# Patient Record
Sex: MALE | Race: WHITE | Employment: OTHER | URBAN - METROPOLITAN AREA
[De-identification: names, ages, dates, MRNs, and addresses within clinical notes are randomized per-mention and may not be internally consistent; named-entity substitution may affect disease eponyms.]

---

## 2017-10-17 ENCOUNTER — HOSPITAL ENCOUNTER (OUTPATIENT)
Dept: CT IMAGING | Age: 77
Discharge: HOME | End: 2017-10-17
Attending: UROLOGY | Admitting: UROLOGY

## 2017-10-17 DIAGNOSIS — Z87.442 PERSONAL HISTORY OF URINARY CALCULI: ICD-10-CM

## 2017-10-17 DIAGNOSIS — Z87.440 HISTORY OF URINARY TRACT INFECTION: ICD-10-CM

## 2017-12-13 ENCOUNTER — HOSPITAL ENCOUNTER (OUTPATIENT)
Dept: ENDOSCOPY | Age: 77
Discharge: HOME | End: 2017-12-13
Attending: INTERNAL MEDICINE | Admitting: INTERNAL MEDICINE

## 2017-12-13 VITALS
DIASTOLIC BLOOD PRESSURE: 87 MMHG | OXYGEN SATURATION: 98 % | HEIGHT: 67 IN | TEMPERATURE: 97.1 F | RESPIRATION RATE: 16 BRPM | WEIGHT: 175 LBS | BODY MASS INDEX: 27.47 KG/M2 | HEART RATE: 72 BPM | SYSTOLIC BLOOD PRESSURE: 134 MMHG

## 2017-12-13 ASSESSMENT — PAIN - FUNCTIONAL ASSESSMENT: PAIN_FUNCTIONAL_ASSESSMENT: 0-10

## 2017-12-13 NOTE — PLAN OF CARE
as ordered  [x] Tolerating clear liquids  [x] D/C IV fluids   ACTIVITY [x] Assess level of function  [x] Specified by physician  [x]Activity as tolerated [x] Position on left side [x] Position on left side and reposition patient as physician ordered [x] Gradually elevate HOB to larsons position  [x] Position changes as patient tolerated  [x] Ambulate as pre-procedure   PATIENT / SO EDUCATION [x] Pre,Intra, Post-procedure  teaching appropriate to procedure  [x]Conscious Sedation Teaching  [x] Pain Management - instructed [x] Encourage questions  [x] Clarify any concerns [x]Assist and support patient  [x]Safety devices maintain to  prevent patient injury  [x] Observe standard precautions [x] Physician confers with the family/S.O. [x] Short visit from family in [place] area  [x] Review discharge instructions, take home medicine to family /S.O.; questions clarified  [x] Physician specific post-procedure orders  [x] S/S complications with proper F/U  [x] F/U office visits  [] Med info sheet/ copy of discharge  instruction given to pt./S.O.   OUTCOME PLANNING  DISCHARGE PLAN [x] Patient/S. O. will verbalize understanding of admission  procedure & expectations of outcome in realistic terms  [x] Patient verbalize the role of family/S. O. in plan of care  [x] Patient will have designated responsible person available for discharge.  [x] Patient will demonstrate an  understanding of the planned  procedure and its related procedures and conscious sedation [x] Patient will:  - receive services according to the standards of care  - receive standards for conscious sedation  -  remain free of injury [x] Patient will:   - have stable vital signs based on Aly Score  -  be pain free or tolerable, have no signs of difficulty in breathing  - no abdominal distention, severe sore throat, chest pain, bleeding  -  return to pre-procedure level of conciousness   - tolerate fluid with no N/V  - ambulate as pre-procedure ADL  - verbalize

## 2018-01-30 ENCOUNTER — HOSPITAL ENCOUNTER (OUTPATIENT)
Dept: ENDOSCOPY | Age: 78
Discharge: HOME | End: 2018-01-30
Attending: INTERNAL MEDICINE | Admitting: INTERNAL MEDICINE

## 2018-01-30 VITALS
DIASTOLIC BLOOD PRESSURE: 69 MMHG | SYSTOLIC BLOOD PRESSURE: 129 MMHG | HEART RATE: 58 BPM | OXYGEN SATURATION: 100 % | BODY MASS INDEX: 26.67 KG/M2 | RESPIRATION RATE: 16 BRPM | HEIGHT: 68 IN | TEMPERATURE: 96.9 F | WEIGHT: 176 LBS

## 2018-01-30 NOTE — PLAN OF CARE
understanding of the discharge instructions      NURSE SIGNATURE:   __Electronically signed by Guevara Fleming RN on 1/30/2018 at 10:18 AM  __________________________   ________________________      __________________________    ____________________________________John Jacinto Postal  ___                                                            [id number],,S8,1/36,J

## 2018-01-31 NOTE — OP NOTE
[address], [address]                                 OPERATIVE REPORT    PATIENT NAME: Tigre Munoz                      :        1940  MED REC NO:   [id number]                          ROOM:  ACCOUNT NO:   [de-identified]                          ADMIT DATE: 2018  PROVIDER:     Pancho Doran MD    DATE OF PROCEDURE:  2018    INDICATION FOR PROCEDURE:  Recurrent dysphagia. PROCEDURE:  With the patient in the left lateral position, after 50 mg of  Demerol and 3 mg of Versed IV, the Olympus video endoscope was introduced  into the esophagus and advanced towards the GE junction, where a tight  lower esophageal ring was noted. Balloon dilation was performed using size  15 and 16 mm. Slight bleeding occurred, but there was no complication. What previously appear to be polyp at GE junction, it looks as an enlarged  fold and biopsies were obtained. Previous biopsy showed inflammatory  changes. Stomach was carefully inspected and it was unremarkable. The  duodenum was normal.  Scope was then removed without complication. IMPRESSION:  Lower esophageal ring. Balloon dilation was performed.         Karlene Martin MD    D: 2018 12:54:50       T: 2018 22:31:45     [email or web address]  Job#: [id number]     Doc#: [id number]    CC:  MD Devonte Hall MD

## 2018-02-07 NOTE — H&P
History and Physical / Pre-Sedation Assessment    Patient:  Rick Albert   :   1940     Intended Procedure:  egd    HPI: dysphagia    Nurses notes reviewed and agreed. Medications reviewed  Allergies: No Known Allergies    Physical Exam:  Vital Signs: /69   Pulse 58   Temp 96.9 °F (36.1 °C)   Resp 16   Ht 5' 7.5\" (1.715 m)   Wt 176 lb (79.8 kg)   SpO2 100%   BMI 27.16 kg/m²    Pulmonary:Normal  Cardiac:Normal  Abdomen:Normal    Pre-Procedure Assessment / Plan:  ASA 2 - Patient with mild systemic disease with no functional limitations  Level of Sedation Plan: Moderate sedation  Post Procedure plan: Return to same level of care    I assessed the patient and find that the patient is in satisfactory condition to proceed with the planned procedure and sedation plan. I have explained the risk, benefits, and alternatives to the procedure. The patient understands and agrees to proceed.   Taiwo Martinez  8:51 PM 2018

## 2019-03-19 ENCOUNTER — ANESTHESIA (OUTPATIENT)
Dept: OPERATING ROOM | Age: 79
End: 2019-03-19
Payer: MEDICARE

## 2019-03-19 ENCOUNTER — ANESTHESIA EVENT (OUTPATIENT)
Dept: OPERATING ROOM | Age: 79
End: 2019-03-19
Payer: MEDICARE

## 2019-03-19 ENCOUNTER — HOSPITAL ENCOUNTER (OUTPATIENT)
Age: 79
Setting detail: OUTPATIENT SURGERY
Discharge: HOME | End: 2019-03-19
Attending: OPHTHALMOLOGY | Admitting: OPHTHALMOLOGY
Payer: MEDICARE

## 2019-03-19 VITALS
DIASTOLIC BLOOD PRESSURE: 75 MMHG | HEIGHT: 67 IN | SYSTOLIC BLOOD PRESSURE: 136 MMHG | OXYGEN SATURATION: 99 % | BODY MASS INDEX: 28.25 KG/M2 | RESPIRATION RATE: 16 BRPM | TEMPERATURE: 97.8 F | WEIGHT: 180 LBS | HEART RATE: 51 BPM

## 2019-03-19 VITALS — DIASTOLIC BLOOD PRESSURE: 75 MMHG | SYSTOLIC BLOOD PRESSURE: 120 MMHG | OXYGEN SATURATION: 99 %

## 2019-03-19 PROCEDURE — 3600000013 HC SURGERY LEVEL 3 ADDTL 15MIN: Performed by: OPHTHALMOLOGY

## 2019-03-19 PROCEDURE — 2500000003 HC RX 250 WO HCPCS: Performed by: NURSE ANESTHETIST, CERTIFIED REGISTERED

## 2019-03-19 PROCEDURE — 6370000000 HC RX 637 (ALT 250 FOR IP)

## 2019-03-19 PROCEDURE — 6360000002 HC RX W HCPCS: Performed by: NURSE ANESTHETIST, CERTIFIED REGISTERED

## 2019-03-19 PROCEDURE — 3700000001 HC ADD 15 MINUTES (ANESTHESIA): Performed by: OPHTHALMOLOGY

## 2019-03-19 PROCEDURE — 7100000011 HC PHASE II RECOVERY - ADDTL 15 MIN: Performed by: OPHTHALMOLOGY

## 2019-03-19 PROCEDURE — 3700000000 HC ANESTHESIA ATTENDED CARE: Performed by: OPHTHALMOLOGY

## 2019-03-19 PROCEDURE — 6370000000 HC RX 637 (ALT 250 FOR IP): Performed by: OPHTHALMOLOGY

## 2019-03-19 PROCEDURE — 2709999900 HC NON-CHARGEABLE SUPPLY: Performed by: OPHTHALMOLOGY

## 2019-03-19 PROCEDURE — V2632 POST CHMBR INTRAOCULAR LENS: HCPCS | Performed by: OPHTHALMOLOGY

## 2019-03-19 PROCEDURE — 3600000003 HC SURGERY LEVEL 3 BASE: Performed by: OPHTHALMOLOGY

## 2019-03-19 PROCEDURE — 2580000003 HC RX 258: Performed by: OPHTHALMOLOGY

## 2019-03-19 PROCEDURE — 2580000003 HC RX 258: Performed by: ANESTHESIOLOGY

## 2019-03-19 PROCEDURE — 7100000010 HC PHASE II RECOVERY - FIRST 15 MIN: Performed by: OPHTHALMOLOGY

## 2019-03-19 DEVICE — ACRYSOF(R) IQ ASPHERIC IOL SP ACRYLIC FOLDABLELENS WULTRASERT(TM) DELIVERY SYSTEM UV WBLUE LIGHT FILTER. 13.0MM LENGTH 6.0MM ANTERIORASYMMETRIC BICONVEX OPTIC PLANAR HAPTICS.
Type: IMPLANTABLE DEVICE | Site: LENS | Status: FUNCTIONAL
Brand: ACRYSOF ULTRASERT

## 2019-03-19 RX ORDER — SODIUM CHLORIDE 0.9 % (FLUSH) 0.9 %
10 SYRINGE (ML) INJECTION PRN
Status: DISCONTINUED | OUTPATIENT
Start: 2019-03-19 | End: 2019-03-19 | Stop reason: HOSPADM

## 2019-03-19 RX ORDER — LIDOCAINE HYDROCHLORIDE 10 MG/ML
0.3 INJECTION, SOLUTION EPIDURAL; INFILTRATION; INTRACAUDAL; PERINEURAL
Status: DISCONTINUED | OUTPATIENT
Start: 2019-03-19 | End: 2019-03-19 | Stop reason: HOSPADM

## 2019-03-19 RX ORDER — LIDOCAINE HYDROCHLORIDE 20 MG/ML
INJECTION, SOLUTION INFILTRATION; PERINEURAL PRN
Status: DISCONTINUED | OUTPATIENT
Start: 2019-03-19 | End: 2019-03-19 | Stop reason: SDUPTHER

## 2019-03-19 RX ORDER — PROPOFOL 10 MG/ML
INJECTION, EMULSION INTRAVENOUS PRN
Status: DISCONTINUED | OUTPATIENT
Start: 2019-03-19 | End: 2019-03-19 | Stop reason: SDUPTHER

## 2019-03-19 RX ORDER — SODIUM CHLORIDE 0.9 % (FLUSH) 0.9 %
10 SYRINGE (ML) INJECTION EVERY 12 HOURS SCHEDULED
Status: DISCONTINUED | OUTPATIENT
Start: 2019-03-19 | End: 2019-03-19 | Stop reason: HOSPADM

## 2019-03-19 RX ORDER — MAGNESIUM HYDROXIDE 1200 MG/15ML
LIQUID ORAL PRN
Status: DISCONTINUED | OUTPATIENT
Start: 2019-03-19 | End: 2019-03-19 | Stop reason: ALTCHOICE

## 2019-03-19 RX ORDER — SODIUM CHLORIDE, SODIUM LACTATE, POTASSIUM CHLORIDE, CALCIUM CHLORIDE 600; 310; 30; 20 MG/100ML; MG/100ML; MG/100ML; MG/100ML
INJECTION, SOLUTION INTRAVENOUS CONTINUOUS
Status: DISCONTINUED | OUTPATIENT
Start: 2019-03-19 | End: 2019-03-19 | Stop reason: HOSPADM

## 2019-03-19 RX ORDER — TOBRAMYCIN AND DEXAMETHASONE 3; 1 MG/ML; MG/ML
SUSPENSION/ DROPS OPHTHALMIC PRN
Status: DISCONTINUED | OUTPATIENT
Start: 2019-03-19 | End: 2019-03-19 | Stop reason: ALTCHOICE

## 2019-03-19 RX ADMIN — PROPOFOL 90 MG: 10 INJECTION, EMULSION INTRAVENOUS at 09:04

## 2019-03-19 RX ADMIN — Medication 0.3 ML: at 07:53

## 2019-03-19 RX ADMIN — Medication 0.3 ML: at 07:43

## 2019-03-19 RX ADMIN — BROMFENAC SODIUM 1 DROP: 0.7 SOLUTION/ DROPS OPHTHALMIC at 07:44

## 2019-03-19 RX ADMIN — SODIUM CHLORIDE, POTASSIUM CHLORIDE, SODIUM LACTATE AND CALCIUM CHLORIDE: 600; 310; 30; 20 INJECTION, SOLUTION INTRAVENOUS at 08:01

## 2019-03-19 RX ADMIN — LIDOCAINE HYDROCHLORIDE 40 MG: 20 INJECTION, SOLUTION INFILTRATION; PERINEURAL at 09:23

## 2019-03-19 RX ADMIN — Medication 0.3 ML: at 08:03

## 2019-03-19 ASSESSMENT — PULMONARY FUNCTION TESTS
PIF_VALUE: 1

## 2019-03-19 ASSESSMENT — PAIN - FUNCTIONAL ASSESSMENT: PAIN_FUNCTIONAL_ASSESSMENT: 0-10

## 2019-03-19 ASSESSMENT — PAIN SCALES - GENERAL: PAINLEVEL_OUTOF10: 0

## 2019-09-13 ENCOUNTER — HOSPITAL ENCOUNTER (EMERGENCY)
Age: 79
Discharge: HOME | End: 2019-09-13
Attending: EMERGENCY MEDICINE
Payer: MEDICARE

## 2019-09-13 VITALS
RESPIRATION RATE: 16 BRPM | DIASTOLIC BLOOD PRESSURE: 71 MMHG | HEART RATE: 68 BPM | TEMPERATURE: 99.1 F | HEIGHT: 67 IN | OXYGEN SATURATION: 97 % | BODY MASS INDEX: 28.25 KG/M2 | SYSTOLIC BLOOD PRESSURE: 134 MMHG | WEIGHT: 180 LBS

## 2019-09-13 DIAGNOSIS — L02.91 ABSCESS: Primary | ICD-10-CM

## 2019-09-13 PROCEDURE — 6370000000 HC RX 637 (ALT 250 FOR IP): Performed by: EMERGENCY MEDICINE

## 2019-09-13 PROCEDURE — 99283 EMERGENCY DEPT VISIT LOW MDM: CPT

## 2019-09-13 PROCEDURE — 4500000023 HC ED LEVEL 3 PROCEDURE

## 2019-09-13 RX ORDER — OXYCODONE HYDROCHLORIDE AND ACETAMINOPHEN 5; 325 MG/1; MG/1
2 TABLET ORAL ONCE
Status: COMPLETED | OUTPATIENT
Start: 2019-09-13 | End: 2019-09-13

## 2019-09-13 RX ORDER — CLINDAMYCIN HYDROCHLORIDE 150 MG/1
300 CAPSULE ORAL ONCE
Status: COMPLETED | OUTPATIENT
Start: 2019-09-13 | End: 2019-09-13

## 2019-09-13 RX ORDER — OXYCODONE HYDROCHLORIDE AND ACETAMINOPHEN 5; 325 MG/1; MG/1
1 TABLET ORAL EVERY 6 HOURS PRN
Qty: 20 TABLET | Refills: 0 | Status: SHIPPED | OUTPATIENT
Start: 2019-09-13 | End: 2019-09-18

## 2019-09-13 RX ORDER — OXYCODONE HYDROCHLORIDE AND ACETAMINOPHEN 5; 325 MG/1; MG/1
1 TABLET ORAL ONCE
Status: COMPLETED | OUTPATIENT
Start: 2019-09-13 | End: 2019-09-13

## 2019-09-13 RX ORDER — CLINDAMYCIN HYDROCHLORIDE 300 MG/1
300 CAPSULE ORAL 2 TIMES DAILY
Qty: 14 CAPSULE | Refills: 0 | Status: SHIPPED | OUTPATIENT
Start: 2019-09-13 | End: 2019-09-20

## 2019-09-13 RX ADMIN — CLINDAMYCIN HYDROCHLORIDE 300 MG: 150 CAPSULE ORAL at 21:48

## 2019-09-13 RX ADMIN — OXYCODONE HYDROCHLORIDE AND ACETAMINOPHEN 2 TABLET: 5; 325 TABLET ORAL at 20:53

## 2019-09-13 RX ADMIN — OXYCODONE HYDROCHLORIDE AND ACETAMINOPHEN 1 TABLET: 5; 325 TABLET ORAL at 21:48

## 2019-09-13 ASSESSMENT — PAIN DESCRIPTION - PAIN TYPE: TYPE: ACUTE PAIN

## 2019-09-13 ASSESSMENT — PAIN SCALES - GENERAL
PAINLEVEL_OUTOF10: 5
PAINLEVEL_OUTOF10: 5

## 2019-09-13 ASSESSMENT — PAIN - FUNCTIONAL ASSESSMENT: PAIN_FUNCTIONAL_ASSESSMENT: ACTIVITIES ARE NOT PREVENTED

## 2019-09-13 ASSESSMENT — PAIN DESCRIPTION - PROGRESSION: CLINICAL_PROGRESSION: GRADUALLY WORSENING

## 2019-09-13 ASSESSMENT — PAIN DESCRIPTION - LOCATION: LOCATION: BUTTOCKS

## 2019-09-13 ASSESSMENT — PAIN DESCRIPTION - FREQUENCY: FREQUENCY: CONTINUOUS

## 2019-09-13 ASSESSMENT — PAIN DESCRIPTION - DESCRIPTORS: DESCRIPTORS: SHARP;STABBING

## 2019-09-13 ASSESSMENT — PAIN DESCRIPTION - ORIENTATION: ORIENTATION: LEFT

## 2020-02-16 ENCOUNTER — APPOINTMENT (OUTPATIENT)
Dept: GENERAL RADIOLOGY | Age: 80
End: 2020-02-16
Payer: MEDICARE

## 2020-02-16 ENCOUNTER — HOSPITAL ENCOUNTER (EMERGENCY)
Age: 80
Discharge: HOME | End: 2020-02-16
Payer: MEDICARE

## 2020-02-16 VITALS
SYSTOLIC BLOOD PRESSURE: 115 MMHG | HEART RATE: 77 BPM | HEIGHT: 67 IN | BODY MASS INDEX: 28.25 KG/M2 | DIASTOLIC BLOOD PRESSURE: 71 MMHG | WEIGHT: 180 LBS | TEMPERATURE: 97.9 F | RESPIRATION RATE: 17 BRPM | OXYGEN SATURATION: 99 %

## 2020-02-16 PROCEDURE — 99283 EMERGENCY DEPT VISIT LOW MDM: CPT

## 2020-02-16 PROCEDURE — 71046 X-RAY EXAM CHEST 2 VIEWS: CPT

## 2020-02-16 NOTE — ED PROVIDER NOTES
[address] [facility]  EMERGENCY DEPARTMENT ENCOUNTER        Pt Name: Cyril Lopez  MRN: [id number]  [place] 1940  Date of evaluation: 2/16/2020  Provider: GODFREY Gomez - CNP  PCP: Bailey Garza MD    This patient was not seen and evaluated by the attending physician. CHIEF COMPLAINT       Chief Complaint   Patient presents with    URI     x 1 week    Cough       HISTORY OF PRESENT ILLNESS   (Location, Timing/Onset, Context/Setting, Quality, Duration, Modifying Factors, Severity, Associated Signs and Symptoms)  Note limiting factors. Cyril Lopez is a [de-identified] y.o. male who presents for evaluation of cough and congestion x1 week. Patient states he has had a productive cough with light green sputum and nasal congestion x1 week. Patient states that he has been taking Mucinex since yesterday with relief. Denies smoking, sore throat, ear pain, chest pain, shortness of breath, fever, chills, nausea, vomiting, and diarrhea. Patient states he did not receive a flu shot this year. States \"I want to make sure I do not have pneumonia. \"    Nursing Notes were all reviewed and agreed with or any disagreements were addressed in the HPI. REVIEW OF SYSTEMS    (2-9 systems for level 4, 10 or more for level 5)     Review of Systems    Positives and Pertinent negatives as per HPI. Except as noted above in the ROS, all other systems were reviewed and negative.        PAST MEDICAL HISTORY     Past Medical History:   Diagnosis Date    Chronic kidney disease     kidney stones    [facility]     Guillain Barré syndrome ([facility]     Hyperlipidemia     Hypertension     Left ureteral stone          SURGICAL HISTORY     Past Surgical History:   Procedure Laterality Date    ADRENALECTOMY      groin    BACK SURGERY      CARPAL TUNNEL RELEASE      bilateral    INTRACAPSULAR CATARACT EXTRACTION Right 3/19/2019    PHACOEMULSIFICATION OF CATARACT RIGHT EYE WITH INTRAOCULARLENS IMPLANT performed by Lalito Sampson MD at [address]   [address] OTHER SURGICAL HISTORY  10/25/2017     CYSTOSCOPY; TRANSURETHRAL RESECTION PROSTATE    TOTAL ELBOW ARTHROPLASTY Left          CURRENTMEDICATIONS       Previous Medications    ALLOPURINOL (ZYLOPRIM) 300 MG TABLET    Take 300 mg by mouth daily    ATORVASTATIN (LIPITOR) 80 MG TABLET    Take 80 mg by mouth daily    DIPHENHYDRAMINE-APAP, SLEEP, (TYLENOL PM EXTRA STRENGTH PO)    Take 1 tablet by mouth nightly    DONEPEZIL (ARICEPT) 5 MG TABLET    Take 5 mg by mouth nightly    LOPERAMIDE HCL (IMODIUM A-D PO)    Take by mouth Indications: as needed    TAMSULOSIN (FLOMAX) 0.4 MG CAPSULE    Take 0.4 mg by mouth daily         ALLERGIES     Patient has no known allergies. FAMILYHISTORY       Family History   Problem Relation Age of Onset    Colon Cancer Father     Diabetes Mother     Hypertension Mother     Colon Cancer Sister         x 2          SOCIAL HISTORY       Social History     Tobacco Use    Smoking status: Never Smoker    Smokeless tobacco: Never Used   Substance Use Topics    Alcohol use: No    Drug use: No       SCREENINGS             PHYSICAL EXAM    (up to 7 for level 4, 8 or more for level 5)     ED Triage Vitals [02/16/20 1134]   BP Temp Temp Source Pulse Resp SpO2 Height Weight   114/70 97.9 °F (36.6 °C) Tympanic 75 17 97 % 5' 7\" (1.702 m) 180 lb (81.6 kg)       Physical Exam  Vitals signs and nursing note reviewed. Constitutional:       Appearance: He is well-developed. He is not diaphoretic. HENT:      Head: Normocephalic and atraumatic. Right Ear: Tympanic membrane normal.      Left Ear: Tympanic membrane normal.      Nose: Congestion present. Mouth/Throat:      Mouth: Mucous membranes are moist.      Pharynx: Oropharynx is clear. Eyes:      General:         Right eye: No discharge. Left eye: No discharge.    Neck:      Musculoskeletal: Normal range of

## 2024-02-05 LAB
A/G RATIO: NORMAL
ALBUMIN SERPL-MCNC: NORMAL G/DL
ALP BLD-CCNC: 142 U/L
ALT SERPL-CCNC: 13 U/L
AST SERPL-CCNC: 23 U/L
BASOPHILS ABSOLUTE: 0.1 /ΜL
BASOPHILS RELATIVE PERCENT: 1 %
BILIRUB SERPL-MCNC: 1.3 MG/DL (ref 0.1–1.4)
BILIRUBIN DIRECT: 0.58 MG/DL
BILIRUBIN, INDIRECT: NORMAL
EOSINOPHILS ABSOLUTE: 0.2 /ΜL
EOSINOPHILS RELATIVE PERCENT: 4 %
GLOBULIN: NORMAL
HCT VFR BLD CALC: 49.3 % (ref 41–53)
HEMOGLOBIN: 15.6 G/DL (ref 13.5–17.5)
LYMPHOCYTES ABSOLUTE: 1.6 /ΜL
LYMPHOCYTES RELATIVE PERCENT: 25 %
MCH RBC QN AUTO: 27.9 PG
MCHC RBC AUTO-ENTMCNC: 31.6 G/DL
MCV RBC AUTO: 88 FL
MONOCYTES ABSOLUTE: 0.6 /ΜL
MONOCYTES RELATIVE PERCENT: 9 %
NEUTROPHILS ABSOLUTE: 3.9 /ΜL
NEUTROPHILS RELATIVE PERCENT: 61 %
PLATELET # BLD: 209 K/ΜL
PMV BLD AUTO: NORMAL FL
PROTEIN TOTAL: NORMAL
RBC # BLD: 5.59 10^6/ΜL
TSH SERPL DL<=0.05 MIU/L-ACNC: 1.7 UIU/ML
WBC # BLD: 6.4 10^3/ML

## 2024-02-13 ENCOUNTER — TELEPHONE (OUTPATIENT)
Dept: CARDIOLOGY CLINIC | Age: 84
End: 2024-02-13

## 2024-02-13 NOTE — TELEPHONE ENCOUNTER
Pt wife called to make a new pt appt, stated pt had a stress test last week that was abnormal, SOB,  edema, and claims he has a \"Heart Virus\". Verified pcp, called and requested labs and cardiac testing be faxed. Once received will abstract and scan into media.

## 2024-02-16 ENCOUNTER — TELEPHONE (OUTPATIENT)
Dept: CARDIOLOGY CLINIC | Age: 84
End: 2024-02-16

## 2024-02-16 ENCOUNTER — OFFICE VISIT (OUTPATIENT)
Dept: CARDIOLOGY CLINIC | Age: 84
End: 2024-02-16

## 2024-02-16 VITALS
HEIGHT: 67 IN | BODY MASS INDEX: 29.74 KG/M2 | HEART RATE: 99 BPM | DIASTOLIC BLOOD PRESSURE: 62 MMHG | SYSTOLIC BLOOD PRESSURE: 116 MMHG | OXYGEN SATURATION: 100 % | WEIGHT: 189.5 LBS

## 2024-02-16 DIAGNOSIS — R60.0 BILATERAL LOWER EXTREMITY EDEMA: ICD-10-CM

## 2024-02-16 DIAGNOSIS — Z79.899 MEDICATION MANAGEMENT: ICD-10-CM

## 2024-02-16 DIAGNOSIS — R00.2 PALPITATIONS: ICD-10-CM

## 2024-02-16 DIAGNOSIS — I49.9 IRREGULAR HEART RATE: ICD-10-CM

## 2024-02-16 DIAGNOSIS — R06.02 SHORTNESS OF BREATH: ICD-10-CM

## 2024-02-16 DIAGNOSIS — I50.40 COMBINED SYSTOLIC AND DIASTOLIC CONGESTIVE HEART FAILURE, UNSPECIFIED HF CHRONICITY (HCC): Primary | ICD-10-CM

## 2024-02-16 DIAGNOSIS — R06.09 DYSPNEA ON EXERTION: ICD-10-CM

## 2024-02-16 DIAGNOSIS — Z76.89 ESTABLISHING CARE WITH NEW DOCTOR, ENCOUNTER FOR: ICD-10-CM

## 2024-02-16 DIAGNOSIS — E78.2 MIXED HYPERLIPIDEMIA: ICD-10-CM

## 2024-02-16 RX ORDER — OMEPRAZOLE 40 MG/1
40 CAPSULE, DELAYED RELEASE ORAL DAILY
COMMUNITY

## 2024-02-16 RX ORDER — CEFDINIR 300 MG/1
300 CAPSULE ORAL 2 TIMES DAILY
COMMUNITY

## 2024-02-16 RX ORDER — FUROSEMIDE 40 MG/1
TABLET ORAL
Qty: 40 TABLET | Refills: 0 | Status: SHIPPED | OUTPATIENT
Start: 2024-02-16 | End: 2024-03-22

## 2024-02-16 RX ORDER — ROSUVASTATIN CALCIUM 10 MG/1
10 TABLET, COATED ORAL EVERY EVENING
COMMUNITY
Start: 2023-12-27

## 2024-02-16 RX ORDER — SACUBITRIL AND VALSARTAN 24; 26 MG/1; MG/1
1 TABLET, FILM COATED ORAL 2 TIMES DAILY
COMMUNITY
Start: 2024-02-09

## 2024-02-16 RX ORDER — OXYBUTYNIN CHLORIDE 5 MG/1
5 TABLET, EXTENDED RELEASE ORAL DAILY
COMMUNITY

## 2024-02-16 NOTE — PROGRESS NOTES
CARDIOLOGY CONSULTATION        Patient Name: Jhony Hdez  Primary Care physician: Jhony Valdovinos MD    Reason for Referral/Chief Complaint: Jhony Hdez is a 83 y.o. patient who is referred to cardiology clinic today for evaluation and treatment of shortness of breath.     History of Present Illness:   Jhony Hdez is a 83 y.o. 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 He follows with Dr. Jhony Valdovinos and reported worsening shortness of breath. He completed a stress test with his primary care provider that was interpreted as abnormal with severely reduced LV function which prompted the cardiology referral.      Today, 2/16/2024, he reports increasing shortness of breath and lower extremity edema.  He reports stress testing findings of severely reduced LV function is a new diagnosis and he has not seen a cardiologist previously. He reports increasing short of breath with minimal exertion starting ~5 weeks ago, which lead to stress testing by his primary doctor.  He also feels short of breath and dizzy with bending over.  He reports worsening lower extremity edema bilaterally along with scrotal edema. He thinks the only cardiac testing he has had in the past was stress testing.  He denies tobacco use, alcohol use or illicit drug use.  He reports he was started on cefdinir by his PCP for swelling in his groin.  He denies any issues with urination.  He was sttarted on Entresto by his primary and he reports the medication is expensive for him.  He states his PCP told him his cardiomyopathy was due to a virus, however, we are unable to find documentation to corroborate this.   He reports that his primary told him the stress test did not show any blockages in his heart.        Past Medical History:   has a past medical history of Chronic kidney 
with no rashes or ulceration.    Pysch: Euthymic mood, appropriate affect   Neurologic: Oriented to person, place and time. No slurred speech or facial asymmetry. No motor or sensory deficits on gross examination.         Labs:   CBC:   Lab Results   Component Value Date/Time    WBC 6.4 2024 12:00 AM    RBC 5.59 2024 12:00 AM    RBC 4.89 2017 11:22 AM    HGB 15.6 2024 12:00 AM    HCT 49.3 2024 12:00 AM    MCV 88 2024 12:00 AM    RDW 14.4 10/26/2017 08:30 AM     2024 12:00 AM     CMP:  Lab Results   Component Value Date/Time     10/26/2017 08:30 AM    K 3.6 10/26/2017 08:30 AM     10/26/2017 08:30 AM    CO2 28 10/26/2017 08:30 AM    BUN 6 10/26/2017 08:30 AM    CREATININE 0.6 10/26/2017 08:30 AM    GFRAA >60 10/26/2017 08:30 AM    AGRATIO 1.5 10/18/2016 11:16 AM    LABGLOM >60 10/26/2017 08:30 AM    GLUCOSE 107 10/26/2017 08:30 AM    GLUCOSE 95 2017 11:22 AM    PROT 6.3 2017 11:22 AM    CALCIUM 7.9 10/26/2017 08:30 AM    BILITOT 1.3 2024 12:00 AM    ALKPHOS 142 2024 12:00 AM    AST 23 2024 12:00 AM    ALT 13 2024 12:00 AM     PT/INR:  No results found for: \"PTINR\"  HgBA1c:No results found for: \"LABA1C\"  No results found for: \"CKTOTAL\", \"CKMB\", \"CKMBINDEX\", \"TROPONINI\"    FLP:  No results found for: \"TRIG\", \"HDL\", \"LDLCALC\", \"LDLDIRECT\"    Cardiac Data:     EK24     Echo: 7/20/21  LVEF 50%,, mild thickening of mitral valves.     Stress test: 24  Scanned into media, LVEF 24%      Impression and Plan:      1. Shortness of breath   2. Hyperlipidemia       Patient Active Problem List   Diagnosis    Malignant neoplasm of splenic flexure (HCC)    Guillain Barré syndrome (HCC)       PLAN:  ***     ***     ***    I will address the patient's cardiac risk factors and adjusted pharmacologic treatment as needed. In addition, I have reinforced the need for patient directed risk factor modification.  All questions and

## 2024-02-16 NOTE — PATIENT INSTRUCTIONS
PLAN:  Recommend Echo   Recommend 2 week Cardiac Event Monitor   Start Lasix 40mg twice a day for 5 days, then 40mg daily   -take first dose before 10AM, take second dose before 2PM  Labs: CMP one week before next visit (in 3 weeks)   Please limit Sodium intake in daily diet  Please limit fluid intake to 1.5 L daily  Obtain results of Stress Test Results and Progress Note from Dr. Valdovinos  Provided patient with patient assistance paperwork for Entresto  Follow up with me in 4 weeks      Your provider has ordered testing for further evaluation.  An order/prescription has been included in your paper work.   To schedule outpatient testing, contact Central Scheduling by calling [employer] ([phone]).

## 2024-02-16 NOTE — TELEPHONE ENCOUNTER
Monitor placed by Jaclyn MERRITT  Monitor company [employer]  Length of monitor 2 weeks  Monitor ordered by ALEJANDRO  Phone ID [email or web address]  First Patch ID [id number]  Activation successful prior to pt leaving office? Yes        
 used

## 2024-02-19 ENCOUNTER — TELEPHONE (OUTPATIENT)
Dept: CARDIOLOGY CLINIC | Age: 84
End: 2024-02-19

## 2024-02-19 DIAGNOSIS — I48.91 ATRIAL FIBRILLATION, UNSPECIFIED TYPE (HCC): Primary | ICD-10-CM

## 2024-02-19 NOTE — TELEPHONE ENCOUNTER
Jono Sandhu MD  You4 minutes ago (12:45 PM)     Ok thanks.  If cost for eliquis is prohibitive, begin Coumadin 5mg daily and refer to Coumadin clinic.

## 2024-02-19 NOTE — TELEPHONE ENCOUNTER
Jono Sandhu MD  You10 minutes ago (10:23 AM)     Please call patient Lemuel and inform him new onset AFIB was detected on his monitor.  He needs to begin Eliquis 5mg bid and to ensure close follow-up for next appointment.  Thanks         Spoke with patient and his wife. They do not have health insurance. They want to send in script for Eliquis, but will call us back if it is cost prohibitive. Has F/U with ALEJANDRO scheduled for 3/20/24.     ALEJANDRO LLANES

## 2024-02-19 NOTE — TELEPHONE ENCOUNTER
We received an \"event\" strip from [employer] -scanned into this encounter.    New pt for ALEJANDRO 2/16/23

## 2024-03-11 ENCOUNTER — TELEPHONE (OUTPATIENT)
Dept: CARDIOLOGY CLINIC | Age: 84
End: 2024-03-11

## 2024-03-11 NOTE — TELEPHONE ENCOUNTER
Pt states he has a refill of entresto at the pharmacy, he says it is expensive. He wants to verify if ALEJANDRO wants him to stay on it? If so he will pick the refill up today. He is completely out of medication.

## 2024-03-11 NOTE — TELEPHONE ENCOUNTER
Pt states for last 5 days he has blood in his urine. Pt states it is a light red color. Pt stopped taking Eliquis last night because of this. Pt would also like to know if he should continue taking Entresto. Please advise.

## 2024-03-11 NOTE — TELEPHONE ENCOUNTER
Pt called back. Message given. Pt schedule 3.14.2024 ALEJANDRO. Pt asked if he should refill the Entresto or wait to see ALEJANDRO on 3.14.2024

## 2024-03-12 NOTE — PROGRESS NOTES
CARDIOLOGY CONSULTATION        Patient Name: Jhony Hdez  Primary Care physician: Jhony Valdovinos MD    Reason for Referral/Chief Complaint: Jhony Hdez is a 83 y.o. patient who is referred to cardiology clinic today for evaluation and treatment of shortness of breath.     History of Present Illness:   Jhony Hdez is a 83 y.o. 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     He follows with Dr. Jhony Valdovinos and reported worsening shortness of breath. He completed a stress test with his primary care provider that was interpreted as abnormal with severely reduced LV function which prompted the cardiology referral 2/16/2024. He reported increasing short of breath with minimal exertion starting ~5 weeks ago, which lead to stress testing by his primary doctor. He also felt short of breath and dizzy with bending over.  He reported worsening lower extremity edema bilaterally along with scrotal edema adm started on cefdinir.  He denied tobacco use, alcohol use or illicit drug use.  He was started on Entresto by his primary care provider but cost prohibitive.     An echocardiogram is scheduled 3/15/24 and started on Lasix. He wore an event monitor 2/17/24 that showed new onset atrial fibrillation/flutter and started Eliquis but reported hematuria and it was cost prohibitive. He presents today as a follow up.             Past Medical History:   has a past medical history of Chronic kidney disease, Colon cancer (HCC), Guillain Barré syndrome (HCC), Hyperlipidemia, Hypertension, and Left ureteral stone.    Surgical History:   has a past surgical history that includes back surgery; Adrenalectomy; Carpal tunnel release; Total elbow arthroplasty (Left); Mandible surgery; other surgical history (10/25/2017); Kidney stone surgery; and

## 2024-03-14 ENCOUNTER — OFFICE VISIT (OUTPATIENT)
Dept: CARDIOLOGY CLINIC | Age: 84
End: 2024-03-14
Payer: MEDICARE

## 2024-03-14 ENCOUNTER — TELEPHONE (OUTPATIENT)
Dept: CARDIOLOGY CLINIC | Age: 84
End: 2024-03-14

## 2024-03-14 VITALS
HEIGHT: 67 IN | WEIGHT: 163 LBS | DIASTOLIC BLOOD PRESSURE: 66 MMHG | OXYGEN SATURATION: 97 % | SYSTOLIC BLOOD PRESSURE: 108 MMHG | HEART RATE: 55 BPM | BODY MASS INDEX: 25.58 KG/M2

## 2024-03-14 DIAGNOSIS — R94.31 ABNORMAL EKG: ICD-10-CM

## 2024-03-14 DIAGNOSIS — I10 ESSENTIAL HYPERTENSION: ICD-10-CM

## 2024-03-14 DIAGNOSIS — I50.40 COMBINED SYSTOLIC AND DIASTOLIC CONGESTIVE HEART FAILURE, UNSPECIFIED HF CHRONICITY (HCC): ICD-10-CM

## 2024-03-14 DIAGNOSIS — R06.02 SHORTNESS OF BREATH: Primary | ICD-10-CM

## 2024-03-14 DIAGNOSIS — E78.2 MIXED HYPERLIPIDEMIA: ICD-10-CM

## 2024-03-14 DIAGNOSIS — I48.0 PAF (PAROXYSMAL ATRIAL FIBRILLATION) (HCC): ICD-10-CM

## 2024-03-14 PROCEDURE — 3074F SYST BP LT 130 MM HG: CPT | Performed by: INTERNAL MEDICINE

## 2024-03-14 PROCEDURE — 99214 OFFICE O/P EST MOD 30 MIN: CPT | Performed by: INTERNAL MEDICINE

## 2024-03-14 PROCEDURE — 1123F ACP DISCUSS/DSCN MKR DOCD: CPT | Performed by: INTERNAL MEDICINE

## 2024-03-14 PROCEDURE — 3078F DIAST BP <80 MM HG: CPT | Performed by: INTERNAL MEDICINE

## 2024-03-14 RX ORDER — WARFARIN SODIUM 5 MG/1
5 TABLET ORAL DAILY
Qty: 30 TABLET | Refills: 0 | Status: SHIPPED | OUTPATIENT
Start: 2024-03-14

## 2024-03-14 RX ORDER — SACUBITRIL AND VALSARTAN 24; 26 MG/1; MG/1
1 TABLET, FILM COATED ORAL 2 TIMES DAILY
Qty: 60 TABLET | Refills: 0 | Status: SHIPPED | OUTPATIENT
Start: 2024-03-14

## 2024-03-14 NOTE — PATIENT INSTRUCTIONS
PLAN:  Complete echocardiogram.   Start Coumadin 5 mg daily and referral to the coumadin clinic. Notify our office of bleeding.   Discussed referral to [facility] for hematuria.   Please limit Sodium intake in daily diet.  Please limit fluid intake to 1.5 L daily.  Follow up with me in 4 weeks.

## 2024-03-14 NOTE — TELEPHONE ENCOUNTER
Pt wants to know if he could go back to work. A verbal confirmation is okay per wife. Please advise    Callback; [phone]

## 2024-03-14 NOTE — PROGRESS NOTES
CARDIOLOGY CONSULTATION        Patient Name: Jhony Hdez  Primary Care physician: Jhony Valdovinos MD    Reason for Referral/Chief Complaint: Jhony Hdez is a 83 y.o. patient who is referred to cardiology clinic today for evaluation and treatment of shortness of breath.     History of Present Illness:   Jhony Hdez is a 83 y.o. 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     He follows with Dr. Jhony Valdovinos and reported worsening shortness of breath. He completed a stress test with his primary care provider that was interpreted as abnormal with severely reduced LV function which prompted the cardiology referral 2/16/2024. He reported increasing short of breath with minimal exertion starting ~5 weeks ago, which lead to stress testing by his primary doctor. He also felt short of breath and dizzy with bending over.  He reported worsening lower extremity edema bilaterally along with scrotal edema adm started on cefdinir.  He denied tobacco use, alcohol use or illicit drug use.  He was started on Entresto at last visit, but cost prohibitive.     An echocardiogram is scheduled 3/15/24 and started on Lasix. He wore an event monitor 2/17/24 that showed new onset atrial fibrillation/flutter and started Eliquis but reported hematuria and it was cost prohibitive.     Today, 03/14/2024, he reports that he feels well overall. He has lost 20 pounds and believes the majority of it was water weight. Patient denies current edema, chest pain, shortness of breath, palpitations, dizziness or syncope. Patient is taking all cardiac medications as prescribed and tolerates them well. He is paying $698 a month for Entresto. He stopped taking Eliquis because of hematuria. He has a history of prostate cancer and kidney stones.     Past Medical

## 2024-03-14 NOTE — TELEPHONE ENCOUNTER
I spoke with the Urology group regarding episode of hematuria with Eliquis and a history of prostate cancer. I explained that we would prefer the patient to stay on blood thinner with AF if possible. I also informed them that the patient was not currently bleeding, however, we did start the patient on coumadin, because Eliquis was too expensive.  They advised the patient to call to make a follow up appointment. I spoke with the patient and relayed this information. He V/U. MARIO ALBERTO ALLEN.

## 2024-03-15 ENCOUNTER — TELEPHONE (OUTPATIENT)
Dept: CARDIOLOGY CLINIC | Age: 84
End: 2024-03-15

## 2024-03-15 ENCOUNTER — HOSPITAL ENCOUNTER (OUTPATIENT)
Dept: NON INVASIVE DIAGNOSTICS | Age: 84
Discharge: HOME | End: 2024-03-15
Payer: MEDICARE

## 2024-03-15 DIAGNOSIS — R06.02 SHORTNESS OF BREATH: ICD-10-CM

## 2024-03-15 DIAGNOSIS — R06.09 DYSPNEA ON EXERTION: ICD-10-CM

## 2024-03-15 DIAGNOSIS — Z79.899 MEDICATION MANAGEMENT: ICD-10-CM

## 2024-03-15 DIAGNOSIS — R60.0 BILATERAL LOWER EXTREMITY EDEMA: ICD-10-CM

## 2024-03-15 DIAGNOSIS — E78.2 MIXED HYPERLIPIDEMIA: ICD-10-CM

## 2024-03-15 LAB
ALBUMIN SERPL-MCNC: 4.5 G/DL (ref 3.4–5)
ALBUMIN/GLOB SERPL: 1.6 {RATIO} (ref 1.1–2.2)
ALP SERPL-CCNC: 130 U/L (ref 40–129)
ALT SERPL-CCNC: 19 U/L (ref 10–40)
ANION GAP SERPL CALCULATED.3IONS-SCNC: 11 MMOL/L (ref 3–16)
AST SERPL-CCNC: 26 U/L (ref 15–37)
BILIRUB SERPL-MCNC: 0.9 MG/DL (ref 0–1)
BUN SERPL-MCNC: 29 MG/DL (ref 7–20)
CALCIUM SERPL-MCNC: 9.9 MG/DL (ref 8.3–10.6)
CHLORIDE SERPL-SCNC: 99 MMOL/L (ref 99–110)
CHOLEST SERPL-MCNC: 168 MG/DL (ref 0–199)
CO2 SERPL-SCNC: 29 MMOL/L (ref 21–32)
CREAT SERPL-MCNC: 1.4 MG/DL (ref 0.8–1.3)
GFR SERPLBLD CREATININE-BSD FMLA CKD-EPI: 49 ML/MIN/{1.73_M2}
GLUCOSE SERPL-MCNC: 85 MG/DL (ref 70–99)
HDLC SERPL-MCNC: 37 MG/DL (ref 40–60)
LDL CHOLESTEROL CALCULATED: 106 MG/DL
MAGNESIUM SERPL-MCNC: 2.1 MG/DL (ref 1.8–2.4)
POTASSIUM SERPL-SCNC: 4.7 MMOL/L (ref 3.5–5.1)
PROT SERPL-MCNC: 7.3 G/DL (ref 6.4–8.2)
SODIUM SERPL-SCNC: 139 MMOL/L (ref 136–145)
TRIGL SERPL-MCNC: 125 MG/DL (ref 0–150)
VLDLC SERPL CALC-MCNC: 25 MG/DL

## 2024-03-15 PROCEDURE — 93356 MYOCRD STRAIN IMG SPCKL TRCK: CPT

## 2024-03-15 PROCEDURE — C8929 TTE W OR WO FOL WCON,DOPPLER: HCPCS

## 2024-03-15 PROCEDURE — 6360000004 HC RX CONTRAST MEDICATION: Performed by: INTERNAL MEDICINE

## 2024-03-15 RX ADMIN — PERFLUTREN 1.5 ML: 6.52 INJECTION, SUSPENSION INTRAVENOUS at 08:30

## 2024-03-15 NOTE — TELEPHONE ENCOUNTER
----- Message from Jono Sandhu MD sent at 3/15/2024 11:58 AM EDT -----  Regarding: echo results  Please inform echo results confirmed a severely reduced LVEF at <20%.  Please schedule clinic visit next week to discuss further and talk about next steps.  ----- Message -----  From: Sandra Galvez Incoming Cardiovascular Orders From [facility]  Sent: 3/15/2024   8:59 AM EDT  To: Jono Sandhu MD

## 2024-03-15 NOTE — TELEPHONE ENCOUNTER
Called and spoke with patient. Relayed ALEJANDRO results he VU. Appt made for next week to discuss echo results he VU to time,date, and location of appt.

## 2024-03-17 PROCEDURE — 93228 REMOTE 30 DAY ECG REV/REPORT: CPT | Performed by: INTERNAL MEDICINE

## 2024-03-19 NOTE — PROGRESS NOTES
CARDIOLOGY CONSULTATION        Patient Name: Jhony Hdez  Primary Care physician: Jhony Valdovinos MD    Reason for Referral/Chief Complaint: Jhony Hdez is a 83 y.o. patient who is referred to cardiology clinic today for a management of systolic heart failure and atrial fibrillation.     History of Present Illness:   Jhony Hdez is a 83 y.o. 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 He follows with Dr. Jhony Valdovinos and reported worsening shortness of breath. He completed a stress test with his primary care provider that was interpreted as abnormal with severely reduced LV function which prompted the cardiology referral 2/16/2024. He reported increasing short of breath with minimal exertion starting ~5 weeks ago, which lead to stress testing by his primary doctor. He also felt short of breath and dizzy with bending over.  He reported worsening lower extremity edema bilaterally along with scrotal edema adm started on cefdinir.  He denied tobacco use, alcohol use or illicit drug use. He was started on Entresto at last visit, but cost prohibitive.     He wore an event monitor 2/17/24 that showed new onset atrial fibrillation/flutter and started Eliquis but discontinued with reports of hematuria and it was cost prohibitive. He has a history of prostate cancer and kidney stones, follows with urology Dr. Tate. He was seen in follow up 3/14/24 reported he had lost 20 pounds and believed the majority of it was water weight since starting the Lasix. He was started on Coumadin with referral to the coumadin clinic discussed referral to the [facility] for hematuria.     Echocardiogram 3/15/24 showed a severely reduced EF at <20% and presents today for a follow up.       Past Medical History:   has a 
  furosemide (LASIX) 40 MG tablet Take 1 tablet by mouth 2 times daily for 5 days, THEN 1 tablet daily. 2/16/24 3/22/24 Yes Jono Sandhu MD   Diphenhydramine-APAP, sleep, (TYLENOL PM EXTRA STRENGTH PO) Take 1 tablet by mouth nightly   Yes Alhaji Alonzo MD   Loperamide HCl (IMODIUM A-D PO) Take by mouth Indications: as needed   Yes Alhaji Alonzo MD   allopurinol (ZYLOPRIM) 300 MG tablet Take 0.5 tablets by mouth daily 1/2 tablet   Yes Alhaji Alonzo MD   donepezil (ARICEPT) 5 MG tablet Take 1 tablet by mouth nightly   Yes Alhaji Alonzo MD   warfarin (COUMADIN) 5 MG tablet Take 1 tablet by mouth daily  Patient not taking: Reported on 3/20/2024 3/14/24   Jono Sandhu MD   oxyBUTYnin (DITROPAN-XL) 5 MG extended release tablet Take 1 tablet by mouth daily  Patient not taking: Reported on 3/20/2024    Alhaji Aolnzo MD     CURRENT Medications:  No current facility-administered medications for this visit.    Allergies:  Patient has no known allergies.     Review of Systems:   A 14 point review of symptoms completed. Pertinent positives identified in the HPI, all other review of symptoms negative as below.    Objective:     Vitals:    03/20/24 1314   BP: 96/60   Pulse: 50   SpO2: 100%        Weight - Scale: 76 kg (167 lb 8 oz)       PHYSICAL EXAM:    BP 96/60   Pulse 50   Ht 1.702 m (5' 7\")   Wt 76 kg (167 lb 8 oz)   SpO2 100%   BMI 26.23 kg/m²     General:  Alert, cooperative, no distress, appears stated age   Head:  Normocephalic, atraumatic   Eyes:  Conjunctiva/corneas clear, anicteric sclerae    Nose: Nares normal, no drainage or sinus tenderness   Throat: No abnormalities of the lips, oral mucosa or tongue.    Neck: Trachea midline. Neck supple with no lymphadenopathy, thyroid not enlarged, symmetric, no tenderness/mass/nodules, no Jugular venous pressure elevation    Lungs:   Decreased breath sounds in bases   Chest Wall:  No deformity or tenderness to palpation

## 2024-03-20 ENCOUNTER — TELEPHONE (OUTPATIENT)
Dept: CARDIOLOGY CLINIC | Age: 84
End: 2024-03-20

## 2024-03-20 ENCOUNTER — ANTI-COAG VISIT (OUTPATIENT)
Dept: PHARMACY | Age: 84
End: 2024-03-20
Payer: MEDICARE

## 2024-03-20 ENCOUNTER — OFFICE VISIT (OUTPATIENT)
Dept: CARDIOLOGY CLINIC | Age: 84
End: 2024-03-20
Payer: MEDICARE

## 2024-03-20 VITALS
HEART RATE: 50 BPM | HEIGHT: 67 IN | DIASTOLIC BLOOD PRESSURE: 60 MMHG | BODY MASS INDEX: 26.29 KG/M2 | OXYGEN SATURATION: 100 % | SYSTOLIC BLOOD PRESSURE: 96 MMHG | WEIGHT: 167.5 LBS

## 2024-03-20 DIAGNOSIS — R06.02 SHORTNESS OF BREATH: Primary | ICD-10-CM

## 2024-03-20 DIAGNOSIS — R60.0 BILATERAL LOWER EXTREMITY EDEMA: ICD-10-CM

## 2024-03-20 DIAGNOSIS — I42.9 CARDIOMYOPATHY, UNSPECIFIED TYPE (HCC): ICD-10-CM

## 2024-03-20 DIAGNOSIS — I10 ESSENTIAL HYPERTENSION: ICD-10-CM

## 2024-03-20 DIAGNOSIS — I48.0 PAF (PAROXYSMAL ATRIAL FIBRILLATION) (HCC): Primary | ICD-10-CM

## 2024-03-20 DIAGNOSIS — I50.40 COMBINED SYSTOLIC AND DIASTOLIC CONGESTIVE HEART FAILURE, UNSPECIFIED HF CHRONICITY (HCC): ICD-10-CM

## 2024-03-20 DIAGNOSIS — R93.1 ABNORMAL ECHOCARDIOGRAM: ICD-10-CM

## 2024-03-20 DIAGNOSIS — I48.0 PAF (PAROXYSMAL ATRIAL FIBRILLATION) (HCC): ICD-10-CM

## 2024-03-20 DIAGNOSIS — E78.2 MIXED HYPERLIPIDEMIA: ICD-10-CM

## 2024-03-20 DIAGNOSIS — I25.5 ISCHEMIC CARDIOMYOPATHY: ICD-10-CM

## 2024-03-20 LAB — INTERNATIONAL NORMALIZATION RATIO, POC: 1.7

## 2024-03-20 PROCEDURE — 99214 OFFICE O/P EST MOD 30 MIN: CPT | Performed by: INTERNAL MEDICINE

## 2024-03-20 PROCEDURE — 85610 PROTHROMBIN TIME: CPT | Performed by: PHARMACIST

## 2024-03-20 PROCEDURE — 3078F DIAST BP <80 MM HG: CPT | Performed by: INTERNAL MEDICINE

## 2024-03-20 PROCEDURE — 1123F ACP DISCUSS/DSCN MKR DOCD: CPT | Performed by: INTERNAL MEDICINE

## 2024-03-20 PROCEDURE — 3074F SYST BP LT 130 MM HG: CPT | Performed by: INTERNAL MEDICINE

## 2024-03-20 PROCEDURE — 99211 OFF/OP EST MAY X REQ PHY/QHP: CPT | Performed by: PHARMACIST

## 2024-03-20 RX ORDER — WARFARIN SODIUM 5 MG/1
5 TABLET ORAL DAILY
Qty: 90 TABLET | Refills: 1 | Status: SHIPPED | OUTPATIENT
Start: 2024-03-20

## 2024-03-20 RX ORDER — FUROSEMIDE 20 MG/1
20 TABLET ORAL DAILY
Qty: 90 TABLET | Refills: 1 | Status: SHIPPED | OUTPATIENT
Start: 2024-03-20

## 2024-03-20 RX ORDER — NITROGLYCERIN 0.4 MG/1
TABLET SUBLINGUAL
COMMUNITY
Start: 2024-02-05

## 2024-03-20 RX ORDER — OMEGA-3S/DHA/EPA/FISH OIL/D3 300MG-1000
400 CAPSULE ORAL DAILY
COMMUNITY

## 2024-03-20 NOTE — PATIENT INSTRUCTIONS
PLAN:  Order left heart catheterization (angiogram) ~ abnormal echocardiogram, severely reduced ejection fraction  ~ NPO (nothing to eat or drink) after midnight prior to procedure  ~ Hold Coumadin 48 hrs prior to procedure  ~ Hold furosemide (Lasix) and Vitamin D3 morning of procedure  ~ No lotion or cream to skin day of procedure  ~ Pack over night bag  ~ Martha will call to schedule procedure    2. Change furosemide (Lasix) to 20 mg daily  3.  Instructed to call office if unable to afford medication prior to stopping therapies  4.  Plan on echocardiogram in next 6 months ~ re-evaluate ejection fraction (heart strength)  5.  Would recommend proceeding with heart catheterization prior to returning to work.   6.  If you plan to do yard work ~ encourage you to rest when needed and avoid over exertion  7. Follow up in 1 month

## 2024-03-20 NOTE — PROGRESS NOTES
MrEmily Hdez is here for management of anticoagulation for AFib.   PMH also significant for HLD, HTN, Hx prostate, colon CA.   He presents today w/out complaint.  Pt verifies dosing regimen as listed above.   Pt denies s/s bleeding/bruising/swelling/SOB.  No BRBPR. No melena.  Address missed doses  Reviewed pt medication list  No changes in RX/OTCs/Herbal medications.  Reviewed dietary concerns  Address EToH and tobacco use.  As initial clinic visit, provided pt with counseling for medication use/compliance, adverse events, and nutrition; clinic overview & orientation; and educational materials.    Newly diagnosed AFib about one month ago.  He had been on Eliquis for ~3-4 weeks, switched due to cost and blood in urine.    Started on Warfarin 5 mg daily last Thur 3/14.  No bruising/bleeding so far.      INR 1.7 is slightly below therapeutic range of 2-2.5  Recommend to increase dose to 5 mg daily except 7.5 mg Q Wed  Patient has 5 mg tablets.  Will continue to monitor and check INR in 5 days.  Dosing reminder card given with phone number, appointment date and time.   Return to clinic: 3/25 @ 0900    Clifford Mcguire, PharmD 9:04 AM EDT 3/20/24    For Pharmacy Admin Tracking Only    Intervention Detail: Adherence Monitorin and Dose Adjustment: 1, reason: Therapy Optimization  Total # of Interventions Recommended: 2  Total # of Interventions Accepted: 2  Time Spent (min): 30

## 2024-03-20 NOTE — TELEPHONE ENCOUNTER
Order left heart catheterization (angiogram) ~ abnormal echocardiogram, severely reduced ejection fraction  ~ NPO (nothing to eat or drink) after midnight prior to procedure  ~ Hold Coumadin 48 hrs prior to procedure  ~ Hold furosemide (Lasix) and Vitamin D3 morning of procedure  ~ Take all other prescribed medications with sip of water   ~ No lotion or cream to skin day of procedure  ~ Pack over night bag  ~ Martha will call to schedule procedure

## 2024-03-21 NOTE — TELEPHONE ENCOUNTER
Procedure:  [facility]  Doctor:  Dr. Hancock (Ekwenibe)  Date:  3/25/24  Time:  9am  Arrival:  7:30am  Reps:  n/a  Anesthesia:  n/a      Spoke with patient. Please have patient arrive to the main entrance of [facility] ([address] [place] [id number]) and check in with the registration desk.  They will be directed to the Cath Lab.  Remind patient to be NPO after midnight (8 hours prior). Do not apply lotions/creams on skin the day of procedure.    JONASW, ALEJANDRO - graciela only.

## 2024-03-25 ENCOUNTER — HOSPITAL ENCOUNTER (INPATIENT)
Dept: CARDIAC CATH/INVASIVE PROCEDURES | Age: 84
LOS: 1 days | Discharge: HOME | DRG: 287 | End: 2024-03-25
Attending: INTERNAL MEDICINE | Admitting: INTERNAL MEDICINE
Payer: MEDICARE

## 2024-03-25 VITALS
WEIGHT: 166.3 LBS | HEIGHT: 67 IN | HEART RATE: 91 BPM | TEMPERATURE: 88 F | BODY MASS INDEX: 26.1 KG/M2 | OXYGEN SATURATION: 98 % | RESPIRATION RATE: 13 BRPM | SYSTOLIC BLOOD PRESSURE: 87 MMHG | DIASTOLIC BLOOD PRESSURE: 60 MMHG

## 2024-03-25 PROBLEM — I95.9 HYPOTENSION: Status: ACTIVE | Noted: 2024-03-25

## 2024-03-25 LAB
ANION GAP SERPL CALCULATED.3IONS-SCNC: 12 MMOL/L (ref 3–16)
BUN SERPL-MCNC: 42 MG/DL (ref 7–20)
CALCIUM SERPL-MCNC: 9.5 MG/DL (ref 8.3–10.6)
CHLORIDE SERPL-SCNC: 99 MMOL/L (ref 99–110)
CHOLEST SERPL-MCNC: 176 MG/DL (ref 0–199)
CO2 SERPL-SCNC: 27 MMOL/L (ref 21–32)
CREAT SERPL-MCNC: 1.7 MG/DL (ref 0.8–1.3)
DEPRECATED RDW RBC AUTO: 16.5 % (ref 12.4–15.4)
EKG ATRIAL RATE: 75 BPM
EKG DIAGNOSIS: NORMAL
EKG Q-T INTERVAL: 444 MS
EKG QRS DURATION: 152 MS
EKG QTC CALCULATION (BAZETT): 515 MS
EKG R AXIS: -80 DEGREES
EKG T AXIS: 51 DEGREES
EKG VENTRICULAR RATE: 81 BPM
GFR SERPLBLD CREATININE-BSD FMLA CKD-EPI: 39 ML/MIN/{1.73_M2}
GLUCOSE SERPL-MCNC: 84 MG/DL (ref 70–99)
HCT VFR BLD AUTO: 51.2 % (ref 40.5–52.5)
HDLC SERPL-MCNC: 39 MG/DL (ref 40–60)
HGB BLD-MCNC: 16.6 G/DL (ref 13.5–17.5)
INR PPP: 1.61 (ref 0.84–1.16)
LDLC SERPL CALC-MCNC: 105 MG/DL
MCH RBC QN AUTO: 28.4 PG (ref 26–34)
MCHC RBC AUTO-ENTMCNC: 32.4 G/DL (ref 31–36)
MCV RBC AUTO: 87.6 FL (ref 80–100)
PLATELET # BLD AUTO: 195 K/UL (ref 135–450)
PMV BLD AUTO: 8.2 FL (ref 5–10.5)
POTASSIUM SERPL-SCNC: 4.1 MMOL/L (ref 3.5–5.1)
PROTHROMBIN TIME: 19.1 SEC (ref 11.5–14.8)
RBC # BLD AUTO: 5.84 M/UL (ref 4.2–5.9)
SODIUM SERPL-SCNC: 138 MMOL/L (ref 136–145)
TRIGL SERPL-MCNC: 160 MG/DL (ref 0–150)
VLDLC SERPL CALC-MCNC: 32 MG/DL
WBC # BLD AUTO: 5 K/UL (ref 4–11)

## 2024-03-25 PROCEDURE — C1894 INTRO/SHEATH, NON-LASER: HCPCS

## 2024-03-25 PROCEDURE — 85347 COAGULATION TIME ACTIVATED: CPT

## 2024-03-25 PROCEDURE — 93460 R&L HRT ART/VENTRICLE ANGIO: CPT

## 2024-03-25 PROCEDURE — 93010 ELECTROCARDIOGRAM REPORT: CPT | Performed by: INTERNAL MEDICINE

## 2024-03-25 PROCEDURE — 85027 COMPLETE CBC AUTOMATED: CPT

## 2024-03-25 PROCEDURE — 99152 MOD SED SAME PHYS/QHP 5/>YRS: CPT | Performed by: INTERNAL MEDICINE

## 2024-03-25 PROCEDURE — 4A023N8 MEASUREMENT OF CARDIAC SAMPLING AND PRESSURE, BILATERAL, PERCUTANEOUS APPROACH: ICD-10-PCS | Performed by: INTERNAL MEDICINE

## 2024-03-25 PROCEDURE — 93005 ELECTROCARDIOGRAM TRACING: CPT | Performed by: INTERNAL MEDICINE

## 2024-03-25 PROCEDURE — 85610 PROTHROMBIN TIME: CPT

## 2024-03-25 PROCEDURE — 2500000003 HC RX 250 WO HCPCS

## 2024-03-25 PROCEDURE — B2151ZZ FLUOROSCOPY OF LEFT HEART USING LOW OSMOLAR CONTRAST: ICD-10-PCS | Performed by: INTERNAL MEDICINE

## 2024-03-25 PROCEDURE — C1769 GUIDE WIRE: HCPCS

## 2024-03-25 PROCEDURE — 6360000002 HC RX W HCPCS

## 2024-03-25 PROCEDURE — B2111ZZ FLUOROSCOPY OF MULTIPLE CORONARY ARTERIES USING LOW OSMOLAR CONTRAST: ICD-10-PCS | Performed by: INTERNAL MEDICINE

## 2024-03-25 PROCEDURE — 1200000000 HC SEMI PRIVATE

## 2024-03-25 PROCEDURE — 6360000004 HC RX CONTRAST MEDICATION

## 2024-03-25 PROCEDURE — 2709999900 HC NON-CHARGEABLE SUPPLY

## 2024-03-25 PROCEDURE — 6370000000 HC RX 637 (ALT 250 FOR IP)

## 2024-03-25 PROCEDURE — 80048 BASIC METABOLIC PNL TOTAL CA: CPT

## 2024-03-25 PROCEDURE — 93460 R&L HRT ART/VENTRICLE ANGIO: CPT | Performed by: INTERNAL MEDICINE

## 2024-03-25 PROCEDURE — 80061 LIPID PANEL: CPT

## 2024-03-25 PROCEDURE — 2580000003 HC RX 258: Performed by: INTERNAL MEDICINE

## 2024-03-25 PROCEDURE — C1887 CATHETER, GUIDING: HCPCS

## 2024-03-25 RX ORDER — MIDAZOLAM HYDROCHLORIDE 1 MG/ML
INJECTION INTRAMUSCULAR; INTRAVENOUS
Status: COMPLETED | OUTPATIENT
Start: 2024-03-25 | End: 2024-03-25

## 2024-03-25 RX ORDER — ASPIRIN 325 MG
325 TABLET ORAL ONCE
Status: COMPLETED | OUTPATIENT
Start: 2024-03-25 | End: 2024-03-25

## 2024-03-25 RX ORDER — SODIUM CHLORIDE 0.9 % (FLUSH) 0.9 %
5-40 SYRINGE (ML) INJECTION EVERY 12 HOURS SCHEDULED
Status: DISCONTINUED | OUTPATIENT
Start: 2024-03-25 | End: 2024-03-25 | Stop reason: HOSPADM

## 2024-03-25 RX ORDER — SODIUM CHLORIDE 9 MG/ML
INJECTION, SOLUTION INTRAVENOUS PRN
Status: DISCONTINUED | OUTPATIENT
Start: 2024-03-25 | End: 2024-03-25 | Stop reason: HOSPADM

## 2024-03-25 RX ORDER — SODIUM CHLORIDE 0.9 % (FLUSH) 0.9 %
5-40 SYRINGE (ML) INJECTION PRN
Status: DISCONTINUED | OUTPATIENT
Start: 2024-03-25 | End: 2024-03-25 | Stop reason: HOSPADM

## 2024-03-25 RX ORDER — 0.9 % SODIUM CHLORIDE 0.9 %
250 INTRAVENOUS SOLUTION INTRAVENOUS ONCE
Status: COMPLETED | OUTPATIENT
Start: 2024-03-25 | End: 2024-03-25

## 2024-03-25 RX ORDER — HEPARIN SODIUM 1000 [USP'U]/ML
INJECTION, SOLUTION INTRAVENOUS; SUBCUTANEOUS
Status: COMPLETED | OUTPATIENT
Start: 2024-03-25 | End: 2024-03-25

## 2024-03-25 RX ORDER — ONDANSETRON 2 MG/ML
4 INJECTION INTRAMUSCULAR; INTRAVENOUS EVERY 6 HOURS PRN
Status: DISCONTINUED | OUTPATIENT
Start: 2024-03-25 | End: 2024-03-25 | Stop reason: HOSPADM

## 2024-03-25 RX ORDER — ACETAMINOPHEN 325 MG/1
650 TABLET ORAL EVERY 4 HOURS PRN
Status: DISCONTINUED | OUTPATIENT
Start: 2024-03-25 | End: 2024-03-25 | Stop reason: HOSPADM

## 2024-03-25 RX ORDER — LORAZEPAM 0.5 MG/1
0.5 TABLET ORAL
Status: DISCONTINUED | OUTPATIENT
Start: 2024-03-25 | End: 2024-03-25 | Stop reason: HOSPADM

## 2024-03-25 RX ORDER — PHENYLEPHRINE HCL IN 0.9% NACL 1 MG/10 ML
SYRINGE (ML) INTRAVENOUS
Status: COMPLETED | OUTPATIENT
Start: 2024-03-25 | End: 2024-03-25

## 2024-03-25 RX ADMIN — SODIUM CHLORIDE: 9 INJECTION, SOLUTION INTRAVENOUS at 12:11

## 2024-03-25 RX ADMIN — SODIUM CHLORIDE 250 ML: 9 INJECTION, SOLUTION INTRAVENOUS at 14:32

## 2024-03-25 RX ADMIN — Medication 325 MG: at 07:53

## 2024-03-25 RX ADMIN — Medication 0.1 MG: at 11:31

## 2024-03-25 RX ADMIN — Medication 10 ML: at 08:58

## 2024-03-25 RX ADMIN — HEPARIN SODIUM 4000 UNITS: 1000 INJECTION, SOLUTION INTRAVENOUS; SUBCUTANEOUS at 11:31

## 2024-03-25 RX ADMIN — MIDAZOLAM HYDROCHLORIDE 0.5 MG: 1 INJECTION INTRAMUSCULAR; INTRAVENOUS at 11:05

## 2024-03-25 RX ADMIN — MIDAZOLAM HYDROCHLORIDE 0.5 MG: 1 INJECTION INTRAMUSCULAR; INTRAVENOUS at 11:16

## 2024-03-25 NOTE — PROCEDURES
CARDIAC CATHETERIZATION REPORT     Procedure Date:  3/25/2024  Patient Name: Jhony Hdez  MRN: [id number] : 1940      : Kieran Hancock MD      PROCEDURES PERFORMED  Left heart cath via right radial approach  Coronary angiography  Right heart cath via right brachial vein approach  Moderate sedation 15 min CPT 36808 (Midazolam: 1 mg, Fentanyl: 0 mcg)  Sedation start time: 1102  Sedation end time: 1135  US guidance for vascular access CPT 83476      INDICATION  Dilated cardiomyopathy    PROCEDURE DESCRIPTION  Risks/benefits/alternatives/outcomes were discussed with with patient/family and informed consent was obtained. Patient was prepped and draped in the usual sterile fashion.  Patient was sedated with midazolam only without fentanyl. Local anaesthetic was applied over right brachial vein access site.  Right brachial vein was selectively cannulated with a micropuncture sheath under ultrasound guidance a 5Fr sheath was inserted.  A 5 Macedonian Bellaire-Tree catheter was advanced through the sheath.  Intracardiac pressures were recorded at various levels including RA, RV, pulmonary artery, and wedge position as well as blood samples taken from RA and PA for measurement of oxygen saturation.  Subsequently, cardiac output was calculated using Danna method. At the conclusion of the procedure, Bellaire-Tree catheter catheter was removed from the sheath while the sheath was kept in place. There were no immediate complications.      Following this, local anaesthetic was applied over right radial puncture site. Using a modified Seldinger technique, the right radial artery was selectively cannulated and a 6Fr Terumo sheath was inserted into right radial artery.  Verapamil and nitroglycerin were NOT administered through the sheath due to severe cardiomyopathy and low blood pressure at the beginning of the case.  Heparin was administered.  Diagnostic 5Fr JL3.5 and JR4 were used to engage left and right coronary

## 2024-03-25 NOTE — PROGRESS NOTES
Patient having low BP's. Lowest was 69/52. BP came up after pt placed in Trendelenburg position (90/78) . Pt remains asymptomatic; denies dizziness/feeling faint. Fluids continue to run at 50 ml/hr. [facility] notified. No other orders at this time, but will cont to monitor.   We will obtain records from Dr Justus Delgado office   We will check blood work including celiac antibodies and thyroid  Continue bowel regimen   We will have you follow-up with Dr Carlos Hopkins or Dr Kirt Quinn

## 2024-03-25 NOTE — H&P
History and physical/sedation Pre-Procedure Note    Patient Name: Jhony Hdez   YOB: 1940  Room/Bed: [facility] Pool /NONE  Medical Record Number: [id number]  Date: 3/25/2024   Time: 8:39 AM       Indication: Cardiomyopathy    Consent: I have discussed with the patient and/or the patient representative the indication, alternatives, and the possible risks and/or complications of the planned procedure and the anesthesia methods. The patient and/or patient representative appear to understand and agree to proceed.    Vital Signs:   Vitals:    03/25/24 0802   BP: 102/79   SpO2: 99%       Past Medical History:   has a past medical history of Chronic kidney disease, Colon cancer (HCC), Guillain Barré syndrome (HCC), Hyperlipidemia, Hypertension, and Left ureteral stone.    Past Surgical History:   has a past surgical history that includes back surgery; Adrenalectomy; Carpal tunnel release; Total elbow arthroplasty (Left); Mandible surgery; other surgical history (10/25/2017); Kidney stone surgery; and Intracapsular cataract extraction (Right, 3/19/2019).    Medications:   Scheduled Meds:    sodium chloride flush  5-40 mL IntraVENous 2 times per day     Continuous Infusions:    sodium chloride       PRN Meds: sodium chloride flush, sodium chloride, ondansetron, LORazepam  Home Meds:   Prior to Admission medications    Medication Sig Start Date End Date Taking? Authorizing Provider   cholecalciferol (VITAMIN D3) 400 UNIT TABS tablet Take 1 tablet by mouth daily    Alhaji Alonzo MD   nitroGLYCERIN (NITROSTAT) 0.4 MG SL tablet PLACE 1 TABLET UNDER TONGUE FOR CHEST PAIN. CALL 911 IF NO IMPROVEMENT AFTER 5 MIN. REPEAT DOSE TWICE IF NEEDED 2/5/24   Alhaji Alonzo MD   furosemide (LASIX) 20 MG tablet Take 1 tablet by mouth daily 3/20/24   Jono Sandhu MD   warfarin (COUMADIN) 5 MG tablet Take 1 tablet by mouth daily 3/20/24   Jono Sandhu MD   ENTRESTO 24-26 MG per tablet Take 1 tablet by mouth

## 2024-03-25 NOTE — H&P
History and physical/sedation Pre-Procedure Note    Patient Name: Jhony Hdez   YOB: 1940  Room/Bed: Cath Pool /NONE  Medical Record Number: [id number]  Date: 3/25/2024   Time: 10:34 AM       Indication: Dilated cardiomyopathy    Consent: I have discussed with the patient and/or the patient representative the indication, alternatives, and the possible risks and/or complications of the planned procedure and the anesthesia methods. The patient and/or patient representative appear to understand and agree to proceed.    Vital Signs:   Vitals:    03/25/24 0802   BP: 102/79   SpO2: 99%       Past Medical History:   has a past medical history of Chronic kidney disease, Colon cancer (HCC), Guillain Barré syndrome (HCC), Hyperlipidemia, Hypertension, and Left ureteral stone.    Past Surgical History:   has a past surgical history that includes back surgery; Adrenalectomy; Carpal tunnel release; Total elbow arthroplasty (Left); Mandible surgery; other surgical history (10/25/2017); Kidney stone surgery; and Intracapsular cataract extraction (Right, 3/19/2019).    Medications:   Scheduled Meds:    sodium chloride flush  5-40 mL IntraVENous 2 times per day     Continuous Infusions:    sodium chloride       PRN Meds: sodium chloride flush, sodium chloride, ondansetron, LORazepam  Home Meds:   Prior to Admission medications    Medication Sig Start Date End Date Taking? Authorizing Provider   cholecalciferol (VITAMIN D3) 400 UNIT TABS tablet Take 1 tablet by mouth daily    Alhaji Alonzo MD   nitroGLYCERIN (NITROSTAT) 0.4 MG SL tablet PLACE 1 TABLET UNDER TONGUE FOR CHEST PAIN. CALL 911 IF NO IMPROVEMENT AFTER 5 MIN. REPEAT DOSE TWICE IF NEEDED 2/5/24   Alhaji Alonzo MD   furosemide (LASIX) 20 MG tablet Take 1 tablet by mouth daily 3/20/24   Jono Sandhu MD   warfarin (COUMADIN) 5 MG tablet Take 1 tablet by mouth daily 3/20/24   Jono Sandhu MD   ENTRESTO 24-26 MG per tablet Take 1 tablet

## 2024-03-25 NOTE — PROGRESS NOTES
Asked to admit overnight for observation but later manual blood pressure was okay and wife wanted to take her back home after the cath completed

## 2024-03-25 NOTE — PROGRESS NOTES
Radial band off @ 1308.  Brachial sheath out @ 1325. Pressure held x7 mins and hemostasis achieved.  Both sites are soft/ dry with gauze/tegaderm in place (and splint on right wrist). Will cont to monitor.

## 2024-03-25 NOTE — PROGRESS NOTES
Remained asymptomatic with good coloring even with soft BP's. Manual BP 88/60 and then 89/55. Patient wanted to go home/ did not wish to stay. Wife agreeable.  Patient did well walking to restroom and getting dressed. Had steady gait, no dizziness. Dr. Hancock notified and ok with discharge. DC instructions discussed and patient instructed to hold Lasix x2 days and resume Warfarin this evening. Both wife and patient verbalize understanding. IV removed. Patient's wife went to get car and patient wheeled to front entrance by RN.

## 2024-03-25 NOTE — DISCHARGE INSTRUCTIONS
Cath Labs at  [facility]   Discharge Instructions        3/25/2024  Jhony Hdez   Date of Birth 1940       Activity:  No driving for 24 hours.  In 24 hours you may remove dressing and shower, wash site gently with soap and water and leave open to air  Avoid submerging your arm in sitting water for 5 days.  Do not use your right hand for 24 hours, then  No lifting more than 5 pounds for 5 days.   No lotions, powders, or ointments near site for 5 days.   No work/school for 5 days unless instructed otherwise by your cardiologist.    Diet:   Resume previous diet, if a cardiac diet is specified you will receive a handout with  general guidelines.   Drink extra non-alcoholic/decaffienated fluids for first 24 hours after your procedure.    Arm Management:  If bleeding occurs from the site or a hematoma (lump) begins to increase in size, apply pressure directly over the site, call 911 to return to the hospital.    Special Instructions:  Report any coolness or numbness in the arm  Report any chills, fever, itching, red bumps or rash   Report any of the following to the MD: drainage from the site, redness and/or swelling at the site, increased tenderness at the site   If you are currently taking Metformin or Metformin combination medications for Diabetes, hold your dose for 48 hours after your procedure.  Consult your Cardiologist before taking any NSAIDS, vitamin supplements, estrogen, or estrogen plus progestin.  Do not stop taking Plavix, Brilinta or Effient, without first consulting your cardiologist.    Sedation Discharge Instructions:  For the next 24 hours do not drive a car, operate machinery, power tools or kitchen appliances.    Do not drink alcohol; including beer or wine.    Do not make any important decisions or sign any important papers.  For the next 24 hours you can expect drowsiness, light-headed or dizziness, nausea/ vomiting, inability to concentrate, fatigue and desire to sleep.  We strongly

## 2024-03-25 NOTE — PROGRESS NOTES
BP's continue to be low in 70s/50s. Patient asymptomatic and has good color. Dr. Hancock to bedside to eval. 250ml bolus ordered. Will monitor.

## 2024-03-26 LAB — POC ACT LR: >400 SEC

## 2024-04-01 ENCOUNTER — ANTI-COAG VISIT (OUTPATIENT)
Dept: PHARMACY | Age: 84
End: 2024-04-01
Payer: MEDICARE

## 2024-04-01 DIAGNOSIS — I48.0 PAF (PAROXYSMAL ATRIAL FIBRILLATION) (HCC): Primary | ICD-10-CM

## 2024-04-01 LAB — INR BLD: 3.5

## 2024-04-01 PROCEDURE — 99211 OFF/OP EST MAY X REQ PHY/QHP: CPT | Performed by: FAMILY MEDICINE

## 2024-04-01 PROCEDURE — 85610 PROTHROMBIN TIME: CPT | Performed by: FAMILY MEDICINE

## 2024-04-01 NOTE — PROGRESS NOTES
MrEmily Hdez is here for management of anticoagulation for AFib.   PMH also significant for HLD, HTN, Hx prostate, colon CA.   He presents today w/out complaint.  Pt verifies dosing regimen as listed above.   Pt denies s/s bleeding/bruising/swelling/SOB.  No BRBPR. No melena.  Address missed doses  Reviewed pt medication list  No changes in RX/OTCs/Herbal medications.  Reviewed dietary concerns  Address EToH and tobacco use.  As initial clinic visit, provided pt with counseling for medication use/compliance, adverse events, and nutrition; clinic overview & orientation; and educational materials.    Newly diagnosed AFib about one month ago.  He had been on Eliquis for ~3-4 weeks, switched due to cost and blood in urine.  Started on Warfarin 5 mg daily last ur 3/14.  No bruising/bleeding so far.    Had procedure in cath on 3/25, missed doses on 3/23, 3/24 and 3/25     INR 3.5 is above therapeutic range of 2-2.5  Recommend to take 2.5 mg today, then decrease dose to 5 mg daily  Patient has 5 mg tablets.  Will continue to monitor and check INR in 7 days.  Dosing reminder card given with phone number, appointment date and time.   Return to clinii:  @ 945 am     Joe Sibley PharmD 2024 9:18 AM  For Pharmacy Admin Tracking Only    Intervention Detail: Adherence Monitorin and Dose Adjustment: 1, reason: Therapy Optimization  Total # of Interventions Recommended: 1  Total # of Interventions Accepted: 1  Time Spent (min): 15

## 2024-04-02 DIAGNOSIS — R00.2 PALPITATIONS: Primary | ICD-10-CM

## 2024-04-05 ENCOUNTER — TELEPHONE (OUTPATIENT)
Dept: CARDIOLOGY CLINIC | Age: 84
End: 2024-04-05

## 2024-04-05 NOTE — TELEPHONE ENCOUNTER
----- Message from Jono Sandhu MD sent at 4/5/2024  4:11 PM EDT -----  Please inform patient event monitor showed AFIB and one run of a ventricular rhythm called nonsustained VT, no pauses were seen.  Refer to EP for assistance with management. Continue current regimen and FU as scheduled.  ----- Message -----  From: Luly Sorto  Sent: 4/2/2024   1:04 PM EDT  To: Jono Sandhu MD

## 2024-04-05 NOTE — TELEPHONE ENCOUNTER
I spoke with patient and his wife. They wanted to wait to speak with you at upcoming office visit prior to scheduling with EP.

## 2024-04-08 ENCOUNTER — ANTI-COAG VISIT (OUTPATIENT)
Dept: PHARMACY | Age: 84
End: 2024-04-08
Payer: MEDICARE

## 2024-04-08 DIAGNOSIS — I48.0 PAF (PAROXYSMAL ATRIAL FIBRILLATION) (HCC): Primary | ICD-10-CM

## 2024-04-08 LAB — INTERNATIONAL NORMALIZATION RATIO, POC: 2.7

## 2024-04-08 PROCEDURE — 99211 OFF/OP EST MAY X REQ PHY/QHP: CPT | Performed by: PHARMACIST

## 2024-04-08 PROCEDURE — 85610 PROTHROMBIN TIME: CPT | Performed by: PHARMACIST

## 2024-04-08 NOTE — PROGRESS NOTES
MrEmily Hdez is here for management of anticoagulation for AFib.   PMH also significant for HLD, HTN, Hx prostate, colon CA.   He presents today w/out complaint.  Pt verifies dosing regimen as listed above.   Pt denies s/s bleeding/bruising/swelling/SOB.  No BRBPR. No melena.  Address missed doses  Reviewed pt medication list  No changes in RX/OTCs/Herbal medications.  Reviewed dietary concerns  Address EToH and tobacco use.    Had procedure in cath on 3/25, missed doses on 3/23, 3/24 and 3/25     INR improved today after doing 1/2 tablet one day last week. Will plan to continue similar dose.    INR 2.7 is slightly above therapeutic range of 2-2.5  Recommend to decrease dose to 5 mg daily except 2.5 mg Q Tues.  Patient has 5 mg tablets.  Will continue to monitor and check INR in 2 weeks.  Dosing reminder card given with phone number, appointment date and time.   Return to clinii:  @ 900 am     Clifford Mcguire PharmD 9:53 AM EDT 24    For Pharmacy Admin Tracking Only    Intervention Detail: Adherence Monitorin and Dose Adjustment: 1, reason: Therapy Optimization  Total # of Interventions Recommended: 1  Total # of Interventions Accepted: 1  Time Spent (min): 15

## 2024-04-09 DIAGNOSIS — I48.0 PAF (PAROXYSMAL ATRIAL FIBRILLATION) (HCC): ICD-10-CM

## 2024-04-09 RX ORDER — SACUBITRIL AND VALSARTAN 24; 26 MG/1; MG/1
1 TABLET, FILM COATED ORAL 2 TIMES DAILY
Qty: 60 TABLET | Refills: 1 | Status: SHIPPED | OUTPATIENT
Start: 2024-04-09

## 2024-04-09 RX ORDER — WARFARIN SODIUM 5 MG/1
5 TABLET ORAL DAILY
Qty: 90 TABLET | Refills: 1 | Status: SHIPPED | OUTPATIENT
Start: 2024-04-09

## 2024-04-09 NOTE — TELEPHONE ENCOUNTER
Pts wife is requesting refill of Entresto 24-26mg. Preferred pharmacy is    [id number] - [place] [id number] - P [phone]     Last ov 03/20/2024 andrew  Next of 04/18/2024 andrew

## 2024-04-09 NOTE — TELEPHONE ENCOUNTER
Wife returned call. Wife states pt actually needs a refill for warfarin. Wife states pt does not need Entresto. Please advise.

## 2024-04-16 NOTE — PROGRESS NOTES
results found for: \"PTINR\"  HgBA1c:No results found for: \"LABA1C\"  No results found for: \"CKTOTAL\", \"CKMB\", \"CKMBINDEX\", \"TROPONINI\"    FLP:    Lab Results   Component Value Date/Time    TRIG 160 03/25/2024 07:55 AM    HDL 39 03/25/2024 07:55 AM    LDLCALC 105 03/25/2024 07:55 AM       Cardiac Data:   EKG 3/25/24  Atrial fibrillation   Low voltage in the limb leads   Left axis deviation   Right bundle branch block  Inferior infarct,age undetermined  Anteroseptal infarct,age undetermined     EKG 2/16/24  indeterminate due to quality     Echo: 7/20/11  LVEF 50%, mild thickening of mitral and aortic valves.     Echo 3/15/24  Normal left ventricle size, severely increased concentric wall thickness and severely reduced systolic function with an estimated ejection fraction of 15-20%. Global hypokinesis noted.  Indeterminate diastolic function due to afib. IVC size is dilated (>2.1 cm) and collapses < 50% with respiration  consistent with elevated RA pressure (15 mmHg).  RVSP of 53mmHg assuming RAP of 15, consistent with at least moderate pulmonary hypertension.  The right ventricle is severely dilated with moderately reduced systolic  function.  The left atrium is mild to moderately dilated.  Moderate mitral regurgitation.  Moderate tricuspid regurgitation.  The right atrium is moderately dilated.  Definity contrast agent was used to help visualize endocardial borders.    Stress test: 2/7/24  Scanned into media, LVEF 24%4    Cardiac Catheterization 3/25/24  FINDINGS  Left main: Normal  LAD: 30% stenosis in the mid segment, 50% stenosis in the distal segment  Ramus intermedius: Medium size vessel, normal  Left circumflex: Normal  RCA: Dominant vessel, mild diffuse disease  LVEDP: 7  Left ventriculogram: Not obtained  RA - 5 mmHg  RV - 30/5 mmHg  PA - 35/10/18 mmHg  PCWP - 6 mmHg  Cardiac output/index (Danna): 3.76/2.0  Cardiac output/index (Thermodilution): Not obtained  SVR - 1260  PVR - 186    FINAL DIAGNOSIS  Moderate

## 2024-04-18 ENCOUNTER — TELEPHONE (OUTPATIENT)
Dept: CARDIOLOGY CLINIC | Age: 84
End: 2024-04-18

## 2024-04-18 ENCOUNTER — OFFICE VISIT (OUTPATIENT)
Dept: CARDIOLOGY CLINIC | Age: 84
End: 2024-04-18
Payer: MEDICARE

## 2024-04-18 VITALS
HEIGHT: 67 IN | BODY MASS INDEX: 26.84 KG/M2 | SYSTOLIC BLOOD PRESSURE: 98 MMHG | WEIGHT: 171 LBS | OXYGEN SATURATION: 99 % | HEART RATE: 42 BPM | DIASTOLIC BLOOD PRESSURE: 66 MMHG

## 2024-04-18 DIAGNOSIS — I50.40 COMBINED SYSTOLIC AND DIASTOLIC CONGESTIVE HEART FAILURE, UNSPECIFIED HF CHRONICITY (HCC): Primary | ICD-10-CM

## 2024-04-18 DIAGNOSIS — E78.2 MIXED HYPERLIPIDEMIA: ICD-10-CM

## 2024-04-18 DIAGNOSIS — R94.31 ABNORMAL ECG: ICD-10-CM

## 2024-04-18 DIAGNOSIS — I50.20 HFREF (HEART FAILURE WITH REDUCED EJECTION FRACTION) (HCC): Primary | ICD-10-CM

## 2024-04-18 DIAGNOSIS — I48.0 PAF (PAROXYSMAL ATRIAL FIBRILLATION) (HCC): ICD-10-CM

## 2024-04-18 PROCEDURE — 3074F SYST BP LT 130 MM HG: CPT | Performed by: INTERNAL MEDICINE

## 2024-04-18 PROCEDURE — 99214 OFFICE O/P EST MOD 30 MIN: CPT | Performed by: INTERNAL MEDICINE

## 2024-04-18 PROCEDURE — 1123F ACP DISCUSS/DSCN MKR DOCD: CPT | Performed by: INTERNAL MEDICINE

## 2024-04-18 PROCEDURE — 3078F DIAST BP <80 MM HG: CPT | Performed by: INTERNAL MEDICINE

## 2024-04-18 RX ORDER — SACUBITRIL AND VALSARTAN 24; 26 MG/1; MG/1
1 TABLET, FILM COATED ORAL 2 TIMES DAILY
Qty: 180 TABLET | Refills: 3 | Status: SHIPPED | OUTPATIENT
Start: 2024-04-18 | End: 2024-04-18 | Stop reason: SDUPTHER

## 2024-04-18 RX ORDER — SACUBITRIL AND VALSARTAN 24; 26 MG/1; MG/1
1 TABLET, FILM COATED ORAL 2 TIMES DAILY
Qty: 180 TABLET | Refills: 3 | Status: SHIPPED | OUTPATIENT
Start: 2024-04-18

## 2024-04-18 NOTE — PATIENT INSTRUCTIONS
PLAN:  Take lasix 20 mg every other day  Continue to monitor blood pressure   Discussed ordering echo at next office visit  Follow up with me in 4 months

## 2024-04-22 ENCOUNTER — ANTI-COAG VISIT (OUTPATIENT)
Dept: PHARMACY | Age: 84
End: 2024-04-22
Payer: MEDICARE

## 2024-04-22 DIAGNOSIS — I48.0 PAF (PAROXYSMAL ATRIAL FIBRILLATION) (HCC): Primary | ICD-10-CM

## 2024-04-22 LAB — INTERNATIONAL NORMALIZATION RATIO, POC: 3

## 2024-04-22 PROCEDURE — 85610 PROTHROMBIN TIME: CPT | Performed by: PHARMACIST

## 2024-04-22 PROCEDURE — 99211 OFF/OP EST MAY X REQ PHY/QHP: CPT | Performed by: PHARMACIST

## 2024-04-22 NOTE — PROGRESS NOTES
MrEmily Hdez is here for management of anticoagulation for AFib.   PMH also significant for HLD, HTN, Hx prostate, colon CA.   He presents today w/out complaint.  Pt verifies dosing regimen as listed above.   Pt denies s/s bleeding/bruising/swelling/SOB.  No BRBPR. No melena.  Address missed doses  Reviewed pt medication list  No changes in RX/OTCs/Herbal medications.  Reviewed dietary concerns  Address EToH and tobacco use.    Decreased dose of Lasix, otherwise no recent changes.    INR remains slightly above goal, will lower dose slightly again.    INR 3.0 is slightly above therapeutic range of 2-2.5  Recommend to decrease dose to 5 mg daily except 2.5 mg Q Tues/Fri  Patient has 5 mg tablets.  Will continue to monitor and check INR in 2 weeks.  Dosing reminder card given with phone number, appointment date and time.   Return to cliniic:  @ 0815 am     Clifford Mcguire PharmD 9:08 AM EDT 24    For Pharmacy Admin Tracking Only    Intervention Detail: Adherence Monitorin and Dose Adjustment: 1, reason: Therapy Optimization  Total # of Interventions Recommended: 1  Total # of Interventions Accepted: 1  Time Spent (min): 15

## 2024-05-08 ENCOUNTER — ANTI-COAG VISIT (OUTPATIENT)
Dept: PHARMACY | Age: 84
End: 2024-05-08
Payer: MEDICARE

## 2024-05-08 DIAGNOSIS — I48.0 PAF (PAROXYSMAL ATRIAL FIBRILLATION) (HCC): Primary | ICD-10-CM

## 2024-05-08 LAB — INTERNATIONAL NORMALIZATION RATIO, POC: 2.3

## 2024-05-08 PROCEDURE — 85610 PROTHROMBIN TIME: CPT | Performed by: PHARMACIST

## 2024-05-08 PROCEDURE — 99211 OFF/OP EST MAY X REQ PHY/QHP: CPT | Performed by: PHARMACIST

## 2024-05-08 NOTE — PROGRESS NOTES
MrEmily Hdez is here for management of anticoagulation for AFib.   PMH also significant for HLD, HTN, Hx prostate, colon CA.   He presents today w/out complaint.  Pt verifies dosing regimen as listed above.   Pt denies s/s bleeding/bruising/swelling/SOB.  No BRBPR. No melena.  Address missed doses  Reviewed pt medication list  No changes in RX/OTCs/Herbal medications.  Reviewed dietary concerns  Address EToH and tobacco use.    No changes per pt    INR 2.3 is within therapeutic range of 2-2.5  Recommend to continue dose of 5 mg daily except 2.5 mg Q Tu/Fri  Patient has 5 mg tablets.  Will continue to monitor and check INR in 3 weeks.  Dosing reminder card given with phone number, appointment date and time.   Return to clinic:  @ 0815 am     Raquel ParikhD 8:20 AM EDT 24    For Pharmacy Admin Tracking Only    Intervention Detail: Adherence Monitorin  Total # of Interventions Recommended: 1  Total # of Interventions Accepted: 1  Time Spent (min): 15

## 2024-05-29 ENCOUNTER — ANTI-COAG VISIT (OUTPATIENT)
Dept: PHARMACY | Age: 84
End: 2024-05-29
Payer: MEDICARE

## 2024-05-29 DIAGNOSIS — I48.0 PAF (PAROXYSMAL ATRIAL FIBRILLATION) (HCC): Primary | ICD-10-CM

## 2024-05-29 LAB — INTERNATIONAL NORMALIZATION RATIO, POC: 2.5

## 2024-05-29 PROCEDURE — 85610 PROTHROMBIN TIME: CPT | Performed by: PHARMACIST

## 2024-05-29 PROCEDURE — 99211 OFF/OP EST MAY X REQ PHY/QHP: CPT | Performed by: PHARMACIST

## 2024-05-29 NOTE — PROGRESS NOTES
MrEmily Hdez is here for management of anticoagulation for AFib.   PMH also significant for HLD, HTN, Hx prostate, colon CA.   He presents today w/out complaint.  Pt verifies dosing regimen as listed above.   Pt denies s/s bleeding/bruising/swelling/SOB.  No BRBPR. No melena.  Address missed doses  Reviewed pt medication list  No changes in RX/OTCs/Herbal medications.  Reviewed dietary concerns  Address EToH and tobacco use.    No changes per pt    INR 2.3 is within therapeutic range of 2-2.5  Recommend to continue dose of 5 mg daily except 2.5 mg Q Tu/Fri  Patient has 5 mg tablets.  Will continue to monitor and check INR in 4 weeks.  Dosing reminder card given with phone number, appointment date and time.   Return to clinic:  @ 0800 am     Raquel ParikhD 8:25 AM EDT 24    For Pharmacy Admin Tracking Only    Intervention Detail: Adherence Monitorin  Total # of Interventions Recommended: 1  Total # of Interventions Accepted: 1  Time Spent (min): 15

## 2024-06-26 ENCOUNTER — ANTI-COAG VISIT (OUTPATIENT)
Dept: PHARMACY | Age: 84
End: 2024-06-26
Payer: MEDICARE

## 2024-06-26 DIAGNOSIS — I48.0 PAF (PAROXYSMAL ATRIAL FIBRILLATION) (HCC): Primary | ICD-10-CM

## 2024-06-26 LAB
INTERNATIONAL NORMALIZATION RATIO, POC: 2.8
PROTHROMBIN TIME, POC: NORMAL

## 2024-06-26 PROCEDURE — 85610 PROTHROMBIN TIME: CPT | Performed by: PHARMACIST

## 2024-06-26 PROCEDURE — 99211 OFF/OP EST MAY X REQ PHY/QHP: CPT | Performed by: PHARMACIST

## 2024-06-26 NOTE — PROGRESS NOTES
MrEmily Hdez is here for management of anticoagulation for AFib.   PMH also significant for HLD, HTN, Hx prostate, colon CA.   He presents today w/out complaint.  Pt verifies dosing regimen as listed above.   Pt denies s/s bleeding/bruising/swelling/SOB.  No BRBPR. No melena.  Address missed doses  Reviewed pt medication list  No changes in RX/OTCs/Herbal medications.  Reviewed dietary concerns  Address EToH and tobacco use.    No changes per pt    INR 2.8 is slightly above therapeutic range of 2-2.5  Recommend to continue dose of 5 mg daily except 2.5 mg Q Tu/Fri  Patient has 5 mg tablets.  Will continue to monitor and check INR in 4 weeks.  Dosing reminder card given with phone number, appointment date and time.   Return to clinic:  @ 0745 am     Clifford Mcguire, PharmD 8:00 AM EDT 24    For Pharmacy Admin Tracking Only    Intervention Detail: Adherence Monitorin  Total # of Interventions Recommended: 1  Total # of Interventions Accepted: 1  Time Spent (min): 15

## 2024-07-24 ENCOUNTER — ANTI-COAG VISIT (OUTPATIENT)
Dept: PHARMACY | Age: 84
End: 2024-07-24
Payer: MEDICARE

## 2024-07-24 DIAGNOSIS — I48.0 PAF (PAROXYSMAL ATRIAL FIBRILLATION) (HCC): Primary | ICD-10-CM

## 2024-07-24 LAB
INTERNATIONAL NORMALIZATION RATIO, POC: 3
PROTHROMBIN TIME, POC: NORMAL

## 2024-07-24 PROCEDURE — 99211 OFF/OP EST MAY X REQ PHY/QHP: CPT | Performed by: PHARMACIST

## 2024-07-24 PROCEDURE — 85610 PROTHROMBIN TIME: CPT | Performed by: PHARMACIST

## 2024-07-24 NOTE — PROGRESS NOTES
MrEmily Hdez is here for management of anticoagulation for AFib.   PMH also significant for HLD, HTN, Hx prostate, colon CA.   He presents today w/out complaint.  Pt verifies dosing regimen as listed above.   Pt denies s/s bleeding/bruising/swelling/SOB.  No BRBPR. No melena.  Address missed doses  Reviewed pt medication list  No changes in RX/OTCs/Herbal medications.  Reviewed dietary concerns  Address EToH and tobacco use.    No changes per pt  INR remains slightly above goal range.    INR 3.0 is slightly above therapeutic range of 2-2.5  Recommend to reduce dose to 5 mg daily except 2.5 mg Q MWF  Patient has 5 mg tablets.  Will continue to monitor and check INR in 4 weeks.  Dosing reminder card given with phone number, appointment date and time.   Return to clinic: 24 @ 0745 am     Clifford Mcguire, PharmD 7:48 AM EDT 24    For Pharmacy Admin Tracking Only    Intervention Detail: Adherence Monitorin and Dose Adjustment: 1, reason: Therapy Optimization  Total # of Interventions Recommended: 2  Total # of Interventions Accepted: 2  Time Spent (min): 15

## 2024-08-21 ENCOUNTER — ANTI-COAG VISIT (OUTPATIENT)
Dept: PHARMACY | Age: 84
End: 2024-08-21
Payer: MEDICARE

## 2024-08-21 DIAGNOSIS — I48.0 PAF (PAROXYSMAL ATRIAL FIBRILLATION) (HCC): Primary | ICD-10-CM

## 2024-08-21 LAB
INTERNATIONAL NORMALIZATION RATIO, POC: 1.5
PROTHROMBIN TIME, POC: 0

## 2024-08-21 PROCEDURE — 99211 OFF/OP EST MAY X REQ PHY/QHP: CPT

## 2024-08-21 PROCEDURE — 85610 PROTHROMBIN TIME: CPT

## 2024-08-21 NOTE — PROGRESS NOTES
MrEmily Hdez is here for management of anticoagulation for AFib.   PMH also significant for HLD, HTN, Hx prostate, colon CA.   He presents today w/out complaint.  Pt verifies dosing regimen as listed above.   Pt denies s/s bleeding/bruising/swelling/SOB.  No missed doses  No changes in RX/OTCs/Herbal medications.  No dietary concerns  No ETOH or tobacco use.        INR 1.5 is below therapeutic range of 2-2.5, dose reduced last visit.   Recommend to resume prior dose of 5 mg daily except 2.5 mg Q MWF  Patient has 5 mg tablets.  Will continue to monitor and check INR in 2 weeks.  Dosing reminder card given with phone number, appointment date and time.   Return to clinic: 9/4/24 @ 2637    Nella Doherty Pharm. D.  For Pharmacy Admin Tracking Only    Intervention Detail:   Total # of Interventions Recommended: 0  Total # of Interventions Accepted: 0  Time Spent (min): 15

## 2024-09-04 ENCOUNTER — ANTI-COAG VISIT (OUTPATIENT)
Dept: PHARMACY | Age: 84
End: 2024-09-04
Payer: MEDICARE

## 2024-09-04 DIAGNOSIS — I48.0 PAF (PAROXYSMAL ATRIAL FIBRILLATION) (HCC): Primary | ICD-10-CM

## 2024-09-04 LAB
INTERNATIONAL NORMALIZATION RATIO, POC: 2.1
PROTHROMBIN TIME, POC: 0

## 2024-09-04 PROCEDURE — 85610 PROTHROMBIN TIME: CPT | Performed by: PHARMACIST

## 2024-09-04 PROCEDURE — 99211 OFF/OP EST MAY X REQ PHY/QHP: CPT | Performed by: PHARMACIST

## 2024-09-04 NOTE — PROGRESS NOTES
MrEmily Hdez is here for management of anticoagulation for AFib.   PMH also significant for HLD, HTN, Hx prostate, colon CA.   He presents today w/out complaint.  Pt verifies dosing regimen as listed above.   Pt denies s/s bleeding/bruising/swelling/SOB.  No missed doses  No changes in RX/OTCs/Herbal medications.  No dietary concerns  No ETOH or tobacco use.    Pt states they had been on vacation prior to last INR check, may have missed a dose causing low INR last time.    INR in range today after dose increase last visit, will continue as he has been taking.    INR 2.1 is within therapeutic range of 2-2.5, dose reduced last visit.   Recommend to continue dose of 5 mg daily except 2.5 mg Q M/  Patient has 5 mg tablets.  Will continue to monitor and check INR in 4 weeks.  Dosing reminder card given with phone number, appointment date and time.   Return to clinic:  10/2/24 @ 0830    Clifford Mcguire, PharmD 8:12 AM EDT 24    For Pharmacy Admin Tracking Only    Intervention Detail: Adherence Monitorin  Total # of Interventions Recommended: 1  Total # of Interventions Accepted: 1  Time Spent (min): 15

## 2024-09-17 ENCOUNTER — HOSPITAL ENCOUNTER (OUTPATIENT)
Age: 84
Discharge: HOME | End: 2024-09-19
Attending: INTERNAL MEDICINE
Payer: MEDICARE

## 2024-09-17 VITALS
WEIGHT: 171 LBS | SYSTOLIC BLOOD PRESSURE: 98 MMHG | DIASTOLIC BLOOD PRESSURE: 66 MMHG | HEIGHT: 67 IN | BODY MASS INDEX: 26.84 KG/M2

## 2024-09-17 DIAGNOSIS — I25.5 ISCHEMIC CARDIOMYOPATHY: ICD-10-CM

## 2024-09-17 DIAGNOSIS — I42.9 CARDIOMYOPATHY, UNSPECIFIED TYPE (HCC): ICD-10-CM

## 2024-09-17 DIAGNOSIS — I10 ESSENTIAL HYPERTENSION: ICD-10-CM

## 2024-09-17 DIAGNOSIS — I50.40 COMBINED SYSTOLIC AND DIASTOLIC CONGESTIVE HEART FAILURE, UNSPECIFIED HF CHRONICITY (HCC): ICD-10-CM

## 2024-09-17 LAB
ECHO AO ROOT DIAM: 3.7 CM
ECHO AO ROOT INDEX: 1.96 CM/M2
ECHO AV CUSP MM: 1.4 CM
ECHO AV PEAK GRADIENT: 8 MMHG
ECHO AV PEAK VELOCITY: 1.5 M/S
ECHO BSA: 1.91 M2
ECHO EST RA PRESSURE: 3 MMHG
ECHO LA AREA 2C: 23.8 CM2
ECHO LA AREA 4C: 25.1 CM2
ECHO LA DIAMETER INDEX: 2.86 CM/M2
ECHO LA DIAMETER: 5.4 CM
ECHO LA MAJOR AXIS: 6.4 CM
ECHO LA MINOR AXIS: 6.5 CM
ECHO LA TO AORTIC ROOT RATIO: 1.46
ECHO LA VOL BP: 75 ML (ref 18–58)
ECHO LA VOL MOD A2C: 70 ML (ref 18–58)
ECHO LA VOL MOD A4C: 80 ML (ref 18–58)
ECHO LA VOL/BSA BIPLANE: 40 ML/M2 (ref 16–34)
ECHO LA VOLUME INDEX MOD A2C: 37 ML/M2 (ref 16–34)
ECHO LA VOLUME INDEX MOD A4C: 42 ML/M2 (ref 16–34)
ECHO LV E' LATERAL VELOCITY: 11 CM/S
ECHO LV E' SEPTAL VELOCITY: 4 CM/S
ECHO LV EDV A2C: 76 ML
ECHO LV EDV A4C: 68 ML
ECHO LV EDV INDEX A4C: 36 ML/M2
ECHO LV EDV NDEX A2C: 40 ML/M2
ECHO LV EF PHYSICIAN: 30 %
ECHO LV EJECTION FRACTION A2C: 24 %
ECHO LV EJECTION FRACTION A4C: 35 %
ECHO LV EJECTION FRACTION BIPLANE: 27 % (ref 55–100)
ECHO LV ESV A2C: 58 ML
ECHO LV ESV A4C: 44 ML
ECHO LV ESV INDEX A2C: 31 ML/M2
ECHO LV ESV INDEX A4C: 23 ML/M2
ECHO LV FRACTIONAL SHORTENING: 33 % (ref 28–44)
ECHO LV INTERNAL DIMENSION DIASTOLE INDEX: 2.54 CM/M2
ECHO LV INTERNAL DIMENSION DIASTOLIC: 4.8 CM (ref 4.2–5.9)
ECHO LV INTERNAL DIMENSION SYSTOLIC INDEX: 1.69 CM/M2
ECHO LV INTERNAL DIMENSION SYSTOLIC: 3.2 CM
ECHO LV ISOVOLUMETRIC RELAXATION TIME (IVRT): 98 MS
ECHO LV IVSD: 1.6 CM (ref 0.6–1)
ECHO LV MASS 2D: 384.3 G (ref 88–224)
ECHO LV MASS INDEX 2D: 203.3 G/M2 (ref 49–115)
ECHO LV POSTERIOR WALL DIASTOLIC: 1.9 CM (ref 0.6–1)
ECHO LV RELATIVE WALL THICKNESS RATIO: 0.79
ECHO MV A VELOCITY: 0.58 M/S
ECHO MV E VELOCITY: 0.9 M/S
ECHO MV E/A RATIO: 1.55
ECHO MV E/E' LATERAL: 8.18
ECHO MV E/E' RATIO (AVERAGED): 15.34
ECHO MV E/E' SEPTAL: 22.5
ECHO PV MAX VELOCITY: 1.1 M/S
ECHO PV PEAK GRADIENT: 4 MMHG
ECHO RA AREA 4C: 18.5 CM2
ECHO RA END SYSTOLIC VOLUME APICAL 4 CHAMBER INDEX BSA: 24 ML/M2
ECHO RA VOLUME: 46 ML
ECHO RIGHT VENTRICULAR SYSTOLIC PRESSURE (RVSP): 26 MMHG
ECHO RV BASAL DIMENSION: 3.3 CM
ECHO RV FREE WALL PEAK S': 11 CM/S
ECHO RV LONGITUDINAL DIMENSION: 7.2 CM
ECHO RV MID DIMENSION: 2.8 CM
ECHO RV TAPSE: 1.8 CM (ref 1.7–?)
ECHO TV PEAK GRADIENT: 2 MMHG
ECHO TV REGURGITANT MAX VELOCITY: 2.41 M/S
ECHO TV REGURGITANT PEAK GRADIENT: 23 MMHG

## 2024-09-17 PROCEDURE — 93306 TTE W/DOPPLER COMPLETE: CPT | Performed by: STUDENT IN AN ORGANIZED HEALTH CARE EDUCATION/TRAINING PROGRAM

## 2024-09-17 PROCEDURE — 93306 TTE W/DOPPLER COMPLETE: CPT

## 2024-09-28 DIAGNOSIS — I50.40 COMBINED SYSTOLIC AND DIASTOLIC CONGESTIVE HEART FAILURE, UNSPECIFIED HF CHRONICITY (HCC): ICD-10-CM

## 2024-09-30 RX ORDER — FUROSEMIDE 20 MG
20 TABLET ORAL DAILY
Qty: 90 TABLET | Refills: 1 | Status: SHIPPED | OUTPATIENT
Start: 2024-09-30

## 2024-10-02 ENCOUNTER — ANTI-COAG VISIT (OUTPATIENT)
Dept: PHARMACY | Age: 84
End: 2024-10-02
Payer: MEDICARE

## 2024-10-02 DIAGNOSIS — I48.0 PAF (PAROXYSMAL ATRIAL FIBRILLATION) (HCC): Primary | ICD-10-CM

## 2024-10-02 LAB
INTERNATIONAL NORMALIZATION RATIO, POC: 2.3
PROTHROMBIN TIME, POC: 0

## 2024-10-02 PROCEDURE — 85610 PROTHROMBIN TIME: CPT | Performed by: PHARMACIST

## 2024-10-02 PROCEDURE — 99211 OFF/OP EST MAY X REQ PHY/QHP: CPT | Performed by: PHARMACIST

## 2024-10-02 NOTE — PROGRESS NOTES
Mr. Jhony Hdez is here for management of anticoagulation for AFib.   PMH also significant for HLD, HTN, Hx prostate, colon CA.   He presents today w/out complaint.  Pt verifies dosing regimen as listed above.   Pt denies s/s bleeding/bruising/swelling/SOB.  No missed doses  No changes in RX/OTCs/Herbal medications.  No dietary concerns  No ETOH or tobacco use.    He has had some blood in urine recently.  He does have a history of prostate cancer. Advised to follow up if continues or worsens.      INR 2.3 is within therapeutic range of 2-2.5.  Recommend to continue dose of 5 mg daily except 2.5 mg Q M/F  Patient has 5 mg tablets.  Will continue to monitor and check INR in 4 weeks.  Dosing reminder card given with phone number, appointment date and time.   Return to clinic:  10/30/24 @ 0830    Clifford Mcguire, PharmD 8:28 AM EDT 10/2/24    For Pharmacy Admin Tracking Only    Intervention Detail: Adherence Monitorin  Total # of Interventions Recommended: 1  Total # of Interventions Accepted: 1  Time Spent (min): 15

## 2024-10-17 NOTE — PROGRESS NOTES
found for: \"CKTOTAL\", \"CKMB\", \"CKMBINDEX\", \"TROPONINI\"    FLP:    Lab Results   Component Value Date/Time    TRIG 160 03/25/2024 07:55 AM    HDL 39 03/25/2024 07:55 AM     Lab Results   Component Value Date    TSH 1.700 02/05/2024      Cardiac Data:   EKG 3/25/24  Atrial fibrillation   Low voltage in the limb leads   Left axis deviation   Right bundle branch block  Inferior infarct,age undetermined  Anteroseptal infarct,age undetermined     EKG 2/16/24  indeterminate due to quality    Echo 9/17/24    Left Ventricle: Severely reduced left ventricular systolic function with a visually estimated EF of 25 - 30%. Left ventricle size is normal. Moderate-severely increased wall thickness. Findings consistent with moderate-severe concentric hypertrophy.  There is a speckled appearance of the myocardium.  Cardiac MRI could be considered. Global hypokinesis present. Grade II diastolic dysfunction with increased LAP.    Aortic Valve: Mildl-moderately thickened cusps. No regurgitation. No stenosis.    Mitral Valve: Mild annular calcification. Mildly thickened, at the anterior leaflet. There is restricted motion of the posterior leaflet. Mild to moderate regurgitation.    Tricuspid Valve: Mild regurgitation. Normal RVSP. The estimated RVSP is 26 mmHg.    Left Atrium: Left atrium is mildly dilated.    Right Atrium: Right atrium is mildly dilated.    Image quality is adequate.    Echo 3/15/24  Normal left ventricle size, severely increased concentric wall thickness and severely reduced systolic function with an estimated ejection fraction of 15-20%. Global hypokinesis noted.  Indeterminate diastolic function due to afib. IVC size is dilated (>2.1 cm) and collapses < 50% with respiration  consistent with elevated RA pressure (15 mmHg).  RVSP of 53mmHg assuming RAP of 15, consistent with at least moderate pulmonary hypertension.  The right ventricle is severely dilated with moderately reduced systolic  function.  The left atrium is

## 2024-10-18 ENCOUNTER — OFFICE VISIT (OUTPATIENT)
Dept: CARDIOLOGY CLINIC | Age: 84
End: 2024-10-18

## 2024-10-18 VITALS
DIASTOLIC BLOOD PRESSURE: 74 MMHG | HEIGHT: 67 IN | HEART RATE: 72 BPM | SYSTOLIC BLOOD PRESSURE: 126 MMHG | OXYGEN SATURATION: 98 % | BODY MASS INDEX: 28.17 KG/M2 | WEIGHT: 179.5 LBS

## 2024-10-18 DIAGNOSIS — E78.2 MIXED HYPERLIPIDEMIA: ICD-10-CM

## 2024-10-18 DIAGNOSIS — I50.20 HFREF (HEART FAILURE WITH REDUCED EJECTION FRACTION) (HCC): Primary | ICD-10-CM

## 2024-10-18 DIAGNOSIS — I48.0 PAF (PAROXYSMAL ATRIAL FIBRILLATION) (HCC): ICD-10-CM

## 2024-10-18 DIAGNOSIS — Z79.899 MEDICATION MANAGEMENT: ICD-10-CM

## 2024-10-18 DIAGNOSIS — I10 ESSENTIAL HYPERTENSION: ICD-10-CM

## 2024-10-18 DIAGNOSIS — I50.40 COMBINED SYSTOLIC AND DIASTOLIC CONGESTIVE HEART FAILURE, UNSPECIFIED HF CHRONICITY (HCC): ICD-10-CM

## 2024-10-18 RX ORDER — FUROSEMIDE 20 MG
20 TABLET ORAL EVERY OTHER DAY
Qty: 90 TABLET | Refills: 1 | Status: SHIPPED
Start: 2024-10-18

## 2024-10-18 RX ORDER — OMEPRAZOLE 40 MG/1
40 CAPSULE, DELAYED RELEASE ORAL DAILY
Qty: 90 CAPSULE | Refills: 3 | Status: SHIPPED | OUTPATIENT
Start: 2024-10-18

## 2024-10-18 RX ORDER — METOPROLOL SUCCINATE 25 MG/1
25 TABLET, EXTENDED RELEASE ORAL NIGHTLY
Qty: 30 TABLET | Refills: 0 | Status: SHIPPED | OUTPATIENT
Start: 2024-10-18

## 2024-10-18 RX ORDER — DAPAGLIFLOZIN 5 MG/1
5 TABLET, FILM COATED ORAL EVERY MORNING
Qty: 30 TABLET | Refills: 0 | Status: SHIPPED | OUTPATIENT
Start: 2024-10-18

## 2024-10-18 RX ORDER — SPIRONOLACTONE 25 MG/1
25 TABLET ORAL DAILY
Qty: 30 TABLET | Refills: 0 | Status: SHIPPED | OUTPATIENT
Start: 2024-10-18

## 2024-10-18 NOTE — PATIENT INSTRUCTIONS
Plan:  Start Aldactone (spironolactone) 25 mg daily.   Start Farxiga (dapagliflozin) 5 mg daily.   Start Toprol XL 25 mg nightly.   Order fasting lab work: CMP and mag, CBC 1 week prior to next appointment.   Continue other prescribed medications as ordered.  Follow up with Coumadin clinic as scheduled.   Follow up with me 1st-2nd week in December.

## 2024-10-18 NOTE — TELEPHONE ENCOUNTER
Pt and pt wife called wanting to see if there is another medication that can be prescribed in replacement for the Farxiga. They said its too expensive.

## 2024-10-21 RX ORDER — DAPAGLIFLOZIN 10 MG/1
5 TABLET, FILM COATED ORAL EVERY EVENING
Qty: 14 TABLET | Refills: 0 | Status: SHIPPED | COMMUNITY
Start: 2024-10-21

## 2024-10-21 NOTE — TELEPHONE ENCOUNTER
Per HIPAA I could speak with pts wife. PT wife is going to  samples and patient assistance ppw.       Both samples and pt assistance ppw in pickup bin.     ALEJANDRO: please advise if pt should continue other medications      Per last OV  Instructions      Return in about 2 months (around 12/18/2024).  Plan:  Start Aldactone (spironolactone) 25 mg daily.   Start Farxiga (dapagliflozin) 5 mg daily.   Start Toprol XL 25 mg nightly.   Order fasting lab work: CMP and mag, CBC 1 week prior to next appointment.   Continue other prescribed medications as ordered.  Follow up with Coumadin clinic as scheduled.   Follow up with me 1st-2nd week in December.

## 2024-10-21 NOTE — TELEPHONE ENCOUNTER
Pt's wife Aura called back and is wanting to know if there is a replacement med gor the Farxiga. Also wants to know if he should take the other two medications that were newly prescribe if he's not taking the farxiga. Please advise.

## 2024-10-22 NOTE — TELEPHONE ENCOUNTER
Pt presented self in main suite.  Dropped off PA PPW for Farxiga.  Placed ppw in ALEJANDRO folder for processing

## 2024-10-29 ENCOUNTER — TELEPHONE (OUTPATIENT)
Dept: CARDIOLOGY CLINIC | Age: 84
End: 2024-10-29

## 2024-10-29 RX ORDER — DAPAGLIFLOZIN 5 MG/1
5 TABLET, FILM COATED ORAL EVERY MORNING
Qty: 30 TABLET | Refills: 0 | Status: SHIPPED | OUTPATIENT
Start: 2024-10-29

## 2024-10-29 NOTE — TELEPHONE ENCOUNTER
AZ&ME denial Due to exceeding income limits scanned into media  ALEJANDRO please advise   
Jono Sandhu MD   to [facility] Staff  Ann Marie Shipley, RN   10/29/24  2:53 PM We will just hold Farxiga for now due to financial costs.      Spoke with patient and relayed ALEJANDRO's message. Pt v/u and will finish out his samples of Jardiance and then f/u with ALEJANDRO in December.  
Pt was denied for assistance for Farxiga.     Called pharmacy and cost was $560 for generic for 30 days. Farga ordered for pharmacy to process cost. Called pt and notified pt of patient assistance denial. Pt states this is too expensive.    ALEJANDRO- Is there a medication to order in place of farxiga, patient was denied assistance and cost for generic is $560 for 30 days.  
Bacterial Etiology

## 2024-10-30 ENCOUNTER — ANTI-COAG VISIT (OUTPATIENT)
Dept: PHARMACY | Age: 84
End: 2024-10-30
Payer: MEDICARE

## 2024-10-30 DIAGNOSIS — I48.0 PAF (PAROXYSMAL ATRIAL FIBRILLATION) (HCC): Primary | ICD-10-CM

## 2024-10-30 LAB
INTERNATIONAL NORMALIZATION RATIO, POC: 2.7
PROTHROMBIN TIME, POC: 0

## 2024-10-30 PROCEDURE — 99211 OFF/OP EST MAY X REQ PHY/QHP: CPT | Performed by: PHARMACIST

## 2024-10-30 PROCEDURE — 85610 PROTHROMBIN TIME: CPT | Performed by: PHARMACIST

## 2024-10-30 NOTE — PROGRESS NOTES
MrEmily Hdez is here for management of anticoagulation for AFib.   PMH also significant for HLD, HTN, Hx prostate, colon CA.   He presents today w/out complaint.  Pt verifies dosing regimen as listed above.   Pt denies s/s bleeding/bruising/swelling/SOB.  No missed doses  No changes in RX/OTCs/Herbal medications.  No dietary concerns  No ETOH or tobacco use.    He has had some blood in urine recently.  He does have a history of prostate cancer, following with urologist.    Recently started on Spironolactone, Farxiga, Metoprolol.  May not be able to continue Farxiga due to cost.      INR 2.7 is within therapeutic range of 2-2.5.  Recommend to continue dose of 5 mg daily except 2.5 mg Q M/  Patient has 5 mg tablets.  Will continue to monitor and check INR in 4 weeks.  Dosing reminder card given with phone number, appointment date and time.   Return to clinic:  24 @ 0830    Clifford Mcguire PharmD 8:35 AM EDT 10/30/24    For Pharmacy Admin Tracking Only    Intervention Detail: Adherence Monitorin  Total # of Interventions Recommended: 1  Total # of Interventions Accepted: 1  Time Spent (min): 15

## 2024-11-05 DIAGNOSIS — I48.0 PAF (PAROXYSMAL ATRIAL FIBRILLATION) (HCC): ICD-10-CM

## 2024-11-06 RX ORDER — METOPROLOL SUCCINATE 25 MG/1
25 TABLET, EXTENDED RELEASE ORAL NIGHTLY
Qty: 90 TABLET | Refills: 3 | Status: SHIPPED | OUTPATIENT
Start: 2024-11-06

## 2024-11-06 RX ORDER — WARFARIN SODIUM 5 MG/1
5 TABLET ORAL DAILY
Qty: 90 TABLET | Refills: 3 | Status: SHIPPED | OUTPATIENT
Start: 2024-11-06

## 2024-11-06 RX ORDER — SPIRONOLACTONE 25 MG/1
25 TABLET ORAL DAILY
Qty: 90 TABLET | Refills: 3 | Status: SHIPPED | OUTPATIENT
Start: 2024-11-06

## 2024-11-06 NOTE — TELEPHONE ENCOUNTER
Last Office Visit: 10/18/2024 Provider: ALEJANDRO  Is provider OOT? No    Next Office Visit: 12/12/2024 Provider: ALEJANDRO      LAST LABS:   BMP:   Lab Results   Component Value Date/Time     03/25/2024 07:55 AM    K 4.1 03/25/2024 07:55 AM    CL 99 03/25/2024 07:55 AM    CO2 27 03/25/2024 07:55 AM    BUN 42 03/25/2024 07:55 AM    CREATININE 1.7 03/25/2024 07:55 AM    GLUCOSE 84 03/25/2024 07:55 AM    GLUCOSE 95 05/04/2017 11:22 AM    CALCIUM 9.5 03/25/2024 07:55 AM    LABGLOM 39 03/25/2024 07:55 AM       CBC:   Lab Results   Component Value Date    WBC 5.0 03/25/2024    HGB 16.6 03/25/2024    HCT 51.2 03/25/2024    MCV 87.6 03/25/2024     03/25/2024            Is encounter provider correct?   Yes  Does refill dosage match last filled?   Yes  Changes to script from Tele Encounters or LOV Plan?   no  Did you adjust dispensed amount or refills to reflect last and upcoming OV?  Yes    Requested Prescriptions     Pending Prescriptions Disp Refills    metoprolol succinate (TOPROL XL) 25 MG extended release tablet [Pharmacy Med Name: Metoprolol Succinate ER Oral Tablet Extended Release 24 Hour 25 MG] 90 tablet 3     Sig: TAKE 1 TABLET BY MOUTH AT BEDTIME    spironolactone (ALDACTONE) 25 MG tablet [Pharmacy Med Name: Spironolactone Oral Tablet 25 MG] 90 tablet 3     Sig: TAKE 1 TABLET BY MOUTH EVERY DAY    warfarin (JANTOVEN) 5 MG tablet [Pharmacy Med Name: Jantoven Oral Tablet 5 MG] 90 tablet 3     Sig: TAKE 1 TABLET BY MOUTH DAILY

## 2024-11-11 ENCOUNTER — TELEPHONE (OUTPATIENT)
Dept: CARDIOLOGY CLINIC | Age: 84
End: 2024-11-11

## 2024-11-11 NOTE — TELEPHONE ENCOUNTER
Pt spouse contacted office, pt will be out of farxiga 5mg in a few days and does not know if pt can have more samples. Please advise.

## 2024-11-12 NOTE — TELEPHONE ENCOUNTER
Jono Sandhu MD   to [facility] Staff  Ann Marie Shipley RN       10/29/24  2:53 PM  We will just hold Farxiga for now due to financial costs.       Per Telephone encounter from 10/29 we will hold farxiga.   Spoke with pt. Pt v/u

## 2024-12-04 ENCOUNTER — ANTI-COAG VISIT (OUTPATIENT)
Dept: PHARMACY | Age: 84
End: 2024-12-04
Payer: MEDICARE

## 2024-12-04 ENCOUNTER — HOSPITAL ENCOUNTER (OUTPATIENT)
Age: 84
Discharge: HOME | End: 2024-12-04
Payer: MEDICARE

## 2024-12-04 DIAGNOSIS — I48.0 PAF (PAROXYSMAL ATRIAL FIBRILLATION) (HCC): Primary | ICD-10-CM

## 2024-12-04 DIAGNOSIS — Z79.899 MEDICATION MANAGEMENT: ICD-10-CM

## 2024-12-04 DIAGNOSIS — Z79.899 MEDICATION MANAGEMENT: Primary | ICD-10-CM

## 2024-12-04 LAB
ALBUMIN SERPL-MCNC: 3.9 G/DL (ref 3.4–5)
ALBUMIN/GLOB SERPL: 1.2 {RATIO} (ref 1.1–2.2)
ALP SERPL-CCNC: 83 U/L (ref 40–129)
ALT SERPL-CCNC: 10 U/L (ref 10–40)
ANION GAP SERPL CALCULATED.3IONS-SCNC: 13 MMOL/L (ref 3–16)
AST SERPL-CCNC: 21 U/L (ref 15–37)
BASOPHILS # BLD: 0.1 K/UL (ref 0–0.2)
BASOPHILS NFR BLD: 1.6 %
BILIRUB SERPL-MCNC: 0.5 MG/DL (ref 0–1)
BUN SERPL-MCNC: 31 MG/DL (ref 7–20)
CALCIUM SERPL-MCNC: 9.1 MG/DL (ref 8.3–10.6)
CHLORIDE SERPL-SCNC: 106 MMOL/L (ref 99–110)
CO2 SERPL-SCNC: 20 MMOL/L (ref 21–32)
CREAT SERPL-MCNC: 1.5 MG/DL (ref 0.8–1.3)
DEPRECATED RDW RBC AUTO: 14.9 % (ref 12.4–15.4)
EOSINOPHIL # BLD: 0.3 K/UL (ref 0–0.6)
EOSINOPHIL NFR BLD: 4.2 %
EST. AVERAGE GLUCOSE BLD GHB EST-MCNC: 122.6 MG/DL
GFR SERPLBLD CREATININE-BSD FMLA CKD-EPI: 45 ML/MIN/{1.73_M2}
GLUCOSE SERPL-MCNC: 89 MG/DL (ref 70–99)
HBA1C MFR BLD: 5.9 %
HCT VFR BLD AUTO: 48.4 % (ref 40.5–52.5)
HGB BLD-MCNC: 15.3 G/DL (ref 13.5–17.5)
INTERNATIONAL NORMALIZATION RATIO, POC: 3
LYMPHOCYTES # BLD: 1.2 K/UL (ref 1–5.1)
LYMPHOCYTES NFR BLD: 19.1 %
MAGNESIUM SERPL-MCNC: 2.1 MG/DL (ref 1.8–2.4)
MCH RBC QN AUTO: 29.8 PG (ref 26–34)
MCHC RBC AUTO-ENTMCNC: 31.5 G/DL (ref 31–36)
MCV RBC AUTO: 94.3 FL (ref 80–100)
MONOCYTES # BLD: 0.7 K/UL (ref 0–1.3)
MONOCYTES NFR BLD: 11.6 %
NEUTROPHILS # BLD: 3.9 K/UL (ref 1.7–7.7)
NEUTROPHILS NFR BLD: 63.5 %
PLATELET # BLD AUTO: 196 K/UL (ref 135–450)
PMV BLD AUTO: 8.6 FL (ref 5–10.5)
POTASSIUM SERPL-SCNC: 5.3 MMOL/L (ref 3.5–5.1)
PROT SERPL-MCNC: 7.1 G/DL (ref 6.4–8.2)
PROTHROMBIN TIME, POC: 0
RBC # BLD AUTO: 5.13 M/UL (ref 4.2–5.9)
SODIUM SERPL-SCNC: 139 MMOL/L (ref 136–145)
WBC # BLD AUTO: 6.1 K/UL (ref 4–11)

## 2024-12-04 PROCEDURE — 36415 COLL VENOUS BLD VENIPUNCTURE: CPT

## 2024-12-04 PROCEDURE — 85610 PROTHROMBIN TIME: CPT

## 2024-12-04 PROCEDURE — 83735 ASSAY OF MAGNESIUM: CPT

## 2024-12-04 PROCEDURE — 80061 LIPID PANEL: CPT

## 2024-12-04 PROCEDURE — 83036 HEMOGLOBIN GLYCOSYLATED A1C: CPT

## 2024-12-04 PROCEDURE — 99211 OFF/OP EST MAY X REQ PHY/QHP: CPT

## 2024-12-04 PROCEDURE — 80053 COMPREHEN METABOLIC PANEL: CPT

## 2024-12-04 PROCEDURE — 85025 COMPLETE CBC W/AUTO DIFF WBC: CPT

## 2024-12-04 NOTE — RESULT ENCOUNTER NOTE
Called pt and relayed message, pt requested for RN to speak to pt spouse, also spoke with pt spouse and relayed message. V/U. Labs ordered.     ALEJANDRO LLANES

## 2024-12-04 NOTE — PROGRESS NOTES
MrEmily Hdez is here for management of anticoagulation for AFib.   PMH also significant for HLD, HTN, Hx prostate, colon CA.   He presents today w/out complaint.  Pt verifies dosing regimen as listed above.   Pt denies s/s bleeding/bruising/swelling/SOB.  No missed doses  No changes in RX/OTCs/Herbal medications.  No dietary concerns  No ETOH or tobacco use.    He has had some blood in urine recently.  He does have a history of prostate cancer, following with urologist.    Recently started on Spironolactone, Farxiga, Metoprolol.  May not be able to continue Farxiga due to cost.      INR 3.0 is above therapeutic range of 2-2.5.  Recommend to dec dose to 5 mg daily except 2.5 mg Q M/W/F  Patient has 5 mg tablets.  Will continue to monitor and check INR in 2 weeks.  Dosing reminder card given with phone number, appointment date and time.   Return to clinic:  12/18/24 @ 5264  Nella García. D.  For Pharmacy Admin Tracking Only    Intervention Detail: Dose Adjustment: 1, reason: Therapy De-escalation  Total # of Interventions Recommended: 1  Total # of Interventions Accepted: 1  Time Spent (min): 15

## 2024-12-05 LAB
CHOLEST SERPL-MCNC: 174 MG/DL (ref 0–199)
HDLC SERPL-MCNC: 33 MG/DL (ref 40–60)
LDLC SERPL CALC-MCNC: 107 MG/DL
TRIGL SERPL-MCNC: 171 MG/DL (ref 0–150)
VLDLC SERPL CALC-MCNC: 34 MG/DL

## 2024-12-12 ENCOUNTER — OFFICE VISIT (OUTPATIENT)
Dept: CARDIOLOGY CLINIC | Age: 84
End: 2024-12-12
Payer: MEDICARE

## 2024-12-12 VITALS
BODY MASS INDEX: 28.56 KG/M2 | SYSTOLIC BLOOD PRESSURE: 92 MMHG | HEIGHT: 67 IN | WEIGHT: 182 LBS | DIASTOLIC BLOOD PRESSURE: 50 MMHG | HEART RATE: 50 BPM | OXYGEN SATURATION: 99 %

## 2024-12-12 DIAGNOSIS — I50.9 CONGESTIVE HEART FAILURE, UNSPECIFIED HF CHRONICITY, UNSPECIFIED HEART FAILURE TYPE (HCC): ICD-10-CM

## 2024-12-12 DIAGNOSIS — R06.02 SHORTNESS OF BREATH: ICD-10-CM

## 2024-12-12 DIAGNOSIS — Z79.899 MEDICATION MANAGEMENT: ICD-10-CM

## 2024-12-12 DIAGNOSIS — I50.40 COMBINED SYSTOLIC AND DIASTOLIC CONGESTIVE HEART FAILURE, UNSPECIFIED HF CHRONICITY (HCC): Primary | ICD-10-CM

## 2024-12-12 DIAGNOSIS — R09.89 UNEQUAL BLOOD PRESSURE IN UPPER EXTREMITIES: ICD-10-CM

## 2024-12-12 PROCEDURE — G8419 CALC BMI OUT NRM PARAM NOF/U: HCPCS | Performed by: INTERNAL MEDICINE

## 2024-12-12 PROCEDURE — 99214 OFFICE O/P EST MOD 30 MIN: CPT | Performed by: INTERNAL MEDICINE

## 2024-12-12 PROCEDURE — 1123F ACP DISCUSS/DSCN MKR DOCD: CPT | Performed by: INTERNAL MEDICINE

## 2024-12-12 PROCEDURE — 3074F SYST BP LT 130 MM HG: CPT | Performed by: INTERNAL MEDICINE

## 2024-12-12 PROCEDURE — 3078F DIAST BP <80 MM HG: CPT | Performed by: INTERNAL MEDICINE

## 2024-12-12 PROCEDURE — 1159F MED LIST DOCD IN RCRD: CPT | Performed by: INTERNAL MEDICINE

## 2024-12-12 PROCEDURE — 1036F TOBACCO NON-USER: CPT | Performed by: INTERNAL MEDICINE

## 2024-12-12 PROCEDURE — G8484 FLU IMMUNIZE NO ADMIN: HCPCS | Performed by: INTERNAL MEDICINE

## 2024-12-12 PROCEDURE — G8427 DOCREV CUR MEDS BY ELIG CLIN: HCPCS | Performed by: INTERNAL MEDICINE

## 2024-12-12 RX ORDER — DAPAGLIFLOZIN 5 MG/1
5 TABLET, FILM COATED ORAL EVERY MORNING
Qty: 90 TABLET | Refills: 3 | Status: CANCELLED | OUTPATIENT
Start: 2024-12-12

## 2024-12-12 NOTE — PATIENT INSTRUCTIONS
Plan:  Order Echocardiogram to view size and strength of the heart for 6 months.   Order arterial ultrasound to upper extremities for 6 months.   Order fasting (8 hours) lab work: Lipids, TSH, A1C, CMP and CBC. Get this completed prior to your next appointment.  Continue medications as prescribed.   Follow up with me in 6 months.

## 2024-12-12 NOTE — PROGRESS NOTES
AM     12/04/2024 09:33 AM     CMP:  Lab Results   Component Value Date/Time     12/04/2024 09:33 AM    K 5.3 12/04/2024 09:33 AM    K 4.1 03/25/2024 07:55 AM     12/04/2024 09:33 AM    CO2 20 12/04/2024 09:33 AM    BUN 31 12/04/2024 09:33 AM    CREATININE 1.5 12/04/2024 09:33 AM    GFRAA >60 10/26/2017 08:30 AM    AGRATIO 1.2 12/04/2024 09:33 AM    LABGLOM 45 12/04/2024 09:33 AM    LABGLOM 39 03/25/2024 07:55 AM    GLUCOSE 89 12/04/2024 09:33 AM    GLUCOSE 95 05/04/2017 11:22 AM    CALCIUM 9.1 12/04/2024 09:33 AM    BILITOT 0.5 12/04/2024 09:33 AM    ALKPHOS 83 12/04/2024 09:33 AM    AST 21 12/04/2024 09:33 AM    ALT 10 12/04/2024 09:33 AM     PT/INR:  No results found for: \"PTINR\"  HgBA1c:  Lab Results   Component Value Date    LABA1C 5.9 12/04/2024     No results found for: \"CKTOTAL\", \"CKMB\", \"CKMBINDEX\", \"TROPONINI\"    FLP:    Lab Results   Component Value Date/Time    TRIG 171 12/04/2024 09:33 AM    HDL 33 12/04/2024 09:33 AM     Lab Results   Component Value Date    TSH 1.700 02/05/2024      Cardiac Data:   EKG 3/25/24  Atrial fibrillation   Low voltage in the limb leads   Left axis deviation   Right bundle branch block  Inferior infarct,age undetermined  Anteroseptal infarct,age undetermined     EKG 2/16/24  indeterminate due to quality    Echo 9/17/24    Left Ventricle: Severely reduced left ventricular systolic function with a visually estimated EF of 25 - 30%. Left ventricle size is normal. Moderate-severely increased wall thickness. Findings consistent with moderate-severe concentric hypertrophy.  There is a speckled appearance of the myocardium.  Cardiac MRI could be considered. Global hypokinesis present. Grade II diastolic dysfunction with increased LAP.    Aortic Valve: Mildl-moderately thickened cusps. No regurgitation. No stenosis.    Mitral Valve: Mild annular calcification. Mildly thickened, at the anterior leaflet. There is restricted motion of the posterior leaflet. Mild to

## 2024-12-18 ENCOUNTER — HOSPITAL ENCOUNTER (OUTPATIENT)
Age: 84
Discharge: HOME | End: 2024-12-18
Payer: MEDICARE

## 2024-12-18 ENCOUNTER — ANTI-COAG VISIT (OUTPATIENT)
Dept: PHARMACY | Age: 84
End: 2024-12-18
Payer: MEDICARE

## 2024-12-18 DIAGNOSIS — I48.0 PAF (PAROXYSMAL ATRIAL FIBRILLATION) (HCC): Primary | ICD-10-CM

## 2024-12-18 DIAGNOSIS — Z79.899 MEDICATION MANAGEMENT: ICD-10-CM

## 2024-12-18 LAB
ALBUMIN SERPL-MCNC: 3.8 G/DL (ref 3.4–5)
ALBUMIN/GLOB SERPL: 1.2 {RATIO} (ref 1.1–2.2)
ALP SERPL-CCNC: 83 U/L (ref 40–129)
ALT SERPL-CCNC: 11 U/L (ref 10–40)
ANION GAP SERPL CALCULATED.3IONS-SCNC: 6 MMOL/L (ref 3–16)
AST SERPL-CCNC: 18 U/L (ref 15–37)
BASOPHILS # BLD: 0.1 K/UL (ref 0–0.2)
BASOPHILS NFR BLD: 1.2 %
BILIRUB SERPL-MCNC: 0.6 MG/DL (ref 0–1)
BUN SERPL-MCNC: 22 MG/DL (ref 7–20)
CALCIUM SERPL-MCNC: 9.1 MG/DL (ref 8.3–10.6)
CHLORIDE SERPL-SCNC: 106 MMOL/L (ref 99–110)
CO2 SERPL-SCNC: 28 MMOL/L (ref 21–32)
CREAT SERPL-MCNC: 1.4 MG/DL (ref 0.8–1.3)
DEPRECATED RDW RBC AUTO: 14.9 % (ref 12.4–15.4)
EOSINOPHIL # BLD: 0.3 K/UL (ref 0–0.6)
EOSINOPHIL NFR BLD: 5.7 %
EST. AVERAGE GLUCOSE BLD GHB EST-MCNC: 125.5 MG/DL
GFR SERPLBLD CREATININE-BSD FMLA CKD-EPI: 49 ML/MIN/{1.73_M2}
GLUCOSE P FAST SERPL-MCNC: 92 MG/DL (ref 70–99)
HBA1C MFR BLD: 6 %
HCT VFR BLD AUTO: 45 % (ref 40.5–52.5)
HGB BLD-MCNC: 14.4 G/DL (ref 13.5–17.5)
INTERNATIONAL NORMALIZATION RATIO, POC: 2.2
LYMPHOCYTES # BLD: 1.3 K/UL (ref 1–5.1)
LYMPHOCYTES NFR BLD: 27.3 %
MAGNESIUM SERPL-MCNC: 2.1 MG/DL (ref 1.8–2.4)
MCH RBC QN AUTO: 29.9 PG (ref 26–34)
MCHC RBC AUTO-ENTMCNC: 32 G/DL (ref 31–36)
MCV RBC AUTO: 93.6 FL (ref 80–100)
MONOCYTES # BLD: 0.6 K/UL (ref 0–1.3)
MONOCYTES NFR BLD: 11.8 %
NEUTROPHILS # BLD: 2.6 K/UL (ref 1.7–7.7)
NEUTROPHILS NFR BLD: 54 %
PLATELET # BLD AUTO: 185 K/UL (ref 135–450)
PMV BLD AUTO: 8.2 FL (ref 5–10.5)
POTASSIUM SERPL-SCNC: 4.3 MMOL/L (ref 3.5–5.1)
PROT SERPL-MCNC: 7 G/DL (ref 6.4–8.2)
PROTHROMBIN TIME, POC: 0
RBC # BLD AUTO: 4.81 M/UL (ref 4.2–5.9)
SODIUM SERPL-SCNC: 140 MMOL/L (ref 136–145)
WBC # BLD AUTO: 4.9 K/UL (ref 4–11)

## 2024-12-18 PROCEDURE — 85610 PROTHROMBIN TIME: CPT | Performed by: PHARMACIST

## 2024-12-18 PROCEDURE — 80053 COMPREHEN METABOLIC PANEL: CPT

## 2024-12-18 PROCEDURE — 36415 COLL VENOUS BLD VENIPUNCTURE: CPT

## 2024-12-18 PROCEDURE — 85025 COMPLETE CBC W/AUTO DIFF WBC: CPT

## 2024-12-18 PROCEDURE — 80061 LIPID PANEL: CPT

## 2024-12-18 PROCEDURE — 83036 HEMOGLOBIN GLYCOSYLATED A1C: CPT

## 2024-12-18 PROCEDURE — 99211 OFF/OP EST MAY X REQ PHY/QHP: CPT | Performed by: PHARMACIST

## 2024-12-18 PROCEDURE — 84443 ASSAY THYROID STIM HORMONE: CPT

## 2024-12-18 PROCEDURE — 83735 ASSAY OF MAGNESIUM: CPT

## 2024-12-18 NOTE — PROGRESS NOTES
MrEmily Hdez is here for management of anticoagulation for AFib.   PMH also significant for HLD, HTN, Hx prostate, colon CA.   He presents today w/out complaint.  Pt verifies dosing regimen as listed above.   Pt denies s/s bleeding/bruising/swelling/SOB.  No missed doses  No changes in RX/OTCs/Herbal medications.  No dietary concerns  No ETOH or tobacco use.    No recent changes per pt.    INR 2.2 is within therapeutic range of 2-2.5.  Recommend to continue dose of 5 mg daily except 2.5 mg Q M/W/  Patient has 5 mg tablets.  Will continue to monitor and check INR in 4 weeks.  Dosing reminder card given with phone number, appointment date and time.   Return to clinic:  1/15/25 @ 0845    Raquel ParikhD 8:45 AM EST 24    For Pharmacy Admin Tracking Only    Intervention Detail: Adherence Monitorin  Total # of Interventions Recommended: 1  Total # of Interventions Accepted: 1  Time Spent (min): 15

## 2024-12-19 DIAGNOSIS — E78.2 MIXED HYPERLIPIDEMIA: Primary | ICD-10-CM

## 2024-12-19 DIAGNOSIS — Z79.899 MEDICATION MANAGEMENT: ICD-10-CM

## 2024-12-19 LAB
CHOLEST SERPL-MCNC: 150 MG/DL (ref 0–199)
HDLC SERPL-MCNC: 31 MG/DL (ref 40–60)
LDL CHOLESTEROL: 88 MG/DL
TRIGL SERPL-MCNC: 153 MG/DL (ref 0–150)
TSH SERPL DL<=0.005 MIU/L-ACNC: 2.16 UIU/ML (ref 0.27–4.2)
VLDLC SERPL CALC-MCNC: 31 MG/DL

## 2024-12-19 RX ORDER — ROSUVASTATIN CALCIUM 20 MG/1
20 TABLET, COATED ORAL NIGHTLY
Qty: 90 TABLET | Refills: 0 | Status: SHIPPED | OUTPATIENT
Start: 2024-12-19

## 2024-12-19 NOTE — RESULT ENCOUNTER NOTE
Lipids labs are better (LDL down from 107 to 88) but goal is less than 70. Liver tests normal. If willing to increase crestor from 10mg to 20mg daily I would do so and then recheck FLP/LFT's with ALEJANDRO ibarra in 2 months.    Patient informed and VU.

## 2025-01-15 ENCOUNTER — ANTI-COAG VISIT (OUTPATIENT)
Dept: PHARMACY | Age: 85
End: 2025-01-15
Payer: MEDICARE

## 2025-01-15 DIAGNOSIS — I48.0 PAF (PAROXYSMAL ATRIAL FIBRILLATION) (HCC): Primary | ICD-10-CM

## 2025-01-15 LAB
INTERNATIONAL NORMALIZATION RATIO, POC: 2.4
PROTHROMBIN TIME, POC: 0

## 2025-01-15 PROCEDURE — 99211 OFF/OP EST MAY X REQ PHY/QHP: CPT | Performed by: PHARMACIST

## 2025-01-15 PROCEDURE — 85610 PROTHROMBIN TIME: CPT | Performed by: PHARMACIST

## 2025-01-15 NOTE — PROGRESS NOTES
MrEmily Hdez is here for management of anticoagulation for AFib.   PMH also significant for HLD, HTN, Hx prostate, colon CA.   He presents today w/out complaint.  Pt verifies dosing regimen as listed above.   Pt denies s/s bleeding/bruising/swelling/SOB.  No missed doses  No changes in RX/OTCs/Herbal medications.  No dietary concerns  No ETOH or tobacco use.    No recent changes per pt.    INR 2.4 is within therapeutic range of 2-2.5.  Recommend to continue dose of 5 mg daily except 2.5 mg Q M/W/  Patient has 5 mg tablets.  Will continue to monitor and check INR in 4 weeks.  Dosing reminder card given with phone number, appointment date and time.   Return to clinic:  25 @ 0845    Clifford Mcguire PharmD 8:43 AM EST 1/15/25    For Pharmacy Admin Tracking Only    Intervention Detail: Adherence Monitorin  Total # of Interventions Recommended: 1  Total # of Interventions Accepted: 1  Time Spent (min): 15

## 2025-02-12 ENCOUNTER — ANTI-COAG VISIT (OUTPATIENT)
Dept: PHARMACY | Age: 85
End: 2025-02-12
Payer: MEDICARE

## 2025-02-12 DIAGNOSIS — I48.0 PAF (PAROXYSMAL ATRIAL FIBRILLATION) (HCC): Primary | ICD-10-CM

## 2025-02-12 LAB
INTERNATIONAL NORMALIZATION RATIO, POC: 2.4
PROTHROMBIN TIME, POC: 0

## 2025-02-12 PROCEDURE — 85610 PROTHROMBIN TIME: CPT | Performed by: PHARMACIST

## 2025-02-12 PROCEDURE — 99211 OFF/OP EST MAY X REQ PHY/QHP: CPT | Performed by: PHARMACIST

## 2025-02-12 NOTE — PROGRESS NOTES
MrEmily Hdez is here for management of anticoagulation for AFib.   PMH also significant for HLD, HTN, Hx prostate, colon CA.   He presents today w/out complaint.  Pt verifies dosing regimen as listed above.   Pt denies s/s bleeding/bruising/swelling/SOB.  No missed doses  No changes in RX/OTCs/Herbal medications.  No dietary concerns  No ETOH or tobacco use.    No recent changes per pt.    INR 2.4 is within therapeutic range of 2-2.5.  Recommend to continue dose of 5 mg daily except 2.5 mg Q M/W/  Patient has 5 mg tablets.  Will continue to monitor and check INR in 4 weeks.  Dosing reminder card given with phone number, appointment date and time.   Return to clinic:  3/12/25 @ 0830    Clifford Mcguire PharmD 8:46 AM EST 25    For Pharmacy Admin Tracking Only    Intervention Detail: Adherence Monitorin  Total # of Interventions Recommended: 1  Total # of Interventions Accepted: 1  Time Spent (min): 15

## 2025-02-26 ENCOUNTER — TELEPHONE (OUTPATIENT)
Dept: CARDIOLOGY CLINIC | Age: 85
End: 2025-02-26

## 2025-02-26 NOTE — TELEPHONE ENCOUNTER
Received fax from [employer] stating it is too soon for re enrollment. Applications are accepted 45 days prior to patients eligibility expiring. Pt is eligible to receive entresto until 04/18/25.  Will re submit application on 03/03

## 2025-03-03 RX ORDER — ROSUVASTATIN CALCIUM 20 MG/1
20 TABLET, COATED ORAL NIGHTLY
Qty: 90 TABLET | Refills: 1 | Status: SHIPPED | OUTPATIENT
Start: 2025-03-03

## 2025-03-03 NOTE — TELEPHONE ENCOUNTER
LOV 12/12/24  NOV 6/18/25    Lab Results   Component Value Date    CHOL 174 12/04/2024    TRIG 171 (H) 12/04/2024    HDL 31 (L) 12/18/2024    LDL 88 12/18/2024    VLDL 31 12/18/2024     Lab Results   Component Value Date/Time    ALKPHOS 83 12/18/2024 09:04 AM    ALT 11 12/18/2024 09:04 AM    AST 18 12/18/2024 09:04 AM    BILITOT 0.6 12/18/2024 09:04 AM    BILIDIR 0.58 02/05/2024 12:00 AM

## 2025-03-04 ENCOUNTER — TELEPHONE (OUTPATIENT)
Dept: CARDIOLOGY CLINIC | Age: 85
End: 2025-03-04

## 2025-03-04 NOTE — TELEPHONE ENCOUNTER
Pt wife presented herself in office to review medications and make sure pt was supposed to be on them or d/c them. Reviewed with her per last OV and most recent enc/result notes. She v/u.

## 2025-03-12 ENCOUNTER — ANTI-COAG VISIT (OUTPATIENT)
Dept: PHARMACY | Age: 85
End: 2025-03-12
Payer: MEDICARE

## 2025-03-12 DIAGNOSIS — I48.0 PAF (PAROXYSMAL ATRIAL FIBRILLATION) (HCC): Primary | ICD-10-CM

## 2025-03-12 LAB
INTERNATIONAL NORMALIZATION RATIO, POC: 1.7
PROTHROMBIN TIME, POC: 0

## 2025-03-12 PROCEDURE — 85610 PROTHROMBIN TIME: CPT | Performed by: PHARMACIST

## 2025-03-12 PROCEDURE — 99211 OFF/OP EST MAY X REQ PHY/QHP: CPT | Performed by: PHARMACIST

## 2025-03-12 NOTE — PROGRESS NOTES
MrEmily Hdez is here for management of anticoagulation for AFib.   PMH also significant for HLD, HTN, Hx prostate, colon CA.   He presents today w/out complaint.  Pt verifies dosing regimen as listed above.   Pt denies s/s bleeding/bruising/swelling/SOB.  No missed doses  No changes in RX/OTCs/Herbal medications.  No dietary concerns  No ETOH or tobacco use.    No recent changes per pt.    He has more had blood urine recently. He has scheduled with urology but unable to see him until May. Has gotten worse over past few weeks, but was having occasionally previously.  Advised to be seen sooner if possible, or go to ER, etc if it continues to worsen.    INR low today. Will boost dose today only then continue current dose rather than increase weekly dose with current bleeding issues going on.    INR 1.7 is below therapeutic range of 2-2.5.  Recommend to take 5 mg today, then continue dose of 5 mg daily except 2.5 mg Q M//  Patient has 5 mg tablets.  Will continue to monitor and check INR in 3 weeks.  Dosing reminder card given with phone number, appointment date and time.   Return to clinic:  25 @ 0815    Clifford Mcguire, Hubert 8:31 AM EDT 3/12/25    For Pharmacy Admin Tracking Only    Intervention Detail: Adherence Monitorin  Total # of Interventions Recommended: 1  Total # of Interventions Accepted: 1  Time Spent (min): 15

## 2025-03-23 DIAGNOSIS — I50.40 COMBINED SYSTOLIC AND DIASTOLIC CONGESTIVE HEART FAILURE, UNSPECIFIED HF CHRONICITY (HCC): ICD-10-CM

## 2025-03-24 RX ORDER — FUROSEMIDE 20 MG/1
20 TABLET ORAL DAILY
Qty: 90 TABLET | Refills: 0 | Status: SHIPPED | OUTPATIENT
Start: 2025-03-24

## 2025-03-24 NOTE — TELEPHONE ENCOUNTER
Last Office Visit: 12/12/2024 Provider: ALEJANDRO  **Is provider OOT? No    Next Office Visit: 6/18/2025 Provider: ALEJANDRO      Lab orders needed? no   Encounter provider correct? Yes If not, change provider  Script changes since last refill? no    LAST LABS:   BMP:   Lab Results   Component Value Date/Time     12/18/2024 09:04 AM    K 4.3 12/18/2024 09:04 AM    K 4.1 03/25/2024 07:55 AM     12/18/2024 09:04 AM    CO2 28 12/18/2024 09:04 AM    BUN 22 12/18/2024 09:04 AM    CREATININE 1.4 12/18/2024 09:04 AM    GLUCOSE 89 12/04/2024 09:33 AM    GLUCOSE 95 05/04/2017 11:22 AM    CALCIUM 9.1 12/18/2024 09:04 AM    LABGLOM 49 12/18/2024 09:04 AM    LABGLOM 39 03/25/2024 07:55 AM         **Care Everywhere? no

## 2025-03-25 ENCOUNTER — APPOINTMENT (OUTPATIENT)
Dept: CT IMAGING | Age: 85
DRG: 660 | End: 2025-03-25
Payer: MEDICARE

## 2025-03-25 ENCOUNTER — HOSPITAL ENCOUNTER (INPATIENT)
Age: 85
LOS: 3 days | Discharge: HOME | DRG: 660 | End: 2025-03-28
Attending: INTERNAL MEDICINE | Admitting: INTERNAL MEDICINE
Payer: MEDICARE

## 2025-03-25 DIAGNOSIS — N28.89 MASS OF URETER: ICD-10-CM

## 2025-03-25 DIAGNOSIS — N13.30 HYDRONEPHROSIS, UNSPECIFIED HYDRONEPHROSIS TYPE: ICD-10-CM

## 2025-03-25 DIAGNOSIS — Z79.01 ANTICOAGULATED: ICD-10-CM

## 2025-03-25 DIAGNOSIS — R31.9 HEMATURIA, UNSPECIFIED TYPE: Primary | ICD-10-CM

## 2025-03-25 DIAGNOSIS — N13.39 OTHER HYDRONEPHROSIS: ICD-10-CM

## 2025-03-25 LAB
ALBUMIN SERPL-MCNC: 3.6 G/DL (ref 3.4–5)
ALBUMIN/GLOB SERPL: 1.3 {RATIO} (ref 1.1–2.2)
ALP SERPL-CCNC: 93 U/L (ref 40–129)
ALT SERPL-CCNC: 11 U/L (ref 10–40)
AMORPH SED URNS QL MICRO: ABNORMAL /HPF
ANION GAP SERPL CALCULATED.3IONS-SCNC: 7 MMOL/L (ref 3–16)
AST SERPL-CCNC: 22 U/L (ref 15–37)
BACTERIA URNS QL MICRO: ABNORMAL /HPF
BASOPHILS # BLD: 0 K/UL (ref 0–0.2)
BASOPHILS NFR BLD: 1 %
BILIRUB SERPL-MCNC: 0.6 MG/DL (ref 0–1)
BILIRUB UR QL STRIP.AUTO: NEGATIVE
BUN SERPL-MCNC: 14 MG/DL (ref 7–20)
CALCIUM SERPL-MCNC: 8.5 MG/DL (ref 8.3–10.6)
CHLORIDE SERPL-SCNC: 102 MMOL/L (ref 99–110)
CLARITY UR: CLEAR
CO2 SERPL-SCNC: 27 MMOL/L (ref 21–32)
COLOR UR: YELLOW
CREAT SERPL-MCNC: 1.2 MG/DL (ref 0.8–1.3)
DEPRECATED RDW RBC AUTO: 14.7 % (ref 12.4–15.4)
EOSINOPHIL # BLD: 0.1 K/UL (ref 0–0.6)
EOSINOPHIL NFR BLD: 2.3 %
EPI CELLS #/AREA URNS HPF: ABNORMAL /HPF (ref 0–5)
FLUAV RNA RESP QL NAA+PROBE: NOT DETECTED
FLUBV RNA RESP QL NAA+PROBE: NOT DETECTED
GFR SERPLBLD CREATININE-BSD FMLA CKD-EPI: 59 ML/MIN/{1.73_M2}
GLUCOSE SERPL-MCNC: 100 MG/DL (ref 70–99)
GLUCOSE UR STRIP.AUTO-MCNC: NEGATIVE MG/DL
HCT VFR BLD AUTO: 43.1 % (ref 40.5–52.5)
HGB BLD-MCNC: 14.3 G/DL (ref 13.5–17.5)
HGB UR QL STRIP.AUTO: ABNORMAL
INR PPP: 2.02 (ref 0.85–1.15)
KETONES UR STRIP.AUTO-MCNC: NEGATIVE MG/DL
LEUKOCYTE ESTERASE UR QL STRIP.AUTO: NEGATIVE
LYMPHOCYTES # BLD: 1 K/UL (ref 1–5.1)
LYMPHOCYTES NFR BLD: 20.7 %
MAGNESIUM SERPL-MCNC: 1.82 MG/DL (ref 1.8–2.4)
MCH RBC QN AUTO: 30.7 PG (ref 26–34)
MCHC RBC AUTO-ENTMCNC: 33.3 G/DL (ref 31–36)
MCV RBC AUTO: 92 FL (ref 80–100)
MONOCYTES # BLD: 0.4 K/UL (ref 0–1.3)
MONOCYTES NFR BLD: 9 %
MUCOUS THREADS #/AREA URNS LPF: ABNORMAL /LPF
NEUTROPHILS # BLD: 3.2 K/UL (ref 1.7–7.7)
NEUTROPHILS NFR BLD: 67 %
NITRITE UR QL STRIP.AUTO: NEGATIVE
PH UR STRIP.AUTO: 6 [PH] (ref 5–8)
PLATELET # BLD AUTO: 185 K/UL (ref 135–450)
PMV BLD AUTO: 8.3 FL (ref 5–10.5)
POTASSIUM SERPL-SCNC: 3.5 MMOL/L (ref 3.5–5.1)
PROT SERPL-MCNC: 6.4 G/DL (ref 6.4–8.2)
PROT UR STRIP.AUTO-MCNC: 30 MG/DL
PROTHROMBIN TIME: 23 SEC (ref 11.9–14.9)
RBC # BLD AUTO: 4.68 M/UL (ref 4.2–5.9)
RBC #/AREA URNS HPF: >100 /HPF (ref 0–4)
SARS-COV-2 RNA RESP QL NAA+PROBE: NOT DETECTED
SODIUM SERPL-SCNC: 136 MMOL/L (ref 136–145)
SP GR UR STRIP.AUTO: 1.02 (ref 1–1.03)
UA COMPLETE W REFLEX CULTURE PNL UR: ABNORMAL
UA DIPSTICK W REFLEX MICRO PNL UR: YES
URN SPEC COLLECT METH UR: ABNORMAL
UROBILINOGEN UR STRIP-ACNC: 0.2 E.U./DL
WBC # BLD AUTO: 4.8 K/UL (ref 4–11)
WBC #/AREA URNS HPF: ABNORMAL /HPF (ref 0–5)

## 2025-03-25 PROCEDURE — 99285 EMERGENCY DEPT VISIT HI MDM: CPT

## 2025-03-25 PROCEDURE — 2500000003 HC RX 250 WO HCPCS

## 2025-03-25 PROCEDURE — 85025 COMPLETE CBC W/AUTO DIFF WBC: CPT

## 2025-03-25 PROCEDURE — 85610 PROTHROMBIN TIME: CPT

## 2025-03-25 PROCEDURE — 80053 COMPREHEN METABOLIC PANEL: CPT

## 2025-03-25 PROCEDURE — 6360000004 HC RX CONTRAST MEDICATION: Performed by: NURSE PRACTITIONER

## 2025-03-25 PROCEDURE — 74177 CT ABD & PELVIS W/CONTRAST: CPT

## 2025-03-25 PROCEDURE — 1200000000 HC SEMI PRIVATE

## 2025-03-25 PROCEDURE — 6370000000 HC RX 637 (ALT 250 FOR IP)

## 2025-03-25 PROCEDURE — 83735 ASSAY OF MAGNESIUM: CPT

## 2025-03-25 PROCEDURE — 99223 1ST HOSP IP/OBS HIGH 75: CPT

## 2025-03-25 PROCEDURE — 81001 URINALYSIS AUTO W/SCOPE: CPT

## 2025-03-25 PROCEDURE — 87636 SARSCOV2 & INF A&B AMP PRB: CPT

## 2025-03-25 RX ORDER — POLYETHYLENE GLYCOL 3350 17 G/17G
17 POWDER, FOR SOLUTION ORAL DAILY PRN
Status: DISCONTINUED | OUTPATIENT
Start: 2025-03-25 | End: 2025-03-28 | Stop reason: HOSPADM

## 2025-03-25 RX ORDER — DONEPEZIL HYDROCHLORIDE 5 MG/1
5 TABLET, FILM COATED ORAL NIGHTLY
Status: DISCONTINUED | OUTPATIENT
Start: 2025-03-25 | End: 2025-03-28 | Stop reason: HOSPADM

## 2025-03-25 RX ORDER — SODIUM CHLORIDE 0.9 % (FLUSH) 0.9 %
5-40 SYRINGE (ML) INJECTION EVERY 12 HOURS SCHEDULED
Status: DISCONTINUED | OUTPATIENT
Start: 2025-03-25 | End: 2025-03-28 | Stop reason: HOSPADM

## 2025-03-25 RX ORDER — ROSUVASTATIN CALCIUM 10 MG/1
20 TABLET, COATED ORAL NIGHTLY
Status: DISCONTINUED | OUTPATIENT
Start: 2025-03-26 | End: 2025-03-28 | Stop reason: HOSPADM

## 2025-03-25 RX ORDER — ONDANSETRON 2 MG/ML
4 INJECTION INTRAMUSCULAR; INTRAVENOUS EVERY 6 HOURS PRN
Status: DISCONTINUED | OUTPATIENT
Start: 2025-03-25 | End: 2025-03-28 | Stop reason: HOSPADM

## 2025-03-25 RX ORDER — ONDANSETRON 4 MG/1
4 TABLET, ORALLY DISINTEGRATING ORAL EVERY 8 HOURS PRN
Status: DISCONTINUED | OUTPATIENT
Start: 2025-03-25 | End: 2025-03-28 | Stop reason: HOSPADM

## 2025-03-25 RX ORDER — ALLOPURINOL 300 MG/1
150 TABLET ORAL DAILY
Status: DISCONTINUED | OUTPATIENT
Start: 2025-03-26 | End: 2025-03-28 | Stop reason: HOSPADM

## 2025-03-25 RX ORDER — POTASSIUM CHLORIDE 7.45 MG/ML
10 INJECTION INTRAVENOUS PRN
Status: DISCONTINUED | OUTPATIENT
Start: 2025-03-25 | End: 2025-03-28 | Stop reason: HOSPADM

## 2025-03-25 RX ORDER — PANTOPRAZOLE SODIUM 40 MG/1
40 TABLET, DELAYED RELEASE ORAL
Status: DISCONTINUED | OUTPATIENT
Start: 2025-03-26 | End: 2025-03-28 | Stop reason: HOSPADM

## 2025-03-25 RX ORDER — FUROSEMIDE 20 MG/1
20 TABLET ORAL DAILY
Status: DISCONTINUED | OUTPATIENT
Start: 2025-03-26 | End: 2025-03-26

## 2025-03-25 RX ORDER — SODIUM CHLORIDE 9 MG/ML
INJECTION, SOLUTION INTRAVENOUS PRN
Status: DISCONTINUED | OUTPATIENT
Start: 2025-03-25 | End: 2025-03-28 | Stop reason: HOSPADM

## 2025-03-25 RX ORDER — ACETAMINOPHEN 325 MG/1
650 TABLET ORAL EVERY 6 HOURS PRN
Status: DISCONTINUED | OUTPATIENT
Start: 2025-03-25 | End: 2025-03-28 | Stop reason: HOSPADM

## 2025-03-25 RX ORDER — IOPAMIDOL 755 MG/ML
75 INJECTION, SOLUTION INTRAVASCULAR
Status: COMPLETED | OUTPATIENT
Start: 2025-03-25 | End: 2025-03-25

## 2025-03-25 RX ORDER — SODIUM CHLORIDE 0.9 % (FLUSH) 0.9 %
5-40 SYRINGE (ML) INJECTION PRN
Status: DISCONTINUED | OUTPATIENT
Start: 2025-03-25 | End: 2025-03-28 | Stop reason: HOSPADM

## 2025-03-25 RX ORDER — ACETAMINOPHEN 650 MG/1
650 SUPPOSITORY RECTAL EVERY 6 HOURS PRN
Status: DISCONTINUED | OUTPATIENT
Start: 2025-03-25 | End: 2025-03-28 | Stop reason: HOSPADM

## 2025-03-25 RX ORDER — METOPROLOL SUCCINATE 25 MG/1
25 TABLET, EXTENDED RELEASE ORAL NIGHTLY
Status: DISCONTINUED | OUTPATIENT
Start: 2025-03-26 | End: 2025-03-28 | Stop reason: HOSPADM

## 2025-03-25 RX ORDER — POTASSIUM CHLORIDE 1500 MG/1
40 TABLET, EXTENDED RELEASE ORAL PRN
Status: DISCONTINUED | OUTPATIENT
Start: 2025-03-25 | End: 2025-03-28 | Stop reason: HOSPADM

## 2025-03-25 RX ORDER — MAGNESIUM SULFATE IN WATER 40 MG/ML
2000 INJECTION, SOLUTION INTRAVENOUS PRN
Status: DISCONTINUED | OUTPATIENT
Start: 2025-03-25 | End: 2025-03-28 | Stop reason: HOSPADM

## 2025-03-25 RX ADMIN — SODIUM CHLORIDE, PRESERVATIVE FREE 10 ML: 5 INJECTION INTRAVENOUS at 22:56

## 2025-03-25 RX ADMIN — DONEPEZIL HYDROCHLORIDE 5 MG: 5 TABLET, FILM COATED ORAL at 22:55

## 2025-03-25 RX ADMIN — SACUBITRIL AND VALSARTAN 1 TABLET: 24; 26 TABLET, FILM COATED ORAL at 22:52

## 2025-03-25 RX ADMIN — IOPAMIDOL 75 ML: 755 INJECTION, SOLUTION INTRAVENOUS at 14:52

## 2025-03-25 ASSESSMENT — ENCOUNTER SYMPTOMS
SORE THROAT: 0
FACIAL SWELLING: 0
RHINORRHEA: 0
ABDOMINAL PAIN: 0
COLOR CHANGE: 0
SHORTNESS OF BREATH: 0

## 2025-03-25 ASSESSMENT — PAIN - FUNCTIONAL ASSESSMENT: PAIN_FUNCTIONAL_ASSESSMENT: NONE - DENIES PAIN

## 2025-03-25 NOTE — ED PROVIDER NOTES
[facility] EMERGENCY DEPARTMENT  EMERGENCY DEPARTMENT ENCOUNTER      I am the Primary Clinician of Record    Note started: 3:06 PM EDT 3/25/25    DEANNA. I have evaluated this patient.          Pt Name: Jhony Hdez  MRN: [id number]  Birthdate 1940  Dateof evaluation: 3/25/2025  Provider: Katie Sainz, APRN - CNP  PCP: Jhony Valdovinos MD  ED Attending: No att. providers found      CHIEF COMPLAINT       Chief Complaint   Patient presents with    Hematuria     Patient states that when he urinates he has blood from penis, denies any pain. Does take blood thinners       HISTORY OF PRESENTILLNESS   (Location/Symptom, Timing/Onset, Context/Setting, Quality, Duration, Modifying Factors, Severity)  Note limiting factors.     Jhony dHez is a 84 y.o. male for hematuria. Onset was for the past few weeks.  Context includes patient reports that he is on Coumadin and he has been having worsening hematuria for the past few weeks. Alleviating factors include nothing.  Aggravating factors include nothing. Pain is 0/10.  Nothing has been used for pain today.     Nursing Notes were all reviewed and agreed with or any disagreements were addressed  in the HPI.      REVIEW OF SYSTEMS       Review of Systems   Constitutional:  Negative for activity change, appetite change and fever.   HENT:  Negative for congestion, facial swelling, rhinorrhea and sore throat.    Eyes:  Negative for visual disturbance.   Respiratory:  Negative for shortness of breath.    Cardiovascular:  Negative for chest pain.   Gastrointestinal:  Negative for abdominal pain.   Genitourinary:  Positive for hematuria. Negative for difficulty urinating.   Musculoskeletal:  Negative for arthralgias and myalgias.   Skin:  Negative for color change and rash.   Neurological:  Negative for dizziness and light-headedness.   Psychiatric/Behavioral:  Negative for agitation.    All other systems reviewed and are negative.      Positives and  Relation Age of Onset    Colon Cancer Father     Diabetes Mother     Hypertension Mother     Colon Cancer Sister         x 2          SOCIAL HISTORY       Social History     Socioeconomic History    Marital status:    Tobacco Use    Smoking status: Never    Smokeless tobacco: Never   Vaping Use    Vaping status: Never Used   Substance and Sexual Activity    Alcohol use: No    Drug use: No         SCREENINGS           CIWA Assessment  BP: 127/76  Pulse: 59          PHYSICAL EXAM     ED Triage Vitals [03/25/25 1348]   BP Systolic BP Percentile Diastolic BP Percentile Temp Temp Source Pulse Respirations SpO2   127/76 -- -- 97.9 °F (36.6 °C) Oral 59 15 98 %      Height Weight - Scale         1.702 m (5' 7\") 85.7 kg (189 lb)             Physical Exam  Constitutional:       Appearance: Normal appearance. He is well-developed.   HENT:      Head: Normocephalic and atraumatic.   Cardiovascular:      Rate and Rhythm: Normal rate.   Pulmonary:      Effort: Pulmonary effort is normal. No respiratory distress.   Abdominal:      General: There is no distension.      Palpations: Abdomen is soft.      Tenderness: There is no abdominal tenderness. There is no right CVA tenderness or left CVA tenderness.   Musculoskeletal:         General: Normal range of motion.      Cervical back: Normal range of motion.   Skin:     General: Skin is warm and dry.   Neurological:      Mental Status: He is alert and oriented to person, place, and time.         DIAGNOSTIC RESULTS   LABS:    Labs Reviewed   COMPREHENSIVE METABOLIC PANEL W/ REFLEX TO MG FOR LOW K - Abnormal; Notable for the following components:       Result Value    Glucose 100 (*)     Est, Glom Filt Rate 59 (*)     All other components within normal limits   PROTIME-INR - Abnormal; Notable for the following components:    Protime 23.0 (*)     INR 2.02 (*)     All other components within normal limits   URINALYSIS WITH REFLEX TO CULTURE - Abnormal; Notable for the following

## 2025-03-25 NOTE — H&P
Hospital Medicine History & Physical      PCP: Jhony Valdovinos MD    Date of Admission: 3/25/2025    Date of Service: Pt seen/examined on 03/25/25    Chief Complaint:    Chief Complaint   Patient presents with    Hematuria     Patient states that when he urinates he has blood from penis, denies any pain. Does take blood thinners         History Of Present Illness:      The patient is a 84 y.o. male with a PMHx of CHF, CAD, atrial fibrillation, HTN, HLD, CKD, rectal cancer, gout who presents to [facility] with hematuria. History obtained from the patient and review of EMR. Patient reports that he first started notice blood coming from his penis about a month ago. Reports that he would urinate and it would be clear, however, after he was done, blood would then come out after. When he would urinate after, blood clots would come out, followed again by clear urine. Blood started becoming more frequent, and now has been persistent for the last few days. No associated abdominal pain. No melena or hematochezia. Denies any unintentional weight loss, has actually gained 4 pounds recently. Denies night sweats, fevers, or chills. Denies chest pain, shortness of breath, or coughing.     Past Medical History:        Diagnosis Date    Chronic kidney disease     kidney stones    Colon cancer (HCC)     Guillain Barré syndrome     Hyperlipidemia     Hypertension     Left ureteral stone        Past Surgical History:        Procedure Laterality Date    ADRENALECTOMY      groin    BACK SURGERY      CARPAL TUNNEL RELEASE      bilateral    INTRACAPSULAR CATARACT EXTRACTION Right 3/19/2019    PHACOEMULSIFICATION OF CATARACT RIGHT EYE WITH INTRAOCULARLENS IMPLANT performed by Thierno Jensen MD at [facility] OR    KIDNEY STONE SURGERY      several    MANDIBLE SURGERY      OTHER SURGICAL HISTORY  10/25/2017     CYSTOSCOPY; TRANSURETHRAL RESECTION PROSTATE    TOTAL ELBOW ARTHROPLASTY Left        Medications Prior to Admission:    Prior  for input(s): \"CKTOTAL\", \"CKMB\", \"CKMBINDEX\", \"TROPONINI\" in the last 72 hours.    U/A:    Lab Results   Component Value Date/Time    COLORU Yellow 03/25/2025 02:18 PM    WBCUA 3-5 03/25/2025 02:18 PM    RBCUA >100 03/25/2025 02:18 PM    MUCUS 1+ 03/25/2025 02:18 PM    BACTERIA 1+ 03/25/2025 02:18 PM    CLARITYU Clear 03/25/2025 02:18 PM    LEUKOCYTESUR Negative 03/25/2025 02:18 PM    BLOODU LARGE 03/25/2025 02:18 PM    GLUCOSEU Negative 03/25/2025 02:18 PM    AMORPHOUS 2+ 03/25/2025 02:18 PM       ABG  No results found for: \"ODN4LLT\", \"BEART\", \"T7NAPFVL\", \"PHART\", \"THGBART\", \"DBT7HTU\", \"PO2ART\", \"ZMQ4WKI\"    CULTURES  Results       ** No results found for the last 336 hours. **            EKG:  I have reviewed the EKG with the following interpretation:   No results found for this or any previous visit (from the past 4464 hours).       RADIOLOGY  CT ABDOMEN PELVIS W IV CONTRAST Additional Contrast? None   Final Result   1. Severe right-sided hydronephrosis and hydroureter secondary to enhancing   mass seen in the right ureter at the pelvic inlet extending all the way to   the right ureterovesicular junction. 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   2. Mild fullness of the left collecting system with a large stone seen in the   left renal pelvis as well as a stone seen in the distal left ureter.   3. Prostate radiation therapy seeds in place.           09/17/24    ECHO (TTE) COMPLETE (PRN CONTRAST/BUBBLE/STRAIN/3D) 09/17/2024  5:58 PM (Final)    Interpretation Summary    Left Ventricle: Severely reduced left ventricular systolic function with a visually estimated EF of 25 - 30%. Left ventricle size is normal. Moderate-severely increased wall thickness. Findings consistent with moderate-severe concentric hypertrophy.  There is a speckled appearance of the myocardium.  Cardiac MRI could be considered. Global hypokinesis present. Grade II diastolic dysfunction with increased LAP.    Aortic Valve:

## 2025-03-25 NOTE — PROGRESS NOTES
4 Eyes Skin Assessment     NAME:  Jhony Hdez  YOB: 1940  MEDICAL RECORD NUMBER:  [id number]  Patient has no pain and or discomfort noted and or reported. No new or open areas noted and or reported. Wart noted to left hand.  The patient is being assessed for  Admission    I agree that at least one RN has performed a thorough Head to Toe Skin Assessment on the patient. ALL assessment sites listed below have been assessed.      Areas assessed by both nurses:    Head, Face, Ears, Shoulders, Back, Chest, Arms, Elbows, Hands, Sacrum. Buttock, Coccyx, Ischium, and Legs. Feet and Heels        Does the Patient have a Wound? No noted wound(s)       Chuck Prevention initiated by RN: No  Wound Care Orders initiated by RN: No    Pressure Injury (Stage 3,4, Unstageable, DTI, NWPT, and Complex wounds) if present, place Wound referral order by RN under : No    New Ostomies, if present place, Ostomy referral order under : No     Nurse 1 eSignature: Electronically signed by Dayton Romero RN on 3/25/25 at 6:33 PM EDT    **SHARE this note so that the co-signing nurse can place an eSignature**    Nurse 2 eSignature: Electronically signed by Samuel Bach RN on 3/25/25 at 6:35 PM EDT

## 2025-03-26 ENCOUNTER — ANESTHESIA EVENT (OUTPATIENT)
Dept: OPERATING ROOM | Age: 85
DRG: 660 | End: 2025-03-26
Payer: MEDICARE

## 2025-03-26 PROBLEM — Z85.46 HISTORY OF PROSTATE CANCER: Status: ACTIVE | Noted: 2025-03-26

## 2025-03-26 PROBLEM — Z79.01 ANTICOAGULATED: Status: ACTIVE | Noted: 2025-03-26

## 2025-03-26 LAB
ANION GAP SERPL CALCULATED.3IONS-SCNC: 10 MMOL/L (ref 3–16)
BASOPHILS # BLD: 0.1 K/UL (ref 0–0.2)
BASOPHILS NFR BLD: 1.2 %
BUN SERPL-MCNC: 12 MG/DL (ref 7–20)
CALCIUM SERPL-MCNC: 8.1 MG/DL (ref 8.3–10.6)
CHLORIDE SERPL-SCNC: 108 MMOL/L (ref 99–110)
CO2 SERPL-SCNC: 24 MMOL/L (ref 21–32)
CREAT SERPL-MCNC: 1.2 MG/DL (ref 0.8–1.3)
DEPRECATED RDW RBC AUTO: 14.7 % (ref 12.4–15.4)
EKG ATRIAL RATE: 220 BPM
EKG DIAGNOSIS: NORMAL
EKG Q-T INTERVAL: 476 MS
EKG QRS DURATION: 148 MS
EKG QTC CALCULATION (BAZETT): 455 MS
EKG R AXIS: -82 DEGREES
EKG T AXIS: 77 DEGREES
EKG VENTRICULAR RATE: 55 BPM
EOSINOPHIL # BLD: 0.1 K/UL (ref 0–0.6)
EOSINOPHIL NFR BLD: 3.1 %
GFR SERPLBLD CREATININE-BSD FMLA CKD-EPI: 59 ML/MIN/{1.73_M2}
GLUCOSE SERPL-MCNC: 93 MG/DL (ref 70–99)
HCT VFR BLD AUTO: 38.9 % (ref 40.5–52.5)
HCT VFR BLD AUTO: 39.5 % (ref 40.5–52.5)
HCT VFR BLD AUTO: 39.8 % (ref 40.5–52.5)
HCT VFR BLD AUTO: 40.4 % (ref 40.5–52.5)
HGB BLD-MCNC: 13 G/DL (ref 13.5–17.5)
HGB BLD-MCNC: 13.2 G/DL (ref 13.5–17.5)
HGB BLD-MCNC: 13.2 G/DL (ref 13.5–17.5)
HGB BLD-MCNC: 13.3 G/DL (ref 13.5–17.5)
INR PPP: 2.15 (ref 0.85–1.15)
LYMPHOCYTES # BLD: 1.1 K/UL (ref 1–5.1)
LYMPHOCYTES NFR BLD: 25.2 %
MCH RBC QN AUTO: 30.7 PG (ref 26–34)
MCHC RBC AUTO-ENTMCNC: 33.4 G/DL (ref 31–36)
MCV RBC AUTO: 91.9 FL (ref 80–100)
MONOCYTES # BLD: 0.5 K/UL (ref 0–1.3)
MONOCYTES NFR BLD: 11.9 %
NEUTROPHILS # BLD: 2.5 K/UL (ref 1.7–7.7)
NEUTROPHILS NFR BLD: 58.6 %
PLATELET # BLD AUTO: 154 K/UL (ref 135–450)
PMV BLD AUTO: 8.9 FL (ref 5–10.5)
POTASSIUM SERPL-SCNC: 3.8 MMOL/L (ref 3.5–5.1)
PROTHROMBIN TIME: 24.1 SEC (ref 11.9–14.9)
RBC # BLD AUTO: 4.23 M/UL (ref 4.2–5.9)
SODIUM SERPL-SCNC: 142 MMOL/L (ref 136–145)
WBC # BLD AUTO: 4.3 K/UL (ref 4–11)

## 2025-03-26 PROCEDURE — 85610 PROTHROMBIN TIME: CPT

## 2025-03-26 PROCEDURE — 85025 COMPLETE CBC W/AUTO DIFF WBC: CPT

## 2025-03-26 PROCEDURE — 85018 HEMOGLOBIN: CPT

## 2025-03-26 PROCEDURE — 85014 HEMATOCRIT: CPT

## 2025-03-26 PROCEDURE — 36415 COLL VENOUS BLD VENIPUNCTURE: CPT

## 2025-03-26 PROCEDURE — 80048 BASIC METABOLIC PNL TOTAL CA: CPT

## 2025-03-26 PROCEDURE — 2500000003 HC RX 250 WO HCPCS

## 2025-03-26 PROCEDURE — 99232 SBSQ HOSP IP/OBS MODERATE 35: CPT | Performed by: NURSE PRACTITIONER

## 2025-03-26 PROCEDURE — 6370000000 HC RX 637 (ALT 250 FOR IP)

## 2025-03-26 PROCEDURE — 1200000000 HC SEMI PRIVATE

## 2025-03-26 PROCEDURE — 93005 ELECTROCARDIOGRAM TRACING: CPT | Performed by: ANESTHESIOLOGY

## 2025-03-26 RX ORDER — FUROSEMIDE 20 MG/1
20 TABLET ORAL EVERY OTHER DAY
Status: DISCONTINUED | OUTPATIENT
Start: 2025-03-28 | End: 2025-03-28 | Stop reason: HOSPADM

## 2025-03-26 RX ADMIN — SACUBITRIL AND VALSARTAN 1 TABLET: 24; 26 TABLET, FILM COATED ORAL at 09:25

## 2025-03-26 RX ADMIN — SODIUM CHLORIDE, PRESERVATIVE FREE 10 ML: 5 INJECTION INTRAVENOUS at 09:25

## 2025-03-26 RX ADMIN — ROSUVASTATIN CALCIUM 20 MG: 10 TABLET, FILM COATED ORAL at 20:46

## 2025-03-26 RX ADMIN — SODIUM CHLORIDE, PRESERVATIVE FREE 10 ML: 5 INJECTION INTRAVENOUS at 20:50

## 2025-03-26 RX ADMIN — ALLOPURINOL 150 MG: 300 TABLET ORAL at 09:25

## 2025-03-26 RX ADMIN — FUROSEMIDE 20 MG: 20 TABLET ORAL at 09:25

## 2025-03-26 RX ADMIN — DONEPEZIL HYDROCHLORIDE 5 MG: 5 TABLET, FILM COATED ORAL at 20:46

## 2025-03-26 NOTE — CONSULTS
[facility]    Consult Note      Requesting Physician:  GLEN Logan    CHIEF COMPLAINT:  hematuria      HISTORY OF PRESENT ILLNESS:    Mr. Hdez  is a 84 y.o. male we are seeing in consultation for a ureteral mass. He has a pmh a fib, colorectal cancer x2, prostate cancer s/p seeds, kidney stones, CKD, dementia, GBS, HTN and HLD. He presented with hematuria and passing clots, progressively worse over the past 1 month. He had been on coumadin for atrial fibrillation, which is held. He had a CT scan showing a R ureteral mass and R hydronephrosis. He has not yet been seen by urology nor had a biopsy.      ICD-10-CM    1. Hematuria, unspecified type  R31.9       2. Anticoagulated  Z79.01       3. Mass of ureter  N28.89       4. Other hydronephrosis  N13.39              Past Medical History:  Past Medical History:   Diagnosis Date    Chronic kidney disease     kidney stones    Colon cancer (HCC)     Guillain Barré syndrome     Hyperlipidemia     Hypertension     Left ureteral stone        Past Surgical History:  Past Surgical History:   Procedure Laterality Date    ADRENALECTOMY      groin    BACK SURGERY      CARPAL TUNNEL RELEASE      bilateral    INTRACAPSULAR CATARACT EXTRACTION Right 3/19/2019    PHACOEMULSIFICATION OF CATARACT RIGHT EYE WITH INTRAOCULARLENS IMPLANT performed by Thierno Jensen MD at [facility] OR    KIDNEY STONE SURGERY      several    MANDIBLE SURGERY      OTHER SURGICAL HISTORY  10/25/2017     CYSTOSCOPY; TRANSURETHRAL RESECTION PROSTATE    TOTAL ELBOW ARTHROPLASTY Left        Current Medications:  Current Facility-Administered Medications   Medication Dose Route Frequency Provider Last Rate Last Admin    sodium chloride flush 0.9 % injection 5-40 mL  5-40 mL IntraVENous 2 times per day Caro Guadarrama PA-C   10 mL at 03/25/25 9552    sodium chloride flush

## 2025-03-26 NOTE — PROGRESS NOTES
Shift assessment complete. See flow sheet. Scheduled meds given. See MAR.  Patients head-toe complete, VS are logged, and active bowel sound noted in all four quadrants.     Patient on room air, RR easy and unlabored. No s/s of distress. A/O x4. Denies pain or SOB.     Patient sitting in bed, denies further needs at this time. Call light and bedside table are within reach. The bed is locked and is in the lowest position.       Vitals:    03/26/25 0915   BP: 109/71   Pulse: 52   Resp: 16   Temp: 97.7 °F (36.5 °C)   SpO2: 94%

## 2025-03-26 NOTE — PROGRESS NOTES
Progress Note    Admit Date:  3/25/2025    Admitted for gross hematuria  Hx of prostate cancer s/p seed implantation     Subjective:  Mr. Hdez is resting in bed.  Denies any complaints.  No hematuria today.  Plan for OR tomorrow am     Objective:   Patient Vitals for the past 4 hrs:   BP Temp Temp src Pulse Resp SpO2 Weight   03/26/25 0915 109/71 97.7 °F (36.5 °C) Oral 52 16 94 % --   03/26/25 0630 -- -- -- -- -- -- 78.4 kg (172 lb 14.4 oz)        No intake or output data in the 24 hours ending 03/26/25 0927    Physical Exam:    Gen: No distress. Alert.   Eyes: PERRL. No sclera icterus. No conjunctival injection.   ENT: No discharge. Pharynx clear.   Neck: No JVD.  Trachea midline.  Resp: No accessory muscle use. No crackles. No wheezes. No rhonchi.   CV: irregularly irregular . No murmur.  No rub. No edema.   Capillary Refill: Brisk,< 3 seconds   GI: Non-tender. Non-distended.  Normal bowel sounds.  Skin: Warm and dry. No nodule on exposed extremities. No rash on exposed extremities.   M/S: No cyanosis. No joint deformity. No clubbing.   Neuro: Awake. Grossly nonfocal    Psych: Oriented x 3. No anxiety or agitation.      Scheduled Meds:   sodium chloride flush  5-40 mL IntraVENous 2 times per day    allopurinol  150 mg Oral Daily    donepezil  5 mg Oral Nightly    sacubitril-valsartan  1 tablet Oral BID    furosemide  20 mg Oral Daily    metoprolol succinate  25 mg Oral Nightly    rosuvastatin  20 mg Oral Nightly    pantoprazole  40 mg Oral QAM AC       Continuous Infusions:   sodium chloride         PRN Meds:  sodium chloride flush, sodium chloride, potassium chloride **OR** potassium alternative oral replacement **OR** potassium chloride, magnesium sulfate, ondansetron **OR** ondansetron, polyethylene glycol, acetaminophen **OR** acetaminophen      Data:  CBC:   Recent Labs     03/25/25  1418 03/26/25  0042 03/26/25  0632 03/26/25  0840   WBC 4.8  --  4.3  --    HGB 14.3 13.3* 13.0* 13.2*   HCT 43.1 39.5* 38.9*

## 2025-03-26 NOTE — CARE COORDINATION
Case Management Assessment  Initial Evaluation    Date/Time of Evaluation: 3/26/2025 9:55 AM  Assessment Completed by: Musa Mckinney RN    If patient is discharged prior to next notation, then this note serves as note for discharge by case management.    Patient Name: Jhony Hdez                   YOB: 1940  Diagnosis: Hematuria [R31.9]  Anticoagulated [Z79.01]  Mass of ureter [N28.89]  Other hydronephrosis [N13.39]  Hematuria, unspecified type [R31.9]                   Date / Time: 3/25/2025  1:41 PM    Patient Admission Status: Inpatient   Readmission Risk (Low < 19, Mod (19-27), High > 27): Readmission Risk Score: 12.6    Current PCP: Jhony Valdovinos MD  PCP verified by CM? Yes    Chart Reviewed: Yes      History Provided by: Patient  Patient Orientation: Alert and Oriented    Patient Cognition: Alert    Hospitalization in the last 30 days (Readmission):  No    If yes, Readmission Assessment in  Navigator will be completed.    Advance Directives:      Code Status: Full Code   Patient's Primary Decision Maker is: Legal Next of Kin      Discharge Planning:    Patient lives with: Spouse/Significant Other Type of Home: House  Primary Care Giver: Self  Patient Support Systems include: Spouse/Significant Other   Current Financial resources: Medicare  Current community resources: None  Current services prior to admission: None            Current DME:              Type of Home Care services:  None    ADLS  Prior functional level: Independent in ADLs/IADLs  Current functional level: Independent in ADLs/IADLs    PT AM-PAC:   /24  OT AM-PAC:   /24    Family can provide assistance at DC: Yes  Would you like Case Management to discuss the discharge plan with any other family members/significant others, and if so, who? No  Plans to Return to Present Housing: Yes  Other Identified Issues/Barriers to RETURNING to current housing: na    Potential Assistance needed at discharge: N/A            Potential

## 2025-03-26 NOTE — ACP (ADVANCE CARE PLANNING)
Advance Care Planning     General Advance Care Planning (ACP) Conversation    Date of Conversation: 3/26/2025  Conducted with: Patient with Decision Making Capacity  Other persons present: Spouse Geetha    Healthcare Decision Maker: No healthcare decision makers have been documented.       Content/Action Overview:  DECLINED ACP Conversation - will revisit periodically  Reviewed DNR/DNI and patient elects Full Code (Attempt Resuscitation)        Length of Voluntary ACP Conversation in minutes:  <16 minutes (Non-Billable)    Musa Mckinney RN

## 2025-03-26 NOTE — CONSULTS
Urology Consult Note      Reason for Consultation: Gross hematuria    Chief Complaint: \"blood after I urinate for about 1 month\"  HPI:  Jhony is a 84 y.o. male with prostate CA s/p radiation seed implantation, adrenal CA sp left adrenalectomy many years ago,  rectal cancer treated with XRT, h/o kidney stones, warfarin use for atrial fibrillation who presented to [facility] with gross hematuria.  He reports that he has been seeing bloody discharge from his penis after urinating for the past 1 month.  He denies flank pain, abd pain, dysuria, fever or chills.   He has not been evaluated for this problem yet.  In the ER a CT revelead right hydronephrosis and a distal right ureteral mass.  He also has a large left renal pelvis stone and a distal left ureteral stone without significant left hydronephrosis.  He is admitted for further care.  Today he denies complaints, he has no pain.      Past Medical History:   Diagnosis Date    Chronic kidney disease     kidney stones    Colon cancer (HCC)     Guillain Barré syndrome     Hyperlipidemia     Hypertension     Left ureteral stone        Past Surgical History:   Procedure Laterality Date    ADRENALECTOMY      groin    BACK SURGERY      CARPAL TUNNEL RELEASE      bilateral    INTRACAPSULAR CATARACT EXTRACTION Right 3/19/2019    PHACOEMULSIFICATION OF CATARACT RIGHT EYE WITH INTRAOCULARLENS IMPLANT performed by Thierno Jensen MD at [facility] OR    KIDNEY STONE SURGERY      several    MANDIBLE SURGERY      OTHER SURGICAL HISTORY  10/25/2017     CYSTOSCOPY; TRANSURETHRAL RESECTION PROSTATE    TOTAL ELBOW ARTHROPLASTY Left        Medication List reviewed:     Current Facility-Administered Medications   Medication Dose Route Frequency Provider Last Rate Last Admin    sodium chloride flush 0.9 % injection 5-40 mL  5-40 mL IntraVENous 2 times per day Caro Guadarrama PA-C   10 mL at 03/25/25 8881    sodium chloride flush 0.9 % injection 5-40 mL  5-40 mL IntraVENous PRN Flowers,

## 2025-03-26 NOTE — PLAN OF CARE
Problem: Chronic Conditions and Co-morbidities  Goal: Patient's chronic conditions and co-morbidity symptoms are monitored and maintained or improved  3/26/2025 1238 by Mariajose Xavier RN  Outcome: Progressing  3/26/2025 0630 by Trisha King RN  Outcome: Progressing     Problem: ABCDS Injury Assessment  Goal: Absence of physical injury  3/26/2025 1238 by Mariajose Xavier RN  Outcome: Progressing  3/26/2025 0630 by Trisha King RN  Outcome: Progressing

## 2025-03-26 NOTE — FLOWSHEET NOTE
03/25/25 2042   Vital Signs   Temp 98.1 °F (36.7 °C)   Temp Source Oral   Pulse 75   Heart Rate Source Monitor   Respirations 18   BP 99/63   MAP (Calculated) 75   BP Location Right upper arm   BP Method Automatic   Patient Position Semi fowlers   Oxygen Therapy   SpO2 92 %   O2 Device None (Room air)         Shift assessment is complete. The pt is A&O x 4, The pt denies any further needs at this time. The pt call light and personal items are with in reach. The alarm bed is on. Will continue to monitor.

## 2025-03-27 ENCOUNTER — ANESTHESIA (OUTPATIENT)
Dept: OPERATING ROOM | Age: 85
DRG: 660 | End: 2025-03-27
Payer: MEDICARE

## 2025-03-27 ENCOUNTER — APPOINTMENT (OUTPATIENT)
Dept: GENERAL RADIOLOGY | Age: 85
DRG: 660 | End: 2025-03-27
Payer: MEDICARE

## 2025-03-27 LAB
ANION GAP SERPL CALCULATED.3IONS-SCNC: 8 MMOL/L (ref 3–16)
BASOPHILS # BLD: 0 K/UL (ref 0–0.2)
BASOPHILS NFR BLD: 1.1 %
BUN SERPL-MCNC: 12 MG/DL (ref 7–20)
CALCIUM SERPL-MCNC: 8.2 MG/DL (ref 8.3–10.6)
CHLORIDE SERPL-SCNC: 106 MMOL/L (ref 99–110)
CO2 SERPL-SCNC: 27 MMOL/L (ref 21–32)
CREAT SERPL-MCNC: 1.2 MG/DL (ref 0.8–1.3)
DEPRECATED RDW RBC AUTO: 15 % (ref 12.4–15.4)
EOSINOPHIL # BLD: 0.1 K/UL (ref 0–0.6)
EOSINOPHIL NFR BLD: 3.3 %
GFR SERPLBLD CREATININE-BSD FMLA CKD-EPI: 59 ML/MIN/{1.73_M2}
GLUCOSE BLD-MCNC: 81 MG/DL (ref 70–99)
GLUCOSE SERPL-MCNC: 99 MG/DL (ref 70–99)
HCT VFR BLD AUTO: 40.4 % (ref 40.5–52.5)
HCT VFR BLD AUTO: 40.6 % (ref 40.5–52.5)
HCT VFR BLD AUTO: 42.2 % (ref 40.5–52.5)
HCT VFR BLD AUTO: 46.6 % (ref 40.5–52.5)
HGB BLD-MCNC: 13.2 G/DL (ref 13.5–17.5)
HGB BLD-MCNC: 13.5 G/DL (ref 13.5–17.5)
HGB BLD-MCNC: 13.8 G/DL (ref 13.5–17.5)
HGB BLD-MCNC: 15.2 G/DL (ref 13.5–17.5)
INR PPP: 1.92 (ref 0.85–1.15)
LYMPHOCYTES # BLD: 0.9 K/UL (ref 1–5.1)
LYMPHOCYTES NFR BLD: 21.6 %
MCH RBC QN AUTO: 30.9 PG (ref 26–34)
MCHC RBC AUTO-ENTMCNC: 33.2 G/DL (ref 31–36)
MCV RBC AUTO: 93.1 FL (ref 80–100)
MONOCYTES # BLD: 0.6 K/UL (ref 0–1.3)
MONOCYTES NFR BLD: 13.2 %
NEUTROPHILS # BLD: 2.6 K/UL (ref 1.7–7.7)
NEUTROPHILS NFR BLD: 60.8 %
PERFORMED ON: NORMAL
PLATELET # BLD AUTO: 164 K/UL (ref 135–450)
PMV BLD AUTO: 8.6 FL (ref 5–10.5)
POTASSIUM SERPL-SCNC: 4 MMOL/L (ref 3.5–5.1)
PROTHROMBIN TIME: 22.1 SEC (ref 11.9–14.9)
RBC # BLD AUTO: 4.36 M/UL (ref 4.2–5.9)
SODIUM SERPL-SCNC: 141 MMOL/L (ref 136–145)
WBC # BLD AUTO: 4.2 K/UL (ref 4–11)

## 2025-03-27 PROCEDURE — 3600000004 HC SURGERY LEVEL 4 BASE: Performed by: UROLOGY

## 2025-03-27 PROCEDURE — 2580000003 HC RX 258: Performed by: UROLOGY

## 2025-03-27 PROCEDURE — C2617 STENT, NON-COR, TEM W/O DEL: HCPCS | Performed by: UROLOGY

## 2025-03-27 PROCEDURE — 36415 COLL VENOUS BLD VENIPUNCTURE: CPT

## 2025-03-27 PROCEDURE — 3700000001 HC ADD 15 MINUTES (ANESTHESIA): Performed by: UROLOGY

## 2025-03-27 PROCEDURE — 2500000003 HC RX 250 WO HCPCS: Performed by: UROLOGY

## 2025-03-27 PROCEDURE — 6360000002 HC RX W HCPCS: Performed by: UROLOGY

## 2025-03-27 PROCEDURE — 7100000001 HC PACU RECOVERY - ADDTL 15 MIN: Performed by: UROLOGY

## 2025-03-27 PROCEDURE — BT141ZZ FLUOROSCOPY OF KIDNEYS, URETERS AND BLADDER USING LOW OSMOLAR CONTRAST: ICD-10-PCS | Performed by: PEDIATRICS

## 2025-03-27 PROCEDURE — 85014 HEMATOCRIT: CPT

## 2025-03-27 PROCEDURE — 3600000014 HC SURGERY LEVEL 4 ADDTL 15MIN: Performed by: UROLOGY

## 2025-03-27 PROCEDURE — 0T788DZ DILATION OF BILATERAL URETERS WITH INTRALUMINAL DEVICE, VIA NATURAL OR ARTIFICIAL OPENING ENDOSCOPIC: ICD-10-PCS | Performed by: PEDIATRICS

## 2025-03-27 PROCEDURE — C1769 GUIDE WIRE: HCPCS | Performed by: UROLOGY

## 2025-03-27 PROCEDURE — 80048 BASIC METABOLIC PNL TOTAL CA: CPT

## 2025-03-27 PROCEDURE — 85025 COMPLETE CBC W/AUTO DIFF WBC: CPT

## 2025-03-27 PROCEDURE — 7100000000 HC PACU RECOVERY - FIRST 15 MIN: Performed by: UROLOGY

## 2025-03-27 PROCEDURE — 85018 HEMOGLOBIN: CPT

## 2025-03-27 PROCEDURE — 6370000000 HC RX 637 (ALT 250 FOR IP)

## 2025-03-27 PROCEDURE — 3700000000 HC ANESTHESIA ATTENDED CARE: Performed by: UROLOGY

## 2025-03-27 PROCEDURE — 99232 SBSQ HOSP IP/OBS MODERATE 35: CPT

## 2025-03-27 PROCEDURE — 2580000003 HC RX 258: Performed by: ANESTHESIOLOGY

## 2025-03-27 PROCEDURE — 2500000003 HC RX 250 WO HCPCS: Performed by: ANESTHESIOLOGY

## 2025-03-27 PROCEDURE — 74420 UROGRAPHY RTRGR +-KUB: CPT

## 2025-03-27 PROCEDURE — 6360000002 HC RX W HCPCS: Performed by: NURSE ANESTHETIST, CERTIFIED REGISTERED

## 2025-03-27 PROCEDURE — 6370000000 HC RX 637 (ALT 250 FOR IP): Performed by: UROLOGY

## 2025-03-27 PROCEDURE — 85610 PROTHROMBIN TIME: CPT

## 2025-03-27 PROCEDURE — 6360000004 HC RX CONTRAST MEDICATION: Performed by: UROLOGY

## 2025-03-27 PROCEDURE — 1200000000 HC SEMI PRIVATE

## 2025-03-27 PROCEDURE — C1758 CATHETER, URETERAL: HCPCS | Performed by: UROLOGY

## 2025-03-27 PROCEDURE — 2709999900 HC NON-CHARGEABLE SUPPLY: Performed by: UROLOGY

## 2025-03-27 PROCEDURE — 2500000003 HC RX 250 WO HCPCS

## 2025-03-27 DEVICE — URETERAL STENT
Type: IMPLANTABLE DEVICE | Site: URETER | Status: FUNCTIONAL
Brand: CONTOUR™

## 2025-03-27 RX ORDER — SODIUM CHLORIDE 0.9 % (FLUSH) 0.9 %
5-40 SYRINGE (ML) INJECTION EVERY 12 HOURS SCHEDULED
Status: DISCONTINUED | OUTPATIENT
Start: 2025-03-27 | End: 2025-03-27 | Stop reason: HOSPADM

## 2025-03-27 RX ORDER — SODIUM CHLORIDE 9 MG/ML
INJECTION, SOLUTION INTRAVENOUS PRN
Status: DISCONTINUED | OUTPATIENT
Start: 2025-03-27 | End: 2025-03-27 | Stop reason: HOSPADM

## 2025-03-27 RX ORDER — ONDANSETRON 2 MG/ML
INJECTION INTRAMUSCULAR; INTRAVENOUS
Status: DISCONTINUED | OUTPATIENT
Start: 2025-03-27 | End: 2025-03-27 | Stop reason: SDUPTHER

## 2025-03-27 RX ORDER — FENTANYL CITRATE 50 UG/ML
INJECTION, SOLUTION INTRAMUSCULAR; INTRAVENOUS
Status: DISCONTINUED | OUTPATIENT
Start: 2025-03-27 | End: 2025-03-27 | Stop reason: SDUPTHER

## 2025-03-27 RX ORDER — SODIUM CHLORIDE, SODIUM LACTATE, POTASSIUM CHLORIDE, CALCIUM CHLORIDE 600; 310; 30; 20 MG/100ML; MG/100ML; MG/100ML; MG/100ML
INJECTION, SOLUTION INTRAVENOUS CONTINUOUS
Status: DISCONTINUED | OUTPATIENT
Start: 2025-03-27 | End: 2025-03-27 | Stop reason: HOSPADM

## 2025-03-27 RX ORDER — LIDOCAINE HYDROCHLORIDE 20 MG/ML
INJECTION, SOLUTION INFILTRATION; PERINEURAL
Status: DISCONTINUED | OUTPATIENT
Start: 2025-03-27 | End: 2025-03-27 | Stop reason: SDUPTHER

## 2025-03-27 RX ORDER — SODIUM CHLORIDE 0.9 % (FLUSH) 0.9 %
5-40 SYRINGE (ML) INJECTION PRN
Status: DISCONTINUED | OUTPATIENT
Start: 2025-03-27 | End: 2025-03-27 | Stop reason: HOSPADM

## 2025-03-27 RX ORDER — LIDOCAINE HYDROCHLORIDE 20 MG/ML
JELLY TOPICAL PRN
Status: DISCONTINUED | OUTPATIENT
Start: 2025-03-27 | End: 2025-03-27 | Stop reason: ALTCHOICE

## 2025-03-27 RX ORDER — OXYCODONE HYDROCHLORIDE 5 MG/1
10 TABLET ORAL PRN
Status: DISCONTINUED | OUTPATIENT
Start: 2025-03-27 | End: 2025-03-27 | Stop reason: HOSPADM

## 2025-03-27 RX ORDER — OXYCODONE HYDROCHLORIDE 5 MG/1
5 TABLET ORAL PRN
Status: DISCONTINUED | OUTPATIENT
Start: 2025-03-27 | End: 2025-03-27 | Stop reason: HOSPADM

## 2025-03-27 RX ORDER — ONDANSETRON 2 MG/ML
4 INJECTION INTRAMUSCULAR; INTRAVENOUS
Status: DISCONTINUED | OUTPATIENT
Start: 2025-03-27 | End: 2025-03-27 | Stop reason: HOSPADM

## 2025-03-27 RX ORDER — LABETALOL HYDROCHLORIDE 5 MG/ML
5 INJECTION, SOLUTION INTRAVENOUS EVERY 10 MIN PRN
Status: DISCONTINUED | OUTPATIENT
Start: 2025-03-27 | End: 2025-03-27 | Stop reason: HOSPADM

## 2025-03-27 RX ORDER — DEXAMETHASONE SODIUM PHOSPHATE 4 MG/ML
INJECTION, SOLUTION INTRA-ARTICULAR; INTRALESIONAL; INTRAMUSCULAR; INTRAVENOUS; SOFT TISSUE
Status: DISCONTINUED | OUTPATIENT
Start: 2025-03-27 | End: 2025-03-27 | Stop reason: SDUPTHER

## 2025-03-27 RX ORDER — NALOXONE HYDROCHLORIDE 0.4 MG/ML
INJECTION, SOLUTION INTRAMUSCULAR; INTRAVENOUS; SUBCUTANEOUS PRN
Status: DISCONTINUED | OUTPATIENT
Start: 2025-03-27 | End: 2025-03-27 | Stop reason: HOSPADM

## 2025-03-27 RX ORDER — PROPOFOL 10 MG/ML
INJECTION, EMULSION INTRAVENOUS
Status: DISCONTINUED | OUTPATIENT
Start: 2025-03-27 | End: 2025-03-27 | Stop reason: SDUPTHER

## 2025-03-27 RX ORDER — DIPHENHYDRAMINE HYDROCHLORIDE 50 MG/ML
12.5 INJECTION, SOLUTION INTRAMUSCULAR; INTRAVENOUS
Status: DISCONTINUED | OUTPATIENT
Start: 2025-03-27 | End: 2025-03-27 | Stop reason: HOSPADM

## 2025-03-27 RX ADMIN — SODIUM CHLORIDE 2000 MG: 9 INJECTION, SOLUTION INTRAVENOUS at 08:36

## 2025-03-27 RX ADMIN — LIDOCAINE HYDROCHLORIDE 60 MG: 20 INJECTION, SOLUTION INFILTRATION; PERINEURAL at 08:36

## 2025-03-27 RX ADMIN — SODIUM CHLORIDE, PRESERVATIVE FREE 10 ML: 5 INJECTION INTRAVENOUS at 10:18

## 2025-03-27 RX ADMIN — DEXAMETHASONE SODIUM PHOSPHATE 4 MG: 4 INJECTION INTRA-ARTICULAR; INTRALESIONAL; INTRAMUSCULAR; INTRAVENOUS; SOFT TISSUE at 08:39

## 2025-03-27 RX ADMIN — ONDANSETRON 4 MG: 2 INJECTION, SOLUTION INTRAMUSCULAR; INTRAVENOUS at 08:39

## 2025-03-27 RX ADMIN — Medication 20 MG: at 08:20

## 2025-03-27 RX ADMIN — SACUBITRIL AND VALSARTAN 1 TABLET: 24; 26 TABLET, FILM COATED ORAL at 22:09

## 2025-03-27 RX ADMIN — METOPROLOL SUCCINATE 25 MG: 25 TABLET, EXTENDED RELEASE ORAL at 22:09

## 2025-03-27 RX ADMIN — PROPOFOL 200 MG: 10 INJECTION, EMULSION INTRAVENOUS at 08:36

## 2025-03-27 RX ADMIN — ROSUVASTATIN CALCIUM 20 MG: 10 TABLET, FILM COATED ORAL at 22:09

## 2025-03-27 RX ADMIN — SODIUM CHLORIDE, SODIUM LACTATE, POTASSIUM CHLORIDE, AND CALCIUM CHLORIDE: .6; .31; .03; .02 INJECTION, SOLUTION INTRAVENOUS at 08:20

## 2025-03-27 RX ADMIN — PHENYLEPHRINE HYDROCHLORIDE 100 MCG: 10 INJECTION INTRAVENOUS at 08:58

## 2025-03-27 RX ADMIN — DONEPEZIL HYDROCHLORIDE 5 MG: 5 TABLET, FILM COATED ORAL at 22:09

## 2025-03-27 RX ADMIN — FENTANYL CITRATE 50 MCG: 50 INJECTION INTRAMUSCULAR; INTRAVENOUS at 08:43

## 2025-03-27 RX ADMIN — SACUBITRIL AND VALSARTAN 1 TABLET: 24; 26 TABLET, FILM COATED ORAL at 10:17

## 2025-03-27 RX ADMIN — SODIUM CHLORIDE, PRESERVATIVE FREE 10 ML: 5 INJECTION INTRAVENOUS at 22:10

## 2025-03-27 RX ADMIN — PANTOPRAZOLE SODIUM 40 MG: 40 TABLET, DELAYED RELEASE ORAL at 10:17

## 2025-03-27 RX ADMIN — ALLOPURINOL 150 MG: 300 TABLET ORAL at 10:18

## 2025-03-27 ASSESSMENT — PAIN - FUNCTIONAL ASSESSMENT: PAIN_FUNCTIONAL_ASSESSMENT: 0-10

## 2025-03-27 ASSESSMENT — ENCOUNTER SYMPTOMS: SHORTNESS OF BREATH: 1

## 2025-03-27 NOTE — OP NOTE
[address] [address]                            OPERATIVE REPORT      PATIENT NAME: MAHAD OLIVAS                : 1940  MED REC NO: [id number]                      ROOM: 0229  ACCOUNT NO: [id number]                       ADMIT DATE: 2025  PROVIDER: Eduardo Mendoza MD      DATE OF PROCEDURE:  2025    SURGEON:  Eduardo Mendoza MD    FACILITY:  [facility].    DIAGNOSES:    1. Right distal ureteral mass with severe hydronephrosis and nonfunctioning kidney.  2. Left mid ureteral calculus, +2 cm renal calculus.    PROCEDURES PERFORMED:  Cystoscopy, bilateral retrograde pyelogram, difficult right ureteral stent placement and left ureteral stent placement.    INDICATION FOR PROCEDURE:  The patient is a very pleasant 84-year-old gentleman with multiple medical issues.  From urologic perspective, he is on Coumadin.  He is having gross hematuria.  CT scan was done, showing severe right-sided hydroureteronephrosis with a distal right ureteral mass.  On the left side, he has a mid ureteral stone and a 2 cm stone in the renal pelvis without hydronephrosis.  He also has a history of prostate cancer with seed implantation.  He had an adrenalectomy done by Dr. Curiel on the left side.  He presents today for the aforementioned procedure.  He also has a history of radiation for rectal cancer.    ESTIMATED BLOOD LOSS:  1 mL.    ANESTHESIA:  General endotracheal.    DESCRIPTION OF PROCEDURE:  After undergoing informed consent, the patient was taken to the operating room.  He was prepped and draped in the usual sterile fashion and given a dose of preoperative antibiotics.  Under general anesthesia, I inserted a rigid scope in the bladder.  Prostate was small and has radiation changes to it.  The bladder looks very unhealthy with significant trabeculations to near end-stage bladder.  With some difficulty, I

## 2025-03-27 NOTE — PROGRESS NOTES
Progress Note    Admit Date:  3/25/2025    Admitted for gross hematuria  Hx of prostate cancer s/p seed implantation     Subjective:  Mr. Hdez s/p bilateral stent placement, bilateral retrograde pyelogram today.  Patient has palmer catheter in, palmer bag with significant hematuria.  Feeling well, no abdominal pain, nausea, vomiting.    Objective:   Patient Vitals for the past 4 hrs:   BP Temp Temp src Pulse Resp SpO2   03/27/25 1015 123/68 97.5 °F (36.4 °C) Oral 65 16 96 %   03/27/25 0950 -- -- -- 65 13 95 %   03/27/25 0945 101/60 -- -- 78 14 95 %   03/27/25 0940 -- -- -- 71 14 95 %   03/27/25 0935 -- -- -- 75 11 96 %   03/27/25 0930 101/67 97.5 °F (36.4 °C) Oral 71 13 95 %   03/27/25 0925 107/75 -- -- 87 13 96 %   03/27/25 0920 103/70 -- -- 71 14 94 %   03/27/25 0915 113/75 97.3 °F (36.3 °C) Axillary 74 14 94 %   03/27/25 0811 110/72 98.3 °F (36.8 °C) Oral 87 18 97 %   03/27/25 0730 118/67 97.9 °F (36.6 °C) Oral 57 16 95 %          Intake/Output Summary (Last 24 hours) at 3/27/2025 1056  Last data filed at 3/27/2025 0836  Gross per 24 hour   Intake 50 ml   Output --   Net 50 ml       Physical Exam:    Gen: No distress. Alert.   Eyes: PERRL. No sclera icterus. No conjunctival injection.   ENT: No discharge. Pharynx clear.   Neck: No JVD.  Trachea midline.  Resp: No accessory muscle use. No crackles. No wheezes. No rhonchi.   CV: irregularly irregular . No murmur.  No rub. No edema.   Capillary Refill: Brisk,< 3 seconds   GI: Non-tender. Non-distended.  Normal bowel sounds.  : palmer catheter in place, palmer draining red urine.  Skin: Warm and dry. No nodule on exposed extremities. No rash on exposed extremities.   M/S: No cyanosis. No joint deformity. No clubbing.   Neuro: Awake. Grossly nonfocal    Psych: Oriented x 3. No anxiety or agitation.      Scheduled Meds:   [START ON 3/28/2025] furosemide  20 mg Oral Every Other Day    sodium chloride flush  5-40 mL IntraVENous 2 times per day    allopurinol  150 mg Oral  consulted  - NPO after midnight for OR tomorrow- possible biopsy  -s/p bilateral stent placement, bilateral retrograde pyelogram, cytology pending.  -planning for right diagnostic ureteroscopy and left urs with cvac in a few weeks outpatient, will ultimately need right nephroureterectomy.   -significant hematuria post surgery, will need to monitor for improvement prior to dc.    Chronic combined CHF.  -does not appear to be fluid overloaded.   -echo 9/2024 EF 25-30%, grade II DD.  -continue home lasix and Entresto.- pt takes lasix every other day   -I/Os, daily weights.      Moderate nonobstructive CAD.  -holding warfarin--resume when able.  -continue statin and BB.     Paroxysmal atrial fibrillation.  -holding home warfarin in the setting of hematuria.   -INR goal 2-2.5, currently therapeutic at 2.02.  -continue home metoprolol.   - monitor PT/INR     Hypertension.  -continue home metoprolol and Entresto  - monitor      Hyperlipidemia  -continue statin     CKD stage IIIa  -Cr 1.2, baseline 1.4-1.5.  -avoid nephrotoxic agents.  -renally dose medications as able.  - monitor BMP     Hx rectal cancer (1986)  -s/p surgery and xrt.  -followed with [facility].     Hx splenic flexure cancer (2015)  -s/p resection and complete mobilization of splenic flexure.     Gout  -continue allopurinol.      Dementia  -continue donepezil     GERD  -continue PPI     Hx guillain-barre syndrome.     Discussed plan of care with patient, family and Urology.      Note above makes patient higher risk for morbidity and mortality requiring testing and treatment.      DVT Prophylaxis: SCDs  Diet: Diet NPO Exceptions are: Sips of Water with Meds  Code Status: Full Code      Caro Guadarrama PA-C

## 2025-03-27 NOTE — ANESTHESIA PRE PROCEDURE
Caro Guadarrama PA-C   20 mg at 03/26/25 2046   • pantoprazole (PROTONIX) tablet 40 mg  40 mg Oral QAM AC Caro Guadarrama PA-C           Allergies:  No Known Allergies    Problem List:    Patient Active Problem List   Diagnosis Code   • Malignant neoplasm of splenic flexure (HCC) C18.5   • Guillain Barré syndrome G61.0   • Shortness of breath R06.02   • Bilateral lower extremity edema R60.0   • Combined systolic and diastolic congestive heart failure ([facility]) I50.40   • Establishing care with new doctor, encounter for Z76.89   • Irregular heart rate I49.9   • Palpitations R00.2   • Mixed hyperlipidemia E78.2   • PAF (paroxysmal atrial fibrillation) ([facility]) I48.0   • Essential hypertension I10   • Abnormal echocardiogram R93.1   • Hypotension I95.9   • Abnormal ECG R94.31   • HFrEF (heart failure with reduced ejection fraction) ([facility]) I50.20   • Medication management Z79.899   • Unequal blood pressure in upper extremities R09.89   • Hematuria R31.9   • 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   • Other hydronephrosis N13.39   • Anticoagulated Z79.01   • History of prostate cancer Z85.46       Past Medical History:        Diagnosis Date   • Chronic kidney disease     kidney stones   • Colon cancer (HCC)    • Guillain Barré syndrome    • Hyperlipidemia    • Hypertension    • Left ureteral stone        Past Surgical History:        Procedure Laterality Date   • ADRENALECTOMY      groin   • BACK SURGERY     • CARPAL TUNNEL RELEASE      bilateral   • INTRACAPSULAR CATARACT EXTRACTION Right 3/19/2019    PHACOEMULSIFICATION OF CATARACT RIGHT EYE WITH INTRAOCULARLENS IMPLANT performed by Thierno Jensen MD at [facility] OR   • KIDNEY STONE SURGERY      several   • MANDIBLE SURGERY     • OTHER SURGICAL HISTORY  10/25/2017     CYSTOSCOPY; TRANSURETHRAL RESECTION PROSTATE   • TOTAL ELBOW ARTHROPLASTY Left        Social History:    Social History     Tobacco Use   • Smoking status: Never   • Smokeless tobacco: Never   Substance Use Topics   •

## 2025-03-27 NOTE — BRIEF OP NOTE
Brief Postoperative Note      Patient: Jhony Hdez  YOB: 1940  MRN: [id number]    Date of Procedure: 3/27/2025    Pre-Op Diagnosis Codes:      * Hydronephrosis, unspecified hydronephrosis type [N13.30]    Post-Op Diagnosis: Same       Procedure(s):  CYSTOSCOPY, BILATERAL STENT PLACEMENT,BILATERAL RETROGRADE PYELOGRAM    Surgeon(s):  Eduardo Mendoza MD    Assistant:  * No surgical staff found *    Anesthesia: General    Estimated Blood Loss (mL): Minimal    Complications: None    Specimens:   ID Type Source Tests Collected by Time Destination   A : CYSTOSCOPIC URINE-RIGHT RENAL FISH Urine Urine, Cystoscopic CYTOLOGY, NON-GYN Eduardo Mendoza MD 3/27/2025 0850        Implants:  Implant Name Type Inv. Item Serial No.  Lot No. LRB No. Used Action   STENT URET 6FR L26CM PERCFLX HYDR+ TAPR TIP GRAD - [id number]  STENT URET 6FR L26CM PERCFLX HYDR+ TAPR TIP GRAD  [employer]- [id number] Right 1 Implanted   STENT URET 6FR L26CM PERCFLX HYDR+ TAPR TIP GRAD - [id number]  STENT URET 6FR L26CM PERCFLX HYDR+ TAPR TIP GRAD  [employer]- [id number] Left 1 Implanted         Drains:   Urinary Catheter 03/27/25 (Active)       Findings:  Infection Present At Time Of Surgery (PATOS) (choose all levels that have infection present):  No infection present  Other Findings: SEVERE RIGHT HYDRO, BLOODY URINE SEEN COMING FROM RT SIDE. HUGE 2CM LEFT RENAL PELVIC STONE  PLAN: LARA CAN BE REMOVED. WILL DO A RIGHT DIAGNOSTIC URETEROSCOPY AND LEFT URS WITH CVAC IN A FEW WEEKS AS OUTPATIENT.   ULTIMATELY, HE LIKELY WILL NEED A RIGHT NEPHROURETERECTOMY (IF HE WISHES TO PERSUE TREATMENT)    Electronically signed by EDUARDO MENDOZA MD on 3/27/2025 at 9:08 AM

## 2025-03-27 NOTE — PROGRESS NOTES
Patient back from procedure. Shift assessment complete. Scheduled meds given.  Patients head-toe complete, VS are logged, and active bowel sound noted in all four quadrants.     Patient on room air, RR easy and unlabored. No s/s of distress. A/O x4. Denies pain or SOB. De [place]re in place and draining red bloody urine.      Patient sitting in bed, denies further needs at this time. Call light and bedside table are within reach. The bed is locked and is in the lowest position. Wife at bedside.       Vitals:    03/27/25 1015   BP: 123/68   Pulse: 65   Resp: 16   Temp: 97.5 °F (36.4 °C)   SpO2: 96%

## 2025-03-27 NOTE — DISCHARGE INSTRUCTIONS
Heart Failure Resources:  Heart Failure Interactive Workbook:  Go to [email or web address] [email or web address]/publication/?q=829755 for a Free Heart Failure Interactive Workbook provided by The American [employer] Association. This interactive workbook will provide information on Healthier Living with Heart Failure. Please copy and paste link into search bar. Use your mouse to scroll through the pages.    HF [place] renard:   Heart Failure Free smart phone renard available for iPhone and Android download. Use your phone to track sodium intake, fluid intake, symptoms, and weight.     Low Sodium Diet / Recipes:  Go to www.[employer].Webchutney website for “renal” diet which is Low Sodium! [employer] is a dialysis company, but this website offers free seasonal cookbooks. Each quarter, they will release 25-30 new recipes with a breakdown of calories, sodium, and glucose. You can also go to wwwPure360/recipes website for free recipes.     Discharge Instruction Video:  Scan the QR code below with your camera and click the canva.com link to open the video and watch educational information on Heart Failure and Medications from one of our nurses.   https://[email or web address]/design/[email or web address]/[email or web address]/edit    Home Exercise Program:   Identification of Green/Yellow/Red zones:  You should be able to identify when you feel good (green zone), if you have 1-2 symptoms of HF (yellow zone), or if you are in need of medical attention (red zone).  In your CHF education folder you were provided a “stop light tool” to outline this information.     We want to you to rate your exertion levels:    Our therapy team has discussed means of identification with you such as the \"Phylicia scale.\"  The Phylicia rating scale ranges from 6 to 20, where 6 means \"no exertion at all\" and 20 means \"maximal exertion.\" The goal is to use this to gauge how much effort it is taking for you to do your normal daily tasks.   You should be able to recognize when too much

## 2025-03-27 NOTE — ANESTHESIA POSTPROCEDURE EVALUATION
Department of Anesthesiology  Postprocedure Note    Patient: Jhony Hdez  MRN: [id number]  YOB: 1940  Date of evaluation: 3/27/2025    Procedure Summary       Date: 03/27/25 Room / Location: [address]    Anesthesia Start: 0827 Anesthesia Stop: 0914    Procedure: CYSTOSCOPY, BILATERAL STENT PLACEMENT,BILATERAL RETROGRADE PYELOGRAM (Bilateral: Bladder) Diagnosis:       Hydronephrosis, unspecified hydronephrosis type      (Hydronephrosis, unspecified hydronephrosis type [N13.30])    Surgeons: Eduardo Mendoza MD Responsible Provider: Octaviano Andres MD    Anesthesia Type: general ASA Status: 3            Anesthesia Type: No value filed.    Aly Phase I: Aly Score: 10    Aly Phase II:      Anesthesia Post Evaluation    Comments: Postoperative Anesthesia Note    Name:    Jhony Hdez  MRN:      [id number]    Patient Vitals in the past 12 hrs:  03/27/25 1015, BP:123/68, Temp:97.5 °F (36.4 °C), Temp src:Oral, Pulse:65, Resp:16, SpO2:96 %  03/27/25 0950, Pulse:65, Resp:13, SpO2:95 %  03/27/25 0945, BP:101/60, Pulse:78, Resp:14, SpO2:95 %  03/27/25 0940, Pulse:71, Resp:14, SpO2:95 %  03/27/25 0935, Pulse:75, Resp:11, SpO2:96 %  03/27/25 0930, BP:101/67, Temp:97.5 °F (36.4 °C), Temp src:Oral, Pulse:71, Resp:13, SpO2:95 %  03/27/25 0925, BP:107/75, Pulse:87, Resp:13, SpO2:96 %  03/27/25 0920, BP:103/70, Pulse:71, Resp:14, SpO2:94 %  03/27/25 0915, BP:113/75, Temp:97.3 °F (36.3 °C), Temp src:Axillary, Pulse:74, Resp:14, SpO2:94 %  03/27/25 0811, BP:110/72, Temp:98.3 °F (36.8 °C), Temp src:Oral, Pulse:87, Resp:18, SpO2:97 %  03/27/25 0730, BP:118/67, Temp:97.9 °F (36.6 °C), Temp src:Oral, Pulse:57, Resp:16, SpO2:95 %  03/27/25 0100, BP:111/72, Temp:98.4 °F (36.9 °C), Temp src:Oral, Pulse:60, Resp:17, SpO2:96 %     LABS:    CBC  Lab Results       Component                Value               Date/Time                  WBC                      4.2

## 2025-03-27 NOTE — PROGRESS NOTES
Urology Progress Note      Subjective: Jhony Hdez has no complaints     Vitals:  /72   Pulse 87   Temp 98.3 °F (36.8 °C) (Oral)   Resp 18   Ht 1.702 m (5' 7\")   Wt 78.4 kg (172 lb 14.4 oz)   SpO2 97%   BMI 27.08 kg/m²   Temp  Av.1 °F (36.7 °C)  Min: 97.7 °F (36.5 °C)  Max: 98.4 °F (36.9 °C)  No intake or output data in the 24 hours ending 25    Exam:   Physical:   Well developed, well nourished in no acute distress  Mood indicates no abnormalities. Pt doesn’t appear depressed  Orientated to time and place      Labs:  WBC:    Lab Results   Component Value Date/Time    WBC 4.2 2025 06:15 AM     Hemoglobin/Hematocrit:    Lab Results   Component Value Date/Time    HGB 13.5 2025 06:15 AM    HCT 40.6 2025 06:15 AM     BMP:    Lab Results   Component Value Date/Time     2025 06:15 AM    K 4.0 2025 06:15 AM     2025 06:15 AM    CO2 27 2025 06:15 AM    BUN 12 2025 06:15 AM    CREATININE 1.2 2025 06:15 AM    CALCIUM 8.2 2025 06:15 AM    GFRAA >60 10/26/2017 08:30 AM    LABGLOM 59 2025 06:15 AM    LABGLOM 39 2024 07:55 AM     PT/INR:    Lab Results   Component Value Date/Time    PROTIME 22.1 2025 06:15 AM    PROTIME 0.0 2025 08:31 AM    INR 1.92 2025 06:15 AM     Urinalysis: >100 rbc        Imaging:      CT ABDOMEN PELVIS W IV CONTRAST Additional Contrast? None   Final Result   1. Severe right-sided hydronephrosis and hydroureter secondary to enhancing   mass seen in the right ureter at the pelvic inlet extending all the way to   the right ureterovesicular junction. 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   2. Mild fullness of the left collecting system with a large stone seen in the   left renal pelvis as well as a stone seen in the distal left ureter.   3. Prostate radiation therapy seeds in place.                    Impression/Plan:      #Gross Hematuria   #Right hydronephrosis

## 2025-03-27 NOTE — PLAN OF CARE
Problem: Chronic Conditions and Co-morbidities  Goal: Patient's chronic conditions and co-morbidity symptoms are monitored and maintained or improved  3/27/2025 1248 by Mariajose Xavier RN  Outcome: Progressing  3/27/2025 0650 by Trisha King RN  Outcome: Progressing     Problem: ABCDS Injury Assessment  Goal: Absence of physical injury  3/27/2025 1248 by Mariajose Xavier RN  Outcome: Progressing  3/27/2025 0650 by Trisha King RN  Outcome: Progressing

## 2025-03-27 NOTE — PROGRESS NOTES
ONCOLOGY HEMATOLOGY CARE PROGRESS NOTE      SUBJECTIVE:    Getting ready to go down for stent placement this AM. Afebrile.    ROS:     Constitutional:  No weight loss, No fever, No chills, No night sweats.  Energy level good.  Eyes:  No impairment or change in vision  ENT / Mouth:  No pain, abnormal ulceration, bleeding, nasal drip or change in voice or hearing  Cardiovascular:  No chest pain, palpitations, new edema, or calf discomfort  Respiratory:  No pain, hemoptysis, change to breathing  Breast:  No pain, discharge, change in appearance or texture  Gastrointestinal:  No pain, cramping, jaundice, change to eating and bowel habits  Urinary:  No pain, bleeding or change in continence  Genitalia: No pain, bleeding or discharge  Musculoskeletal:  No redness, pain, edema or weakness  Skin:  No pruritus, rash, change to nodules or lesions  Neurologic:  No discomfort, change in mental status, speech, sensory or motor activity  Psychiatric:  No change in concentration or change to affect or mood  Endocrine:  No hot flashes, increased thirst, or change to urine production  Hematologic: No petechiae, ecchymosis or bleeding  Lymphatic:  No lymphadenopathy or lymphedema  Allergy / Immunologic:  No eczema, hives, frequent or recurrent infections    OBJECTIVE        Physical    VITALS:  Patient Vitals for the past 24 hrs:   BP Temp Temp src Pulse Resp SpO2   03/27/25 0100 111/72 98.4 °F (36.9 °C) Oral 60 17 96 %   03/26/25 2049 109/67 -- -- -- -- --   03/26/25 2048 109/67 -- -- 59 -- --   03/26/25 2000 109/67 98.4 °F (36.9 °C) Oral 53 16 96 %   03/26/25 1300 120/73 97.7 °F (36.5 °C) Oral 76 16 97 %   03/26/25 0915 109/71 97.7 °F (36.5 °C) Oral 52 16 94 %       24HR INTAKE/OUTPUT:  No intake or output data in the 24 hours ending 03/27/25 0741    CONSTITUTIONAL: awake, alert, cooperative, no apparent distress   EYES: pupils equal, round and reactive to light, sclera clear and conjunctiva

## 2025-03-28 ENCOUNTER — TELEPHONE (OUTPATIENT)
Dept: CARDIOLOGY CLINIC | Age: 85
End: 2025-03-28

## 2025-03-28 VITALS
OXYGEN SATURATION: 96 % | WEIGHT: 171.2 LBS | BODY MASS INDEX: 26.87 KG/M2 | RESPIRATION RATE: 18 BRPM | HEART RATE: 70 BPM | DIASTOLIC BLOOD PRESSURE: 79 MMHG | TEMPERATURE: 97.7 F | HEIGHT: 67 IN | SYSTOLIC BLOOD PRESSURE: 118 MMHG

## 2025-03-28 LAB
ANION GAP SERPL CALCULATED.3IONS-SCNC: 10 MMOL/L (ref 3–16)
BASOPHILS # BLD: 0 K/UL (ref 0–0.2)
BASOPHILS NFR BLD: 0.2 %
BUN SERPL-MCNC: 16 MG/DL (ref 7–20)
CALCIUM SERPL-MCNC: 8.5 MG/DL (ref 8.3–10.6)
CHLORIDE SERPL-SCNC: 106 MMOL/L (ref 99–110)
CO2 SERPL-SCNC: 24 MMOL/L (ref 21–32)
CREAT SERPL-MCNC: 1.4 MG/DL (ref 0.8–1.3)
DEPRECATED RDW RBC AUTO: 14.8 % (ref 12.4–15.4)
EOSINOPHIL # BLD: 0 K/UL (ref 0–0.6)
EOSINOPHIL NFR BLD: 0 %
GFR SERPLBLD CREATININE-BSD FMLA CKD-EPI: 49 ML/MIN/{1.73_M2}
GLUCOSE BLD-MCNC: 146 MG/DL (ref 70–99)
GLUCOSE SERPL-MCNC: 130 MG/DL (ref 70–99)
HCT VFR BLD AUTO: 42.4 % (ref 40.5–52.5)
HCT VFR BLD AUTO: 43.5 % (ref 40.5–52.5)
HGB BLD-MCNC: 13.9 G/DL (ref 13.5–17.5)
HGB BLD-MCNC: 14.3 G/DL (ref 13.5–17.5)
INR PPP: 1.75 (ref 0.85–1.15)
LYMPHOCYTES # BLD: 0.7 K/UL (ref 1–5.1)
LYMPHOCYTES NFR BLD: 6.6 %
MCH RBC QN AUTO: 30.2 PG (ref 26–34)
MCHC RBC AUTO-ENTMCNC: 32.8 G/DL (ref 31–36)
MCV RBC AUTO: 92.3 FL (ref 80–100)
MONOCYTES # BLD: 1 K/UL (ref 0–1.3)
MONOCYTES NFR BLD: 9.3 %
NEUTROPHILS # BLD: 9.3 K/UL (ref 1.7–7.7)
NEUTROPHILS NFR BLD: 83.9 %
PERFORMED ON: ABNORMAL
PLATELET # BLD AUTO: 209 K/UL (ref 135–450)
PMV BLD AUTO: 9.1 FL (ref 5–10.5)
POTASSIUM SERPL-SCNC: 4.4 MMOL/L (ref 3.5–5.1)
PROTHROMBIN TIME: 20.5 SEC (ref 11.9–14.9)
RBC # BLD AUTO: 4.6 M/UL (ref 4.2–5.9)
SODIUM SERPL-SCNC: 140 MMOL/L (ref 136–145)
WBC # BLD AUTO: 11 K/UL (ref 4–11)

## 2025-03-28 PROCEDURE — 85014 HEMATOCRIT: CPT

## 2025-03-28 PROCEDURE — 80048 BASIC METABOLIC PNL TOTAL CA: CPT

## 2025-03-28 PROCEDURE — 6370000000 HC RX 637 (ALT 250 FOR IP)

## 2025-03-28 PROCEDURE — 85025 COMPLETE CBC W/AUTO DIFF WBC: CPT

## 2025-03-28 PROCEDURE — 99238 HOSP IP/OBS DSCHRG MGMT 30/<: CPT | Performed by: INTERNAL MEDICINE

## 2025-03-28 PROCEDURE — 85610 PROTHROMBIN TIME: CPT

## 2025-03-28 PROCEDURE — 2500000003 HC RX 250 WO HCPCS

## 2025-03-28 PROCEDURE — 85018 HEMOGLOBIN: CPT

## 2025-03-28 PROCEDURE — 36415 COLL VENOUS BLD VENIPUNCTURE: CPT

## 2025-03-28 RX ADMIN — ACETAMINOPHEN 650 MG: 325 TABLET ORAL at 04:28

## 2025-03-28 RX ADMIN — ALLOPURINOL 150 MG: 300 TABLET ORAL at 08:20

## 2025-03-28 RX ADMIN — SACUBITRIL AND VALSARTAN 1 TABLET: 24; 26 TABLET, FILM COATED ORAL at 08:20

## 2025-03-28 RX ADMIN — SODIUM CHLORIDE, PRESERVATIVE FREE 10 ML: 5 INJECTION INTRAVENOUS at 08:20

## 2025-03-28 ASSESSMENT — PAIN DESCRIPTION - ORIENTATION: ORIENTATION: INNER

## 2025-03-28 ASSESSMENT — PAIN DESCRIPTION - ONSET: ONSET: GRADUAL

## 2025-03-28 ASSESSMENT — PAIN DESCRIPTION - FREQUENCY: FREQUENCY: INTERMITTENT

## 2025-03-28 ASSESSMENT — PAIN SCALES - GENERAL
PAINLEVEL_OUTOF10: 5
PAINLEVEL_OUTOF10: 1

## 2025-03-28 ASSESSMENT — PAIN - FUNCTIONAL ASSESSMENT: PAIN_FUNCTIONAL_ASSESSMENT: ACTIVITIES ARE NOT PREVENTED

## 2025-03-28 ASSESSMENT — PAIN DESCRIPTION - PAIN TYPE: TYPE: CHRONIC PAIN

## 2025-03-28 ASSESSMENT — PAIN DESCRIPTION - DESCRIPTORS: DESCRIPTORS: ACHING

## 2025-03-28 ASSESSMENT — PAIN DESCRIPTION - LOCATION: LOCATION: PENIS

## 2025-03-28 NOTE — PLAN OF CARE
Problem: Chronic Conditions and Co-morbidities  Goal: Patient's chronic conditions and co-morbidity symptoms are monitored and maintained or improved  3/28/2025 0939 by Rosemarie Blankenship, RN  Outcome: Progressing  Flowsheets (Taken 3/28/2025 0805)  Care Plan - Patient's Chronic Conditions and Co-Morbidity Symptoms are Monitored and Maintained or Improved:   Monitor and assess patient's chronic conditions and comorbid symptoms for stability, deterioration, or improvement   Collaborate with multidisciplinary team to address chronic and comorbid conditions and prevent exacerbation or deterioration   Update acute care plan with appropriate goals if chronic or comorbid symptoms are exacerbated and prevent overall improvement and discharge  3/27/2025 2248 by Trisha King, RN  Outcome: Progressing     Problem: Discharge Planning  Goal: Discharge to home or other facility with appropriate resources  3/28/2025 0939 by Rosemarie Blankenship, RN  Outcome: Progressing  Flowsheets (Taken 3/28/2025 0805)  Discharge to home or other facility with appropriate resources:   Identify barriers to discharge with patient and caregiver   Arrange for needed discharge resources and transportation as appropriate   Identify discharge learning needs (meds, wound care, etc)   Arrange for interpreters to assist at discharge as needed   Refer to discharge planning if patient needs post-hospital services based on physician order or complex needs related to functional status, cognitive ability or social support system  3/27/2025 2248 by Trisha King, RN  Outcome: Progressing  Flowsheets (Taken 3/27/2025 2159)  Discharge to home or other facility with appropriate resources: Identify barriers to discharge with patient and caregiver     Problem: ABCDS Injury Assessment  Goal: Absence of physical injury  3/28/2025 0939 by Rosemarie Blankenship, RN  Outcome: Progressing  3/27/2025 2248 by Trisha King, RN  Outcome: Progressing

## 2025-03-28 NOTE — PROGRESS NOTES
ONCOLOGY HEMATOLOGY CARE PROGRESS NOTE      SUBJECTIVE:    S/P cystoscopy, laser ablation and R ureteral stent placement yesterday.     ROS:     Constitutional:  No weight loss, No fever, No chills, No night sweats.  Energy level good.  Eyes:  No impairment or change in vision  ENT / Mouth:  No pain, abnormal ulceration, bleeding, nasal drip or change in voice or hearing  Cardiovascular:  No chest pain, palpitations, new edema, or calf discomfort  Respiratory:  No pain, hemoptysis, change to breathing  Breast:  No pain, discharge, change in appearance or texture  Gastrointestinal:  No pain, cramping, jaundice, change to eating and bowel habits  Urinary:  No pain, bleeding or change in continence  Genitalia: No pain, bleeding or discharge  Musculoskeletal:  No redness, pain, edema or weakness  Skin:  No pruritus, rash, change to nodules or lesions  Neurologic:  No discomfort, change in mental status, speech, sensory or motor activity  Psychiatric:  No change in concentration or change to affect or mood  Endocrine:  No hot flashes, increased thirst, or change to urine production  Hematologic: No petechiae, ecchymosis or bleeding  Lymphatic:  No lymphadenopathy or lymphedema  Allergy / Immunologic:  No eczema, hives, frequent or recurrent infections    OBJECTIVE        Physical    VITALS:  Patient Vitals for the past 24 hrs:   BP Temp Temp src Pulse Resp SpO2 Weight   03/28/25 0800 118/79 97.7 °F (36.5 °C) Oral 70 18 96 % --   03/28/25 0642 -- -- -- -- -- -- 77.7 kg (171 lb 3.2 oz)   03/28/25 0015 107/67 97.8 °F (36.6 °C) Oral 65 17 97 % --   03/27/25 2145 110/70 98.2 °F (36.8 °C) Oral 72 16 97 % --   03/27/25 1745 116/64 97.5 °F (36.4 °C) Oral 71 16 97 % --   03/27/25 1015 123/68 97.5 °F (36.4 °C) Oral 65 16 96 % --   03/27/25 0950 -- -- -- 65 13 95 % --   03/27/25 0945 101/60 -- -- 78 14 95 % --   03/27/25 0940 -- -- -- 71 14 95 % --   03/27/25 0935 -- -- -- 75 11 96 % --

## 2025-03-28 NOTE — TELEPHONE ENCOUNTER
Called and got the fax number from [facility] in [place]. Surgeons name is , Phone number: [phone]   Fax number is: [phone]   I called and let pt know I obtained the fax number and the surgeon name.

## 2025-03-28 NOTE — FLOWSHEET NOTE
03/28/25 0800   Vital Signs   Temp 97.7 °F (36.5 °C)   Temp Source Oral   Pulse 70   Heart Rate Source Monitor   Respirations 18   /79   MAP (Calculated) 92   Pain Assessment   Pain Assessment None - Denies Pain   Care Plan - Pain Goals   Verbalizes/displays adequate comfort level or baseline comfort level Encourage patient to monitor pain and request assistance;Assess pain using appropriate pain scale;Administer analgesics based on type and severity of pain and evaluate response;Implement non-pharmacological measures as appropriate and evaluate response;Consider cultural and social influences on pain and pain management;Notify Licensed Independent Practitioner if interventions unsuccessful or patient reports new pain   Oxygen Therapy   SpO2 96 %   O2 Device None (Room air)     AMNA Kim PA-C from urology here and discontinued palmer catheter with 400 ml of cherry red urine in bag without difficulty. Spouse at bedside. VSS. Skin pink warm and dry. Lung sounds CTA. No edema noted. Denies any complains of. In good spirits. Call bell and bedside table within reach. Will continue to monitor.

## 2025-03-28 NOTE — CARE COORDINATION
Met with pt at bedside. Pt to dc home today. Spouse transporting pt home. No further DC or DME needs identified.     IMM- 3/28    O2- 96% RA

## 2025-03-28 NOTE — TELEPHONE ENCOUNTER
MARIO ALBERTO pt is trying to get scheduled for kidney stone removal surgery and to have another mass checked out on other kidney. Pt did just have a stent placed for it recently. Pt said he will need to be off his Jantoven (Warfarin) for 5 days prior to surgery and pt wanted to make sure it was okay. Pt is going to call back with Surgeon's name, phone number and fax number. He does not have it yet. Please advise. Thank you!   Best call back:[phone]

## 2025-03-28 NOTE — TELEPHONE ENCOUNTER
Attempted to reach pt, left vm to call office back. If they need clearance letter faxed, we need a good fax number

## 2025-03-28 NOTE — FLOWSHEET NOTE
03/27/25 2145   Vital Signs   Temp 98.2 °F (36.8 °C)   Temp Source Oral   Pulse 72   Heart Rate Source Monitor   Respirations 16   /70   MAP (Calculated) 83   BP Location Right upper arm   BP Method Automatic   Patient Position Semi fowlers   Pain Assessment   Pain Assessment None - Denies Pain   Opioid-Induced Sedation   POSS Score 1   RASS   Osorio Agitation Sedation Scale (RASS) 0   Oxygen Therapy   SpO2 97 %   O2 Device None (Room air)     Shift assessment is complete. The pt is A&O x 4,  The pt denies any further needs at this time. The pt call light and personal items are with in reach. The bed alarm is on. Will continue to monitor.

## 2025-03-28 NOTE — PROGRESS NOTES
Discharge instructions completed with patient and wife, both denies any questions, patient refused transport, walked out on his own. IV already removed.

## 2025-03-28 NOTE — FLOWSHEET NOTE
03/28/25 0757   Handoff   Communication Given Shift Handoff   Handoff Given To Rosemarie PARADA   Handoff Received From Yelitza PARADA/Trisha RN   Handoff Communication Face to Face;At bedside   Time Handoff Given 0711     Patient is stable. Lying in bed. All end of shift needs have been met. No further assistance needed at this time.

## 2025-03-28 NOTE — PROGRESS NOTES
HEART FAILURE CARE PLAN:    Comorbidities Reviewed: Yes   Patient has a past medical history of Chronic kidney disease, Colon cancer (HCC), Guillain Barré syndrome, Hyperlipidemia, Hypertension, and Left ureteral stone.     Weights Reviewed: Yes   Admission weight: 85.7 kg (189 lb)   Wt Readings from Last 3 Encounters:   03/28/25 77.7 kg (171 lb 3.2 oz)   12/12/24 82.6 kg (182 lb)   10/18/24 81.4 kg (179 lb 8 oz)     Intake & Output Reviewed: Yes     Intake/Output Summary (Last 24 hours) at 3/28/2025 0939  Last data filed at 3/28/2025 0803  Gross per 24 hour   Intake 50 ml   Output 950 ml   Net -900 ml       ECHOCARDIOGRAM Reviewed: Yes   Patient's Ejection Fraction (EF) is less than or equal to 40%. Discuss HFrEF Guideline Directed Medical Therapy (GDMT) with Cardiologist or Hospitalist:          Medications Reviewed: Yes   SCHEDULED HOSPITAL MEDICATIONS:   furosemide  20 mg Oral Every Other Day    sodium chloride flush  5-40 mL IntraVENous 2 times per day    allopurinol  150 mg Oral Daily    donepezil  5 mg Oral Nightly    sacubitril-valsartan  1 tablet Oral BID    metoprolol succinate  25 mg Oral Nightly    rosuvastatin  20 mg Oral Nightly    pantoprazole  40 mg Oral QAM AC     HOME MEDICATIONS:  Prior to Admission medications    Medication Sig Start Date End Date Taking? Authorizing Provider   furosemide (LASIX) 20 MG tablet TAKE 1 TABLET BY MOUTH EVERY DAY 3/24/25  Yes Jono Sandhu MD   rosuvastatin (CRESTOR) 20 MG tablet Take 1 tablet by mouth nightly 3/3/25  Yes Jono Sandhu MD   metoprolol succinate (TOPROL XL) 25 MG extended release tablet TAKE 1 TABLET BY MOUTH AT BEDTIME 11/6/24  Yes Jono Sandhu MD   JANTOVEN 5 MG tablet TAKE 1 TABLET BY MOUTH DAILY 11/6/24  Yes Jono Sandhu MD   omeprazole (PRILOSEC) 40 MG delayed release capsule Take 1 capsule by mouth daily 10/18/24  Yes Jono Sandhu MD   ENTRESTO 24-26 MG per tablet Take 1 tablet by mouth 2 times daily 4/18/24  Yes Phoebe  Jono DANIEL MD   Diphenhydramine-APAP, sleep, (TYLENOL PM EXTRA STRENGTH PO) Take 1 tablet by mouth nightly   Yes Alhaji Alonzo MD   allopurinol (ZYLOPRIM) 300 MG tablet Take 0.5 tablets by mouth daily 1/2 tablet   Yes Alhaji Alonzo MD   donepezil (ARICEPT) 5 MG tablet Take 1 tablet by mouth nightly   Yes Alhaji Alonzo MD   spironolactone (ALDACTONE) 25 MG tablet TAKE 1 TABLET BY MOUTH EVERY DAY  Patient not taking: Reported on 3/25/2025 11/6/24   Jono Sandhu MD   dapagliflozin (FARXIGA) 5 MG tablet Take 1 tablet by mouth every morning  Patient not taking: Reported on 3/25/2025 10/29/24   Jono Sandhu MD   nitroGLYCERIN (NITROSTAT) 0.4 MG SL tablet  2/5/24   ProviderAlhaji MD      Diet Reviewed: Yes   ADULT DIET; Regular    Goal of Care Reviewed: Yes   Patient and/or Family's stated Goal of Care this Admission:  to, Reduce shortness of breath, increase activity tolerance, better understand heart failure and disease management, be more comfortable, and reduce lower extremity edema prior to discharge.     Electronically signed by Rosemarie Wallace RN on 3/28/2025 at 9:39 AM

## 2025-03-28 NOTE — PROGRESS NOTES
Urology Progress Note      Subjective: Jhony Hdez c/o of penile pain from catheter     Vitals:  /79   Pulse 70   Temp 97.7 °F (36.5 °C) (Oral)   Resp 18   Ht 1.702 m (5' 7\")   Wt 77.7 kg (171 lb 3.2 oz)   SpO2 96%   BMI 26.81 kg/m²   Temp  Av.6 °F (36.4 °C)  Min: 97.3 °F (36.3 °C)  Max: 98.2 °F (36.8 °C)    Intake/Output Summary (Last 24 hours) at 3/28/2025 0833  Last data filed at 3/28/2025 0803  Gross per 24 hour   Intake 100 ml   Output 950 ml   Net -850 ml       Exam:   Physical:   Well developed, well nourished in no acute distress  Mood indicates no abnormalities. Pt doesn’t appear depressed  Orientated to time and place  Penis not circumcised.  [place] draining thin red urine       Labs:  WBC:    Lab Results   Component Value Date/Time    WBC 11.0 2025 06:12 AM     Hemoglobin/Hematocrit:    Lab Results   Component Value Date/Time    HGB 13.9 2025 06:12 AM    HCT 42.4 2025 06:12 AM     BMP:    Lab Results   Component Value Date/Time     2025 06:12 AM    K 4.4 2025 06:12 AM     2025 06:12 AM    CO2 24 2025 06:12 AM    BUN 16 2025 06:12 AM    CREATININE 1.4 2025 06:12 AM    CALCIUM 8.5 2025 06:12 AM    GFRAA >60 10/26/2017 08:30 AM    LABGLOM 49 2025 06:12 AM    LABGLOM 39 2024 07:55 AM     PT/INR:    Lab Results   Component Value Date/Time    PROTIME 20.5 2025 06:12 AM    PROTIME 0.0 2025 08:31 AM    INR 1.75 2025 06:12 AM     Urinalysis: >100 rbc        Imaging:      CT ABDOMEN PELVIS W IV CONTRAST Additional Contrast? None   Final Result   1. Severe right-sided hydronephrosis and hydroureter secondary to enhancing   mass seen in the right ureter at the pelvic inlet extending all the way to   the right ureterovesicular junction. 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   2. Mild fullness of the left collecting system with a large stone seen in the   left renal pelvis as well as a

## 2025-03-28 NOTE — PROGRESS NOTES
HEART FAILURE CARE PLAN:    Comorbidities Reviewed: Yes   Patient has a past medical history of Chronic kidney disease, Colon cancer (HCC), Guillain Barré syndrome, Hyperlipidemia, Hypertension, and Left ureteral stone.     Weights Reviewed: Yes   Admission weight: 85.7 kg (189 lb)   Wt Readings from Last 3 Encounters:   03/28/25 77.7 kg (171 lb 3.2 oz)   12/12/24 82.6 kg (182 lb)   10/18/24 81.4 kg (179 lb 8 oz)     Intake & Output Reviewed: Yes     Intake/Output Summary (Last 24 hours) at 3/28/2025 0804  Last data filed at 3/28/2025 0803  Gross per 24 hour   Intake 100 ml   Output 950 ml   Net -850 ml       ECHOCARDIOGRAM Reviewed: Yes   Patient's Ejection Fraction (EF) is less than or equal to 40%. Discuss HFrEF Guideline Directed Medical Therapy (GDMT) with Cardiologist or Hospitalist:          Medications Reviewed: Yes   SCHEDULED HOSPITAL MEDICATIONS:   furosemide  20 mg Oral Every Other Day    sodium chloride flush  5-40 mL IntraVENous 2 times per day    allopurinol  150 mg Oral Daily    donepezil  5 mg Oral Nightly    sacubitril-valsartan  1 tablet Oral BID    metoprolol succinate  25 mg Oral Nightly    rosuvastatin  20 mg Oral Nightly    pantoprazole  40 mg Oral QAM AC     HOME MEDICATIONS:  Prior to Admission medications    Medication Sig Start Date End Date Taking? Authorizing Provider   furosemide (LASIX) 20 MG tablet TAKE 1 TABLET BY MOUTH EVERY DAY 3/24/25  Yes Jono Sandhu MD   rosuvastatin (CRESTOR) 20 MG tablet Take 1 tablet by mouth nightly 3/3/25  Yes Jono Sandhu MD   metoprolol succinate (TOPROL XL) 25 MG extended release tablet TAKE 1 TABLET BY MOUTH AT BEDTIME 11/6/24  Yes Jono Sandhu MD   JANTOVEN 5 MG tablet TAKE 1 TABLET BY MOUTH DAILY 11/6/24  Yes Jono Sandhu MD   omeprazole (PRILOSEC) 40 MG delayed release capsule Take 1 capsule by mouth daily 10/18/24  Yes Jono Sandhu MD   ENTRESTO 24-26 MG per tablet Take 1 tablet by mouth 2 times daily 4/18/24  Yes Phoebe  Jono DANIEL MD   Diphenhydramine-APAP, sleep, (TYLENOL PM EXTRA STRENGTH PO) Take 1 tablet by mouth nightly   Yes Alhaji Alonoz MD   allopurinol (ZYLOPRIM) 300 MG tablet Take 0.5 tablets by mouth daily 1/2 tablet   Yes Alhaji Alonzo MD   donepezil (ARICEPT) 5 MG tablet Take 1 tablet by mouth nightly   Yes Alhaji Alonzo MD   spironolactone (ALDACTONE) 25 MG tablet TAKE 1 TABLET BY MOUTH EVERY DAY  Patient not taking: Reported on 3/25/2025 11/6/24   Jono Sandhu MD   dapagliflozin (FARXIGA) 5 MG tablet Take 1 tablet by mouth every morning  Patient not taking: Reported on 3/25/2025 10/29/24   Jono Sandhu MD   nitroGLYCERIN (NITROSTAT) 0.4 MG SL tablet  2/5/24   ProviderAlhaji MD      Diet Reviewed: Yes   ADULT DIET; Regular    Goal of Care Reviewed: Yes   Patient and/or Family's stated Goal of Care this Admission: Reduce shortness of breath, increase activity tolerance, better understand heart failure and disease management, be more comfortable, and reduce lower extremity edema prior to discharge.     Electronically signed by Trisha King RN on 3/28/2025 at 8:04 AM

## 2025-03-28 NOTE — PROGRESS NOTES
Progress Note    Admit Date:  3/25/2025    Admitted for gross hematuria  Hx of prostate cancer s/p seed implantation     Subjective:  Mr. Hdez s/p bilateral stent placement, bilateral retrograde pyelogram today.  Patient has palmer catheter in, palmer bag with significant hematuria.  Feeling well, no abdominal pain, nausea, vomiting.    Objective:   Patient Vitals for the past 4 hrs:   BP Temp Temp src Pulse Resp SpO2   03/28/25 0800 118/79 97.7 °F (36.5 °C) Oral 70 18 96 %          Intake/Output Summary (Last 24 hours) at 3/28/2025 1154  Last data filed at 3/28/2025 0803  Gross per 24 hour   Intake 50 ml   Output 950 ml   Net -900 ml       Physical Exam:    Gen: No distress. Alert.   Eyes: PERRL. No sclera icterus. No conjunctival injection.   ENT: No discharge. Pharynx clear.   Neck: No JVD.  Trachea midline.  Resp: No accessory muscle use. No crackles. No wheezes. No rhonchi.   CV: irregularly irregular . No murmur.  No rub. No edema.   Capillary Refill: Brisk,< 3 seconds   GI: Non-tender. Non-distended.  Normal bowel sounds.  : palmer catheter in place, palmer draining red urine.  Skin: Warm and dry. No nodule on exposed extremities. No rash on exposed extremities.   M/S: No cyanosis. No joint deformity. No clubbing.   Neuro: Awake. Grossly nonfocal    Psych: Oriented x 3. No anxiety or agitation.      Scheduled Meds:   furosemide  20 mg Oral Every Other Day    sodium chloride flush  5-40 mL IntraVENous 2 times per day    allopurinol  150 mg Oral Daily    donepezil  5 mg Oral Nightly    sacubitril-valsartan  1 tablet Oral BID    metoprolol succinate  25 mg Oral Nightly    rosuvastatin  20 mg Oral Nightly    pantoprazole  40 mg Oral QAM AC       Continuous Infusions:   sodium chloride         PRN Meds:  sodium chloride flush, sodium chloride, potassium chloride **OR** potassium alternative oral replacement **OR** potassium chloride, magnesium sulfate, ondansetron **OR** ondansetron, polyethylene glycol,

## 2025-03-28 NOTE — PLAN OF CARE
Problem: Chronic Conditions and Co-morbidities  Goal: Patient's chronic conditions and co-morbidity symptoms are monitored and maintained or improved  3/27/2025 2248 by Trisha King, RN  Outcome: Progressing     Problem: Discharge Planning  Goal: Discharge to home or other facility with appropriate resources  Outcome: Progressing  Flowsheets (Taken 3/27/2025 2159)  Discharge to home or other facility with appropriate resources: Identify barriers to discharge with patient and caregiver     Problem: ABCDS Injury Assessment  Goal: Absence of physical injury  3/27/2025 2248 by Trisha King, RN  Outcome: Progressing     Problem: Safety - Adult  Goal: Free from fall injury  Outcome: Progressing

## 2025-03-31 ENCOUNTER — TELEPHONE (OUTPATIENT)
Dept: PHARMACY | Age: 85
End: 2025-03-31

## 2025-03-31 NOTE — TELEPHONE ENCOUNTER
Pt's wife called to cancel appointment and to inform us that he will be off of warfarin for awhile due to a series of upcoming procedures. They will inform clinic when he resumes warfarin.     Nedra Ladd, PharmD 3/31/2025 10:02 AM   [facility]  [phone]

## 2025-03-31 NOTE — TELEPHONE ENCOUNTER
Jono Sandhu MD  [facility] Cardio Practice Staff; Ann Marie Shipley, RN3 days ago       Patient with moderate to high CV risk for procedures and can proceed as scheduled.  Ok to hold Coumadin per protocol and then resume once procedure is complete.    Jono Sandhu MD, [facility]  Cardiovascular Disease  [facility]  [phone] [place] Office  3/28/2025 2:07 PM

## 2025-04-07 NOTE — DISCHARGE SUMMARY
Name:  Jhony Hdez  Room:  0229/0229-02  MRN:    [id number]    Discharge Summary      This discharge summary is in conjunction with a complete physical exam done on the day of discharge.      Discharging Physician: Dr. Chang      Admit: 3/25/2025  Discharge:  3/28/2025    Diagnoses this Admission    Principal Problem:    Hematuria  Active Problems:    Mixed hyperlipidemia    PAF (paroxysmal atrial fibrillation) (HCC)    Essential hypertension    Mass of ureter    Other hydronephrosis    Anticoagulated    History of prostate cancer  Resolved Problems:    * No resolved hospital problems. *          Procedures (Please Review Full Report for Details)  Cystoscopy, bilateral retrograde pyelogram, difficult right ureteral stent placement and left ureteral stent placement.  3/27.    Consults    IP CONSULT TO UROLOGY  IP CONSULT TO ONCOLOGY      HPI:    The patient is a 84 y.o. male with a PMHx of CHF, CAD, atrial fibrillation, HTN, HLD, CKD, rectal cancer, gout who presents to [facility] with hematuria. History obtained from the patient and review of EMR. Patient reports that he first started notice blood coming from his penis about a month ago. Reports that he would urinate and it would be clear, however, after he was done, blood would then come out after. When he would urinate after, blood clots would come out, followed again by clear urine. Blood started becoming more frequent, and now has been persistent for the last few days. No associated abdominal pain. No melena or hematochezia. Denies any unintentional weight loss, has actually gained 4 pounds recently. Denies night sweats, fevers, or chills. Denies chest pain, shortness of breath, or coughing.         Physical Exam at Discharge:   /79   Pulse 70   Temp 97.7 °F (36.5 °C) (Oral)   Resp 18   Ht 1.702 m (5' 7\")   Wt 77.7 kg (171 lb 3.2 oz)   SpO2 96%   BMI 26.81 kg/m²   Gen: No distress. Alert.   Eyes: PERRL. No sclera icterus. No conjunctival

## 2025-04-16 PROBLEM — N13.30 HYDRONEPHROSIS: Status: ACTIVE | Noted: 2025-03-25

## 2025-04-22 NOTE — PROGRESS NOTES
4/22 @ 1035 Pt confirms saw PCP Dr. Valdovinos on 4/14 for PreOp H&P/Testing. TI Complete. MD    4/22 @ 9297 Spoke to Suri @ Dr. Valdovinos office [phone] requesting 4/14 PreOp H&P/testing results to be faxed to PAT # given with read back. MD    4/22 @ 9852 Requested 4/14/25 lab results from [facility] to be faxed to PAT# given with read back. MD

## 2025-04-22 NOTE — PROGRESS NOTES
[facility] PRE-SURGICAL TESTING INSTRUCTIONS                      PRIOR TO PROCEDURE DATE:    1. PLEASE FOLLOW ANY INSTRUCTIONS GIVEN TO YOU PER YOUR SURGEON.      2. Arrange for someone to drive you home and be with you for the first 24 hours after discharge for your safety after your procedure for which you received sedation. Ensure it is someone we can share information with regarding your discharge.     NOTE: At this time ONLY 2 ADULTS may accompany you   One person ENCOURAGED to stay at hospital entire time if outpatient surgery      3. You must contact your surgeon for instructions IF:  You are taking any blood thinners, aspirin, anti-inflammatory or vitamins.  There is a change in your physical condition such as a cold, fever, rash, cuts, sores, or any other infection, especially near your surgical site.    4. Do not drink alcohol the day before or day of your procedure.  Do not use any recreational marijuana at least 24 hours or street drugs (heroin, cocaine) at minimum 5 days prior to your procedure.     5. A Pre-Surgical History and Physical MUST be completed WITHIN 30 DAYS OR LESS prior to your procedure.by your Physician or an Urgent Care        THE DAY OF YOUR PROCEDURE:  1.  Follow instructions for ARRIVAL TIME as DIRECTED BY YOUR SURGEON.     2. Enter the MAIN entrance from [facility] and follow the signs to the free Parking Garage or  Parking (offered free of charge 7 am-5pm).      3. Enter the Main Entrance of the hospital (do not enter from the lower level of the parking garage). Upon entrance, check in with the  at the surgical information desk on your LEFT.   Bring your insurance card and photo ID to register      4. DO NOT EAT ANYTHING 8 hours prior to arrival for surgery.  You may have up to 8 ounces of water 4 hours prior to your arrival for surgery.   NOTE: ALL Gastric, Bariatric & Bowel surgery patients - you MUST follow your surgeon's instructions regarding  eating/ drinking as you will have very specific instructions to follow.  If you did not receive these, call your surgeon's office immediately.     5. MEDICATIONS:  Take the following medications with a SMALL sip of water:   Use your usual dose of inhalers the morning of surgery. BRING your rescue inhaler with you to hospital.   Anesthesia does NOT want you to take insulin the morning of surgery. They will control your blood sugar while you are at the hospital. Please contact your ordering physician for instructions regarding your insulin the night before your procedure. If you have an insulin pump, please keep it set on basal rate.   Bariatric patient's call your surgeon if on diabetic medications as some may need to be stopped 1 week prior to surgery    6. Do not swallow additional water when brushing teeth. No gum, candy, mints, or ice chips. Refrain from smoking or at least decrease the amount on day of surgery.    7. Morning of surgery:   Take a shower with an antibacterial soap (i.e., Safeguard or Dial) OR your physician may have instructed you to use Hibiclens.  Dress in loose, comfortable clothing appropriate for redressing after your procedure.   Do not wear jewelry (including body piercings), make-up (especially NO eye make-up), fingernail polish (NO toenail polish if foot/leg surgery), lotion, powders, or metal hairclips.   Do not shave or wax for 72 hours prior to procedure near your operative site. Shaving with a razor can irritate your skin and make it easier to develop an infection. On the day of your procedure, any hair that needs to be removed near the surgical site will be 'clipped' by a healthcare worker using a special clipper designed to avoid skin irritation.    8. Dentures, glasses, or contacts will need to be removed before your procedure. Bring cases for your glasses, contacts, dentures, or hearing aids to protect them while you are in surgery.      9. If you use a CPAP, please bring it with

## 2025-04-25 ENCOUNTER — APPOINTMENT (OUTPATIENT)
Dept: GENERAL RADIOLOGY | Age: 85
End: 2025-04-25
Attending: UROLOGY
Payer: MEDICARE

## 2025-04-25 ENCOUNTER — ANESTHESIA (OUTPATIENT)
Dept: OPERATING ROOM | Age: 85
End: 2025-04-25
Payer: MEDICARE

## 2025-04-25 ENCOUNTER — HOSPITAL ENCOUNTER (OUTPATIENT)
Age: 85
Setting detail: OUTPATIENT SURGERY
Discharge: HOME | End: 2025-04-25
Attending: UROLOGY | Admitting: UROLOGY
Payer: MEDICARE

## 2025-04-25 ENCOUNTER — ANESTHESIA EVENT (OUTPATIENT)
Dept: OPERATING ROOM | Age: 85
End: 2025-04-25
Payer: MEDICARE

## 2025-04-25 VITALS
OXYGEN SATURATION: 97 % | HEART RATE: 85 BPM | SYSTOLIC BLOOD PRESSURE: 101 MMHG | HEIGHT: 67 IN | WEIGHT: 181.6 LBS | DIASTOLIC BLOOD PRESSURE: 69 MMHG | RESPIRATION RATE: 18 BRPM | BODY MASS INDEX: 28.5 KG/M2 | TEMPERATURE: 97.5 F

## 2025-04-25 DIAGNOSIS — N20.1 LEFT URETERAL CALCULUS: ICD-10-CM

## 2025-04-25 DIAGNOSIS — N28.9 LESION OF RIGHT NATIVE KIDNEY: ICD-10-CM

## 2025-04-25 PROCEDURE — 2720000010 HC SURG SUPPLY STERILE: Performed by: UROLOGY

## 2025-04-25 PROCEDURE — 2580000003 HC RX 258: Performed by: ANESTHESIOLOGY

## 2025-04-25 PROCEDURE — C1747 HC ENDOSCOPE, SINGLE, URINARY TRACT: HCPCS | Performed by: UROLOGY

## 2025-04-25 PROCEDURE — 7100000001 HC PACU RECOVERY - ADDTL 15 MIN: Performed by: UROLOGY

## 2025-04-25 PROCEDURE — 88305 TISSUE EXAM BY PATHOLOGIST: CPT

## 2025-04-25 PROCEDURE — 88300 SURGICAL PATH GROSS: CPT

## 2025-04-25 PROCEDURE — 7100000010 HC PHASE II RECOVERY - FIRST 15 MIN: Performed by: UROLOGY

## 2025-04-25 PROCEDURE — C2617 STENT, NON-COR, TEM W/O DEL: HCPCS | Performed by: UROLOGY

## 2025-04-25 PROCEDURE — 2500000003 HC RX 250 WO HCPCS

## 2025-04-25 PROCEDURE — 7100000011 HC PHASE II RECOVERY - ADDTL 15 MIN: Performed by: UROLOGY

## 2025-04-25 PROCEDURE — C1894 INTRO/SHEATH, NON-LASER: HCPCS | Performed by: UROLOGY

## 2025-04-25 PROCEDURE — 7100000000 HC PACU RECOVERY - FIRST 15 MIN: Performed by: UROLOGY

## 2025-04-25 PROCEDURE — 6360000002 HC RX W HCPCS

## 2025-04-25 PROCEDURE — C1758 CATHETER, URETERAL: HCPCS | Performed by: UROLOGY

## 2025-04-25 PROCEDURE — 6370000000 HC RX 637 (ALT 250 FOR IP): Performed by: UROLOGY

## 2025-04-25 PROCEDURE — 6370000000 HC RX 637 (ALT 250 FOR IP)

## 2025-04-25 PROCEDURE — 3700000001 HC ADD 15 MINUTES (ANESTHESIA): Performed by: UROLOGY

## 2025-04-25 PROCEDURE — 3600000014 HC SURGERY LEVEL 4 ADDTL 15MIN: Performed by: UROLOGY

## 2025-04-25 PROCEDURE — 2500000003 HC RX 250 WO HCPCS: Performed by: UROLOGY

## 2025-04-25 PROCEDURE — 6360000004 HC RX CONTRAST MEDICATION: Performed by: UROLOGY

## 2025-04-25 PROCEDURE — 2709999900 HC NON-CHARGEABLE SUPPLY: Performed by: UROLOGY

## 2025-04-25 PROCEDURE — C1769 GUIDE WIRE: HCPCS | Performed by: UROLOGY

## 2025-04-25 PROCEDURE — 3700000000 HC ANESTHESIA ATTENDED CARE: Performed by: UROLOGY

## 2025-04-25 PROCEDURE — 3600000004 HC SURGERY LEVEL 4 BASE: Performed by: UROLOGY

## 2025-04-25 PROCEDURE — 6360000002 HC RX W HCPCS: Performed by: UROLOGY

## 2025-04-25 DEVICE — URETERAL STENT
Type: IMPLANTABLE DEVICE | Site: URETER | Status: FUNCTIONAL
Brand: POLARIS™ ULTRA

## 2025-04-25 RX ORDER — ONDANSETRON 2 MG/ML
4 INJECTION INTRAMUSCULAR; INTRAVENOUS
Status: DISCONTINUED | OUTPATIENT
Start: 2025-04-25 | End: 2025-04-25 | Stop reason: HOSPADM

## 2025-04-25 RX ORDER — SODIUM CHLORIDE, SODIUM LACTATE, POTASSIUM CHLORIDE, CALCIUM CHLORIDE 600; 310; 30; 20 MG/100ML; MG/100ML; MG/100ML; MG/100ML
INJECTION, SOLUTION INTRAVENOUS CONTINUOUS
Status: DISCONTINUED | OUTPATIENT
Start: 2025-04-25 | End: 2025-04-25 | Stop reason: HOSPADM

## 2025-04-25 RX ORDER — LIDOCAINE HYDROCHLORIDE 20 MG/ML
JELLY TOPICAL PRN
Status: DISCONTINUED | OUTPATIENT
Start: 2025-04-25 | End: 2025-04-25 | Stop reason: ALTCHOICE

## 2025-04-25 RX ORDER — DEXAMETHASONE SODIUM PHOSPHATE 4 MG/ML
INJECTION, SOLUTION INTRA-ARTICULAR; INTRALESIONAL; INTRAMUSCULAR; INTRAVENOUS; SOFT TISSUE
Status: DISCONTINUED | OUTPATIENT
Start: 2025-04-25 | End: 2025-04-25 | Stop reason: SDUPTHER

## 2025-04-25 RX ORDER — IOPAMIDOL 612 MG/ML
INJECTION, SOLUTION INTRAVASCULAR PRN
Status: DISCONTINUED | OUTPATIENT
Start: 2025-04-25 | End: 2025-04-25 | Stop reason: ALTCHOICE

## 2025-04-25 RX ORDER — GLYCOPYRROLATE 0.2 MG/ML
INJECTION INTRAMUSCULAR; INTRAVENOUS
Status: DISCONTINUED | OUTPATIENT
Start: 2025-04-25 | End: 2025-04-25 | Stop reason: SDUPTHER

## 2025-04-25 RX ORDER — SODIUM CHLORIDE 9 MG/ML
INJECTION, SOLUTION INTRAVENOUS PRN
Status: DISCONTINUED | OUTPATIENT
Start: 2025-04-25 | End: 2025-04-25 | Stop reason: HOSPADM

## 2025-04-25 RX ORDER — CIPROFLOXACIN 2 MG/ML
400 INJECTION, SOLUTION INTRAVENOUS ONCE
Status: COMPLETED | OUTPATIENT
Start: 2025-04-25 | End: 2025-04-25

## 2025-04-25 RX ORDER — PROPOFOL 10 MG/ML
INJECTION, EMULSION INTRAVENOUS
Status: DISCONTINUED | OUTPATIENT
Start: 2025-04-25 | End: 2025-04-25 | Stop reason: SDUPTHER

## 2025-04-25 RX ORDER — PHENYLEPHRINE HCL IN 0.9% NACL 1 MG/10 ML
SYRINGE (ML) INTRAVENOUS
Status: DISCONTINUED | OUTPATIENT
Start: 2025-04-25 | End: 2025-04-25 | Stop reason: SDUPTHER

## 2025-04-25 RX ORDER — SODIUM CHLORIDE 0.9 % (FLUSH) 0.9 %
5-40 SYRINGE (ML) INJECTION PRN
Status: DISCONTINUED | OUTPATIENT
Start: 2025-04-25 | End: 2025-04-25 | Stop reason: HOSPADM

## 2025-04-25 RX ORDER — ONDANSETRON 2 MG/ML
INJECTION INTRAMUSCULAR; INTRAVENOUS
Status: DISCONTINUED | OUTPATIENT
Start: 2025-04-25 | End: 2025-04-25 | Stop reason: SDUPTHER

## 2025-04-25 RX ORDER — SODIUM CHLORIDE 0.9 % (FLUSH) 0.9 %
5-40 SYRINGE (ML) INJECTION EVERY 12 HOURS SCHEDULED
Status: DISCONTINUED | OUTPATIENT
Start: 2025-04-25 | End: 2025-04-25 | Stop reason: HOSPADM

## 2025-04-25 RX ORDER — LIDOCAINE HCL/PF 100 MG/5ML
SYRINGE (ML) INJECTION
Status: DISCONTINUED | OUTPATIENT
Start: 2025-04-25 | End: 2025-04-25 | Stop reason: SDUPTHER

## 2025-04-25 RX ORDER — HYDROMORPHONE HYDROCHLORIDE 1 MG/ML
0.25 INJECTION, SOLUTION INTRAMUSCULAR; INTRAVENOUS; SUBCUTANEOUS EVERY 5 MIN PRN
Status: DISCONTINUED | OUTPATIENT
Start: 2025-04-25 | End: 2025-04-25 | Stop reason: HOSPADM

## 2025-04-25 RX ORDER — LIDOCAINE HYDROCHLORIDE 40 MG/ML
SOLUTION TOPICAL
Status: DISCONTINUED | OUTPATIENT
Start: 2025-04-25 | End: 2025-04-25 | Stop reason: SDUPTHER

## 2025-04-25 RX ORDER — MAGNESIUM HYDROXIDE 1200 MG/15ML
LIQUID ORAL CONTINUOUS PRN
Status: DISCONTINUED | OUTPATIENT
Start: 2025-04-25 | End: 2025-04-25 | Stop reason: HOSPADM

## 2025-04-25 RX ORDER — ROCURONIUM BROMIDE 10 MG/ML
INJECTION, SOLUTION INTRAVENOUS
Status: DISCONTINUED | OUTPATIENT
Start: 2025-04-25 | End: 2025-04-25 | Stop reason: SDUPTHER

## 2025-04-25 RX ORDER — NALOXONE HYDROCHLORIDE 0.4 MG/ML
INJECTION, SOLUTION INTRAMUSCULAR; INTRAVENOUS; SUBCUTANEOUS PRN
Status: DISCONTINUED | OUTPATIENT
Start: 2025-04-25 | End: 2025-04-25 | Stop reason: HOSPADM

## 2025-04-25 RX ORDER — FENTANYL CITRATE 50 UG/ML
INJECTION, SOLUTION INTRAMUSCULAR; INTRAVENOUS
Status: DISCONTINUED | OUTPATIENT
Start: 2025-04-25 | End: 2025-04-25 | Stop reason: SDUPTHER

## 2025-04-25 RX ADMIN — Medication 50 MCG: at 14:27

## 2025-04-25 RX ADMIN — Medication 100 MCG: at 13:29

## 2025-04-25 RX ADMIN — FENTANYL CITRATE 25 MCG: 50 INJECTION, SOLUTION INTRAMUSCULAR; INTRAVENOUS at 14:06

## 2025-04-25 RX ADMIN — SODIUM CHLORIDE, SODIUM LACTATE, POTASSIUM CHLORIDE, AND CALCIUM CHLORIDE: .6; .31; .03; .02 INJECTION, SOLUTION INTRAVENOUS at 11:22

## 2025-04-25 RX ADMIN — PROPOFOL 40 MG: 10 INJECTION, EMULSION INTRAVENOUS at 13:18

## 2025-04-25 RX ADMIN — PROPOFOL 100 MG: 10 INJECTION, EMULSION INTRAVENOUS at 13:17

## 2025-04-25 RX ADMIN — ONDANSETRON 4 MG: 2 INJECTION INTRAMUSCULAR; INTRAVENOUS at 13:17

## 2025-04-25 RX ADMIN — GLYCOPYRROLATE 0.2 MG: 0.2 INJECTION INTRAMUSCULAR; INTRAVENOUS at 14:52

## 2025-04-25 RX ADMIN — LIDOCAINE HYDROCHLORIDE 4 ML: 40 SOLUTION TOPICAL at 13:20

## 2025-04-25 RX ADMIN — SODIUM CHLORIDE, SODIUM LACTATE, POTASSIUM CHLORIDE, AND CALCIUM CHLORIDE: .6; .31; .03; .02 INJECTION, SOLUTION INTRAVENOUS at 14:44

## 2025-04-25 RX ADMIN — DEXAMETHASONE SODIUM PHOSPHATE 4 MG: 4 INJECTION INTRA-ARTICULAR; INTRALESIONAL; INTRAMUSCULAR; INTRAVENOUS; SOFT TISSUE at 13:25

## 2025-04-25 RX ADMIN — FENTANYL CITRATE 25 MCG: 50 INJECTION, SOLUTION INTRAMUSCULAR; INTRAVENOUS at 14:41

## 2025-04-25 RX ADMIN — Medication 80 MG: at 13:17

## 2025-04-25 RX ADMIN — CIPROFLOXACIN 400 MG: 400 INJECTION, SOLUTION INTRAVENOUS at 13:23

## 2025-04-25 RX ADMIN — SUGAMMADEX 200 MG: 100 INJECTION, SOLUTION INTRAVENOUS at 15:04

## 2025-04-25 RX ADMIN — ROCURONIUM BROMIDE 50 MG: 50 INJECTION INTRAVENOUS at 13:18

## 2025-04-25 RX ADMIN — Medication 100 MCG: at 13:19

## 2025-04-25 RX ADMIN — ROCURONIUM BROMIDE 20 MG: 50 INJECTION INTRAVENOUS at 13:52

## 2025-04-25 ASSESSMENT — ENCOUNTER SYMPTOMS: SHORTNESS OF BREATH: 1

## 2025-04-25 ASSESSMENT — PAIN SCALES - GENERAL
PAINLEVEL_OUTOF10: 0
PAINLEVEL_OUTOF10: 0

## 2025-04-25 NOTE — DISCHARGE INSTRUCTIONS
REMOVE LARA SATURDAY OR SUNDAY AT HOME, WHEN URINE CLEAR  CALL DANICA TO ARRANGE STENT REMOVAL IN ABOUT 2-3 WEEKS. WE WILL DO ANOTHER CAMERA UP THE LEFT SIDE TO MAKE SURE ALL THE STONE IS GONE AT TIME OF STENT REMOVAL. [phone]  DR MARSHALL WILL CALL WITH PATH REPORT ONCE AVAILABLE    [facility] AMBULATORY PROCEDURE DISCHARGE INSTRUCTIONS    There are potential side effects of anesthesia or sedation you may experience for the first 24 hours.  These side effects include:    Confusion or Memory loss, Dizziness, or Delayed Reaction Times   [x]A responsible person should be with you for the next 24 hours.  Do not operate any vehicles (automobiles, bicycles, motorcycles) or power tools or machinery for 24 hours.  Do not sign any legal documents or make any legal decisions for 24 hours. Do not drink alcohol for 24 hours or while taking narcotic pain medication.      Nausea    [x]Start with light diet and progress to your normal diet as you feel like eating. However, if you experience nausea or repeated episodes of vomiting which persist beyond 12-24 hours, notify your physician.  Once nausea has passed, remember to keep drinking fluids.    Difficulty Passing Urine  [x]Drink extra amounts of fluid today.  Notify your physician if you have not urinated within 8 hours after your procedure or you feel uncomfortable.      Irritated Throat from a Breathing Tube  [x]Drink extra amounts of fluid today.  Lozenges may help.    Muscle Aches  [x]You may experience some generalized body aches as your muscles recover from medications used to relax them during surgery.  These will gradually subside.    MEDICATION INSTRUCTIONS:  [x]Prescription(S) x  0   sent with you.  Use as directed.  When taking pain medications, you may experience the side effect of dizziness or drowsiness.  Do not     [x]Give the list of your medications to your primary care physician on your next visit. Keep your med list updated and carry it with in case of

## 2025-04-25 NOTE — PROGRESS NOTES
Ambulatory Surgery/Procedure Discharge Note    Vitals:    04/25/25 1623   BP: 101/69   Pulse: 85   Resp: 18   Temp: 97.5 °F (36.4 °C)   SpO2: 97%       In: 1500 [I.V.:1300]  Out: -     Restroom use offered before discharge.  Yes    Pain assessment:  none  Pain Level: 0    Patient discharged to home with family     Patient discharged to home/self care. Patient discharged via wheel chair by transporter to waiting family/S.O.       4/25/2025 4:51 PM

## 2025-04-25 NOTE — PROGRESS NOTES
PACU Transfer to [facility]    Vitals:    04/25/25 1600   BP: 103/68   Pulse: 56   Resp: 14   Temp: 97.9 °F (36.6 °C)   SpO2: 97%         Intake/Output Summary (Last 24 hours) at 4/25/2025 1613  Last data filed at 4/25/2025 1510  Gross per 24 hour   Intake 1500 ml   Output --   Net 1500 ml       Pain assessment:  none  Pain Level: 0    Patient has all belongings at discharge from PACU.    Patient transferred via STRETCHER per ANNABELLA to care of [facility] RN.

## 2025-04-25 NOTE — PROGRESS NOTES
Patient arrived to PACU post CYSTOSCOPY RIGHT DIAGNOSTIC URETEROSCOPY RIGHT URETERAL MASS, EXCISION WITH HOLMIUM LASER, LEFT URETEROSCOPY HOLMIUM LASER STONE MANIPULATION WITH CVAC, BILATERAL STENT EXCHANGE - Bilateral with Dr. Mendoza. VSS on arrival. CRNA gave PACU RN report at bedside stating no complications during procedure. Dressing to surgical site clean, dry and intact. Patient shows no signs of pain at this time. Will continue to monitor.

## 2025-04-25 NOTE — BRIEF OP NOTE
Brief Postoperative Note      Patient: Jhony Hdez  YOB: 1940  MRN: [id number]    Date of Procedure: 4/25/2025    Pre-Op Diagnosis Codes:      * Left ureteral calculus [N20.1]     * Lesion of right native kidney [N28.9]    Post-Op Diagnosis: Same       Procedure(s):  CYSTOSCOPY RIGHT DIAGNOSTIC URETEROSCOPY RIGHT URETERAL MASS, EXCISION WITH HOLMIUM LASER, LEFT URETEROSCOPY HOLMIUM LASER STONE MANIPULATION WITH CVAC, BILATERAL STENT EXCHANGE    Surgeon(s):  Eduardo Mendoza MD    Assistant:  * No surgical staff found *    Anesthesia: General    Estimated Blood Loss (mL): Minimal    Complications: None    Specimens:   ID Type Source Tests Collected by Time Destination   A : RIGHT URETERAL MASS BIOPSY Tissue Tissue SURGICAL PATHOLOGY Eduardo Mendoza MD 4/25/2025 1342    B : LEFT RENAL PELVIC STONES Tissue Tissue SURGICAL PATHOLOGY Eduardo Mendoza MD 4/25/2025 1505        Implants:  Implant Name Type Inv. Item Serial No.  Lot No. LRB No. Used Action   STENT URET 6FR L26CM PERCFLX HYDR+ DBL PGTL THRD 2 - [id number]  STENT URET 6FR L26CM PERCFLX HYDR+ DBL PGTL THRD 2  [employer] [facility] [id number] Right 1 Implanted   STENT URET 6FR L24CM PERCFLX HYDR+ DBL PGTL THRD 2 - [id number]  STENT URET 6FR L24CM PERCFLX HYDR+ DBL PGTL THRD 2  [employer] [facility] [facility] [id number] Left 1 Implanted         Drains:   Ureteral Drain/Stent 04/25/25 Right Ureter (Active)       Ureteral Drain/Stent 04/25/25 Left Ureter (Active)       Urinary Catheter 04/25/25 Coude (Active)       [REMOVED] Urinary Catheter 03/27/25 (Removed)   Catheter Indications Urinary retention (acute or chronic), continuous bladder irrigation or bladder outlet obstruction 03/28/25 0805   Site Assessment [place] 03/28/25 0805   Urine Color Bloody;Cherry 03/28/25 0805   Urine Appearance Clear 03/28/25 0805   Urine Odor Other (Comment) 03/28/25 0221   Collection Container Standard 03/28/25 0805   Securement Method Securing  device (Describe) 03/28/25 0805   Catheter Care  Perineal wipes 03/28/25 0221   Catheter Best Practices  Catheter secured to thigh;Tamper seal intact;Bag below bladder;Bag not on floor;Lack of dependent loop in tubing;Drainage bag less than half full 03/28/25 0805   Status Draining 03/28/25 0805   Output (mL) 300 mL 03/28/25 0803       Findings:  Infection Present At Time Of Surgery (PATOS) (choose all levels that have infection present):  No infection present  Other Findings: LARGE LEFT LOWER POLE STONE, 2CM. RT DISTAL URETERAL TUMOR, SEVERE STRICTURE  PLAN: CYSTO, BILATERAL STENT REMOVAL IN 2 WEEKS  WIFE WILL REMOVE LARA IN AM.      Electronically signed by BHARGAV MARSHALL MD on 4/25/2025 at 3:08 PM

## 2025-04-25 NOTE — ANESTHESIA POSTPROCEDURE EVALUATION
Department of Anesthesiology  Postprocedure Note    Patient: Jhony Hdez  MRN: [id number]  YOB: 1940  Date of evaluation: 4/25/2025    Procedure Summary       Date: 04/25/25 Room / Location: [place] [address] / [facility]    Anesthesia Start: 1312 Anesthesia Stop: 1519    Procedure: CYSTOSCOPY RIGHT DIAGNOSTIC URETEROSCOPY RIGHT URETERAL MASS, EXCISION WITH HOLMIUM LASER, LEFT URETEROSCOPY HOLMIUM LASER STONE MANIPULATION WITH CVAC, BILATERAL STENT EXCHANGE (Bilateral) Diagnosis:       Left ureteral calculus      Lesion of right native kidney      (Left ureteral calculus [N20.1])      (Lesion of right native kidney [N28.9])    Surgeons: Eduardo Mendoza MD Responsible Provider: Amrit Alves MD    Anesthesia Type: General ASA Status: 3            Anesthesia Type: General    Aly Phase I: Aly Score: 10    Aly Phase II:      Anesthesia Post Evaluation    Patient location during evaluation: PACU  Patient participation: complete - patient participated  Level of consciousness: awake  Pain score: 2  Airway patency: patent  Cardiovascular status: blood pressure returned to baseline  Respiratory status: acceptable  Hydration status: euvolemic  Pain management: adequate    No notable events documented.

## 2025-04-25 NOTE — ANESTHESIA PRE PROCEDURE
Department of Anesthesiology  Preprocedure Note       Name:  Jhony Hdez   Age:  84 y.o.  :  1940                                          MRN:  [id number]         Date:  2025      Surgeon: Surgeon(s):  Eduardo Mendoza MD    Procedure: Procedure(s):  CYSTOSCOPY RIGHT DIAGNOSTIC URETEROSCOPY RIGHT URETERAL MASS, EXCISION WITH HOLMIUM LASER, LEFT URETEROSCOPY HOLMIUM LASER STONE MANIPULATION WITH CVAC, POSSIBLE BILATERAL STENT EXCHANGE    Medications prior to admission:   Prior to Admission medications    Medication Sig Start Date End Date Taking? Authorizing Provider   furosemide (LASIX) 20 MG tablet TAKE 1 TABLET BY MOUTH EVERY DAY  Patient taking differently: Take 1 tablet by mouth daily as needed (swelling) 3/24/25   Jono Sandhu MD   rosuvastatin (CRESTOR) 20 MG tablet Take 1 tablet by mouth nightly 3/3/25   Jono Sandhu MD   metoprolol succinate (TOPROL XL) 25 MG extended release tablet TAKE 1 TABLET BY MOUTH AT BEDTIME 24   Jono Sandhu MD   omeprazole (PRILOSEC) 40 MG delayed release capsule Take 1 capsule by mouth daily  Patient taking differently: Take 1 capsule by mouth daily as needed (heart burn) 10/18/24   Jono Sandhu MD   ENTRESTO 24-26 MG per tablet Take 1 tablet by mouth 2 times daily 24   Jono Sandhu MD   nitroGLYCERIN (NITROSTAT) 0.4 MG SL tablet Place 1 tablet under the tongue every 5 minutes as needed 24   Alhaji Alonzo MD   Diphenhydramine-APAP, sleep, (TYLENOL PM EXTRA STRENGTH PO) Take 1 tablet by mouth nightly    Alhaji Alonzo MD   allopurinol (ZYLOPRIM) 300 MG tablet Take 0.5 tablets by mouth daily 1/2 tablet    Alhaji Alonzo MD   donepezil (ARICEPT) 5 MG tablet Take 1 tablet by mouth nightly    Alhaji Alonzo MD       Current medications:    No current facility-administered medications for this encounter.       Allergies:  No Known Allergies    Problem List:    Patient Active Problem List

## 2025-04-25 NOTE — PROGRESS NOTES
-Armband applied  -Call light within reach  -Visitors at bedside  -IV patent with LR infusing in right forearm  -All patient and visitor concerns addressed  -Visitor signed up for text message updates  -Pt states no further needs at this time

## 2025-04-25 NOTE — H&P
Urology Attending Admission Note      Reason for Admission: rt renal mass, left 2cm stone    History: 85 yo with bilateral stents and left stone, rt mass    Meds: see med rec  Family History, Social History, Review of Systems:  Reviewed and agreed to as per chart    Exam:    Vitals:  /84   Pulse (!) 44   Temp 97.5 °F (36.4 °C) (Temporal)   Resp 17   Ht 1.702 m (5' 7\")   Wt 82.4 kg (181 lb 9.6 oz)   SpO2 100%   BMI 28.44 kg/m²   Temp  Av.5 °F (36.4 °C)  Min: 97.5 °F (36.4 °C)  Max: 97.5 °F (36.4 °C)  No intake or output data in the 24 hours ending 25 1242    Physical:   Well developed, well nourished in no acute distress  Mood indicates no abnormalities. Pt doesn’t appear depressed  Orientated to time and place  Neck is supple, trachea is midline  Respiratory effort is normal  Cardiovascular show no extremity swelling  Abdomen no masses or hernias are palpated, there is no tenderness. Liver and Spleen appear normal.  Skin show no abnormal lesions  Lymph nodes are not palpated in the inguinal, neck, or axillary area.     Male :  Penis appears normal and circumcised  Urethral meatus is normal in size and location  Scrotum appears normal and both testicles appear normal in size and location  Sphincter has good tone  Anus is inspected. There are no perineal masses        Labs:  WBC:    Lab Results   Component Value Date/Time    WBC 11.0 2025 06:12 AM     Hemoglobin/Hematocrit:    Lab Results   Component Value Date/Time    HGB 13.9 2025 06:12 AM    HCT 42.4 2025 06:12 AM     BMP:    Lab Results   Component Value Date/Time     2025 06:12 AM    K 4.4 2025 06:12 AM     2025 06:12 AM    CO2 24 2025 06:12 AM    BUN 16 2025 06:12 AM    CREATININE 1.4 2025 06:12 AM    CALCIUM 8.5 2025 06:12 AM    GFRAA >60 10/26/2017 08:30 AM    LABGLOM 49 2025 06:12 AM    LABGLOM 39 2024 07:55 AM     PT/INR:    Lab Results

## 2025-04-26 NOTE — OP NOTE
[address] [address]                            OPERATIVE REPORT      PATIENT NAME: MAHAD OLIVAS                : 1940  MED REC NO: [id number]                      ROOM: OR  ACCOUNT NO: [id number]                       ADMIT DATE: 2025  PROVIDER: Eduardo Mendoza MD      DATE OF PROCEDURE:  2025    SURGEON:  Eduardo Mendoza MD    PREOPERATIVE DIAGNOSES:    1. Atrophic right kidney with distal right ureteral mass.  2. Large 2 cm renal left-sided stone.    PROCEDURES PERFORMED:  Cystoscopy, right diagnostic ureteroscopy with biopsy of right ureteral mass.  Right-sided 6 x 26 cm double-J stent exchange, left ureteroscopy, retrograde pyelogram, left laser lithotripsy with CVAC stone extraction, and placement of 6 x 24 cm double-J stent.    INDICATION FOR PROCEDURE:  The patient is a very pleasant 84-year-old gentleman with a history of multiple medical issues.  He presented with gross hematuria and a CT scan was done showing severe right-sided hydroureteronephrosis and a distal right ureteral mass.  On the left side, he has a mid ureteral stone and a 2 cm stone in the renal pelvis without hydronephrosis.  He also has a history of prostate cancer with seed implantation.  Finally, he has a history of an adrenalectomy on the left side by Dr. Curiel and lastly, he has had a history of radiation for rectal cancer.  Two weeks ago at [facility], I placed bilateral stents and is here for the aforementioned procedure.    DESCRIPTION OF PROCEDURE:  After undergoing informed consent, the patient was taken to the operating room.  He was prepped and draped in sterile fashion, given a dose of preoperative antibiotics.  Under general anesthesia, I inserted the rigid scope into the urethra.  This shows previous radiation changes to the prostate.  I took the right stent to the urethral meatus and passed  the wire retrograde.  Now using the 2-wire technique, I took my semi-rigid ureteroscope and navigated the distal ureter.  This was very tight and strictured down.  I see a floppy-appearing papillary tumor within the lumen of the distal right ureter.  I then took a Zero Tip Nitinol basket and removed as much of the tumor as I could.  We feel like we get a good bite, but these are very skimpy fragments that we sent off for analysis.  I took 4 or 5 biopsies.  I put them on Telfa and then sent this off for pathologic analysis.  Due to the severity of the stricture, I go ahead and replace a right-sided 6 x 24 cm double-J stent.  We now turned our attention to the left side.  I should also mention there was a good curl in the renal pelvis as well as in the bladder and it was severely hydronephrotic on the right side.  Now, I took the stent to the urethral meatus on the left side.  I now passed a wire retrograde.  I now did a retrograde pyelogram and I could see on fluoroscopy, a large left lower pole stone.  I therefore placed a wire up into the upper pole after doing the retrograde pyelogram.  Now, I placed a 12-14-46 cm ureteral access sheath.  We now take this all the way to the upper pole.  Now, using the [employer] Calyxo device, we identified the stone.  I now meticulously for well over an hour started lasering the stone, which was again about a golf ball size.  I started to dust this as much as possible and intermittently, we used a steerable vacuum device to suck the debris out.  This gives us an excellent result after quite a long laser session.  The stone is actually in the left lower pole, which makes access a little bit difficult, but now I take a Nitinol basket and grab each fragment.  These are sent off for pathologic analysis.  I continued the use of steerable device to look at each calyx and see no significant stone debris.  We then parked our camera in the renal pelvis and used the irrigation to create the

## 2025-05-07 LAB
APPEARANCE STONE: NORMAL
COMPN STONE: NORMAL
SPECIMEN WT: 253 G

## 2025-05-15 ENCOUNTER — APPOINTMENT (OUTPATIENT)
Age: 85
DRG: 023 | End: 2025-05-15
Payer: MEDICARE

## 2025-05-15 ENCOUNTER — APPOINTMENT (OUTPATIENT)
Dept: CT IMAGING | Age: 85
End: 2025-05-15
Attending: EMERGENCY MEDICINE
Payer: MEDICARE

## 2025-05-15 ENCOUNTER — APPOINTMENT (OUTPATIENT)
Dept: CT IMAGING | Age: 85
DRG: 023 | End: 2025-05-15
Payer: MEDICARE

## 2025-05-15 ENCOUNTER — HOSPITAL ENCOUNTER (EMERGENCY)
Age: 85
Discharge: ANOTHER HOSPITAL | End: 2025-05-15
Attending: EMERGENCY MEDICINE
Payer: MEDICARE

## 2025-05-15 ENCOUNTER — HOSPITAL ENCOUNTER (INPATIENT)
Age: 85
LOS: 22 days | Discharge: NURSING, REHAB OR LONG-TERM CARE | DRG: 023 | End: 2025-06-06
Admitting: SURGERY
Payer: MEDICARE

## 2025-05-15 VITALS
HEIGHT: 67 IN | HEART RATE: 54 BPM | WEIGHT: 196.87 LBS | DIASTOLIC BLOOD PRESSURE: 53 MMHG | BODY MASS INDEX: 30.9 KG/M2 | TEMPERATURE: 97.1 F | RESPIRATION RATE: 21 BRPM | SYSTOLIC BLOOD PRESSURE: 119 MMHG | OXYGEN SATURATION: 98 %

## 2025-05-15 DIAGNOSIS — I63.9 CEREBROVASCULAR ACCIDENT (CVA), UNSPECIFIED MECHANISM (HCC): ICD-10-CM

## 2025-05-15 DIAGNOSIS — I42.9 CARDIOMYOPATHY, UNSPECIFIED TYPE (HCC): ICD-10-CM

## 2025-05-15 DIAGNOSIS — Z48.89 ENCOUNTER FOR OTHER SPECIFIED SURGICAL AFTERCARE: ICD-10-CM

## 2025-05-15 DIAGNOSIS — I63.9 STROKE (HCC): ICD-10-CM

## 2025-05-15 DIAGNOSIS — I63.89 CEREBROVASCULAR ACCIDENT (CVA) DUE TO OTHER MECHANISM (HCC): ICD-10-CM

## 2025-05-15 DIAGNOSIS — I48.91 ATRIAL FIBRILLATION, UNSPECIFIED TYPE (HCC): ICD-10-CM

## 2025-05-15 DIAGNOSIS — I48.0 PAROXYSMAL ATRIAL FIBRILLATION (HCC): Primary | ICD-10-CM

## 2025-05-15 DIAGNOSIS — I63.9 CEREBROVASCULAR ACCIDENT (CVA), UNSPECIFIED MECHANISM (HCC): Primary | ICD-10-CM

## 2025-05-15 PROBLEM — I63.411 CEREBROVASCULAR ACCIDENT (CVA) DUE TO EMBOLISM OF RIGHT MIDDLE CEREBRAL ARTERY (HCC): Status: ACTIVE | Noted: 2025-05-15

## 2025-05-15 LAB
ALBUMIN SERPL-MCNC: 3.4 G/DL (ref 3.4–5)
ALBUMIN/GLOB SERPL: 1.1 {RATIO} (ref 1.1–2.2)
ALP SERPL-CCNC: 128 U/L (ref 40–129)
ALT SERPL-CCNC: 9 U/L (ref 10–40)
ANION GAP SERPL CALCULATED.3IONS-SCNC: 11 MMOL/L (ref 3–16)
ANTI-XA UNFRAC HEPARIN: 0.19 IU/ML (ref 0.3–0.7)
ANTI-XA UNFRAC HEPARIN: 0.41 IU/ML (ref 0.3–0.7)
ANTI-XA UNFRAC HEPARIN: <0.1 IU/ML (ref 0.3–0.7)
APTT BLD: 87.9 SEC (ref 22.1–36.4)
AST SERPL-CCNC: 15 U/L (ref 15–37)
BASE EXCESS BLDA CALC-SCNC: -2 MMOL/L (ref -3–3)
BASOPHILS # BLD: 0.1 K/UL (ref 0–0.2)
BASOPHILS NFR BLD: 0.8 %
BILIRUB SERPL-MCNC: 0.3 MG/DL (ref 0–1)
BUN SERPL-MCNC: 17 MG/DL (ref 7–20)
CALCIUM SERPL-MCNC: 8.9 MG/DL (ref 8.3–10.6)
CHLORIDE SERPL-SCNC: 108 MMOL/L (ref 99–110)
CHP ED QC CHECK: YES
CO2 BLDA-SCNC: 24 MMOL/L
CO2 SERPL-SCNC: 23 MMOL/L (ref 21–32)
CREAT SERPL-MCNC: 1.2 MG/DL (ref 0.8–1.3)
DEPRECATED RDW RBC AUTO: 15 % (ref 12.4–15.4)
DEPRECATED RDW RBC AUTO: 15.2 % (ref 12.4–15.4)
ECHO BSA: 1.97 M2
EKG DIAGNOSIS: NORMAL
EKG Q-T INTERVAL: 550 MS
EKG QRS DURATION: 162 MS
EKG QTC CALCULATION (BAZETT): 511 MS
EKG R AXIS: -79 DEGREES
EKG T AXIS: 56 DEGREES
EKG VENTRICULAR RATE: 52 BPM
EOSINOPHIL # BLD: 0.2 K/UL (ref 0–0.6)
EOSINOPHIL NFR BLD: 2.8 %
GFR SERPLBLD CREATININE-BSD FMLA CKD-EPI: 59 ML/MIN/{1.73_M2}
GLUCOSE BLD-MCNC: 106 MG/DL
GLUCOSE BLD-MCNC: 108 MG/DL (ref 70–99)
GLUCOSE SERPL-MCNC: 110 MG/DL (ref 70–99)
HCO3 BLDA-SCNC: 23.2 MMOL/L (ref 21–29)
HCT VFR BLD AUTO: 35.6 % (ref 40.5–52.5)
HCT VFR BLD AUTO: 40.1 % (ref 40.5–52.5)
HGB BLD-MCNC: 11.8 G/DL (ref 13.5–17.5)
HGB BLD-MCNC: 12.7 G/DL (ref 13.5–17.5)
INR PPP: 1 (ref 0.85–1.15)
INR PPP: 1.14 (ref 0.85–1.15)
LYMPHOCYTES # BLD: 1.5 K/UL (ref 1–5.1)
LYMPHOCYTES NFR BLD: 22.9 %
MCH RBC QN AUTO: 28.5 PG (ref 26–34)
MCH RBC QN AUTO: 29.1 PG (ref 26–34)
MCHC RBC AUTO-ENTMCNC: 31.8 G/DL (ref 31–36)
MCHC RBC AUTO-ENTMCNC: 33.2 G/DL (ref 31–36)
MCV RBC AUTO: 87.6 FL (ref 80–100)
MCV RBC AUTO: 89.6 FL (ref 80–100)
MONOCYTES # BLD: 0.7 K/UL (ref 0–1.3)
MONOCYTES NFR BLD: 10.7 %
NEUTROPHILS # BLD: 4 K/UL (ref 1.7–7.7)
NEUTROPHILS NFR BLD: 62.8 %
PCO2 BLDA: 37.6 MM HG (ref 35–45)
PERFORMED ON: ABNORMAL
PERFORMED ON: NORMAL
PH BLDA: 7.4 [PH] (ref 7.35–7.45)
PLATELET # BLD AUTO: 252 K/UL (ref 135–450)
PLATELET # BLD AUTO: 252 K/UL (ref 135–450)
PMV BLD AUTO: 7.9 FL (ref 5–10.5)
PMV BLD AUTO: 8.3 FL (ref 5–10.5)
PO2 BLDA: 77 MM HG (ref 75–108)
POC SAMPLE TYPE: NORMAL
POTASSIUM SERPL-SCNC: 3.7 MMOL/L (ref 3.5–5.1)
PROT SERPL-MCNC: 6.4 G/DL (ref 6.4–8.2)
PROTHROMBIN TIME: 13.4 SEC (ref 11.9–14.9)
PROTHROMBIN TIME: 14.8 SEC (ref 11.9–14.9)
RBC # BLD AUTO: 4.06 M/UL (ref 4.2–5.9)
RBC # BLD AUTO: 4.48 M/UL (ref 4.2–5.9)
SAO2 % BLDA: 95 % (ref 93–100)
SODIUM SERPL-SCNC: 142 MMOL/L (ref 136–145)
TROPONIN, HIGH SENSITIVITY: 48 NG/L (ref 0–22)
WBC # BLD AUTO: 6.3 K/UL (ref 4–11)
WBC # BLD AUTO: 6.9 K/UL (ref 4–11)

## 2025-05-15 PROCEDURE — 6360000002 HC RX W HCPCS: Performed by: NEUROLOGICAL SURGERY

## 2025-05-15 PROCEDURE — 36415 COLL VENOUS BLD VENIPUNCTURE: CPT

## 2025-05-15 PROCEDURE — 70450 CT HEAD/BRAIN W/O DYE: CPT

## 2025-05-15 PROCEDURE — C1760 CLOSURE DEV, VASC: HCPCS | Performed by: NEUROLOGICAL SURGERY

## 2025-05-15 PROCEDURE — 85027 COMPLETE CBC AUTOMATED: CPT

## 2025-05-15 PROCEDURE — B31R1ZZ FLUOROSCOPY OF INTRACRANIAL ARTERIES USING LOW OSMOLAR CONTRAST: ICD-10-PCS | Performed by: NEUROLOGICAL SURGERY

## 2025-05-15 PROCEDURE — 85520 HEPARIN ASSAY: CPT

## 2025-05-15 PROCEDURE — 84484 ASSAY OF TROPONIN QUANT: CPT

## 2025-05-15 PROCEDURE — 92610 EVALUATE SWALLOWING FUNCTION: CPT

## 2025-05-15 PROCEDURE — 99153 MOD SED SAME PHYS/QHP EA: CPT | Performed by: NEUROLOGICAL SURGERY

## 2025-05-15 PROCEDURE — 99152 MOD SED SAME PHYS/QHP 5/>YRS: CPT | Performed by: NEUROLOGICAL SURGERY

## 2025-05-15 PROCEDURE — 99285 EMERGENCY DEPT VISIT HI MDM: CPT

## 2025-05-15 PROCEDURE — APPNB45 APP NON BILLABLE 31-45 MINUTES

## 2025-05-15 PROCEDURE — 70498 CT ANGIOGRAPHY NECK: CPT

## 2025-05-15 PROCEDURE — 85610 PROTHROMBIN TIME: CPT

## 2025-05-15 PROCEDURE — 03CG3ZZ EXTIRPATION OF MATTER FROM INTRACRANIAL ARTERY, PERCUTANEOUS APPROACH: ICD-10-PCS | Performed by: NEUROLOGICAL SURGERY

## 2025-05-15 PROCEDURE — 82803 BLOOD GASES ANY COMBINATION: CPT

## 2025-05-15 PROCEDURE — 97167 OT EVAL HIGH COMPLEX 60 MIN: CPT

## 2025-05-15 PROCEDURE — 85730 THROMBOPLASTIN TIME PARTIAL: CPT

## 2025-05-15 PROCEDURE — 93005 ELECTROCARDIOGRAM TRACING: CPT | Performed by: EMERGENCY MEDICINE

## 2025-05-15 PROCEDURE — 93010 ELECTROCARDIOGRAM REPORT: CPT | Performed by: INTERNAL MEDICINE

## 2025-05-15 PROCEDURE — 93308 TTE F-UP OR LMTD: CPT

## 2025-05-15 PROCEDURE — 2000000000 HC ICU R&B

## 2025-05-15 PROCEDURE — 2709999900 HC NON-CHARGEABLE SUPPLY: Performed by: NEUROLOGICAL SURGERY

## 2025-05-15 PROCEDURE — C1887 CATHETER, GUIDING: HCPCS | Performed by: NEUROLOGICAL SURGERY

## 2025-05-15 PROCEDURE — 36226 PLACE CATH VERTEBRAL ART: CPT | Performed by: NEUROLOGICAL SURGERY

## 2025-05-15 PROCEDURE — C1894 INTRO/SHEATH, NON-LASER: HCPCS | Performed by: NEUROLOGICAL SURGERY

## 2025-05-15 PROCEDURE — 2720000010 HC SURG SUPPLY STERILE: Performed by: NEUROLOGICAL SURGERY

## 2025-05-15 PROCEDURE — 97116 GAIT TRAINING THERAPY: CPT

## 2025-05-15 PROCEDURE — 80053 COMPREHEN METABOLIC PANEL: CPT

## 2025-05-15 PROCEDURE — 36224 PLACE CATH CAROTD ART: CPT | Performed by: NEUROLOGICAL SURGERY

## 2025-05-15 PROCEDURE — 6360000004 HC RX CONTRAST MEDICATION: Performed by: EMERGENCY MEDICINE

## 2025-05-15 PROCEDURE — 97163 PT EVAL HIGH COMPLEX 45 MIN: CPT

## 2025-05-15 PROCEDURE — 85025 COMPLETE CBC W/AUTO DIFF WBC: CPT

## 2025-05-15 PROCEDURE — 36227 PLACE CATH XTRNL CAROTID: CPT | Performed by: NEUROLOGICAL SURGERY

## 2025-05-15 PROCEDURE — 99291 CRITICAL CARE FIRST HOUR: CPT | Performed by: PSYCHIATRY & NEUROLOGY

## 2025-05-15 PROCEDURE — C1773 RET DEV, INSERTABLE: HCPCS | Performed by: NEUROLOGICAL SURGERY

## 2025-05-15 PROCEDURE — C1757 CATH, THROMBECTOMY/EMBOLECT: HCPCS | Performed by: NEUROLOGICAL SURGERY

## 2025-05-15 PROCEDURE — 97112 NEUROMUSCULAR REEDUCATION: CPT

## 2025-05-15 PROCEDURE — C1889 IMPLANT/INSERT DEVICE, NOC: HCPCS | Performed by: NEUROLOGICAL SURGERY

## 2025-05-15 PROCEDURE — 99223 1ST HOSP IP/OBS HIGH 75: CPT | Performed by: INTERNAL MEDICINE

## 2025-05-15 PROCEDURE — C1769 GUIDE WIRE: HCPCS | Performed by: NEUROLOGICAL SURGERY

## 2025-05-15 PROCEDURE — 6360000004 HC RX CONTRAST MEDICATION

## 2025-05-15 PROCEDURE — 61645 PERQ ART M-THROMBECT &/NFS: CPT | Performed by: NEUROLOGICAL SURGERY

## 2025-05-15 RX ORDER — MORPHINE SULFATE 2 MG/ML
2 INJECTION, SOLUTION INTRAMUSCULAR; INTRAVENOUS
Status: DISCONTINUED | OUTPATIENT
Start: 2025-05-15 | End: 2025-05-17

## 2025-05-15 RX ORDER — ROSUVASTATIN CALCIUM 20 MG/1
20 TABLET, COATED ORAL NIGHTLY
Status: DISCONTINUED | OUTPATIENT
Start: 2025-05-15 | End: 2025-05-15

## 2025-05-15 RX ORDER — OMEPRAZOLE 40 MG/1
40 CAPSULE, DELAYED RELEASE ORAL DAILY PRN
COMMUNITY

## 2025-05-15 RX ORDER — HEPARIN SODIUM 10000 [USP'U]/100ML
5-30 INJECTION, SOLUTION INTRAVENOUS CONTINUOUS
Status: DISCONTINUED | OUTPATIENT
Start: 2025-05-15 | End: 2025-05-17

## 2025-05-15 RX ORDER — ASPIRIN 81 MG/1
81 TABLET, CHEWABLE ORAL DAILY
Status: DISCONTINUED | OUTPATIENT
Start: 2025-05-16 | End: 2025-05-15

## 2025-05-15 RX ORDER — PANTOPRAZOLE SODIUM 40 MG/1
40 TABLET, DELAYED RELEASE ORAL
Status: DISCONTINUED | OUTPATIENT
Start: 2025-05-16 | End: 2025-06-06 | Stop reason: HOSPADM

## 2025-05-15 RX ORDER — MIDAZOLAM HYDROCHLORIDE 1 MG/ML
INJECTION, SOLUTION INTRAMUSCULAR; INTRAVENOUS PRN
Status: DISCONTINUED | OUTPATIENT
Start: 2025-05-15 | End: 2025-05-15 | Stop reason: HOSPADM

## 2025-05-15 RX ORDER — WARFARIN SODIUM 5 MG/1
5 TABLET ORAL SEE ADMIN INSTRUCTIONS
Status: ON HOLD | COMMUNITY
End: 2025-06-03 | Stop reason: HOSPADM

## 2025-05-15 RX ORDER — FENTANYL CITRATE 50 UG/ML
INJECTION, SOLUTION INTRAMUSCULAR; INTRAVENOUS PRN
Status: DISCONTINUED | OUTPATIENT
Start: 2025-05-15 | End: 2025-05-15 | Stop reason: HOSPADM

## 2025-05-15 RX ORDER — FUROSEMIDE 20 MG/1
20 TABLET ORAL DAILY
Status: DISCONTINUED | OUTPATIENT
Start: 2025-05-15 | End: 2025-05-29

## 2025-05-15 RX ORDER — ASPIRIN 300 MG/1
300 SUPPOSITORY RECTAL DAILY
Status: DISCONTINUED | OUTPATIENT
Start: 2025-05-16 | End: 2025-05-15

## 2025-05-15 RX ORDER — DONEPEZIL HYDROCHLORIDE 5 MG/1
5 TABLET, FILM COATED ORAL NIGHTLY
Status: DISCONTINUED | OUTPATIENT
Start: 2025-05-15 | End: 2025-06-06 | Stop reason: HOSPADM

## 2025-05-15 RX ORDER — PROMETHAZINE HYDROCHLORIDE 25 MG/1
12.5 TABLET ORAL EVERY 6 HOURS PRN
Status: DISCONTINUED | OUTPATIENT
Start: 2025-05-15 | End: 2025-06-06 | Stop reason: HOSPADM

## 2025-05-15 RX ORDER — IOPAMIDOL 755 MG/ML
75 INJECTION, SOLUTION INTRAVASCULAR
Status: COMPLETED | OUTPATIENT
Start: 2025-05-15 | End: 2025-05-15

## 2025-05-15 RX ORDER — IOPAMIDOL 510 MG/ML
INJECTION, SOLUTION INTRAVASCULAR PRN
Status: DISCONTINUED | OUTPATIENT
Start: 2025-05-15 | End: 2025-05-15 | Stop reason: HOSPADM

## 2025-05-15 RX ORDER — HEPARIN SODIUM 1000 [USP'U]/ML
INJECTION, SOLUTION INTRAVENOUS; SUBCUTANEOUS PRN
Status: DISCONTINUED | OUTPATIENT
Start: 2025-05-15 | End: 2025-05-15 | Stop reason: HOSPADM

## 2025-05-15 RX ORDER — FUROSEMIDE 20 MG/1
20 TABLET ORAL EVERY OTHER DAY
Status: ON HOLD | COMMUNITY
End: 2025-06-03 | Stop reason: HOSPADM

## 2025-05-15 RX ORDER — MORPHINE SULFATE 4 MG/ML
4 INJECTION, SOLUTION INTRAMUSCULAR; INTRAVENOUS
Status: DISCONTINUED | OUTPATIENT
Start: 2025-05-15 | End: 2025-05-17

## 2025-05-15 RX ORDER — ONDANSETRON 2 MG/ML
4 INJECTION INTRAMUSCULAR; INTRAVENOUS EVERY 6 HOURS PRN
Status: DISCONTINUED | OUTPATIENT
Start: 2025-05-15 | End: 2025-06-06 | Stop reason: HOSPADM

## 2025-05-15 RX ORDER — HEPARIN SODIUM 10000 [USP'U]/100ML
INJECTION, SOLUTION INTRAVENOUS CONTINUOUS PRN
Status: COMPLETED | OUTPATIENT
Start: 2025-05-15 | End: 2025-05-15

## 2025-05-15 RX ORDER — HEPARIN SODIUM 200 [USP'U]/100ML
INJECTION, SOLUTION INTRAVENOUS CONTINUOUS PRN
Status: DISCONTINUED | OUTPATIENT
Start: 2025-05-15 | End: 2025-05-15 | Stop reason: HOSPADM

## 2025-05-15 RX ORDER — ALLOPURINOL 300 MG/1
300 TABLET ORAL NIGHTLY
COMMUNITY

## 2025-05-15 RX ORDER — ATORVASTATIN CALCIUM 80 MG/1
80 TABLET, FILM COATED ORAL NIGHTLY
Status: DISCONTINUED | OUTPATIENT
Start: 2025-05-15 | End: 2025-05-16

## 2025-05-15 RX ADMIN — HEPARIN SODIUM 14 UNITS/KG/HR: 10000 INJECTION, SOLUTION INTRAVENOUS at 13:58

## 2025-05-15 RX ADMIN — IOPAMIDOL 75 ML: 755 INJECTION, SOLUTION INTRAVENOUS at 02:44

## 2025-05-15 RX ADMIN — IOPAMIDOL 75 ML: 755 INJECTION, SOLUTION INTRAVENOUS at 18:29

## 2025-05-15 ASSESSMENT — PAIN SCALES - GENERAL: PAINLEVEL_OUTOF10: 0

## 2025-05-15 NOTE — CARE COORDINATION
Case Management Assessment  Initial Evaluation    Date/Time of Evaluation: 5/15/2025 10:20 AM  Assessment Completed by: Samuel Rob RN    If patient is discharged prior to next notation, then this note serves as note for discharge by case management.    Patient Name: Jhony Hdez                   YOB: 1940  Diagnosis: Stroke (HCC) [I63.9]  Acute CVA (cerebrovascular accident) (HCC) [I63.9]                   Date / Time: 5/15/2025  7:12 AM    Patient Admission Status: Inpatient   Readmission Risk (Low < 19, Mod (19-27), High > 27): Readmission Risk Score: 14    Current PCP: Jhony Valdovinos MD  PCP verified by CM? Yes    Chart Reviewed: Yes      History Provided by: Patient, Medical Record  Patient Orientation: Alert and Oriented    Patient Cognition: Alert    Hospitalization in the last 30 days (Readmission):  No    If yes, Readmission Assessment in CM Navigator will be completed and shown below.          Advance Directives:      Code Status: Full Code   Patient's Primary Decision Maker is: Legal Next of Kin      Discharge Planning:    Patient lives with: Spouse/Significant Other Type of Home: House  Primary Care Giver: Self  Patient Support Systems include: Spouse/Significant Other, Family Members   Current Financial resources: Medicare  Current community resources:    Current services prior to admission: None            Current DME:              Type of Home Care services:  None    ADLS  Prior functional level: Independent in ADLs/IADLs  Current functional level: Other (see comment) (tbd)    PT AM-PAC:   /24  OT AM-PAC:   /24    Family can provide assistance at DC: Yes  Would you like Case Management to discuss the discharge plan with any other family members/significant others, and if so, who? Yes  Plans to Return to Present Housing: Unknown at present  Other Identified Issues/Barriers to RETURNING to current housing: medical complications  Potential Assistance needed at discharge: Skilled

## 2025-05-15 NOTE — ED PROVIDER NOTES
Psychiatric:         Behavior: Behavior normal.         Thought Content: Thought content normal.         Judgment: Judgment normal.         DIAGNOSTIC RESULTS     EKG: All EKG's are interpreted by the Emergency Department Physician who either signs or Co-signs this chart in the absence of a cardiologist.    Atrial fibrillation at 52.  , QTc 511 when is consistent with bundle branch block.  No obvious acute ischemic changes noted.  Abnormal EKG    RADIOLOGY:   Non-plain film images such as CT, Ultrasound and MRI are read by the radiologist. Plain radiographic images are visualized and preliminarily interpreted by the emergency physician with the below findings:        Interpretation per the Radiologist below, if available at the time of this note:    CTA HEAD NECK W CONTRAST   Final Result   Occlusion of the M1 segment right MCA.         CT HEAD WO CONTRAST   Final Result   Addendum (preliminary) 1 of 1   ADDENDUM:   Connected Dr. Duc Patricio with Dr. Mccann at 3:00am ESTCompleted:    Farnaz Fountain,05/15/2025 2:00 AM         Final   1. Hyperdense right MCA suggesting right MCA thrombus.   2. No acute intracranial hemorrhage.               ED BEDSIDE ULTRASOUND:   Performed by ED Physician - none    LABS:  Labs Reviewed   CBC WITH AUTO DIFFERENTIAL - Abnormal; Notable for the following components:       Result Value    Hemoglobin 12.7 (*)     Hematocrit 40.1 (*)     All other components within normal limits   COMPREHENSIVE METABOLIC PANEL W/ REFLEX TO MG FOR LOW K - Abnormal; Notable for the following components:    Glucose 110 (*)     Est, Glom Filt Rate 59 (*)     ALT 9 (*)     All other components within normal limits   TROPONIN - Abnormal; Notable for the following components:    Troponin, High Sensitivity 48 (*)     All other components within normal limits   POCT GLUCOSE - Normal   PROTIME-INR       All other labs were within normal range or not returned as of this dictation.    EMERGENCY

## 2025-05-15 NOTE — DISCHARGE INSTRUCTIONS
Secondary Stroke Prevention Goals:   Blood Pressure:  Goal - BP < 140/90  Take your medication as prescribed.   Take your blood pressure at home regularly (at least once a day for the first few weeks). Write the numbers down so your primary care provider can adjust medications as needed.   Cholesterol:   Continue to take your cholesterol medication to help prevent a stroke.   Smoking Cessation   If you are a current smoker the goal is completely quitting.   Diabetes Mellitus:   Goal - HbA1c < 7%  Alcohol Consumption:  Men - two or fewer drinks per day  Physical Activity:  Goal - at least 30 minutes of moderate intensity physical exercise 1-3 times per week  Okay to start at any level and work your way up to goal. Walking even 5-10 minutes a day is better than no walking. Starting with small goals and working your way up is the best way to be successful.   Visit this web page for more information on moving more and living healthy:   https://www.heart.org/en/healthy-living  Diet:  Low-fat, low-sodium, and Mediterranean or Dietary Approaches to Stop Hypertension (DASH) or Diabetic Diet when applicable.   Visit this web page for more information on ways to improve your diet:   https://www.heart.org/en/healthy-living/healthy-eating/eat-smart/nutrition-basics  Obesity:  Goal BMI 18.5-25 kg/m2  [facility] Stroke Program Survey  The [facility] [place] values your feedback related to your recent hospital visit and admission. We strive to improve our Neuroscience program to promote better outcomes and recoveries for all our patients.  The anonymous survey below consists of a few questions that are related to your stay and around your Stroke diagnosis, treatment, and recovery. It is anonymous and has only a few questions.  The estimated length of time needed to complete this survey is 3 minutes or less. Thank you for completing this survey!     [facility] [place] office at:

## 2025-05-15 NOTE — PLAN OF CARE
Brief Stroke Team Plan of Care    I did not see the patient but I discussed the case with the emergency medicine team and reviewed the chart.    In brief, this is an 84 year old man with a history of AF on coumadin (held recently for recurrent nephrolithiasis requiring surgery), recent diagnosis of papillary urothelial cancer (noninvasive, planned to be treated surgically per notes) who presents with an acute RMCA syndrome with RM1 occlusion. Fully functional, lives at home with his wife. Last definitely well at 10pm, when he said good night to his wife. At about 2:00am, his wife woke up and found him plegic on the L side. He was brought to [facility], where his NIHSS was 14 for L hemiplegia and neglect. CT head showed a hyperdense RMCA and favorable ASPECTS (9, although some motion could mean subtle changes are missed), and his CTA confirmed a RM1 occlusion.    A/P: RMCA stroke, not a candidate for TNK based upon time since last known well (>4.5 hrs), but ASPECTS is favorable for standard window thrombectomy. Discussed with [place] team, agree and plan to take him for trial of EVT.  -Transfer to [facility], plan for attempted EVT for stroke.    Please call with questions

## 2025-05-15 NOTE — FLOWSHEET NOTE
Patient received from the IR team. Connected to the cardiac monitor. Neuro exam performed (See neuro flowsheet). Patient on Heparin gtt at rate 900 unit/hour.

## 2025-05-15 NOTE — ED NOTES
Pt was transported by [facility] to [facility] IR. Pt was monitored enroute by this RN, with no change in status. Pt denied any pain enroute, no meds given. Transfer of care handed over to [facility] staff.

## 2025-05-15 NOTE — ED NOTES
Dr. Patricio assessed pt upon arrival. Vitals, blood sugar, and labs obtained. Pt taken to CT by EMS. Pt able to respond appropriately, pt's eyes fixed to R hemisphere.     Last Know Well: 5/14/2025 @ 2200

## 2025-05-15 NOTE — ED NOTES
TAL Ramires rode with [facility] to [facility]

## 2025-05-15 NOTE — H&P
ICU H&P      Hospital Day: 1  ICU Day: 1                                                         Code:Full Code  Admit Date: 5/15/2025  PCP: Jhony Valdovinos MD                                  CC: Left hemiplegia     HISTORY OF PRESENT ILLNESS:   Mr. Hdez is a 85 yo male with pmhx of afib on warfarin, rectal cancer, prostate cancer with seeding, GBS, recent diagnosis of urothelial cancer of the right ureter who presents to [facility] ED with chief complaint of left hemiplegia that was noticed at around 2am. His LKW as 10pm on the 14th. 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 Upon arrival to the ED he was noted to have left facial droop with left hemiplegia so code stroke was called. CT head performed which showed Hyperdense right MCA suggesting right MCA thrombus. 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 Patient was transferred to [facility] for neurosurgical eval. He was taken for thrombectomy this morning (15th) with complication of retained catheter.     PAST HISTORY:     Past Medical History:   Diagnosis Date    CHF (congestive heart failure) (HCC)     Chronic kidney disease     kidney stones    Colon cancer (HCC)     Guillain Barré syndrome     Hyperlipidemia     Hypertension     Left ureteral stone     Right Ureteral mass        Past Surgical History:   Procedure Laterality Date    ADRENALECTOMY      groin    BACK SURGERY      CARPAL TUNNEL RELEASE      bilateral    COLONOSCOPY      CYSTOSCOPY Bilateral 03/27/2025    CYSTOSCOPY, BILATERAL STENT PLACEMENT,BILATERAL RETROGRADE PYELOGRAM performed by Eduardo Mendoza MD at [facility] OR    CYSTOSCOPY Bilateral 4/25/2025    CYSTOSCOPY RIGHT DIAGNOSTIC URETEROSCOPY RIGHT URETERAL MASS, EXCISION WITH HOLMIUM LASER, LEFT URETEROSCOPY HOLMIUM LASER STONE MANIPULATION WITH CVAC, BILATERAL STENT EXCHANGE performed by Eduardo Mendoza MD at [facility] OR

## 2025-05-16 ENCOUNTER — APPOINTMENT (OUTPATIENT)
Dept: CT IMAGING | Age: 85
DRG: 023 | End: 2025-05-16
Payer: MEDICARE

## 2025-05-16 LAB
ANION GAP SERPL CALCULATED.3IONS-SCNC: 12 MMOL/L (ref 3–16)
ANTI-XA UNFRAC HEPARIN: 0.12 IU/ML (ref 0.3–0.7)
ANTI-XA UNFRAC HEPARIN: 0.44 IU/ML (ref 0.3–0.7)
ANTI-XA UNFRAC HEPARIN: 0.58 IU/ML (ref 0.3–0.7)
BUN SERPL-MCNC: 12 MG/DL (ref 7–20)
CALCIUM SERPL-MCNC: 8.7 MG/DL (ref 8.3–10.6)
CHLORIDE SERPL-SCNC: 105 MMOL/L (ref 99–110)
CHOLEST SERPL-MCNC: 113 MG/DL (ref 0–199)
CO2 SERPL-SCNC: 20 MMOL/L (ref 21–32)
CREAT SERPL-MCNC: 1.1 MG/DL (ref 0.8–1.3)
DEPRECATED RDW RBC AUTO: 15.3 % (ref 12.4–15.4)
ECHO BSA: 2 M2
ECHO BSA: 2 M2
ECHO LA AREA 2C: 30.4 CM2
ECHO LA AREA 4C: 23.1 CM2
ECHO LA MAJOR AXIS: 6.4 CM
ECHO LA MINOR AXIS: 6.5 CM
ECHO LA VOL BP: 90 ML (ref 18–58)
ECHO LA VOL MOD A2C: 114 ML (ref 18–58)
ECHO LA VOL MOD A4C: 70 ML (ref 18–58)
ECHO LA VOL/BSA BIPLANE: 46 ML/M2 (ref 16–34)
ECHO LA VOLUME INDEX MOD A2C: 58 ML/M2 (ref 16–34)
ECHO LA VOLUME INDEX MOD A4C: 36 ML/M2 (ref 16–34)
ECHO LV EDV A2C: 154 ML
ECHO LV EDV A4C: 191 ML
ECHO LV EDV INDEX A4C: 98 ML/M2
ECHO LV EDV NDEX A2C: 79 ML/M2
ECHO LV EF PHYSICIAN: 35 %
ECHO LV EJECTION FRACTION A2C: 34 %
ECHO LV EJECTION FRACTION A4C: 40 %
ECHO LV EJECTION FRACTION BIPLANE: 37 % (ref 55–100)
ECHO LV ESV A2C: 102 ML
ECHO LV ESV A4C: 114 ML
ECHO LV ESV INDEX A2C: 52 ML/M2
ECHO LV ESV INDEX A4C: 58 ML/M2
ECHO LV FRACTIONAL SHORTENING: 14 % (ref 28–44)
ECHO LV INTERNAL DIMENSION DIASTOLE INDEX: 2.56 CM/M2
ECHO LV INTERNAL DIMENSION DIASTOLIC: 5 CM (ref 4.2–5.9)
ECHO LV INTERNAL DIMENSION SYSTOLIC INDEX: 2.21 CM/M2
ECHO LV INTERNAL DIMENSION SYSTOLIC: 4.3 CM
ECHO LV IVSD: 1.3 CM (ref 0.6–1)
ECHO LV MASS 2D: 276.4 G (ref 88–224)
ECHO LV MASS INDEX 2D: 141.8 G/M2 (ref 49–115)
ECHO LV POSTERIOR WALL DIASTOLIC: 1.4 CM (ref 0.6–1)
ECHO LV RELATIVE WALL THICKNESS RATIO: 0.56
ECHO RA AREA 4C: 25.9 CM2
ECHO RA END SYSTOLIC VOLUME APICAL 4 CHAMBER INDEX BSA: 39 ML/M2
ECHO RA VOLUME: 77 ML
ECHO RV BASAL DIMENSION: 4.4 CM
ECHO RV LONGITUDINAL DIMENSION: 8.6 CM
ECHO RV MID DIMENSION: 4.1 CM
ECHO TV REGURGITANT MAX VELOCITY: 2.06 M/S
ECHO TV REGURGITANT PEAK GRADIENT: 17 MMHG
GFR SERPLBLD CREATININE-BSD FMLA CKD-EPI: 66 ML/MIN/{1.73_M2}
GLUCOSE BLD-MCNC: 106 MG/DL (ref 70–99)
GLUCOSE SERPL-MCNC: 107 MG/DL (ref 70–99)
HCT VFR BLD AUTO: 36.9 % (ref 40.5–52.5)
HDLC SERPL-MCNC: 29 MG/DL (ref 40–60)
HGB BLD-MCNC: 12.3 G/DL (ref 13.5–17.5)
LDLC SERPL CALC-MCNC: 66 MG/DL
MCH RBC QN AUTO: 29.1 PG (ref 26–34)
MCHC RBC AUTO-ENTMCNC: 33.4 G/DL (ref 31–36)
MCV RBC AUTO: 87.2 FL (ref 80–100)
PERFORMED ON: ABNORMAL
PHOSPHATE SERPL-MCNC: 2.6 MG/DL (ref 2.5–4.9)
PLATELET # BLD AUTO: 350 K/UL (ref 135–450)
PMV BLD AUTO: 8.7 FL (ref 5–10.5)
POTASSIUM SERPL-SCNC: 3.8 MMOL/L (ref 3.5–5.1)
RBC # BLD AUTO: 4.24 M/UL (ref 4.2–5.9)
SODIUM SERPL-SCNC: 137 MMOL/L (ref 136–145)
TRIGL SERPL-MCNC: 91 MG/DL (ref 0–150)
TSH SERPL DL<=0.005 MIU/L-ACNC: 0.88 UIU/ML (ref 0.27–4.2)
VLDLC SERPL CALC-MCNC: 18 MG/DL
WBC # BLD AUTO: 8.6 K/UL (ref 4–11)

## 2025-05-16 PROCEDURE — C1887 CATHETER, GUIDING: HCPCS | Performed by: NEUROLOGICAL SURGERY

## 2025-05-16 PROCEDURE — 84443 ASSAY THYROID STIM HORMONE: CPT

## 2025-05-16 PROCEDURE — 2580000003 HC RX 258

## 2025-05-16 PROCEDURE — 99233 SBSQ HOSP IP/OBS HIGH 50: CPT | Performed by: INTERNAL MEDICINE

## 2025-05-16 PROCEDURE — 3E033XZ INTRODUCTION OF VASOPRESSOR INTO PERIPHERAL VEIN, PERCUTANEOUS APPROACH: ICD-10-PCS | Performed by: NEUROLOGICAL SURGERY

## 2025-05-16 PROCEDURE — 6360000002 HC RX W HCPCS

## 2025-05-16 PROCEDURE — 6360000002 HC RX W HCPCS: Performed by: NEUROLOGICAL SURGERY

## 2025-05-16 PROCEDURE — 36226 PLACE CATH VERTEBRAL ART: CPT | Performed by: NEUROLOGICAL SURGERY

## 2025-05-16 PROCEDURE — 75894 X-RAYS TRANSCATH THERAPY: CPT | Performed by: NEUROLOGICAL SURGERY

## 2025-05-16 PROCEDURE — 93321 DOPPLER ECHO F-UP/LMTD STD: CPT | Performed by: INTERNAL MEDICINE

## 2025-05-16 PROCEDURE — C1760 CLOSURE DEV, VASC: HCPCS | Performed by: NEUROLOGICAL SURGERY

## 2025-05-16 PROCEDURE — 2000000000 HC ICU R&B

## 2025-05-16 PROCEDURE — 80048 BASIC METABOLIC PNL TOTAL CA: CPT

## 2025-05-16 PROCEDURE — C1894 INTRO/SHEATH, NON-LASER: HCPCS | Performed by: NEUROLOGICAL SURGERY

## 2025-05-16 PROCEDURE — 36224 PLACE CATH CAROTD ART: CPT | Performed by: NEUROLOGICAL SURGERY

## 2025-05-16 PROCEDURE — 6370000000 HC RX 637 (ALT 250 FOR IP): Performed by: NURSE PRACTITIONER

## 2025-05-16 PROCEDURE — 93325 DOPPLER ECHO COLOR FLOW MAPG: CPT | Performed by: INTERNAL MEDICINE

## 2025-05-16 PROCEDURE — 75898 FOLLOW-UP ANGIOGRAPHY: CPT | Performed by: NEUROLOGICAL SURGERY

## 2025-05-16 PROCEDURE — 2709999900 HC NON-CHARGEABLE SUPPLY: Performed by: NEUROLOGICAL SURGERY

## 2025-05-16 PROCEDURE — 03LK3DZ OCCLUSION OF RIGHT INTERNAL CAROTID ARTERY WITH INTRALUMINAL DEVICE, PERCUTANEOUS APPROACH: ICD-10-PCS | Performed by: NEUROLOGICAL SURGERY

## 2025-05-16 PROCEDURE — 83036 HEMOGLOBIN GLYCOSYLATED A1C: CPT

## 2025-05-16 PROCEDURE — 61624 TCAT PERM OCCLS/EMBOLJ CNS: CPT | Performed by: NEUROLOGICAL SURGERY

## 2025-05-16 PROCEDURE — 99232 SBSQ HOSP IP/OBS MODERATE 35: CPT | Performed by: NURSE PRACTITIONER

## 2025-05-16 PROCEDURE — 36415 COLL VENOUS BLD VENIPUNCTURE: CPT

## 2025-05-16 PROCEDURE — 80061 LIPID PANEL: CPT

## 2025-05-16 PROCEDURE — 2720000010 HC SURG SUPPLY STERILE: Performed by: NEUROLOGICAL SURGERY

## 2025-05-16 PROCEDURE — 2500000003 HC RX 250 WO HCPCS

## 2025-05-16 PROCEDURE — 99222 1ST HOSP IP/OBS MODERATE 55: CPT | Performed by: INTERNAL MEDICINE

## 2025-05-16 PROCEDURE — 85027 COMPLETE CBC AUTOMATED: CPT

## 2025-05-16 PROCEDURE — 99153 MOD SED SAME PHYS/QHP EA: CPT | Performed by: NEUROLOGICAL SURGERY

## 2025-05-16 PROCEDURE — C1889 IMPLANT/INSERT DEVICE, NOC: HCPCS | Performed by: NEUROLOGICAL SURGERY

## 2025-05-16 PROCEDURE — C1769 GUIDE WIRE: HCPCS | Performed by: NEUROLOGICAL SURGERY

## 2025-05-16 PROCEDURE — C1876 STENT, NON-COA/NON-COV W/DEL: HCPCS | Performed by: NEUROLOGICAL SURGERY

## 2025-05-16 PROCEDURE — 84100 ASSAY OF PHOSPHORUS: CPT

## 2025-05-16 PROCEDURE — 2580000003 HC RX 258: Performed by: NEUROLOGICAL SURGERY

## 2025-05-16 PROCEDURE — 99152 MOD SED SAME PHYS/QHP 5/>YRS: CPT | Performed by: NEUROLOGICAL SURGERY

## 2025-05-16 PROCEDURE — 70450 CT HEAD/BRAIN W/O DYE: CPT

## 2025-05-16 PROCEDURE — 93308 TTE F-UP OR LMTD: CPT | Performed by: INTERNAL MEDICINE

## 2025-05-16 PROCEDURE — 85520 HEPARIN ASSAY: CPT

## 2025-05-16 PROCEDURE — B31R1ZZ FLUOROSCOPY OF INTRACRANIAL ARTERIES USING LOW OSMOLAR CONTRAST: ICD-10-PCS | Performed by: NEUROLOGICAL SURGERY

## 2025-05-16 DEVICE — THE OPTIMA COIL SYSTEM IS INDICATED FOR ENDOVASCULAR EMBOLIZATION OF INTRACRANIAL ANEURYSMS AND OTHER NEUROVASCULAR ABNORMALITIES SUCH AS ARTERIOVENOUS MALFORMATIONS (AVMS) AND ARTERIOVENOUS FISTULAE. THE OPTIMA COIL SYSTEM IS ALSO INDICATED FOR VASCULAR OCCLUSION OF BLOOD VESSELS WITHIN THE NEUROVASCULAR SYSTEM TO PERMANENTLY OBSTRUCT BLOOD FLOW TO AN ANEURYSM OR OTHER VASCULAR MALFORMATION AND FOR ARTERIAL AND VENOUS EMBOLIZATION IN THE PERIPHERAL VASCULATURE. THE OPTIMA COIL SYSTEM COMPRISES OF IMPLANT COIL AND A PUSHER ASSEMBLY. THE OPTIMA COIL SYSTEM IS DESIGNED FR USE WITH THE XCEL DETACHMENT CONTROLLER. THE OPTIMA COIL IS DETACHED THERMALLY WITH THE XCEL DETACHMENT CONTROLLER.
Type: IMPLANTABLE DEVICE | Status: FUNCTIONAL
Brand: OPTIMA COIL SYSTEM

## 2025-05-16 DEVICE — ANGIO-SEAL VIP VASCULAR CLOSURE DEVICE
Type: IMPLANTABLE DEVICE | Status: FUNCTIONAL
Brand: ANGIO-SEAL

## 2025-05-16 RX ORDER — CLOPIDOGREL BISULFATE 75 MG/1
300 TABLET ORAL ONCE
Status: COMPLETED | OUTPATIENT
Start: 2025-05-16 | End: 2025-05-16

## 2025-05-16 RX ORDER — ACETAMINOPHEN 325 MG/1
650 TABLET ORAL EVERY 4 HOURS PRN
Status: DISCONTINUED | OUTPATIENT
Start: 2025-05-16 | End: 2025-05-17

## 2025-05-16 RX ORDER — MIDAZOLAM 1 MG/ML
INJECTION INTRAMUSCULAR; INTRAVENOUS PRN
Status: DISCONTINUED | OUTPATIENT
Start: 2025-05-16 | End: 2025-05-16 | Stop reason: HOSPADM

## 2025-05-16 RX ORDER — ASPIRIN 300 MG/1
300 SUPPOSITORY RECTAL ONCE
Status: COMPLETED | OUTPATIENT
Start: 2025-05-16 | End: 2025-05-16

## 2025-05-16 RX ORDER — MIDODRINE HYDROCHLORIDE 5 MG/1
10 TABLET ORAL
Status: DISCONTINUED | OUTPATIENT
Start: 2025-05-16 | End: 2025-05-18

## 2025-05-16 RX ORDER — HYDROCODONE BITARTRATE AND ACETAMINOPHEN 5; 325 MG/1; MG/1
1 TABLET ORAL EVERY 4 HOURS PRN
Status: DISCONTINUED | OUTPATIENT
Start: 2025-05-16 | End: 2025-05-17

## 2025-05-16 RX ORDER — ASPIRIN 325 MG
325 TABLET ORAL ONCE
Status: COMPLETED | OUTPATIENT
Start: 2025-05-16 | End: 2025-05-16

## 2025-05-16 RX ORDER — LIDOCAINE HYDROCHLORIDE 10 MG/ML
INJECTION, SOLUTION EPIDURAL; INFILTRATION; INTRACAUDAL; PERINEURAL PRN
Status: DISCONTINUED | OUTPATIENT
Start: 2025-05-16 | End: 2025-05-16 | Stop reason: HOSPADM

## 2025-05-16 RX ORDER — HYDROCODONE BITARTRATE AND ACETAMINOPHEN 5; 325 MG/1; MG/1
2 TABLET ORAL EVERY 4 HOURS PRN
Status: DISCONTINUED | OUTPATIENT
Start: 2025-05-16 | End: 2025-05-17

## 2025-05-16 RX ORDER — ATORVASTATIN CALCIUM 40 MG/1
40 TABLET, FILM COATED ORAL NIGHTLY
Status: DISCONTINUED | OUTPATIENT
Start: 2025-05-16 | End: 2025-06-06 | Stop reason: HOSPADM

## 2025-05-16 RX ORDER — SODIUM CHLORIDE 9 MG/ML
INJECTION, SOLUTION INTRAVENOUS CONTINUOUS
Status: DISCONTINUED | OUTPATIENT
Start: 2025-05-16 | End: 2025-05-17

## 2025-05-16 RX ORDER — IOPAMIDOL 510 MG/ML
INJECTION, SOLUTION INTRAVASCULAR PRN
Status: DISCONTINUED | OUTPATIENT
Start: 2025-05-16 | End: 2025-05-16 | Stop reason: HOSPADM

## 2025-05-16 RX ADMIN — ASPIRIN 325 MG: 325 TABLET ORAL at 10:22

## 2025-05-16 RX ADMIN — SODIUM CHLORIDE: 0.9 INJECTION, SOLUTION INTRAVENOUS at 16:05

## 2025-05-16 RX ADMIN — HEPARIN SODIUM 16 UNITS/KG/HR: 10000 INJECTION, SOLUTION INTRAVENOUS at 21:31

## 2025-05-16 RX ADMIN — PANTOPRAZOLE SODIUM 40 MG: 40 INJECTION, POWDER, FOR SOLUTION INTRAVENOUS at 10:55

## 2025-05-16 RX ADMIN — ATORVASTATIN CALCIUM 40 MG: 40 TABLET, FILM COATED ORAL at 21:08

## 2025-05-16 RX ADMIN — SODIUM CHLORIDE 5 MCG/MIN: 0.9 INJECTION, SOLUTION INTRAVENOUS at 01:11

## 2025-05-16 RX ADMIN — POTASSIUM PHOSPHATE, MONOBASIC AND POTASSIUM PHOSPHATE, DIBASIC 10 MMOL: 224; 236 INJECTION, SOLUTION, CONCENTRATE INTRAVENOUS at 10:57

## 2025-05-16 RX ADMIN — HEPARIN SODIUM 16 UNITS/KG/HR: 10000 INJECTION, SOLUTION INTRAVENOUS at 03:31

## 2025-05-16 RX ADMIN — CLOPIDOGREL BISULFATE 300 MG: 75 TABLET, FILM COATED ORAL at 10:22

## 2025-05-16 ASSESSMENT — PAIN SCALES - GENERAL
PAINLEVEL_OUTOF10: 0
PAINLEVEL_OUTOF10: 0

## 2025-05-16 NOTE — CARE COORDINATION
9:01 AM  Possible angio today per CC MD note    Therapy recs ARU.   Patient is from home, normally IPTA.   ARU made aware of consult.   CM will continue to follow.     Electronically signed by Samuel Rob RN, CM on 5/16/2025 at 9:05 AM.  Phone: [phone]  Fax: [phone]

## 2025-05-17 ENCOUNTER — APPOINTMENT (OUTPATIENT)
Dept: GENERAL RADIOLOGY | Age: 85
DRG: 023 | End: 2025-05-17
Payer: MEDICARE

## 2025-05-17 ENCOUNTER — APPOINTMENT (OUTPATIENT)
Dept: CT IMAGING | Age: 85
DRG: 023 | End: 2025-05-17
Payer: MEDICARE

## 2025-05-17 PROBLEM — I63.511 ACUTE RIGHT MCA STROKE (HCC): Status: ACTIVE | Noted: 2025-05-15

## 2025-05-17 LAB
ALBUMIN SERPL-MCNC: 3.1 G/DL (ref 3.4–5)
ANION GAP SERPL CALCULATED.3IONS-SCNC: 12 MMOL/L (ref 3–16)
ANTI-XA UNFRAC HEPARIN: 0.24 IU/ML (ref 0.3–0.7)
ANTI-XA UNFRAC HEPARIN: 0.61 IU/ML (ref 0.3–0.7)
BUN SERPL-MCNC: 11 MG/DL (ref 7–20)
CALCIUM SERPL-MCNC: 8.4 MG/DL (ref 8.3–10.6)
CHLORIDE SERPL-SCNC: 108 MMOL/L (ref 99–110)
CO2 SERPL-SCNC: 21 MMOL/L (ref 21–32)
CREAT SERPL-MCNC: 1.1 MG/DL (ref 0.8–1.3)
DEPRECATED RDW RBC AUTO: 15.1 % (ref 12.4–15.4)
GFR SERPLBLD CREATININE-BSD FMLA CKD-EPI: 66 ML/MIN/{1.73_M2}
GLUCOSE SERPL-MCNC: 114 MG/DL (ref 70–99)
HCT VFR BLD AUTO: 31.3 % (ref 40.5–52.5)
HCT VFR BLD AUTO: 32.2 % (ref 40.5–52.5)
HGB BLD-MCNC: 10.4 G/DL (ref 13.5–17.5)
HGB BLD-MCNC: 11 G/DL (ref 13.5–17.5)
MCH RBC QN AUTO: 29.5 PG (ref 26–34)
MCHC RBC AUTO-ENTMCNC: 34.1 G/DL (ref 31–36)
MCV RBC AUTO: 86.4 FL (ref 80–100)
PHOSPHATE SERPL-MCNC: 2.9 MG/DL (ref 2.5–4.9)
PLATELET # BLD AUTO: 289 K/UL (ref 135–450)
PMV BLD AUTO: 8.5 FL (ref 5–10.5)
POTASSIUM SERPL-SCNC: 3.7 MMOL/L (ref 3.5–5.1)
RBC # BLD AUTO: 3.73 M/UL (ref 4.2–5.9)
SODIUM SERPL-SCNC: 141 MMOL/L (ref 136–145)
WBC # BLD AUTO: 10.1 K/UL (ref 4–11)

## 2025-05-17 PROCEDURE — 2580000003 HC RX 258: Performed by: NURSE PRACTITIONER

## 2025-05-17 PROCEDURE — 6370000000 HC RX 637 (ALT 250 FOR IP): Performed by: NURSE PRACTITIONER

## 2025-05-17 PROCEDURE — 85018 HEMOGLOBIN: CPT

## 2025-05-17 PROCEDURE — 92611 MOTION FLUOROSCOPY/SWALLOW: CPT

## 2025-05-17 PROCEDURE — 6370000000 HC RX 637 (ALT 250 FOR IP): Performed by: NEUROLOGICAL SURGERY

## 2025-05-17 PROCEDURE — 92526 ORAL FUNCTION THERAPY: CPT

## 2025-05-17 PROCEDURE — 2500000003 HC RX 250 WO HCPCS: Performed by: NURSE PRACTITIONER

## 2025-05-17 PROCEDURE — 99233 SBSQ HOSP IP/OBS HIGH 50: CPT | Performed by: INTERNAL MEDICINE

## 2025-05-17 PROCEDURE — 2580000003 HC RX 258: Performed by: NEUROLOGICAL SURGERY

## 2025-05-17 PROCEDURE — 80069 RENAL FUNCTION PANEL: CPT

## 2025-05-17 PROCEDURE — 85027 COMPLETE CBC AUTOMATED: CPT

## 2025-05-17 PROCEDURE — 6360000002 HC RX W HCPCS

## 2025-05-17 PROCEDURE — 85014 HEMATOCRIT: CPT

## 2025-05-17 PROCEDURE — 6370000000 HC RX 637 (ALT 250 FOR IP): Performed by: INTERNAL MEDICINE

## 2025-05-17 PROCEDURE — 2000000000 HC ICU R&B

## 2025-05-17 PROCEDURE — 2580000003 HC RX 258

## 2025-05-17 PROCEDURE — 85520 HEPARIN ASSAY: CPT

## 2025-05-17 PROCEDURE — 99233 SBSQ HOSP IP/OBS HIGH 50: CPT | Performed by: STUDENT IN AN ORGANIZED HEALTH CARE EDUCATION/TRAINING PROGRAM

## 2025-05-17 PROCEDURE — 70450 CT HEAD/BRAIN W/O DYE: CPT

## 2025-05-17 PROCEDURE — 36415 COLL VENOUS BLD VENIPUNCTURE: CPT

## 2025-05-17 PROCEDURE — 6370000000 HC RX 637 (ALT 250 FOR IP): Performed by: SURGERY

## 2025-05-17 PROCEDURE — 74230 X-RAY XM SWLNG FUNCJ C+: CPT

## 2025-05-17 RX ORDER — LOPERAMIDE HYDROCHLORIDE 2 MG/1
2 CAPSULE ORAL 4 TIMES DAILY PRN
Status: DISCONTINUED | OUTPATIENT
Start: 2025-05-17 | End: 2025-06-06 | Stop reason: HOSPADM

## 2025-05-17 RX ORDER — ASPIRIN 300 MG/1
300 SUPPOSITORY RECTAL DAILY
Status: DISCONTINUED | OUTPATIENT
Start: 2025-05-17 | End: 2025-05-20

## 2025-05-17 RX ORDER — CASTOR OIL AND BALSAM, PERU 788; 87 MG/G; MG/G
OINTMENT TOPICAL 2 TIMES DAILY
Status: DISCONTINUED | OUTPATIENT
Start: 2025-05-17 | End: 2025-06-06 | Stop reason: HOSPADM

## 2025-05-17 RX ORDER — ASPIRIN 325 MG
325 TABLET ORAL DAILY
Status: DISCONTINUED | OUTPATIENT
Start: 2025-05-17 | End: 2025-05-20

## 2025-05-17 RX ORDER — CLOPIDOGREL BISULFATE 75 MG/1
75 TABLET ORAL DAILY
Status: DISCONTINUED | OUTPATIENT
Start: 2025-05-17 | End: 2025-05-19

## 2025-05-17 RX ADMIN — ASPIRIN 325 MG ORAL TABLET 325 MG: 325 PILL ORAL at 08:18

## 2025-05-17 RX ADMIN — PANTOPRAZOLE SODIUM 40 MG: 40 INJECTION, POWDER, FOR SOLUTION INTRAVENOUS at 08:18

## 2025-05-17 RX ADMIN — CLOPIDOGREL BISULFATE 75 MG: 75 TABLET, FILM COATED ORAL at 08:18

## 2025-05-17 RX ADMIN — ATORVASTATIN CALCIUM 40 MG: 40 TABLET, FILM COATED ORAL at 19:50

## 2025-05-17 RX ADMIN — Medication: at 12:00

## 2025-05-17 RX ADMIN — MIDODRINE HYDROCHLORIDE 10 MG: 5 TABLET ORAL at 08:18

## 2025-05-17 RX ADMIN — Medication: at 19:50

## 2025-05-17 RX ADMIN — SODIUM CHLORIDE 8 MCG/MIN: 0.9 INJECTION, SOLUTION INTRAVENOUS at 19:13

## 2025-05-17 RX ADMIN — MIDODRINE HYDROCHLORIDE 10 MG: 5 TABLET ORAL at 17:54

## 2025-05-17 RX ADMIN — SODIUM CHLORIDE: 0.9 INJECTION, SOLUTION INTRAVENOUS at 00:21

## 2025-05-17 ASSESSMENT — PAIN SCALES - GENERAL
PAINLEVEL_OUTOF10: 0

## 2025-05-18 ENCOUNTER — APPOINTMENT (OUTPATIENT)
Dept: MRI IMAGING | Age: 85
DRG: 023 | End: 2025-05-18
Payer: MEDICARE

## 2025-05-18 LAB
ALBUMIN SERPL-MCNC: 3.3 G/DL (ref 3.4–5)
ANION GAP SERPL CALCULATED.3IONS-SCNC: 11 MMOL/L (ref 3–16)
BUN SERPL-MCNC: 12 MG/DL (ref 7–20)
CALCIUM SERPL-MCNC: 9 MG/DL (ref 8.3–10.6)
CHLORIDE SERPL-SCNC: 109 MMOL/L (ref 99–110)
CO2 SERPL-SCNC: 22 MMOL/L (ref 21–32)
CREAT SERPL-MCNC: 1 MG/DL (ref 0.8–1.3)
GFR SERPLBLD CREATININE-BSD FMLA CKD-EPI: 73 ML/MIN/{1.73_M2}
GLUCOSE SERPL-MCNC: 108 MG/DL (ref 70–99)
HCT VFR BLD AUTO: 35.1 % (ref 40.5–52.5)
HGB BLD-MCNC: 11.6 G/DL (ref 13.5–17.5)
PHOSPHATE SERPL-MCNC: 2.9 MG/DL (ref 2.5–4.9)
POTASSIUM SERPL-SCNC: 3.8 MMOL/L (ref 3.5–5.1)
SODIUM SERPL-SCNC: 142 MMOL/L (ref 136–145)

## 2025-05-18 PROCEDURE — 92526 ORAL FUNCTION THERAPY: CPT

## 2025-05-18 PROCEDURE — 93005 ELECTROCARDIOGRAM TRACING: CPT

## 2025-05-18 PROCEDURE — 6370000000 HC RX 637 (ALT 250 FOR IP): Performed by: INTERNAL MEDICINE

## 2025-05-18 PROCEDURE — 6370000000 HC RX 637 (ALT 250 FOR IP): Performed by: NEUROLOGICAL SURGERY

## 2025-05-18 PROCEDURE — 85018 HEMOGLOBIN: CPT

## 2025-05-18 PROCEDURE — 2000000000 HC ICU R&B

## 2025-05-18 PROCEDURE — 80069 RENAL FUNCTION PANEL: CPT

## 2025-05-18 PROCEDURE — 97530 THERAPEUTIC ACTIVITIES: CPT

## 2025-05-18 PROCEDURE — 70551 MRI BRAIN STEM W/O DYE: CPT

## 2025-05-18 PROCEDURE — 6370000000 HC RX 637 (ALT 250 FOR IP)

## 2025-05-18 PROCEDURE — 6360000002 HC RX W HCPCS

## 2025-05-18 PROCEDURE — 99233 SBSQ HOSP IP/OBS HIGH 50: CPT | Performed by: INTERNAL MEDICINE

## 2025-05-18 PROCEDURE — 36415 COLL VENOUS BLD VENIPUNCTURE: CPT

## 2025-05-18 PROCEDURE — 85014 HEMATOCRIT: CPT

## 2025-05-18 PROCEDURE — 6370000000 HC RX 637 (ALT 250 FOR IP): Performed by: NURSE PRACTITIONER

## 2025-05-18 PROCEDURE — 2580000003 HC RX 258

## 2025-05-18 PROCEDURE — 99231 SBSQ HOSP IP/OBS SF/LOW 25: CPT | Performed by: NURSE PRACTITIONER

## 2025-05-18 PROCEDURE — 97535 SELF CARE MNGMENT TRAINING: CPT

## 2025-05-18 RX ORDER — LIDOCAINE HYDROCHLORIDE 10 MG/ML
50 INJECTION, SOLUTION EPIDURAL; INFILTRATION; INTRACAUDAL; PERINEURAL ONCE
Status: CANCELLED | OUTPATIENT
Start: 2025-05-18 | End: 2025-05-18

## 2025-05-18 RX ORDER — SODIUM CHLORIDE 9 MG/ML
INJECTION, SOLUTION INTRAVENOUS PRN
Status: CANCELLED | OUTPATIENT
Start: 2025-05-18

## 2025-05-18 RX ORDER — SODIUM CHLORIDE 0.9 % (FLUSH) 0.9 %
5-40 SYRINGE (ML) INJECTION EVERY 12 HOURS SCHEDULED
Status: CANCELLED | OUTPATIENT
Start: 2025-05-18

## 2025-05-18 RX ORDER — HEPARIN SODIUM 5000 [USP'U]/ML
5000 INJECTION, SOLUTION INTRAVENOUS; SUBCUTANEOUS EVERY 8 HOURS SCHEDULED
Status: DISCONTINUED | OUTPATIENT
Start: 2025-05-18 | End: 2025-05-20 | Stop reason: ALTCHOICE

## 2025-05-18 RX ORDER — MIDODRINE HYDROCHLORIDE 5 MG/1
15 TABLET ORAL
Status: DISCONTINUED | OUTPATIENT
Start: 2025-05-18 | End: 2025-05-25

## 2025-05-18 RX ORDER — SODIUM CHLORIDE 0.9 % (FLUSH) 0.9 %
5-40 SYRINGE (ML) INJECTION PRN
Status: CANCELLED | OUTPATIENT
Start: 2025-05-18

## 2025-05-18 RX ADMIN — MIDODRINE HYDROCHLORIDE 15 MG: 5 TABLET ORAL at 11:50

## 2025-05-18 RX ADMIN — CLOPIDOGREL BISULFATE 75 MG: 75 TABLET, FILM COATED ORAL at 08:57

## 2025-05-18 RX ADMIN — MIDODRINE HYDROCHLORIDE 15 MG: 5 TABLET ORAL at 17:28

## 2025-05-18 RX ADMIN — LOPERAMIDE HYDROCHLORIDE 2 MG: 2 CAPSULE ORAL at 17:28

## 2025-05-18 RX ADMIN — PANTOPRAZOLE SODIUM 40 MG: 40 INJECTION, POWDER, FOR SOLUTION INTRAVENOUS at 08:57

## 2025-05-18 RX ADMIN — ASPIRIN 325 MG ORAL TABLET 325 MG: 325 PILL ORAL at 08:58

## 2025-05-18 RX ADMIN — Medication: at 09:00

## 2025-05-18 RX ADMIN — MIDODRINE HYDROCHLORIDE 10 MG: 5 TABLET ORAL at 08:58

## 2025-05-18 RX ADMIN — HEPARIN SODIUM 5000 UNITS: 5000 INJECTION INTRAVENOUS; SUBCUTANEOUS at 21:20

## 2025-05-18 RX ADMIN — Medication: at 19:56

## 2025-05-18 RX ADMIN — ATORVASTATIN CALCIUM 40 MG: 40 TABLET, FILM COATED ORAL at 19:56

## 2025-05-18 RX ADMIN — HEPARIN SODIUM 5000 UNITS: 5000 INJECTION INTRAVENOUS; SUBCUTANEOUS at 14:28

## 2025-05-18 ASSESSMENT — PAIN SCALES - GENERAL
PAINLEVEL_OUTOF10: 0
PAINLEVEL_OUTOF10: 0

## 2025-05-19 ENCOUNTER — APPOINTMENT (OUTPATIENT)
Dept: GENERAL RADIOLOGY | Age: 85
DRG: 023 | End: 2025-05-19
Payer: MEDICARE

## 2025-05-19 LAB
ALBUMIN SERPL-MCNC: 3 G/DL (ref 3.4–5)
ANION GAP SERPL CALCULATED.3IONS-SCNC: 10 MMOL/L (ref 3–16)
BUN SERPL-MCNC: 12 MG/DL (ref 7–20)
CALCIUM SERPL-MCNC: 8.8 MG/DL (ref 8.3–10.6)
CHLORIDE SERPL-SCNC: 108 MMOL/L (ref 99–110)
CO2 SERPL-SCNC: 23 MMOL/L (ref 21–32)
CREAT SERPL-MCNC: 1.1 MG/DL (ref 0.8–1.3)
GFR SERPLBLD CREATININE-BSD FMLA CKD-EPI: 66 ML/MIN/{1.73_M2}
GLUCOSE SERPL-MCNC: 107 MG/DL (ref 70–99)
HCT VFR BLD AUTO: 30.3 % (ref 40.5–52.5)
HGB BLD-MCNC: 10.1 G/DL (ref 13.5–17.5)
PHOSPHATE SERPL-MCNC: 2.7 MG/DL (ref 2.5–4.9)
POTASSIUM SERPL-SCNC: 3.9 MMOL/L (ref 3.5–5.1)
SODIUM SERPL-SCNC: 141 MMOL/L (ref 136–145)

## 2025-05-19 PROCEDURE — 80069 RENAL FUNCTION PANEL: CPT

## 2025-05-19 PROCEDURE — 6370000000 HC RX 637 (ALT 250 FOR IP): Performed by: NURSE PRACTITIONER

## 2025-05-19 PROCEDURE — 2580000003 HC RX 258

## 2025-05-19 PROCEDURE — 99291 CRITICAL CARE FIRST HOUR: CPT | Performed by: INTERNAL MEDICINE

## 2025-05-19 PROCEDURE — 97530 THERAPEUTIC ACTIVITIES: CPT

## 2025-05-19 PROCEDURE — 6360000002 HC RX W HCPCS

## 2025-05-19 PROCEDURE — 2000000000 HC ICU R&B

## 2025-05-19 PROCEDURE — 85014 HEMATOCRIT: CPT

## 2025-05-19 PROCEDURE — 85018 HEMOGLOBIN: CPT

## 2025-05-19 PROCEDURE — 99232 SBSQ HOSP IP/OBS MODERATE 35: CPT | Performed by: STUDENT IN AN ORGANIZED HEALTH CARE EDUCATION/TRAINING PROGRAM

## 2025-05-19 PROCEDURE — 74018 RADEX ABDOMEN 1 VIEW: CPT

## 2025-05-19 PROCEDURE — 6370000000 HC RX 637 (ALT 250 FOR IP): Performed by: NEUROLOGICAL SURGERY

## 2025-05-19 PROCEDURE — 92523 SPEECH SOUND LANG COMPREHEN: CPT

## 2025-05-19 PROCEDURE — 92526 ORAL FUNCTION THERAPY: CPT

## 2025-05-19 PROCEDURE — 97112 NEUROMUSCULAR REEDUCATION: CPT

## 2025-05-19 PROCEDURE — 6370000000 HC RX 637 (ALT 250 FOR IP)

## 2025-05-19 PROCEDURE — 36415 COLL VENOUS BLD VENIPUNCTURE: CPT

## 2025-05-19 PROCEDURE — 99233 SBSQ HOSP IP/OBS HIGH 50: CPT | Performed by: INTERNAL MEDICINE

## 2025-05-19 PROCEDURE — 97129 THER IVNTJ 1ST 15 MIN: CPT

## 2025-05-19 RX ORDER — 0.9 % SODIUM CHLORIDE 0.9 %
500 INTRAVENOUS SOLUTION INTRAVENOUS ONCE
Status: COMPLETED | OUTPATIENT
Start: 2025-05-19 | End: 2025-05-19

## 2025-05-19 RX ADMIN — Medication: at 21:28

## 2025-05-19 RX ADMIN — MIDODRINE HYDROCHLORIDE 15 MG: 5 TABLET ORAL at 12:11

## 2025-05-19 RX ADMIN — CLOPIDOGREL BISULFATE 75 MG: 75 TABLET, FILM COATED ORAL at 09:58

## 2025-05-19 RX ADMIN — PANTOPRAZOLE SODIUM 40 MG: 40 INJECTION, POWDER, FOR SOLUTION INTRAVENOUS at 07:36

## 2025-05-19 RX ADMIN — Medication: at 10:16

## 2025-05-19 RX ADMIN — ASPIRIN 325 MG ORAL TABLET 325 MG: 325 PILL ORAL at 09:58

## 2025-05-19 RX ADMIN — MIDODRINE HYDROCHLORIDE 15 MG: 5 TABLET ORAL at 09:00

## 2025-05-19 RX ADMIN — HEPARIN SODIUM 5000 UNITS: 5000 INJECTION INTRAVENOUS; SUBCUTANEOUS at 05:45

## 2025-05-19 RX ADMIN — HEPARIN SODIUM 5000 UNITS: 5000 INJECTION INTRAVENOUS; SUBCUTANEOUS at 14:18

## 2025-05-19 RX ADMIN — SODIUM CHLORIDE 500 ML: 0.9 INJECTION, SOLUTION INTRAVENOUS at 10:18

## 2025-05-19 RX ADMIN — HEPARIN SODIUM 5000 UNITS: 5000 INJECTION INTRAVENOUS; SUBCUTANEOUS at 21:22

## 2025-05-19 RX ADMIN — ATORVASTATIN CALCIUM 40 MG: 40 TABLET, FILM COATED ORAL at 21:22

## 2025-05-19 RX ADMIN — MIDODRINE HYDROCHLORIDE 15 MG: 5 TABLET ORAL at 16:29

## 2025-05-19 NOTE — CARE COORDINATION
Case Management Assessment           Daily Note                 Date/ Time of Note: 5/19/2025 9:07 AM         Note completed by: Samuel Rob RN    Patient Name: Jhony Hdez  YOB: 1940    Diagnosis:Stroke (HCC) [I63.9]  Acute CVA (cerebrovascular accident) (HCC) [I63.9]  Patient Admission Status: Inpatient  Date of Admission:5/15/2025  7:12 AM    Length of Stay: 4 GLOS: GMLOS: 3.9 Readmission Risk Score: Readmission Risk Score: 16.9    Current Plan of Care: continues on  [place], PO ac, [facility] ARU following  ________________________________________________________________________________________  PT AM-PAC: 10 / 24 per last evaluation on: 5/18    OT AM-PAC: 10 / 24 per last evaluation on: 5/18    _______________________________________________________________________________________  Discharge Plan: Inpatient Rehab: [facility] ARU  Pre-cert required for SNF: NO  COVID Result:    Lab Results   Component Value Date/Time    COVID19 NOT DETECTED 03/25/2025 05:11 PM       Transportation PLAN for discharge:  floor to floor    Tentative discharge date: tbd    Potential assistance Purchasing Medications: Potential Assistance Purchasing Medications: No  Does Patient want to participate in local refill/ meds to beds program?:      Current barriers to discharge: Medical complications    Referrals completed: Inpatient Rehab: [facility] ARU    Resources/ information provided:   ________________________________________________________________________________________  Case Management Notes: patient is from home with spouse. [facility]CC, neurology following. [facility] ARU following, accepted for post acute rehab. [facility] not completed as plan for post acute rehab, not planning on returning home at this time. CM following.     Jhony and his family were provided with choice of provider; he and his family are in agreement with the discharge plan.    Emergency Contacts:  Extended Emergency Contact Information  Primary Emergency Contact:

## 2025-05-20 ENCOUNTER — APPOINTMENT (OUTPATIENT)
Dept: GENERAL RADIOLOGY | Age: 85
DRG: 023 | End: 2025-05-20
Payer: MEDICARE

## 2025-05-20 LAB
ALBUMIN SERPL-MCNC: 3 G/DL (ref 3.4–5)
ANION GAP SERPL CALCULATED.3IONS-SCNC: 10 MMOL/L (ref 3–16)
ANTI-XA UNFRAC HEPARIN: 0.21 IU/ML (ref 0.3–0.7)
ANTI-XA UNFRAC HEPARIN: 0.21 IU/ML (ref 0.3–0.7)
ANTI-XA UNFRAC HEPARIN: <0.1 IU/ML (ref 0.3–0.7)
APTT BLD: 30.7 SEC (ref 22.1–36.4)
BASOPHILS # BLD: 0 K/UL (ref 0–0.2)
BASOPHILS NFR BLD: 0.4 %
BUN SERPL-MCNC: 14 MG/DL (ref 7–20)
CALCIUM SERPL-MCNC: 8.7 MG/DL (ref 8.3–10.6)
CHLORIDE SERPL-SCNC: 109 MMOL/L (ref 99–110)
CO2 SERPL-SCNC: 21 MMOL/L (ref 21–32)
CORTIS SERPL-MCNC: 16 UG/DL
CREAT SERPL-MCNC: 1.1 MG/DL (ref 0.8–1.3)
DEPRECATED RDW RBC AUTO: 15.3 % (ref 12.4–15.4)
EKG ATRIAL RATE: 50 BPM
EKG DIAGNOSIS: NORMAL
EKG Q-T INTERVAL: 530 MS
EKG QRS DURATION: 154 MS
EKG QTC CALCULATION (BAZETT): 478 MS
EKG R AXIS: 259 DEGREES
EKG T AXIS: -54 DEGREES
EKG VENTRICULAR RATE: 49 BPM
EOSINOPHIL # BLD: 0.1 K/UL (ref 0–0.6)
EOSINOPHIL NFR BLD: 1.3 %
GFR SERPLBLD CREATININE-BSD FMLA CKD-EPI: 66 ML/MIN/{1.73_M2}
GLUCOSE SERPL-MCNC: 100 MG/DL (ref 70–99)
HCT VFR BLD AUTO: 31.6 % (ref 40.5–52.5)
HGB BLD-MCNC: 10.4 G/DL (ref 13.5–17.5)
INR PPP: 1.16 (ref 0.85–1.15)
LYMPHOCYTES # BLD: 0.7 K/UL (ref 1–5.1)
LYMPHOCYTES NFR BLD: 8.8 %
MCH RBC QN AUTO: 28.9 PG (ref 26–34)
MCHC RBC AUTO-ENTMCNC: 32.9 G/DL (ref 31–36)
MCV RBC AUTO: 87.9 FL (ref 80–100)
MONOCYTES # BLD: 0.7 K/UL (ref 0–1.3)
MONOCYTES NFR BLD: 9.5 %
NEUTROPHILS # BLD: 6.2 K/UL (ref 1.7–7.7)
NEUTROPHILS NFR BLD: 80 %
PHOSPHATE SERPL-MCNC: 2.9 MG/DL (ref 2.5–4.9)
PLATELET # BLD AUTO: 235 K/UL (ref 135–450)
PMV BLD AUTO: 8.5 FL (ref 5–10.5)
POTASSIUM SERPL-SCNC: 3.7 MMOL/L (ref 3.5–5.1)
PROTHROMBIN TIME: 15 SEC (ref 11.9–14.9)
RBC # BLD AUTO: 3.59 M/UL (ref 4.2–5.9)
SODIUM SERPL-SCNC: 140 MMOL/L (ref 136–145)
WBC # BLD AUTO: 7.8 K/UL (ref 4–11)

## 2025-05-20 PROCEDURE — 93010 ELECTROCARDIOGRAM REPORT: CPT | Performed by: INTERNAL MEDICINE

## 2025-05-20 PROCEDURE — 36415 COLL VENOUS BLD VENIPUNCTURE: CPT

## 2025-05-20 PROCEDURE — 92526 ORAL FUNCTION THERAPY: CPT

## 2025-05-20 PROCEDURE — 99233 SBSQ HOSP IP/OBS HIGH 50: CPT | Performed by: INTERNAL MEDICINE

## 2025-05-20 PROCEDURE — 80069 RENAL FUNCTION PANEL: CPT

## 2025-05-20 PROCEDURE — 2000000000 HC ICU R&B

## 2025-05-20 PROCEDURE — 74018 RADEX ABDOMEN 1 VIEW: CPT

## 2025-05-20 PROCEDURE — 82533 TOTAL CORTISOL: CPT

## 2025-05-20 PROCEDURE — 85610 PROTHROMBIN TIME: CPT

## 2025-05-20 PROCEDURE — 6360000002 HC RX W HCPCS: Performed by: STUDENT IN AN ORGANIZED HEALTH CARE EDUCATION/TRAINING PROGRAM

## 2025-05-20 PROCEDURE — 6360000002 HC RX W HCPCS

## 2025-05-20 PROCEDURE — 2500000003 HC RX 250 WO HCPCS

## 2025-05-20 PROCEDURE — 6370000000 HC RX 637 (ALT 250 FOR IP): Performed by: NEUROLOGICAL SURGERY

## 2025-05-20 PROCEDURE — 6370000000 HC RX 637 (ALT 250 FOR IP)

## 2025-05-20 PROCEDURE — 85025 COMPLETE CBC W/AUTO DIFF WBC: CPT

## 2025-05-20 PROCEDURE — 85520 HEPARIN ASSAY: CPT

## 2025-05-20 PROCEDURE — 85730 THROMBOPLASTIN TIME PARTIAL: CPT

## 2025-05-20 PROCEDURE — 71045 X-RAY EXAM CHEST 1 VIEW: CPT

## 2025-05-20 PROCEDURE — 2580000003 HC RX 258

## 2025-05-20 PROCEDURE — 97129 THER IVNTJ 1ST 15 MIN: CPT

## 2025-05-20 PROCEDURE — 99291 CRITICAL CARE FIRST HOUR: CPT | Performed by: STUDENT IN AN ORGANIZED HEALTH CARE EDUCATION/TRAINING PROGRAM

## 2025-05-20 PROCEDURE — 6370000000 HC RX 637 (ALT 250 FOR IP): Performed by: NURSE PRACTITIONER

## 2025-05-20 RX ORDER — HYDROMORPHONE HYDROCHLORIDE 1 MG/ML
0.25 INJECTION, SOLUTION INTRAMUSCULAR; INTRAVENOUS; SUBCUTANEOUS ONCE
Status: COMPLETED | OUTPATIENT
Start: 2025-05-20 | End: 2025-05-20

## 2025-05-20 RX ORDER — HEPARIN SODIUM 10000 [USP'U]/100ML
5-30 INJECTION, SOLUTION INTRAVENOUS CONTINUOUS
Status: DISCONTINUED | OUTPATIENT
Start: 2025-05-20 | End: 2025-05-22

## 2025-05-20 RX ORDER — ACETAMINOPHEN 325 MG/1
650 TABLET ORAL EVERY 4 HOURS PRN
Status: DISCONTINUED | OUTPATIENT
Start: 2025-05-20 | End: 2025-06-06 | Stop reason: HOSPADM

## 2025-05-20 RX ORDER — MECOBALAMIN 5000 MCG
5 TABLET,DISINTEGRATING ORAL NIGHTLY
Status: DISCONTINUED | OUTPATIENT
Start: 2025-05-20 | End: 2025-06-06 | Stop reason: HOSPADM

## 2025-05-20 RX ADMIN — SODIUM CHLORIDE 5 MCG/MIN: 0.9 INJECTION, SOLUTION INTRAVENOUS at 22:06

## 2025-05-20 RX ADMIN — ACETAMINOPHEN 650 MG: 325 TABLET ORAL at 08:59

## 2025-05-20 RX ADMIN — MIDODRINE HYDROCHLORIDE 15 MG: 5 TABLET ORAL at 08:20

## 2025-05-20 RX ADMIN — PROMETHAZINE HYDROCHLORIDE 12.5 MG: 25 TABLET ORAL at 18:04

## 2025-05-20 RX ADMIN — HEPARIN SODIUM 12 UNITS/KG/HR: 10000 INJECTION, SOLUTION INTRAVENOUS at 10:12

## 2025-05-20 RX ADMIN — HYDROMORPHONE HYDROCHLORIDE 0.25 MG: 1 INJECTION, SOLUTION INTRAMUSCULAR; INTRAVENOUS; SUBCUTANEOUS at 20:59

## 2025-05-20 RX ADMIN — ASPIRIN 325 MG ORAL TABLET 325 MG: 325 PILL ORAL at 08:21

## 2025-05-20 RX ADMIN — Medication: at 08:21

## 2025-05-20 RX ADMIN — MIDODRINE HYDROCHLORIDE 15 MG: 5 TABLET ORAL at 16:13

## 2025-05-20 RX ADMIN — HEPARIN SODIUM 5000 UNITS: 5000 INJECTION INTRAVENOUS; SUBCUTANEOUS at 06:57

## 2025-05-20 RX ADMIN — PANTOPRAZOLE SODIUM 40 MG: 40 TABLET, DELAYED RELEASE ORAL at 06:56

## 2025-05-20 RX ADMIN — MIDODRINE HYDROCHLORIDE 15 MG: 5 TABLET ORAL at 11:44

## 2025-05-20 RX ADMIN — Medication: at 21:34

## 2025-05-20 RX ADMIN — ACETAMINOPHEN 650 MG: 325 TABLET ORAL at 20:37

## 2025-05-20 RX ADMIN — ATORVASTATIN CALCIUM 40 MG: 40 TABLET, FILM COATED ORAL at 21:34

## 2025-05-20 ASSESSMENT — PAIN DESCRIPTION - FREQUENCY
FREQUENCY: INTERMITTENT

## 2025-05-20 ASSESSMENT — PAIN DESCRIPTION - PAIN TYPE
TYPE: ACUTE PAIN

## 2025-05-20 ASSESSMENT — PAIN SCALES - GENERAL
PAINLEVEL_OUTOF10: 10
PAINLEVEL_OUTOF10: 3
PAINLEVEL_OUTOF10: 10
PAINLEVEL_OUTOF10: 0
PAINLEVEL_OUTOF10: 3
PAINLEVEL_OUTOF10: 1
PAINLEVEL_OUTOF10: 3
PAINLEVEL_OUTOF10: 2
PAINLEVEL_OUTOF10: 2
PAINLEVEL_OUTOF10: 3
PAINLEVEL_OUTOF10: 3
PAINLEVEL_OUTOF10: 4
PAINLEVEL_OUTOF10: 3

## 2025-05-20 ASSESSMENT — PAIN DESCRIPTION - LOCATION
LOCATION: BUTTOCKS
LOCATION: RIB CAGE;BACK
LOCATION: BUTTOCKS
LOCATION: RIB CAGE
LOCATION: BUTTOCKS

## 2025-05-20 ASSESSMENT — PAIN DESCRIPTION - ORIENTATION
ORIENTATION: RIGHT
ORIENTATION: RIGHT

## 2025-05-20 ASSESSMENT — PAIN DESCRIPTION - DESCRIPTORS
DESCRIPTORS: ACHING

## 2025-05-20 ASSESSMENT — PAIN DESCRIPTION - DIRECTION
RADIATING_TOWARDS: BACK
RADIATING_TOWARDS: BACK

## 2025-05-20 ASSESSMENT — PAIN - FUNCTIONAL ASSESSMENT
PAIN_FUNCTIONAL_ASSESSMENT: ACTIVITIES ARE NOT PREVENTED
PAIN_FUNCTIONAL_ASSESSMENT: ACTIVITIES ARE NOT PREVENTED

## 2025-05-20 ASSESSMENT — PAIN DESCRIPTION - ONSET
ONSET: ON-GOING
ONSET: ON-GOING

## 2025-05-20 NOTE — CARE COORDINATION
CM following.     Pt is from home with spouse.   [facility] following   No precert needed if accepted.     Joanie Ocampo RN, BSN, CM  Case Management Department  [phone]

## 2025-05-21 ENCOUNTER — APPOINTMENT (OUTPATIENT)
Dept: CT IMAGING | Age: 85
DRG: 023 | End: 2025-05-21
Payer: MEDICARE

## 2025-05-21 ENCOUNTER — APPOINTMENT (OUTPATIENT)
Dept: GENERAL RADIOLOGY | Age: 85
DRG: 023 | End: 2025-05-21
Payer: MEDICARE

## 2025-05-21 PROBLEM — I63.511 ACUTE RIGHT MCA STROKE (HCC): Status: ACTIVE | Noted: 2025-05-21

## 2025-05-21 LAB
ALBUMIN SERPL-MCNC: 3.1 G/DL (ref 3.4–5)
ALBUMIN SERPL-MCNC: 3.1 G/DL (ref 3.4–5)
ALP SERPL-CCNC: 151 U/L (ref 40–129)
ALT SERPL-CCNC: 10 U/L (ref 10–40)
ANION GAP SERPL CALCULATED.3IONS-SCNC: 12 MMOL/L (ref 3–16)
ANTI-XA UNFRAC HEPARIN: 0.25 IU/ML (ref 0.3–0.7)
ANTI-XA UNFRAC HEPARIN: 0.3 IU/ML (ref 0.3–0.7)
ANTI-XA UNFRAC HEPARIN: 0.3 IU/ML (ref 0.3–0.7)
AST SERPL-CCNC: 25 U/L (ref 15–37)
BASOPHILS # BLD: 0 K/UL (ref 0–0.2)
BASOPHILS NFR BLD: 0.4 %
BILIRUB DIRECT SERPL-MCNC: 0.6 MG/DL (ref 0–0.3)
BILIRUB INDIRECT SERPL-MCNC: 0.5 MG/DL (ref 0–1)
BILIRUB SERPL-MCNC: 1.1 MG/DL (ref 0–1)
BUN SERPL-MCNC: 16 MG/DL (ref 7–20)
CALCIUM SERPL-MCNC: 8.9 MG/DL (ref 8.3–10.6)
CHLORIDE SERPL-SCNC: 106 MMOL/L (ref 99–110)
CO2 SERPL-SCNC: 23 MMOL/L (ref 21–32)
CREAT SERPL-MCNC: 1.1 MG/DL (ref 0.8–1.3)
DEPRECATED RDW RBC AUTO: 15.3 % (ref 12.4–15.4)
EOSINOPHIL # BLD: 0.1 K/UL (ref 0–0.6)
EOSINOPHIL NFR BLD: 0.5 %
EST. AVERAGE GLUCOSE BLD GHB EST-MCNC: 125.5 MG/DL
GFR SERPLBLD CREATININE-BSD FMLA CKD-EPI: 66 ML/MIN/{1.73_M2}
GGT SERPL-CCNC: 54 U/L (ref 8–61)
GLUCOSE SERPL-MCNC: 122 MG/DL (ref 70–99)
HBA1C MFR BLD: 6 %
HCT VFR BLD AUTO: 31.7 % (ref 40.5–52.5)
HGB BLD-MCNC: 10.8 G/DL (ref 13.5–17.5)
LYMPHOCYTES # BLD: 1 K/UL (ref 1–5.1)
LYMPHOCYTES NFR BLD: 9.7 %
MCH RBC QN AUTO: 29.5 PG (ref 26–34)
MCHC RBC AUTO-ENTMCNC: 34 G/DL (ref 31–36)
MCV RBC AUTO: 86.6 FL (ref 80–100)
MONOCYTES # BLD: 1.4 K/UL (ref 0–1.3)
MONOCYTES NFR BLD: 13.6 %
NEUTROPHILS # BLD: 7.7 K/UL (ref 1.7–7.7)
NEUTROPHILS NFR BLD: 75.8 %
PHOSPHATE SERPL-MCNC: 3.3 MG/DL (ref 2.5–4.9)
PLATELET # BLD AUTO: 314 K/UL (ref 135–450)
PMV BLD AUTO: 8.8 FL (ref 5–10.5)
POTASSIUM SERPL-SCNC: 3.5 MMOL/L (ref 3.5–5.1)
PROT SERPL-MCNC: 6.3 G/DL (ref 6.4–8.2)
RBC # BLD AUTO: 3.67 M/UL (ref 4.2–5.9)
SODIUM SERPL-SCNC: 141 MMOL/L (ref 136–145)
WBC # BLD AUTO: 10.2 K/UL (ref 4–11)

## 2025-05-21 PROCEDURE — 99291 CRITICAL CARE FIRST HOUR: CPT | Performed by: STUDENT IN AN ORGANIZED HEALTH CARE EDUCATION/TRAINING PROGRAM

## 2025-05-21 PROCEDURE — 80069 RENAL FUNCTION PANEL: CPT

## 2025-05-21 PROCEDURE — 97112 NEUROMUSCULAR REEDUCATION: CPT

## 2025-05-21 PROCEDURE — 6370000000 HC RX 637 (ALT 250 FOR IP): Performed by: STUDENT IN AN ORGANIZED HEALTH CARE EDUCATION/TRAINING PROGRAM

## 2025-05-21 PROCEDURE — 2000000000 HC ICU R&B

## 2025-05-21 PROCEDURE — 85025 COMPLETE CBC W/AUTO DIFF WBC: CPT

## 2025-05-21 PROCEDURE — 6360000002 HC RX W HCPCS

## 2025-05-21 PROCEDURE — 85520 HEPARIN ASSAY: CPT

## 2025-05-21 PROCEDURE — 6360000004 HC RX CONTRAST MEDICATION: Performed by: INTERNAL MEDICINE

## 2025-05-21 PROCEDURE — 6370000000 HC RX 637 (ALT 250 FOR IP)

## 2025-05-21 PROCEDURE — 73130 X-RAY EXAM OF HAND: CPT

## 2025-05-21 PROCEDURE — 6360000002 HC RX W HCPCS: Performed by: STUDENT IN AN ORGANIZED HEALTH CARE EDUCATION/TRAINING PROGRAM

## 2025-05-21 PROCEDURE — 97129 THER IVNTJ 1ST 15 MIN: CPT

## 2025-05-21 PROCEDURE — 97530 THERAPEUTIC ACTIVITIES: CPT

## 2025-05-21 PROCEDURE — 6370000000 HC RX 637 (ALT 250 FOR IP): Performed by: NURSE PRACTITIONER

## 2025-05-21 PROCEDURE — 80076 HEPATIC FUNCTION PANEL: CPT

## 2025-05-21 PROCEDURE — 36415 COLL VENOUS BLD VENIPUNCTURE: CPT

## 2025-05-21 PROCEDURE — 92526 ORAL FUNCTION THERAPY: CPT

## 2025-05-21 PROCEDURE — 82977 ASSAY OF GGT: CPT

## 2025-05-21 PROCEDURE — 70450 CT HEAD/BRAIN W/O DYE: CPT

## 2025-05-21 PROCEDURE — 74177 CT ABD & PELVIS W/CONTRAST: CPT

## 2025-05-21 RX ORDER — SIMETHICONE 80 MG
160 TABLET,CHEWABLE ORAL ONCE
Status: COMPLETED | OUTPATIENT
Start: 2025-05-21 | End: 2025-05-21

## 2025-05-21 RX ORDER — LIDOCAINE 4 G/G
1 PATCH TOPICAL DAILY
Status: DISCONTINUED | OUTPATIENT
Start: 2025-05-21 | End: 2025-05-21 | Stop reason: ALTCHOICE

## 2025-05-21 RX ORDER — SIMETHICONE 80 MG
80 TABLET,CHEWABLE ORAL 4 TIMES DAILY PRN
Status: DISCONTINUED | OUTPATIENT
Start: 2025-05-21 | End: 2025-05-21

## 2025-05-21 RX ORDER — MORPHINE SULFATE 2 MG/ML
2 INJECTION, SOLUTION INTRAMUSCULAR; INTRAVENOUS ONCE
Status: COMPLETED | OUTPATIENT
Start: 2025-05-21 | End: 2025-05-21

## 2025-05-21 RX ORDER — IOPAMIDOL 755 MG/ML
75 INJECTION, SOLUTION INTRAVASCULAR
Status: COMPLETED | OUTPATIENT
Start: 2025-05-21 | End: 2025-05-21

## 2025-05-21 RX ORDER — HYDROMORPHONE HYDROCHLORIDE 1 MG/ML
0.25 INJECTION, SOLUTION INTRAMUSCULAR; INTRAVENOUS; SUBCUTANEOUS ONCE
Status: COMPLETED | OUTPATIENT
Start: 2025-05-21 | End: 2025-05-21

## 2025-05-21 RX ORDER — SIMETHICONE 80 MG
80 TABLET,CHEWABLE ORAL 4 TIMES DAILY
Status: DISCONTINUED | OUTPATIENT
Start: 2025-05-21 | End: 2025-05-26

## 2025-05-21 RX ORDER — ACETAMINOPHEN 500 MG
1000 TABLET ORAL ONCE
Status: COMPLETED | OUTPATIENT
Start: 2025-05-21 | End: 2025-05-21

## 2025-05-21 RX ORDER — LIDOCAINE 40 MG/G
CREAM TOPICAL PRN
Status: DISCONTINUED | OUTPATIENT
Start: 2025-05-21 | End: 2025-05-22 | Stop reason: SDUPTHER

## 2025-05-21 RX ORDER — METHOCARBAMOL 750 MG/1
750 TABLET, FILM COATED ORAL 4 TIMES DAILY
Status: DISCONTINUED | OUTPATIENT
Start: 2025-05-21 | End: 2025-05-21

## 2025-05-21 RX ADMIN — ACETAMINOPHEN 650 MG: 325 TABLET ORAL at 07:43

## 2025-05-21 RX ADMIN — SIMETHICONE 80 MG: 80 TABLET, CHEWABLE ORAL at 20:30

## 2025-05-21 RX ADMIN — Medication 5 MG: at 20:30

## 2025-05-21 RX ADMIN — MIDODRINE HYDROCHLORIDE 15 MG: 5 TABLET ORAL at 07:44

## 2025-05-21 RX ADMIN — SIMETHICONE 80 MG: 80 TABLET, CHEWABLE ORAL at 15:01

## 2025-05-21 RX ADMIN — ACETAMINOPHEN 650 MG: 325 TABLET ORAL at 12:56

## 2025-05-21 RX ADMIN — MORPHINE SULFATE 2 MG: 2 INJECTION, SOLUTION INTRAMUSCULAR; INTRAVENOUS at 09:42

## 2025-05-21 RX ADMIN — HYDROMORPHONE HYDROCHLORIDE 0.25 MG: 1 INJECTION, SOLUTION INTRAMUSCULAR; INTRAVENOUS; SUBCUTANEOUS at 05:07

## 2025-05-21 RX ADMIN — ATORVASTATIN CALCIUM 40 MG: 40 TABLET, FILM COATED ORAL at 20:30

## 2025-05-21 RX ADMIN — MIDODRINE HYDROCHLORIDE 15 MG: 5 TABLET ORAL at 11:24

## 2025-05-21 RX ADMIN — Medication: at 09:42

## 2025-05-21 RX ADMIN — MIDODRINE HYDROCHLORIDE 15 MG: 5 TABLET ORAL at 16:43

## 2025-05-21 RX ADMIN — Medication: at 20:30

## 2025-05-21 RX ADMIN — IOPAMIDOL 75 ML: 755 INJECTION, SOLUTION INTRAVENOUS at 11:49

## 2025-05-21 RX ADMIN — PANTOPRAZOLE SODIUM 40 MG: 40 TABLET, DELAYED RELEASE ORAL at 06:10

## 2025-05-21 RX ADMIN — ACETAMINOPHEN 1000 MG: 500 TABLET ORAL at 18:22

## 2025-05-21 RX ADMIN — GLYCERIN 2 G: 2 SUPPOSITORY RECTAL at 16:10

## 2025-05-21 RX ADMIN — HEPARIN SODIUM 16 UNITS/KG/HR: 10000 INJECTION, SOLUTION INTRAVENOUS at 07:58

## 2025-05-21 RX ADMIN — SIMETHICONE 160 MG: 80 TABLET, CHEWABLE ORAL at 18:22

## 2025-05-21 ASSESSMENT — PAIN DESCRIPTION - ORIENTATION
ORIENTATION: RIGHT;OUTER
ORIENTATION: RIGHT
ORIENTATION: RIGHT;OUTER
ORIENTATION: RIGHT

## 2025-05-21 ASSESSMENT — PAIN DESCRIPTION - FREQUENCY
FREQUENCY: INTERMITTENT

## 2025-05-21 ASSESSMENT — PAIN SCALES - GENERAL
PAINLEVEL_OUTOF10: 0
PAINLEVEL_OUTOF10: 0
PAINLEVEL_OUTOF10: 6
PAINLEVEL_OUTOF10: 0
PAINLEVEL_OUTOF10: 7
PAINLEVEL_OUTOF10: 4
PAINLEVEL_OUTOF10: 7
PAINLEVEL_OUTOF10: 10

## 2025-05-21 ASSESSMENT — PAIN DESCRIPTION - LOCATION
LOCATION: ABDOMEN
LOCATION: RIB CAGE;ABDOMEN
LOCATION: RIB CAGE
LOCATION: RIB CAGE
LOCATION: RIB CAGE;ABDOMEN
LOCATION: ABDOMEN;RIB CAGE
LOCATION: ABDOMEN;RIB CAGE
LOCATION: RIB CAGE

## 2025-05-21 ASSESSMENT — PAIN DESCRIPTION - ONSET
ONSET: ON-GOING
ONSET: ON-GOING

## 2025-05-21 ASSESSMENT — PAIN DESCRIPTION - DESCRIPTORS
DESCRIPTORS: ACHING
DESCRIPTORS: ACHING;BURNING;SHARP;STABBING
DESCRIPTORS: ACHING

## 2025-05-21 ASSESSMENT — PAIN DESCRIPTION - PAIN TYPE
TYPE: ACUTE PAIN

## 2025-05-21 ASSESSMENT — PAIN DESCRIPTION - DIRECTION
RADIATING_TOWARDS: BACK
RADIATING_TOWARDS: BACK

## 2025-05-21 NOTE — CARE COORDINATION
CM following.    Pt is from home with spouse.     N/G placed on 5/19 then replaced on 5/20 due to x ray showing first n/g no longer in stomach.     Pt remains lethargic and on hep gtt and levo gtt.     [facility] following.    No precert needed if accepted    Joanie Ocampo RN, BSN, CM  Case Management Department  [phone]

## 2025-05-22 LAB
ALBUMIN SERPL-MCNC: 2.9 G/DL (ref 3.4–5)
ALBUMIN SERPL-MCNC: 2.9 G/DL (ref 3.4–5)
ALP SERPL-CCNC: 171 U/L (ref 40–129)
ALT SERPL-CCNC: 9 U/L (ref 10–40)
ANION GAP SERPL CALCULATED.3IONS-SCNC: 9 MMOL/L (ref 3–16)
ANTI-XA UNFRAC HEPARIN: 0.24 IU/ML (ref 0.3–0.7)
ANTI-XA UNFRAC HEPARIN: 0.33 IU/ML (ref 0.3–0.7)
ANTI-XA UNFRAC HEPARIN: 0.41 IU/ML (ref 0.3–0.7)
APTT BLD: 39.1 SEC (ref 22.1–36.4)
AST SERPL-CCNC: 22 U/L (ref 15–37)
BASOPHILS # BLD: 0 K/UL (ref 0–0.2)
BASOPHILS NFR BLD: 0.6 %
BILIRUB DIRECT SERPL-MCNC: 0.3 MG/DL (ref 0–0.3)
BILIRUB INDIRECT SERPL-MCNC: 0.4 MG/DL (ref 0–1)
BILIRUB SERPL-MCNC: 0.7 MG/DL (ref 0–1)
BUN SERPL-MCNC: 16 MG/DL (ref 7–20)
CALCIUM SERPL-MCNC: 8.6 MG/DL (ref 8.3–10.6)
CHLORIDE SERPL-SCNC: 105 MMOL/L (ref 99–110)
CO2 SERPL-SCNC: 25 MMOL/L (ref 21–32)
CREAT SERPL-MCNC: 1 MG/DL (ref 0.8–1.3)
DEPRECATED RDW RBC AUTO: 15.6 % (ref 12.4–15.4)
DEPRECATED RDW RBC AUTO: 15.8 % (ref 12.4–15.4)
EOSINOPHIL # BLD: 0.3 K/UL (ref 0–0.6)
EOSINOPHIL NFR BLD: 3.5 %
GFR SERPLBLD CREATININE-BSD FMLA CKD-EPI: 73 ML/MIN/{1.73_M2}
GLUCOSE SERPL-MCNC: 113 MG/DL (ref 70–99)
HCT VFR BLD AUTO: 28.6 % (ref 40.5–52.5)
HCT VFR BLD AUTO: 29.4 % (ref 40.5–52.5)
HGB BLD-MCNC: 9.6 G/DL (ref 13.5–17.5)
HGB BLD-MCNC: 9.9 G/DL (ref 13.5–17.5)
INR PPP: 1.11 (ref 0.85–1.15)
INR PPP: 1.12 (ref 0.85–1.15)
LIPASE SERPL-CCNC: 31 U/L (ref 13–60)
LYMPHOCYTES # BLD: 0.8 K/UL (ref 1–5.1)
LYMPHOCYTES NFR BLD: 10.3 %
MCH RBC QN AUTO: 29 PG (ref 26–34)
MCH RBC QN AUTO: 29.1 PG (ref 26–34)
MCHC RBC AUTO-ENTMCNC: 33.7 G/DL (ref 31–36)
MCHC RBC AUTO-ENTMCNC: 33.9 G/DL (ref 31–36)
MCV RBC AUTO: 85.9 FL (ref 80–100)
MCV RBC AUTO: 86 FL (ref 80–100)
MONOCYTES # BLD: 0.9 K/UL (ref 0–1.3)
MONOCYTES NFR BLD: 12 %
NEUTROPHILS # BLD: 5.6 K/UL (ref 1.7–7.7)
NEUTROPHILS NFR BLD: 73.6 %
PHOSPHATE SERPL-MCNC: 3.2 MG/DL (ref 2.5–4.9)
PLATELET # BLD AUTO: 303 K/UL (ref 135–450)
PLATELET # BLD AUTO: 372 K/UL (ref 135–450)
PMV BLD AUTO: 8.5 FL (ref 5–10.5)
PMV BLD AUTO: 8.7 FL (ref 5–10.5)
POTASSIUM SERPL-SCNC: 3.3 MMOL/L (ref 3.5–5.1)
PROT SERPL-MCNC: 6.2 G/DL (ref 6.4–8.2)
PROTHROMBIN TIME: 14.6 SEC (ref 11.9–14.9)
PROTHROMBIN TIME: 14.6 SEC (ref 11.9–14.9)
RBC # BLD AUTO: 3.33 M/UL (ref 4.2–5.9)
RBC # BLD AUTO: 3.42 M/UL (ref 4.2–5.9)
SODIUM SERPL-SCNC: 139 MMOL/L (ref 136–145)
WBC # BLD AUTO: 7.6 K/UL (ref 4–11)
WBC # BLD AUTO: 7.8 K/UL (ref 4–11)

## 2025-05-22 PROCEDURE — 6360000002 HC RX W HCPCS

## 2025-05-22 PROCEDURE — 92526 ORAL FUNCTION THERAPY: CPT

## 2025-05-22 PROCEDURE — 6370000000 HC RX 637 (ALT 250 FOR IP): Performed by: NURSE PRACTITIONER

## 2025-05-22 PROCEDURE — 80069 RENAL FUNCTION PANEL: CPT

## 2025-05-22 PROCEDURE — 6370000000 HC RX 637 (ALT 250 FOR IP)

## 2025-05-22 PROCEDURE — 97530 THERAPEUTIC ACTIVITIES: CPT

## 2025-05-22 PROCEDURE — 85610 PROTHROMBIN TIME: CPT

## 2025-05-22 PROCEDURE — 2500000003 HC RX 250 WO HCPCS

## 2025-05-22 PROCEDURE — 97110 THERAPEUTIC EXERCISES: CPT

## 2025-05-22 PROCEDURE — 97129 THER IVNTJ 1ST 15 MIN: CPT

## 2025-05-22 PROCEDURE — 85520 HEPARIN ASSAY: CPT

## 2025-05-22 PROCEDURE — 83690 ASSAY OF LIPASE: CPT

## 2025-05-22 PROCEDURE — 2580000003 HC RX 258

## 2025-05-22 PROCEDURE — 6360000002 HC RX W HCPCS: Performed by: STUDENT IN AN ORGANIZED HEALTH CARE EDUCATION/TRAINING PROGRAM

## 2025-05-22 PROCEDURE — 85027 COMPLETE CBC AUTOMATED: CPT

## 2025-05-22 PROCEDURE — 6360000002 HC RX W HCPCS: Performed by: NEUROLOGICAL SURGERY

## 2025-05-22 PROCEDURE — 80076 HEPATIC FUNCTION PANEL: CPT

## 2025-05-22 PROCEDURE — 85025 COMPLETE CBC W/AUTO DIFF WBC: CPT

## 2025-05-22 PROCEDURE — 36415 COLL VENOUS BLD VENIPUNCTURE: CPT

## 2025-05-22 PROCEDURE — 97112 NEUROMUSCULAR REEDUCATION: CPT

## 2025-05-22 PROCEDURE — 99232 SBSQ HOSP IP/OBS MODERATE 35: CPT | Performed by: STUDENT IN AN ORGANIZED HEALTH CARE EDUCATION/TRAINING PROGRAM

## 2025-05-22 PROCEDURE — 2000000000 HC ICU R&B

## 2025-05-22 PROCEDURE — 85730 THROMBOPLASTIN TIME PARTIAL: CPT

## 2025-05-22 RX ORDER — SODIUM CHLORIDE 9 MG/ML
INJECTION, SOLUTION INTRAVENOUS CONTINUOUS
Status: ACTIVE | OUTPATIENT
Start: 2025-05-22 | End: 2025-05-22

## 2025-05-22 RX ORDER — HEPARIN SODIUM 1000 [USP'U]/ML
2000 INJECTION, SOLUTION INTRAVENOUS; SUBCUTANEOUS PRN
Status: DISCONTINUED | OUTPATIENT
Start: 2025-05-22 | End: 2025-05-27

## 2025-05-22 RX ORDER — HEPARIN SODIUM 10000 [USP'U]/100ML
5-30 INJECTION, SOLUTION INTRAVENOUS CONTINUOUS
Status: DISCONTINUED | OUTPATIENT
Start: 2025-05-22 | End: 2025-05-27

## 2025-05-22 RX ORDER — LIDOCAINE 50 MG/G
OINTMENT TOPICAL DAILY PRN
Status: DISCONTINUED | OUTPATIENT
Start: 2025-05-22 | End: 2025-06-06 | Stop reason: HOSPADM

## 2025-05-22 RX ORDER — WARFARIN SODIUM 2.5 MG/1
2.5 TABLET ORAL
Status: DISCONTINUED | OUTPATIENT
Start: 2025-05-22 | End: 2025-05-22

## 2025-05-22 RX ORDER — MORPHINE SULFATE 2 MG/ML
2 INJECTION, SOLUTION INTRAMUSCULAR; INTRAVENOUS
Status: COMPLETED | OUTPATIENT
Start: 2025-05-22 | End: 2025-05-22

## 2025-05-22 RX ORDER — MORPHINE SULFATE 2 MG/ML
2 INJECTION, SOLUTION INTRAMUSCULAR; INTRAVENOUS
Refills: 0 | Status: DISCONTINUED | OUTPATIENT
Start: 2025-05-22 | End: 2025-05-22

## 2025-05-22 RX ORDER — ENOXAPARIN SODIUM 100 MG/ML
1 INJECTION SUBCUTANEOUS EVERY 12 HOURS
Status: DISCONTINUED | OUTPATIENT
Start: 2025-05-22 | End: 2025-05-22

## 2025-05-22 RX ORDER — HEPARIN SODIUM 1000 [USP'U]/ML
4000 INJECTION, SOLUTION INTRAVENOUS; SUBCUTANEOUS PRN
Status: DISCONTINUED | OUTPATIENT
Start: 2025-05-22 | End: 2025-05-27

## 2025-05-22 RX ORDER — HYDROMORPHONE HYDROCHLORIDE 1 MG/ML
0.5 INJECTION, SOLUTION INTRAMUSCULAR; INTRAVENOUS; SUBCUTANEOUS
Refills: 0 | Status: DISCONTINUED | OUTPATIENT
Start: 2025-05-22 | End: 2025-05-23

## 2025-05-22 RX ORDER — HYDROMORPHONE HYDROCHLORIDE 1 MG/ML
0.25 INJECTION, SOLUTION INTRAMUSCULAR; INTRAVENOUS; SUBCUTANEOUS
Refills: 0 | Status: DISPENSED | OUTPATIENT
Start: 2025-05-22 | End: 2025-05-24

## 2025-05-22 RX ORDER — 0.9 % SODIUM CHLORIDE 0.9 %
500 INTRAVENOUS SOLUTION INTRAVENOUS ONCE
Status: COMPLETED | OUTPATIENT
Start: 2025-05-22 | End: 2025-05-22

## 2025-05-22 RX ORDER — HEPARIN SODIUM 1000 [USP'U]/ML
4000 INJECTION, SOLUTION INTRAVENOUS; SUBCUTANEOUS ONCE
Status: COMPLETED | OUTPATIENT
Start: 2025-05-22 | End: 2025-05-22

## 2025-05-22 RX ADMIN — MORPHINE SULFATE 2 MG: 2 INJECTION, SOLUTION INTRAMUSCULAR; INTRAVENOUS at 16:00

## 2025-05-22 RX ADMIN — SIMETHICONE 80 MG: 80 TABLET, CHEWABLE ORAL at 20:43

## 2025-05-22 RX ADMIN — ATORVASTATIN CALCIUM 40 MG: 40 TABLET, FILM COATED ORAL at 20:43

## 2025-05-22 RX ADMIN — SODIUM CHLORIDE 500 ML: 0.9 INJECTION, SOLUTION INTRAVENOUS at 08:14

## 2025-05-22 RX ADMIN — ACETAMINOPHEN 650 MG: 325 TABLET ORAL at 02:33

## 2025-05-22 RX ADMIN — POTASSIUM BICARBONATE 40 MEQ: 782 TABLET, EFFERVESCENT ORAL at 05:30

## 2025-05-22 RX ADMIN — MIDODRINE HYDROCHLORIDE 15 MG: 5 TABLET ORAL at 07:22

## 2025-05-22 RX ADMIN — Medication: at 07:23

## 2025-05-22 RX ADMIN — HEPARIN SODIUM 4000 UNITS: 1000 INJECTION INTRAVENOUS; SUBCUTANEOUS at 20:44

## 2025-05-22 RX ADMIN — MIDODRINE HYDROCHLORIDE 15 MG: 5 TABLET ORAL at 16:00

## 2025-05-22 RX ADMIN — PANTOPRAZOLE SODIUM 40 MG: 40 TABLET, DELAYED RELEASE ORAL at 05:31

## 2025-05-22 RX ADMIN — HYDROMORPHONE HYDROCHLORIDE 0.25 MG: 1 INJECTION, SOLUTION INTRAMUSCULAR; INTRAVENOUS; SUBCUTANEOUS at 23:39

## 2025-05-22 RX ADMIN — HEPARIN SODIUM 18 UNITS/KG/HR: 10000 INJECTION, SOLUTION INTRAVENOUS at 04:21

## 2025-05-22 RX ADMIN — Medication 960 ML: at 10:00

## 2025-05-22 RX ADMIN — SODIUM CHLORIDE 2 MCG/MIN: 0.9 INJECTION, SOLUTION INTRAVENOUS at 05:39

## 2025-05-22 RX ADMIN — SIMETHICONE 80 MG: 80 TABLET, CHEWABLE ORAL at 11:56

## 2025-05-22 RX ADMIN — SODIUM CHLORIDE 125 ML/HR: 9 INJECTION, SOLUTION INTRAVENOUS at 12:04

## 2025-05-22 RX ADMIN — HYDROMORPHONE HYDROCHLORIDE 0.25 MG: 1 INJECTION, SOLUTION INTRAMUSCULAR; INTRAVENOUS; SUBCUTANEOUS at 19:31

## 2025-05-22 RX ADMIN — POTASSIUM BICARBONATE 20 MEQ: 782 TABLET, EFFERVESCENT ORAL at 05:30

## 2025-05-22 RX ADMIN — SIMETHICONE 80 MG: 80 TABLET, CHEWABLE ORAL at 07:23

## 2025-05-22 RX ADMIN — ENOXAPARIN SODIUM 80 MG: 100 INJECTION SUBCUTANEOUS at 11:30

## 2025-05-22 RX ADMIN — SIMETHICONE 80 MG: 80 TABLET, CHEWABLE ORAL at 16:00

## 2025-05-22 RX ADMIN — SODIUM CHLORIDE: 9 INJECTION, SOLUTION INTRAVENOUS at 10:11

## 2025-05-22 RX ADMIN — ONDANSETRON 4 MG: 2 INJECTION INTRAMUSCULAR; INTRAVENOUS at 08:54

## 2025-05-22 RX ADMIN — Medication: at 20:43

## 2025-05-22 RX ADMIN — MIDODRINE HYDROCHLORIDE 15 MG: 5 TABLET ORAL at 11:56

## 2025-05-22 RX ADMIN — HEPARIN SODIUM 12 UNITS/KG/HR: 10000 INJECTION, SOLUTION INTRAVENOUS at 20:54

## 2025-05-22 ASSESSMENT — PAIN DESCRIPTION - LOCATION
LOCATION: ABDOMEN
LOCATION: ABDOMEN;RIB CAGE
LOCATION: ABDOMEN
LOCATION: ABDOMEN;RIB CAGE
LOCATION: ABDOMEN
LOCATION: ABDOMEN;RIB CAGE

## 2025-05-22 ASSESSMENT — PAIN DESCRIPTION - DESCRIPTORS
DESCRIPTORS: ACHING
DESCRIPTORS: ACHING;SHARP
DESCRIPTORS: ACHING
DESCRIPTORS: ACHING;STABBING
DESCRIPTORS: ACHING

## 2025-05-22 ASSESSMENT — PAIN DESCRIPTION - FREQUENCY
FREQUENCY: INTERMITTENT
FREQUENCY: CONTINUOUS
FREQUENCY: INTERMITTENT

## 2025-05-22 ASSESSMENT — PAIN DESCRIPTION - ORIENTATION
ORIENTATION: RIGHT;OUTER
ORIENTATION: RIGHT;OUTER
ORIENTATION: RIGHT
ORIENTATION: RIGHT;OUTER
ORIENTATION: RIGHT
ORIENTATION: RIGHT
ORIENTATION: RIGHT;OUTER
ORIENTATION: RIGHT;OUTER

## 2025-05-22 ASSESSMENT — PAIN DESCRIPTION - ONSET
ONSET: ON-GOING

## 2025-05-22 ASSESSMENT — PAIN DESCRIPTION - PAIN TYPE
TYPE: ACUTE PAIN

## 2025-05-22 ASSESSMENT — PAIN - FUNCTIONAL ASSESSMENT
PAIN_FUNCTIONAL_ASSESSMENT: ACTIVITIES ARE NOT PREVENTED

## 2025-05-22 ASSESSMENT — PAIN SCALES - GENERAL
PAINLEVEL_OUTOF10: 7
PAINLEVEL_OUTOF10: 0
PAINLEVEL_OUTOF10: 6
PAINLEVEL_OUTOF10: 3
PAINLEVEL_OUTOF10: 0
PAINLEVEL_OUTOF10: 3
PAINLEVEL_OUTOF10: 1
PAINLEVEL_OUTOF10: 10
PAINLEVEL_OUTOF10: 3
PAINLEVEL_OUTOF10: 2
PAINLEVEL_OUTOF10: 3
PAINLEVEL_OUTOF10: 5
PAINLEVEL_OUTOF10: 1
PAINLEVEL_OUTOF10: 5

## 2025-05-22 ASSESSMENT — PAIN DESCRIPTION - DIRECTION: RADIATING_TOWARDS: BACK

## 2025-05-22 NOTE — CARE COORDINATION
New CVA and fall. On RA. Pt is from home with spouse.  N/G placed on 5/19 then replaced on 5/20 due to x ray showing first n/g no longer in stomach. Possible will need PEG. Pt remains lethargic and on hep gtt and levo gtt (re: BP). [facility] ARU following. No precert needed if accepted. CM will continue to follow patient until discharge. Electronically signed by Geetha Baker RN on 5/22/2025 at 10:35 AM

## 2025-05-23 ENCOUNTER — APPOINTMENT (OUTPATIENT)
Dept: ULTRASOUND IMAGING | Age: 85
DRG: 023 | End: 2025-05-23
Payer: MEDICARE

## 2025-05-23 LAB
ALBUMIN SERPL-MCNC: 2.8 G/DL (ref 3.4–5)
ALP SERPL-CCNC: 156 U/L (ref 40–129)
ALT SERPL-CCNC: 9 U/L (ref 10–40)
ALT SERPL-CCNC: ABNORMAL U/L (ref 10–40)
ANION GAP SERPL CALCULATED.3IONS-SCNC: 12 MMOL/L (ref 3–16)
ANTI-XA UNFRAC HEPARIN: 0.38 IU/ML (ref 0.3–0.7)
ANTI-XA UNFRAC HEPARIN: 0.4 IU/ML (ref 0.3–0.7)
AST SERPL-CCNC: 37 U/L (ref 15–37)
BASOPHILS # BLD: 0.1 K/UL (ref 0–0.2)
BASOPHILS NFR BLD: 0.9 %
BILIRUB DIRECT SERPL-MCNC: <0.1 MG/DL (ref 0–0.3)
BILIRUB INDIRECT SERPL-MCNC: 0.5 MG/DL (ref 0–1)
BILIRUB SERPL-MCNC: 0.6 MG/DL (ref 0–1)
BUN SERPL-MCNC: 16 MG/DL (ref 7–20)
CALCIUM SERPL-MCNC: 8.5 MG/DL (ref 8.3–10.6)
CHLORIDE SERPL-SCNC: 105 MMOL/L (ref 99–110)
CO2 SERPL-SCNC: 23 MMOL/L (ref 21–32)
CREAT SERPL-MCNC: 1.1 MG/DL (ref 0.8–1.3)
DEPRECATED RDW RBC AUTO: 15.5 % (ref 12.4–15.4)
EKG DIAGNOSIS: NORMAL
EKG Q-T INTERVAL: 570 MS
EKG QRS DURATION: 146 MS
EKG QTC CALCULATION (BAZETT): 498 MS
EKG R AXIS: -80 DEGREES
EKG T AXIS: 266 DEGREES
EKG VENTRICULAR RATE: 46 BPM
EOSINOPHIL # BLD: 0.2 K/UL (ref 0–0.6)
EOSINOPHIL NFR BLD: 2.4 %
GFR SERPLBLD CREATININE-BSD FMLA CKD-EPI: 66 ML/MIN/{1.73_M2}
GLUCOSE BLD-MCNC: 107 MG/DL (ref 70–99)
GLUCOSE SERPL-MCNC: 101 MG/DL (ref 70–99)
HCT VFR BLD AUTO: 29.3 % (ref 40.5–52.5)
HGB BLD-MCNC: 10 G/DL (ref 13.5–17.5)
INR PPP: 1.1 (ref 0.85–1.15)
LACTATE BLDV-SCNC: 0.8 MMOL/L (ref 0.4–2)
LYMPHOCYTES # BLD: 1 K/UL (ref 1–5.1)
LYMPHOCYTES NFR BLD: 11.7 %
MCH RBC QN AUTO: 29.6 PG (ref 26–34)
MCHC RBC AUTO-ENTMCNC: 34.2 G/DL (ref 31–36)
MCV RBC AUTO: 86.5 FL (ref 80–100)
MONOCYTES # BLD: 1.3 K/UL (ref 0–1.3)
MONOCYTES NFR BLD: 15.3 %
NEUTROPHILS # BLD: 5.7 K/UL (ref 1.7–7.7)
NEUTROPHILS NFR BLD: 69.7 %
PERFORMED ON: ABNORMAL
PHOSPHATE SERPL-MCNC: 3.7 MG/DL (ref 2.5–4.9)
PLATELET # BLD AUTO: 356 K/UL (ref 135–450)
PMV BLD AUTO: 9 FL (ref 5–10.5)
POTASSIUM SERPL-SCNC: 4.4 MMOL/L (ref 3.5–5.1)
POTASSIUM SERPL-SCNC: ABNORMAL MMOL/L (ref 3.5–5.1)
PROT SERPL-MCNC: 6.3 G/DL (ref 6.4–8.2)
PROTHROMBIN TIME: 14.4 SEC (ref 11.9–14.9)
RBC # BLD AUTO: 3.38 M/UL (ref 4.2–5.9)
REASON FOR REJECTION: NORMAL
REJECTED TEST: NORMAL
SODIUM SERPL-SCNC: 140 MMOL/L (ref 136–145)
WBC # BLD AUTO: 8.2 K/UL (ref 4–11)

## 2025-05-23 PROCEDURE — 2500000003 HC RX 250 WO HCPCS

## 2025-05-23 PROCEDURE — 80076 HEPATIC FUNCTION PANEL: CPT

## 2025-05-23 PROCEDURE — 36415 COLL VENOUS BLD VENIPUNCTURE: CPT

## 2025-05-23 PROCEDURE — 84132 ASSAY OF SERUM POTASSIUM: CPT

## 2025-05-23 PROCEDURE — 6370000000 HC RX 637 (ALT 250 FOR IP): Performed by: NURSE PRACTITIONER

## 2025-05-23 PROCEDURE — 92526 ORAL FUNCTION THERAPY: CPT

## 2025-05-23 PROCEDURE — 6360000002 HC RX W HCPCS

## 2025-05-23 PROCEDURE — 6370000000 HC RX 637 (ALT 250 FOR IP)

## 2025-05-23 PROCEDURE — 93005 ELECTROCARDIOGRAM TRACING: CPT

## 2025-05-23 PROCEDURE — 2580000003 HC RX 258

## 2025-05-23 PROCEDURE — 2000000000 HC ICU R&B

## 2025-05-23 PROCEDURE — 97129 THER IVNTJ 1ST 15 MIN: CPT

## 2025-05-23 PROCEDURE — 93010 ELECTROCARDIOGRAM REPORT: CPT | Performed by: INTERNAL MEDICINE

## 2025-05-23 PROCEDURE — 83605 ASSAY OF LACTIC ACID: CPT

## 2025-05-23 PROCEDURE — 6370000000 HC RX 637 (ALT 250 FOR IP): Performed by: STUDENT IN AN ORGANIZED HEALTH CARE EDUCATION/TRAINING PROGRAM

## 2025-05-23 PROCEDURE — 85025 COMPLETE CBC W/AUTO DIFF WBC: CPT

## 2025-05-23 PROCEDURE — 84460 ALANINE AMINO (ALT) (SGPT): CPT

## 2025-05-23 PROCEDURE — 85520 HEPARIN ASSAY: CPT

## 2025-05-23 PROCEDURE — 80069 RENAL FUNCTION PANEL: CPT

## 2025-05-23 PROCEDURE — 85610 PROTHROMBIN TIME: CPT

## 2025-05-23 PROCEDURE — 76705 ECHO EXAM OF ABDOMEN: CPT

## 2025-05-23 RX ADMIN — ATORVASTATIN CALCIUM 40 MG: 40 TABLET, FILM COATED ORAL at 20:11

## 2025-05-23 RX ADMIN — MIDODRINE HYDROCHLORIDE 15 MG: 5 TABLET ORAL at 08:07

## 2025-05-23 RX ADMIN — Medication 5 MG: at 20:11

## 2025-05-23 RX ADMIN — HYDROMORPHONE HYDROCHLORIDE 0.25 MG: 1 INJECTION, SOLUTION INTRAMUSCULAR; INTRAVENOUS; SUBCUTANEOUS at 08:58

## 2025-05-23 RX ADMIN — SIMETHICONE 80 MG: 80 TABLET, CHEWABLE ORAL at 08:08

## 2025-05-23 RX ADMIN — MIDODRINE HYDROCHLORIDE 15 MG: 5 TABLET ORAL at 13:17

## 2025-05-23 RX ADMIN — SIMETHICONE 80 MG: 80 TABLET, CHEWABLE ORAL at 20:11

## 2025-05-23 RX ADMIN — MIDODRINE HYDROCHLORIDE 15 MG: 5 TABLET ORAL at 18:36

## 2025-05-23 RX ADMIN — Medication: at 20:15

## 2025-05-23 RX ADMIN — PANTOPRAZOLE SODIUM 40 MG: 40 TABLET, DELAYED RELEASE ORAL at 06:19

## 2025-05-23 RX ADMIN — SODIUM CHLORIDE 3 MCG/MIN: 0.9 INJECTION, SOLUTION INTRAVENOUS at 00:39

## 2025-05-23 RX ADMIN — HEPARIN SODIUM 12 UNITS/KG/HR: 10000 INJECTION, SOLUTION INTRAVENOUS at 22:05

## 2025-05-23 RX ADMIN — SIMETHICONE 80 MG: 80 TABLET, CHEWABLE ORAL at 13:16

## 2025-05-23 RX ADMIN — Medication: at 08:08

## 2025-05-23 RX ADMIN — SIMETHICONE 80 MG: 80 TABLET, CHEWABLE ORAL at 18:36

## 2025-05-23 ASSESSMENT — PAIN SCALES - GENERAL
PAINLEVEL_OUTOF10: 0
PAINLEVEL_OUTOF10: 0
PAINLEVEL_OUTOF10: 9
PAINLEVEL_OUTOF10: 0
PAINLEVEL_OUTOF10: 0
PAINLEVEL_OUTOF10: 9
PAINLEVEL_OUTOF10: 0

## 2025-05-23 NOTE — CARE COORDINATION
Case Management Assessment           Daily Note                 Date/ Time of Note: 5/23/2025 9:43 AM         Note completed by: Samuel Rob RN    Patient Name: Jhony Hdez  YOB: 1940    Diagnosis:Stroke (HCC) [I63.9]  Acute CVA (cerebrovascular accident) (HCC) [I63.9]  Patient Admission Status: Inpatient  Date of Admission:5/15/2025  7:12 AM    Length of Stay: 8 GLOS: GMLOS: 3.9 Readmission Risk Score: Readmission Risk Score: 17.1    Current Plan of Care: poss PEG; awaiting family decision. Continues on hep gtt  ________________________________________________________________________________________  PT AM-PAC: 7 / 24 per last evaluation on: 5/2    OT AM-PAC: 9 / 24 per last evaluation on: 5/22    Recs; IPR  ________________________________________________________________________________________  Discharge Plan: Inpatient Rehab: [facility]  Pre-cert required for SNF: NO  COVID Result:    Lab Results   Component Value Date/Time    COVID19 NOT DETECTED 03/25/2025 05:11 PM       Transportation PLAN for discharge:  floor to floor transfer    Tentative discharge date: tbd    Potential assistance Purchasing Medications: Potential Assistance Purchasing Medications: No  Does Patient want to participate in local refill/ meds to beds program?:      Current barriers to discharge: Medical complications    Referrals completed: Inpatient Rehab: [facility]    Resources/ information provided:   ________________________________________________________________________________________  Case Management Notes: patient is from home, plans to admit to [facility]. No cert needed. Continues with NGT; poss PEG placement per family discussions. NCC s/o.  Levo to be stopped today   [facility] able to admit when medically ready    Jhony and his family were provided with choice of provider; he and his family are in agreement with the discharge plan.    Emergency Contacts:  Extended Emergency Contact Information  Primary Emergency

## 2025-05-24 LAB
ALBUMIN SERPL-MCNC: 3 G/DL (ref 3.4–5)
ALP SERPL-CCNC: 166 U/L (ref 40–129)
ALT SERPL-CCNC: 13 U/L (ref 10–40)
ANION GAP SERPL CALCULATED.3IONS-SCNC: 10 MMOL/L (ref 3–16)
ANTI-XA UNFRAC HEPARIN: 0.22 IU/ML (ref 0.3–0.7)
ANTI-XA UNFRAC HEPARIN: 0.28 IU/ML (ref 0.3–0.7)
ANTI-XA UNFRAC HEPARIN: 0.36 IU/ML (ref 0.3–0.7)
AST SERPL-CCNC: 36 U/L (ref 15–37)
BASOPHILS # BLD: 0.1 K/UL (ref 0–0.2)
BASOPHILS NFR BLD: 0.9 %
BILIRUB DIRECT SERPL-MCNC: 0.4 MG/DL (ref 0–0.3)
BILIRUB INDIRECT SERPL-MCNC: 0.3 MG/DL (ref 0–1)
BILIRUB SERPL-MCNC: 0.7 MG/DL (ref 0–1)
BUN SERPL-MCNC: 18 MG/DL (ref 7–20)
CALCIUM SERPL-MCNC: 8.9 MG/DL (ref 8.3–10.6)
CHLORIDE SERPL-SCNC: 105 MMOL/L (ref 99–110)
CO2 SERPL-SCNC: 26 MMOL/L (ref 21–32)
CREAT SERPL-MCNC: 1.2 MG/DL (ref 0.8–1.3)
DEPRECATED RDW RBC AUTO: 15.5 % (ref 12.4–15.4)
EOSINOPHIL # BLD: 0.2 K/UL (ref 0–0.6)
EOSINOPHIL NFR BLD: 2.9 %
GFR SERPLBLD CREATININE-BSD FMLA CKD-EPI: 59 ML/MIN/{1.73_M2}
GLUCOSE SERPL-MCNC: 90 MG/DL (ref 70–99)
HCT VFR BLD AUTO: 29 % (ref 40.5–52.5)
HGB BLD-MCNC: 9.6 G/DL (ref 13.5–17.5)
INR PPP: 1.11 (ref 0.85–1.15)
LYMPHOCYTES # BLD: 0.9 K/UL (ref 1–5.1)
LYMPHOCYTES NFR BLD: 14 %
MCH RBC QN AUTO: 28.8 PG (ref 26–34)
MCHC RBC AUTO-ENTMCNC: 33.1 G/DL (ref 31–36)
MCV RBC AUTO: 86.9 FL (ref 80–100)
MONOCYTES # BLD: 0.7 K/UL (ref 0–1.3)
MONOCYTES NFR BLD: 10.7 %
NEUTROPHILS # BLD: 4.5 K/UL (ref 1.7–7.7)
NEUTROPHILS NFR BLD: 71.5 %
PHOSPHATE SERPL-MCNC: 3.7 MG/DL (ref 2.5–4.9)
PLATELET # BLD AUTO: 302 K/UL (ref 135–450)
PMV BLD AUTO: 8.7 FL (ref 5–10.5)
POTASSIUM SERPL-SCNC: 4.7 MMOL/L (ref 3.5–5.1)
PROT SERPL-MCNC: 6.3 G/DL (ref 6.4–8.2)
PROTHROMBIN TIME: 14.5 SEC (ref 11.9–14.9)
RBC # BLD AUTO: 3.33 M/UL (ref 4.2–5.9)
SODIUM SERPL-SCNC: 141 MMOL/L (ref 136–145)
WBC # BLD AUTO: 6.3 K/UL (ref 4–11)

## 2025-05-24 PROCEDURE — 6370000000 HC RX 637 (ALT 250 FOR IP)

## 2025-05-24 PROCEDURE — 97129 THER IVNTJ 1ST 15 MIN: CPT

## 2025-05-24 PROCEDURE — 36592 COLLECT BLOOD FROM PICC: CPT

## 2025-05-24 PROCEDURE — 85520 HEPARIN ASSAY: CPT

## 2025-05-24 PROCEDURE — 80069 RENAL FUNCTION PANEL: CPT

## 2025-05-24 PROCEDURE — 85610 PROTHROMBIN TIME: CPT

## 2025-05-24 PROCEDURE — 92526 ORAL FUNCTION THERAPY: CPT

## 2025-05-24 PROCEDURE — 2000000000 HC ICU R&B

## 2025-05-24 PROCEDURE — 6360000002 HC RX W HCPCS

## 2025-05-24 PROCEDURE — 97530 THERAPEUTIC ACTIVITIES: CPT

## 2025-05-24 PROCEDURE — 97110 THERAPEUTIC EXERCISES: CPT

## 2025-05-24 PROCEDURE — 80076 HEPATIC FUNCTION PANEL: CPT

## 2025-05-24 PROCEDURE — 99291 CRITICAL CARE FIRST HOUR: CPT | Performed by: INTERNAL MEDICINE

## 2025-05-24 PROCEDURE — 6370000000 HC RX 637 (ALT 250 FOR IP): Performed by: STUDENT IN AN ORGANIZED HEALTH CARE EDUCATION/TRAINING PROGRAM

## 2025-05-24 PROCEDURE — 6360000002 HC RX W HCPCS: Performed by: INTERNAL MEDICINE

## 2025-05-24 PROCEDURE — 36415 COLL VENOUS BLD VENIPUNCTURE: CPT

## 2025-05-24 PROCEDURE — 97112 NEUROMUSCULAR REEDUCATION: CPT

## 2025-05-24 PROCEDURE — 85025 COMPLETE CBC W/AUTO DIFF WBC: CPT

## 2025-05-24 PROCEDURE — 6370000000 HC RX 637 (ALT 250 FOR IP): Performed by: NURSE PRACTITIONER

## 2025-05-24 RX ORDER — DOPAMINE HYDROCHLORIDE 160 MG/100ML
1-20 INJECTION, SOLUTION INTRAVENOUS CONTINUOUS
Status: DISCONTINUED | OUTPATIENT
Start: 2025-05-24 | End: 2025-05-29

## 2025-05-24 RX ORDER — WARFARIN SODIUM 2.5 MG/1
2.5 TABLET ORAL
Status: DISCONTINUED | OUTPATIENT
Start: 2025-05-26 | End: 2025-05-25

## 2025-05-24 RX ORDER — WARFARIN SODIUM 5 MG/1
5 TABLET ORAL
Status: DISCONTINUED | OUTPATIENT
Start: 2025-05-25 | End: 2025-05-25

## 2025-05-24 RX ORDER — CALCIUM CARBONATE 500 MG/1
500 TABLET, CHEWABLE ORAL 3 TIMES DAILY PRN
Status: DISCONTINUED | OUTPATIENT
Start: 2025-05-24 | End: 2025-06-06 | Stop reason: HOSPADM

## 2025-05-24 RX ADMIN — WARFARIN SODIUM 7.5 MG: 5 TABLET ORAL at 17:41

## 2025-05-24 RX ADMIN — MIDODRINE HYDROCHLORIDE 15 MG: 5 TABLET ORAL at 17:41

## 2025-05-24 RX ADMIN — HEPARIN SODIUM 16 UNITS/KG/HR: 10000 INJECTION, SOLUTION INTRAVENOUS at 22:43

## 2025-05-24 RX ADMIN — DOPAMINE HYDROCHLORIDE IN DEXTROSE 5 MCG/KG/MIN: 1.6 INJECTION, SOLUTION INTRAVENOUS at 11:27

## 2025-05-24 RX ADMIN — Medication 5 MG: at 20:14

## 2025-05-24 RX ADMIN — SIMETHICONE 80 MG: 80 TABLET, CHEWABLE ORAL at 17:41

## 2025-05-24 RX ADMIN — Medication: at 10:12

## 2025-05-24 RX ADMIN — HEPARIN SODIUM 2000 UNITS: 1000 INJECTION INTRAVENOUS; SUBCUTANEOUS at 06:34

## 2025-05-24 RX ADMIN — HEPARIN SODIUM 2504 UNITS: 1000 INJECTION INTRAVENOUS; SUBCUTANEOUS at 22:43

## 2025-05-24 RX ADMIN — SIMETHICONE 80 MG: 80 TABLET, CHEWABLE ORAL at 10:12

## 2025-05-24 RX ADMIN — ANTACID TABLETS 500 MG: 500 TABLET, CHEWABLE ORAL at 13:36

## 2025-05-24 RX ADMIN — MIDODRINE HYDROCHLORIDE 15 MG: 5 TABLET ORAL at 10:12

## 2025-05-24 RX ADMIN — Medication: at 20:14

## 2025-05-24 RX ADMIN — ATORVASTATIN CALCIUM 40 MG: 40 TABLET, FILM COATED ORAL at 20:14

## 2025-05-24 RX ADMIN — SIMETHICONE 80 MG: 80 TABLET, CHEWABLE ORAL at 20:14

## 2025-05-24 ASSESSMENT — PAIN SCALES - GENERAL
PAINLEVEL_OUTOF10: 0

## 2025-05-25 ENCOUNTER — APPOINTMENT (OUTPATIENT)
Dept: GENERAL RADIOLOGY | Age: 85
DRG: 023 | End: 2025-05-25
Payer: MEDICARE

## 2025-05-25 LAB
ALBUMIN SERPL-MCNC: 3.3 G/DL (ref 3.4–5)
ALP SERPL-CCNC: 187 U/L (ref 40–129)
ALT SERPL-CCNC: 20 U/L (ref 10–40)
ANION GAP SERPL CALCULATED.3IONS-SCNC: 11 MMOL/L (ref 3–16)
ANTI-XA UNFRAC HEPARIN: 0.31 IU/ML (ref 0.3–0.7)
ANTI-XA UNFRAC HEPARIN: 0.33 IU/ML (ref 0.3–0.7)
ANTI-XA UNFRAC HEPARIN: 0.56 IU/ML (ref 0.3–0.7)
AST SERPL-CCNC: 47 U/L (ref 15–37)
BASOPHILS # BLD: 0 K/UL (ref 0–0.2)
BASOPHILS NFR BLD: 0.6 %
BILIRUB DIRECT SERPL-MCNC: 0.4 MG/DL (ref 0–0.3)
BILIRUB INDIRECT SERPL-MCNC: 0.4 MG/DL (ref 0–1)
BILIRUB SERPL-MCNC: 0.8 MG/DL (ref 0–1)
BUN SERPL-MCNC: 13 MG/DL (ref 7–20)
CALCIUM SERPL-MCNC: 9.1 MG/DL (ref 8.3–10.6)
CHLORIDE SERPL-SCNC: 100 MMOL/L (ref 99–110)
CO2 SERPL-SCNC: 25 MMOL/L (ref 21–32)
CREAT SERPL-MCNC: 0.9 MG/DL (ref 0.8–1.3)
DEPRECATED RDW RBC AUTO: 15.2 % (ref 12.4–15.4)
EOSINOPHIL # BLD: 0.2 K/UL (ref 0–0.6)
EOSINOPHIL NFR BLD: 2.9 %
GFR SERPLBLD CREATININE-BSD FMLA CKD-EPI: 83 ML/MIN/{1.73_M2}
GLUCOSE SERPL-MCNC: 114 MG/DL (ref 70–99)
HCT VFR BLD AUTO: 32.3 % (ref 40.5–52.5)
HGB BLD-MCNC: 10.9 G/DL (ref 13.5–17.5)
INR PPP: 1.05 (ref 0.85–1.15)
LYMPHOCYTES # BLD: 0.8 K/UL (ref 1–5.1)
LYMPHOCYTES NFR BLD: 10.1 %
MAGNESIUM SERPL-MCNC: 2.12 MG/DL (ref 1.8–2.4)
MCH RBC QN AUTO: 28.9 PG (ref 26–34)
MCHC RBC AUTO-ENTMCNC: 33.8 G/DL (ref 31–36)
MCV RBC AUTO: 85.7 FL (ref 80–100)
MONOCYTES # BLD: 0.8 K/UL (ref 0–1.3)
MONOCYTES NFR BLD: 10.5 %
NEUTROPHILS # BLD: 5.9 K/UL (ref 1.7–7.7)
NEUTROPHILS NFR BLD: 75.9 %
PHOSPHATE SERPL-MCNC: 3 MG/DL (ref 2.5–4.9)
PLATELET # BLD AUTO: 383 K/UL (ref 135–450)
PMV BLD AUTO: 8 FL (ref 5–10.5)
POTASSIUM SERPL-SCNC: 3.9 MMOL/L (ref 3.5–5.1)
PROT SERPL-MCNC: 7 G/DL (ref 6.4–8.2)
PROTHROMBIN TIME: 13.9 SEC (ref 11.9–14.9)
RBC # BLD AUTO: 3.77 M/UL (ref 4.2–5.9)
SODIUM SERPL-SCNC: 136 MMOL/L (ref 136–145)
WBC # BLD AUTO: 7.8 K/UL (ref 4–11)

## 2025-05-25 PROCEDURE — 99291 CRITICAL CARE FIRST HOUR: CPT | Performed by: INTERNAL MEDICINE

## 2025-05-25 PROCEDURE — 85520 HEPARIN ASSAY: CPT

## 2025-05-25 PROCEDURE — 6370000000 HC RX 637 (ALT 250 FOR IP): Performed by: NURSE PRACTITIONER

## 2025-05-25 PROCEDURE — 92611 MOTION FLUOROSCOPY/SWALLOW: CPT

## 2025-05-25 PROCEDURE — 80069 RENAL FUNCTION PANEL: CPT

## 2025-05-25 PROCEDURE — 36415 COLL VENOUS BLD VENIPUNCTURE: CPT

## 2025-05-25 PROCEDURE — 6370000000 HC RX 637 (ALT 250 FOR IP)

## 2025-05-25 PROCEDURE — 85025 COMPLETE CBC W/AUTO DIFF WBC: CPT

## 2025-05-25 PROCEDURE — 85610 PROTHROMBIN TIME: CPT

## 2025-05-25 PROCEDURE — 74230 X-RAY XM SWLNG FUNCJ C+: CPT

## 2025-05-25 PROCEDURE — 6370000000 HC RX 637 (ALT 250 FOR IP): Performed by: STUDENT IN AN ORGANIZED HEALTH CARE EDUCATION/TRAINING PROGRAM

## 2025-05-25 PROCEDURE — 6360000002 HC RX W HCPCS: Performed by: INTERNAL MEDICINE

## 2025-05-25 PROCEDURE — 6360000002 HC RX W HCPCS

## 2025-05-25 PROCEDURE — 2000000000 HC ICU R&B

## 2025-05-25 PROCEDURE — 83735 ASSAY OF MAGNESIUM: CPT

## 2025-05-25 PROCEDURE — 80076 HEPATIC FUNCTION PANEL: CPT

## 2025-05-25 PROCEDURE — 92526 ORAL FUNCTION THERAPY: CPT

## 2025-05-25 PROCEDURE — 6360000002 HC RX W HCPCS: Performed by: NEUROLOGICAL SURGERY

## 2025-05-25 RX ORDER — MIDODRINE HYDROCHLORIDE 5 MG/1
10 TABLET ORAL
Status: DISCONTINUED | OUTPATIENT
Start: 2025-05-25 | End: 2025-05-26

## 2025-05-25 RX ADMIN — DOPAMINE HYDROCHLORIDE IN DEXTROSE 6.5 MCG/KG/MIN: 1.6 INJECTION, SOLUTION INTRAVENOUS at 03:12

## 2025-05-25 RX ADMIN — Medication: at 08:18

## 2025-05-25 RX ADMIN — SIMETHICONE 80 MG: 80 TABLET, CHEWABLE ORAL at 21:01

## 2025-05-25 RX ADMIN — Medication 5 MG: at 21:01

## 2025-05-25 RX ADMIN — Medication: at 21:17

## 2025-05-25 RX ADMIN — SIMETHICONE 80 MG: 80 TABLET, CHEWABLE ORAL at 17:55

## 2025-05-25 RX ADMIN — DOPAMINE HYDROCHLORIDE IN DEXTROSE 5 MCG/KG/MIN: 1.6 INJECTION, SOLUTION INTRAVENOUS at 17:08

## 2025-05-25 RX ADMIN — MIDODRINE HYDROCHLORIDE 15 MG: 5 TABLET ORAL at 08:18

## 2025-05-25 RX ADMIN — HEPARIN SODIUM 16 UNITS/KG/HR: 10000 INJECTION, SOLUTION INTRAVENOUS at 21:59

## 2025-05-25 RX ADMIN — ONDANSETRON 4 MG: 2 INJECTION INTRAMUSCULAR; INTRAVENOUS at 11:14

## 2025-05-25 RX ADMIN — MIDODRINE HYDROCHLORIDE 10 MG: 5 TABLET ORAL at 17:55

## 2025-05-25 RX ADMIN — SIMETHICONE 80 MG: 80 TABLET, CHEWABLE ORAL at 14:02

## 2025-05-25 RX ADMIN — ATORVASTATIN CALCIUM 40 MG: 40 TABLET, FILM COATED ORAL at 21:01

## 2025-05-25 RX ADMIN — PANTOPRAZOLE SODIUM 40 MG: 40 TABLET, DELAYED RELEASE ORAL at 08:22

## 2025-05-25 RX ADMIN — MIDODRINE HYDROCHLORIDE 10 MG: 5 TABLET ORAL at 14:02

## 2025-05-25 RX ADMIN — SIMETHICONE 80 MG: 80 TABLET, CHEWABLE ORAL at 08:18

## 2025-05-25 ASSESSMENT — PAIN SCALES - GENERAL
PAINLEVEL_OUTOF10: 0

## 2025-05-26 LAB
ALBUMIN SERPL-MCNC: 3.1 G/DL (ref 3.4–5)
ALP SERPL-CCNC: 174 U/L (ref 40–129)
ALT SERPL-CCNC: 16 U/L (ref 10–40)
ANION GAP SERPL CALCULATED.3IONS-SCNC: 12 MMOL/L (ref 3–16)
ANTI-XA UNFRAC HEPARIN: 0.25 IU/ML (ref 0.3–0.7)
ANTI-XA UNFRAC HEPARIN: 0.5 IU/ML (ref 0.3–0.7)
ANTI-XA UNFRAC HEPARIN: 0.51 IU/ML (ref 0.3–0.7)
ANTI-XA UNFRAC HEPARIN: 0.59 IU/ML (ref 0.3–0.7)
AST SERPL-CCNC: 37 U/L (ref 15–37)
BASOPHILS # BLD: 0 K/UL (ref 0–0.2)
BASOPHILS NFR BLD: 0.5 %
BILIRUB DIRECT SERPL-MCNC: 0.4 MG/DL (ref 0–0.3)
BILIRUB INDIRECT SERPL-MCNC: 0.5 MG/DL (ref 0–1)
BILIRUB SERPL-MCNC: 0.9 MG/DL (ref 0–1)
BUN SERPL-MCNC: 11 MG/DL (ref 7–20)
CALCIUM SERPL-MCNC: 8.8 MG/DL (ref 8.3–10.6)
CHLORIDE SERPL-SCNC: 98 MMOL/L (ref 99–110)
CO2 SERPL-SCNC: 23 MMOL/L (ref 21–32)
CREAT SERPL-MCNC: 0.9 MG/DL (ref 0.8–1.3)
DEPRECATED RDW RBC AUTO: 15.2 % (ref 12.4–15.4)
EOSINOPHIL # BLD: 0.1 K/UL (ref 0–0.6)
EOSINOPHIL NFR BLD: 1.2 %
GFR SERPLBLD CREATININE-BSD FMLA CKD-EPI: 83 ML/MIN/{1.73_M2}
GLUCOSE SERPL-MCNC: 111 MG/DL (ref 70–99)
HCT VFR BLD AUTO: 33.3 % (ref 40.5–52.5)
HGB BLD-MCNC: 11.2 G/DL (ref 13.5–17.5)
INR PPP: 1.21 (ref 0.85–1.15)
LYMPHOCYTES # BLD: 0.7 K/UL (ref 1–5.1)
LYMPHOCYTES NFR BLD: 7.7 %
MAGNESIUM SERPL-MCNC: 2.03 MG/DL (ref 1.8–2.4)
MCH RBC QN AUTO: 28.8 PG (ref 26–34)
MCHC RBC AUTO-ENTMCNC: 33.7 G/DL (ref 31–36)
MCV RBC AUTO: 85.5 FL (ref 80–100)
MONOCYTES # BLD: 1.3 K/UL (ref 0–1.3)
MONOCYTES NFR BLD: 13.8 %
NEUTROPHILS # BLD: 7 K/UL (ref 1.7–7.7)
NEUTROPHILS NFR BLD: 76.8 %
PHOSPHATE SERPL-MCNC: 3 MG/DL (ref 2.5–4.9)
PLATELET # BLD AUTO: 323 K/UL (ref 135–450)
PMV BLD AUTO: 8.5 FL (ref 5–10.5)
POTASSIUM SERPL-SCNC: 4 MMOL/L (ref 3.5–5.1)
PROT SERPL-MCNC: 7 G/DL (ref 6.4–8.2)
PROTHROMBIN TIME: 15.5 SEC (ref 11.9–14.9)
RBC # BLD AUTO: 3.89 M/UL (ref 4.2–5.9)
SODIUM SERPL-SCNC: 133 MMOL/L (ref 136–145)
WBC # BLD AUTO: 9.1 K/UL (ref 4–11)

## 2025-05-26 PROCEDURE — 80069 RENAL FUNCTION PANEL: CPT

## 2025-05-26 PROCEDURE — 6370000000 HC RX 637 (ALT 250 FOR IP)

## 2025-05-26 PROCEDURE — 85520 HEPARIN ASSAY: CPT

## 2025-05-26 PROCEDURE — 99291 CRITICAL CARE FIRST HOUR: CPT | Performed by: INTERNAL MEDICINE

## 2025-05-26 PROCEDURE — 6360000002 HC RX W HCPCS

## 2025-05-26 PROCEDURE — 36415 COLL VENOUS BLD VENIPUNCTURE: CPT

## 2025-05-26 PROCEDURE — 85025 COMPLETE CBC W/AUTO DIFF WBC: CPT

## 2025-05-26 PROCEDURE — 80076 HEPATIC FUNCTION PANEL: CPT

## 2025-05-26 PROCEDURE — 83735 ASSAY OF MAGNESIUM: CPT

## 2025-05-26 PROCEDURE — 85610 PROTHROMBIN TIME: CPT

## 2025-05-26 PROCEDURE — 2060000000 HC ICU INTERMEDIATE R&B

## 2025-05-26 PROCEDURE — 6360000002 HC RX W HCPCS: Performed by: INTERNAL MEDICINE

## 2025-05-26 RX ORDER — MIDODRINE HYDROCHLORIDE 5 MG/1
5 TABLET ORAL
Status: DISCONTINUED | OUTPATIENT
Start: 2025-05-26 | End: 2025-05-29

## 2025-05-26 RX ORDER — SIMETHICONE 80 MG
80 TABLET,CHEWABLE ORAL DAILY
Status: DISCONTINUED | OUTPATIENT
Start: 2025-05-26 | End: 2025-06-06 | Stop reason: HOSPADM

## 2025-05-26 RX ADMIN — HEPARIN SODIUM 18 UNITS/KG/HR: 10000 INJECTION, SOLUTION INTRAVENOUS at 17:05

## 2025-05-26 RX ADMIN — DOPAMINE HYDROCHLORIDE IN DEXTROSE 7.5 MCG/KG/MIN: 1.6 INJECTION, SOLUTION INTRAVENOUS at 17:02

## 2025-05-26 RX ADMIN — Medication 5 MG: at 20:49

## 2025-05-26 RX ADMIN — Medication: at 20:50

## 2025-05-26 RX ADMIN — PANTOPRAZOLE SODIUM 40 MG: 40 TABLET, DELAYED RELEASE ORAL at 06:22

## 2025-05-26 RX ADMIN — SIMETHICONE 80 MG: 80 TABLET, CHEWABLE ORAL at 08:00

## 2025-05-26 RX ADMIN — DOPAMINE HYDROCHLORIDE IN DEXTROSE 5 MCG/KG/MIN: 1.6 INJECTION, SOLUTION INTRAVENOUS at 05:32

## 2025-05-26 RX ADMIN — HEPARIN SODIUM 2000 UNITS: 1000 INJECTION INTRAVENOUS; SUBCUTANEOUS at 06:22

## 2025-05-26 RX ADMIN — Medication: at 08:00

## 2025-05-26 RX ADMIN — MIDODRINE HYDROCHLORIDE 10 MG: 5 TABLET ORAL at 08:00

## 2025-05-26 RX ADMIN — ATORVASTATIN CALCIUM 40 MG: 40 TABLET, FILM COATED ORAL at 20:49

## 2025-05-26 RX ADMIN — MIDODRINE HYDROCHLORIDE 5 MG: 5 TABLET ORAL at 13:53

## 2025-05-26 ASSESSMENT — PAIN SCALES - GENERAL
PAINLEVEL_OUTOF10: 0

## 2025-05-27 PROBLEM — I49.8 JUNCTIONAL RHYTHM: Status: ACTIVE | Noted: 2025-05-27

## 2025-05-27 PROBLEM — I49.5 SICK SINUS SYNDROME (HCC): Status: ACTIVE | Noted: 2025-05-27

## 2025-05-27 LAB
ALBUMIN SERPL-MCNC: 3 G/DL (ref 3.4–5)
ALP SERPL-CCNC: 173 U/L (ref 40–129)
ALT SERPL-CCNC: 20 U/L (ref 10–40)
ANION GAP SERPL CALCULATED.3IONS-SCNC: 11 MMOL/L (ref 3–16)
ANTI-XA UNFRAC HEPARIN: 0.28 IU/ML (ref 0.3–0.7)
ANTI-XA UNFRAC HEPARIN: 0.44 IU/ML (ref 0.3–0.7)
AST SERPL-CCNC: 35 U/L (ref 15–37)
BASOPHILS # BLD: 0 K/UL (ref 0–0.2)
BASOPHILS NFR BLD: 0.4 %
BILIRUB DIRECT SERPL-MCNC: 0.5 MG/DL (ref 0–0.3)
BILIRUB INDIRECT SERPL-MCNC: 0.5 MG/DL (ref 0–1)
BILIRUB SERPL-MCNC: 1 MG/DL (ref 0–1)
BUN SERPL-MCNC: 11 MG/DL (ref 7–20)
CALCIUM SERPL-MCNC: 8.6 MG/DL (ref 8.3–10.6)
CHLORIDE SERPL-SCNC: 97 MMOL/L (ref 99–110)
CO2 SERPL-SCNC: 25 MMOL/L (ref 21–32)
CREAT SERPL-MCNC: 1 MG/DL (ref 0.8–1.3)
DEPRECATED RDW RBC AUTO: 15.3 % (ref 12.4–15.4)
EOSINOPHIL # BLD: 0.1 K/UL (ref 0–0.6)
EOSINOPHIL NFR BLD: 0.9 %
GFR SERPLBLD CREATININE-BSD FMLA CKD-EPI: 73 ML/MIN/{1.73_M2}
GLUCOSE SERPL-MCNC: 116 MG/DL (ref 70–99)
HCT VFR BLD AUTO: 31.2 % (ref 40.5–52.5)
HGB BLD-MCNC: 10.7 G/DL (ref 13.5–17.5)
INR PPP: 1.3 (ref 0.85–1.15)
LACTATE BLDV-SCNC: 1.8 MMOL/L (ref 0.4–2)
LYMPHOCYTES # BLD: 0.7 K/UL (ref 1–5.1)
LYMPHOCYTES NFR BLD: 8.3 %
MAGNESIUM SERPL-MCNC: 2 MG/DL (ref 1.8–2.4)
MCH RBC QN AUTO: 29.1 PG (ref 26–34)
MCHC RBC AUTO-ENTMCNC: 34.3 G/DL (ref 31–36)
MCV RBC AUTO: 84.8 FL (ref 80–100)
MONOCYTES # BLD: 1.1 K/UL (ref 0–1.3)
MONOCYTES NFR BLD: 13.4 %
NEUTROPHILS # BLD: 6.5 K/UL (ref 1.7–7.7)
NEUTROPHILS NFR BLD: 77 %
PHOSPHATE SERPL-MCNC: 3.1 MG/DL (ref 2.5–4.9)
PLATELET # BLD AUTO: 374 K/UL (ref 135–450)
PMV BLD AUTO: 8.6 FL (ref 5–10.5)
POTASSIUM SERPL-SCNC: 4 MMOL/L (ref 3.5–5.1)
PROT SERPL-MCNC: 6.6 G/DL (ref 6.4–8.2)
PROTHROMBIN TIME: 16.3 SEC (ref 11.9–14.9)
RBC # BLD AUTO: 3.68 M/UL (ref 4.2–5.9)
SODIUM SERPL-SCNC: 133 MMOL/L (ref 136–145)
WBC # BLD AUTO: 8.5 K/UL (ref 4–11)

## 2025-05-27 PROCEDURE — 85025 COMPLETE CBC W/AUTO DIFF WBC: CPT

## 2025-05-27 PROCEDURE — 6360000002 HC RX W HCPCS

## 2025-05-27 PROCEDURE — 80069 RENAL FUNCTION PANEL: CPT

## 2025-05-27 PROCEDURE — 80076 HEPATIC FUNCTION PANEL: CPT

## 2025-05-27 PROCEDURE — 83735 ASSAY OF MAGNESIUM: CPT

## 2025-05-27 PROCEDURE — 02HK3KZ INSERTION OF DEFIBRILLATOR LEAD INTO RIGHT VENTRICLE, PERCUTANEOUS APPROACH: ICD-10-PCS | Performed by: INTERNAL MEDICINE

## 2025-05-27 PROCEDURE — 6370000000 HC RX 637 (ALT 250 FOR IP)

## 2025-05-27 PROCEDURE — 97535 SELF CARE MNGMENT TRAINING: CPT

## 2025-05-27 PROCEDURE — 99291 CRITICAL CARE FIRST HOUR: CPT | Performed by: INTERNAL MEDICINE

## 2025-05-27 PROCEDURE — 85520 HEPARIN ASSAY: CPT

## 2025-05-27 PROCEDURE — 97110 THERAPEUTIC EXERCISES: CPT

## 2025-05-27 PROCEDURE — 92526 ORAL FUNCTION THERAPY: CPT

## 2025-05-27 PROCEDURE — 02H63KZ INSERTION OF DEFIBRILLATOR LEAD INTO RIGHT ATRIUM, PERCUTANEOUS APPROACH: ICD-10-PCS | Performed by: INTERNAL MEDICINE

## 2025-05-27 PROCEDURE — 83605 ASSAY OF LACTIC ACID: CPT

## 2025-05-27 PROCEDURE — 2060000000 HC ICU INTERMEDIATE R&B

## 2025-05-27 PROCEDURE — 97530 THERAPEUTIC ACTIVITIES: CPT

## 2025-05-27 PROCEDURE — 0JH60FZ INSERTION OF SUBCUTANEOUS DEFIBRILLATOR LEAD INTO CHEST SUBCUTANEOUS TISSUE AND FASCIA, OPEN APPROACH: ICD-10-PCS | Performed by: INTERNAL MEDICINE

## 2025-05-27 PROCEDURE — 85610 PROTHROMBIN TIME: CPT

## 2025-05-27 PROCEDURE — 6370000000 HC RX 637 (ALT 250 FOR IP): Performed by: SURGERY

## 2025-05-27 PROCEDURE — 36415 COLL VENOUS BLD VENIPUNCTURE: CPT

## 2025-05-27 RX ORDER — MIDODRINE HYDROCHLORIDE 5 MG/1
10 TABLET ORAL ONCE
Status: COMPLETED | OUTPATIENT
Start: 2025-05-27 | End: 2025-05-27

## 2025-05-27 RX ADMIN — DOPAMINE HYDROCHLORIDE IN DEXTROSE 7.5 MCG/KG/MIN: 1.6 INJECTION, SOLUTION INTRAVENOUS at 11:20

## 2025-05-27 RX ADMIN — DOPAMINE HYDROCHLORIDE IN DEXTROSE 7.5 MCG/KG/MIN: 1.6 INJECTION, SOLUTION INTRAVENOUS at 22:30

## 2025-05-27 RX ADMIN — Medication: at 20:37

## 2025-05-27 RX ADMIN — MIDODRINE HYDROCHLORIDE 10 MG: 5 TABLET ORAL at 16:20

## 2025-05-27 RX ADMIN — MIDODRINE HYDROCHLORIDE 5 MG: 5 TABLET ORAL at 13:09

## 2025-05-27 RX ADMIN — MIDODRINE HYDROCHLORIDE 5 MG: 5 TABLET ORAL at 07:29

## 2025-05-27 RX ADMIN — ACETAMINOPHEN 650 MG: 325 TABLET ORAL at 13:36

## 2025-05-27 RX ADMIN — HEPARIN SODIUM 2000 UNITS: 1000 INJECTION INTRAVENOUS; SUBCUTANEOUS at 06:42

## 2025-05-27 RX ADMIN — Medication: at 07:28

## 2025-05-27 RX ADMIN — DOPAMINE HYDROCHLORIDE IN DEXTROSE 7.5 MCG/KG/MIN: 1.6 INJECTION, SOLUTION INTRAVENOUS at 01:56

## 2025-05-27 RX ADMIN — APIXABAN 5 MG: 5 TABLET, FILM COATED ORAL at 20:36

## 2025-05-27 RX ADMIN — HEPARIN SODIUM 20 UNITS/KG/HR: 10000 INJECTION, SOLUTION INTRAVENOUS at 11:23

## 2025-05-27 RX ADMIN — APIXABAN 5 MG: 5 TABLET, FILM COATED ORAL at 14:11

## 2025-05-27 RX ADMIN — Medication 5 MG: at 20:36

## 2025-05-27 RX ADMIN — ATORVASTATIN CALCIUM 40 MG: 40 TABLET, FILM COATED ORAL at 20:36

## 2025-05-27 RX ADMIN — SIMETHICONE 80 MG: 80 TABLET, CHEWABLE ORAL at 07:29

## 2025-05-27 ASSESSMENT — PAIN SCALES - GENERAL
PAINLEVEL_OUTOF10: 0

## 2025-05-27 ASSESSMENT — PAIN DESCRIPTION - ORIENTATION: ORIENTATION: RIGHT

## 2025-05-27 ASSESSMENT — PAIN DESCRIPTION - DESCRIPTORS: DESCRIPTORS: ACHING

## 2025-05-27 ASSESSMENT — PAIN DESCRIPTION - LOCATION: LOCATION: SHOULDER

## 2025-05-27 NOTE — CARE COORDINATION
PATH-7 Caregiver Assessment Tool    N/A - plan to DC to [facility] once medically ready.     Thank you  Cat Ryan PARADA, BSN, CM  ICU   [phone]

## 2025-05-27 NOTE — CARE COORDINATION
Discharge Planning:    CM continues to follow for DCP- [facility], [facility] pt. Pt is NPO for possible pacemaker placement pending EP consult. No precert needed for rehab placement once ready to DC. CM will follow.    Thank you  Dorcas Davis RN, BSN,   ICU   [phone]

## 2025-05-28 ENCOUNTER — ANESTHESIA EVENT (OUTPATIENT)
Dept: INTERVENTIONAL RADIOLOGY/VASCULAR | Age: 85
End: 2025-05-28
Payer: MEDICARE

## 2025-05-28 ENCOUNTER — ANESTHESIA (OUTPATIENT)
Dept: INTERVENTIONAL RADIOLOGY/VASCULAR | Age: 85
End: 2025-05-28
Payer: MEDICARE

## 2025-05-28 ENCOUNTER — APPOINTMENT (OUTPATIENT)
Dept: GENERAL RADIOLOGY | Age: 85
DRG: 023 | End: 2025-05-28
Payer: MEDICARE

## 2025-05-28 ENCOUNTER — APPOINTMENT (OUTPATIENT)
Age: 85
DRG: 023 | End: 2025-05-28
Attending: INTERNAL MEDICINE
Payer: MEDICARE

## 2025-05-28 LAB
ALBUMIN SERPL-MCNC: 3 G/DL (ref 3.4–5)
ALP SERPL-CCNC: 188 U/L (ref 40–129)
ALT SERPL-CCNC: 21 U/L (ref 10–40)
ANION GAP SERPL CALCULATED.3IONS-SCNC: 11 MMOL/L (ref 3–16)
AST SERPL-CCNC: 40 U/L (ref 15–37)
BASOPHILS # BLD: 0 K/UL (ref 0–0.2)
BASOPHILS NFR BLD: 0.3 %
BILIRUB DIRECT SERPL-MCNC: 0.7 MG/DL (ref 0–0.3)
BILIRUB INDIRECT SERPL-MCNC: 0.6 MG/DL (ref 0–1)
BILIRUB SERPL-MCNC: 1.3 MG/DL (ref 0–1)
BUN SERPL-MCNC: 13 MG/DL (ref 7–20)
CALCIUM SERPL-MCNC: 8.8 MG/DL (ref 8.3–10.6)
CHLORIDE SERPL-SCNC: 96 MMOL/L (ref 99–110)
CO2 SERPL-SCNC: 25 MMOL/L (ref 21–32)
CREAT SERPL-MCNC: 1 MG/DL (ref 0.8–1.3)
DEPRECATED RDW RBC AUTO: 15.5 % (ref 12.4–15.4)
EOSINOPHIL # BLD: 0 K/UL (ref 0–0.6)
EOSINOPHIL NFR BLD: 0.4 %
GFR SERPLBLD CREATININE-BSD FMLA CKD-EPI: 73 ML/MIN/{1.73_M2}
GLUCOSE SERPL-MCNC: 105 MG/DL (ref 70–99)
HCT VFR BLD AUTO: 34.6 % (ref 40.5–52.5)
HGB BLD-MCNC: 11.8 G/DL (ref 13.5–17.5)
INR PPP: 1.76 (ref 0.85–1.15)
LYMPHOCYTES # BLD: 0.7 K/UL (ref 1–5.1)
LYMPHOCYTES NFR BLD: 6.5 %
MCH RBC QN AUTO: 28.9 PG (ref 26–34)
MCHC RBC AUTO-ENTMCNC: 34 G/DL (ref 31–36)
MCV RBC AUTO: 84.9 FL (ref 80–100)
MONOCYTES # BLD: 1.4 K/UL (ref 0–1.3)
MONOCYTES NFR BLD: 13.8 %
NEUTROPHILS # BLD: 8 K/UL (ref 1.7–7.7)
NEUTROPHILS NFR BLD: 79 %
PHOSPHATE SERPL-MCNC: 3.2 MG/DL (ref 2.5–4.9)
PLATELET # BLD AUTO: 359 K/UL (ref 135–450)
PMV BLD AUTO: 7.8 FL (ref 5–10.5)
POTASSIUM SERPL-SCNC: 4 MMOL/L (ref 3.5–5.1)
PROT SERPL-MCNC: 6.9 G/DL (ref 6.4–8.2)
PROTHROMBIN TIME: 20.6 SEC (ref 11.9–14.9)
RBC # BLD AUTO: 4.08 M/UL (ref 4.2–5.9)
SODIUM SERPL-SCNC: 132 MMOL/L (ref 136–145)
WBC # BLD AUTO: 10.2 K/UL (ref 4–11)

## 2025-05-28 PROCEDURE — C1892 INTRO/SHEATH,FIXED,PEEL-AWAY: HCPCS | Performed by: INTERNAL MEDICINE

## 2025-05-28 PROCEDURE — 93312 ECHO TRANSESOPHAGEAL: CPT | Performed by: INTERNAL MEDICINE

## 2025-05-28 PROCEDURE — C1721 AICD, DUAL CHAMBER: HCPCS | Performed by: INTERNAL MEDICINE

## 2025-05-28 PROCEDURE — C1898 LEAD, PMKR, OTHER THAN TRANS: HCPCS | Performed by: INTERNAL MEDICINE

## 2025-05-28 PROCEDURE — 3700000000 HC ANESTHESIA ATTENDED CARE: Performed by: INTERNAL MEDICINE

## 2025-05-28 PROCEDURE — 2709999900 HC NON-CHARGEABLE SUPPLY: Performed by: INTERNAL MEDICINE

## 2025-05-28 PROCEDURE — 6360000002 HC RX W HCPCS

## 2025-05-28 PROCEDURE — 33249 INSJ/RPLCMT DEFIB W/LEAD(S): CPT | Performed by: INTERNAL MEDICINE

## 2025-05-28 PROCEDURE — 93312 ECHO TRANSESOPHAGEAL: CPT

## 2025-05-28 PROCEDURE — 85025 COMPLETE CBC W/AUTO DIFF WBC: CPT

## 2025-05-28 PROCEDURE — C1889 IMPLANT/INSERT DEVICE, NOC: HCPCS | Performed by: INTERNAL MEDICINE

## 2025-05-28 PROCEDURE — C1777 LEAD, AICD, ENDO SINGLE COIL: HCPCS | Performed by: INTERNAL MEDICINE

## 2025-05-28 PROCEDURE — 2580000003 HC RX 258

## 2025-05-28 PROCEDURE — 6370000000 HC RX 637 (ALT 250 FOR IP)

## 2025-05-28 PROCEDURE — 85610 PROTHROMBIN TIME: CPT

## 2025-05-28 PROCEDURE — 3700000001 HC ADD 15 MINUTES (ANESTHESIA): Performed by: INTERNAL MEDICINE

## 2025-05-28 PROCEDURE — C1894 INTRO/SHEATH, NON-LASER: HCPCS | Performed by: INTERNAL MEDICINE

## 2025-05-28 PROCEDURE — 2500000003 HC RX 250 WO HCPCS

## 2025-05-28 PROCEDURE — 99233 SBSQ HOSP IP/OBS HIGH 50: CPT | Performed by: INTERNAL MEDICINE

## 2025-05-28 PROCEDURE — 80069 RENAL FUNCTION PANEL: CPT

## 2025-05-28 PROCEDURE — 33230 INSRT PULSE GEN W/DUAL LEADS: CPT | Performed by: INTERNAL MEDICINE

## 2025-05-28 PROCEDURE — 6370000000 HC RX 637 (ALT 250 FOR IP): Performed by: SURGERY

## 2025-05-28 PROCEDURE — 2060000000 HC ICU INTERMEDIATE R&B

## 2025-05-28 PROCEDURE — 80076 HEPATIC FUNCTION PANEL: CPT

## 2025-05-28 PROCEDURE — 99231 SBSQ HOSP IP/OBS SF/LOW 25: CPT | Performed by: INTERNAL MEDICINE

## 2025-05-28 PROCEDURE — 6360000002 HC RX W HCPCS: Performed by: INTERNAL MEDICINE

## 2025-05-28 PROCEDURE — 73070 X-RAY EXAM OF ELBOW: CPT

## 2025-05-28 PROCEDURE — 6360000002 HC RX W HCPCS: Performed by: SURGERY

## 2025-05-28 PROCEDURE — 93325 DOPPLER ECHO COLOR FLOW MAPG: CPT | Performed by: INTERNAL MEDICINE

## 2025-05-28 PROCEDURE — 36415 COLL VENOUS BLD VENIPUNCTURE: CPT

## 2025-05-28 PROCEDURE — 6360000002 HC RX W HCPCS: Performed by: NURSE PRACTITIONER

## 2025-05-28 DEVICE — LEAD 6935M62 QUATTRO SECURE S MRI US
Type: IMPLANTABLE DEVICE | Status: FUNCTIONAL
Brand: SPRINT QUATTRO SECURE S MRI™ SURESCAN™

## 2025-05-28 DEVICE — LEAD 5076-52 MRI US RCMCRD
Type: IMPLANTABLE DEVICE | Status: FUNCTIONAL
Brand: CAPSUREFIX NOVUS MRI™ SURESCAN®

## 2025-05-28 DEVICE — ENVELOPE CMRM6133 ABSORB LRG MR
Type: IMPLANTABLE DEVICE | Status: FUNCTIONAL
Brand: TYRX™

## 2025-05-28 DEVICE — ICD DDPA2D4 COBALT XT DR MRI DF4 USA
Type: IMPLANTABLE DEVICE | Status: FUNCTIONAL
Brand: COBALT™ XT DR MRI SURESCAN™

## 2025-05-28 RX ORDER — SODIUM CHLORIDE 0.9 % (FLUSH) 0.9 %
5-40 SYRINGE (ML) INJECTION PRN
Status: DISCONTINUED | OUTPATIENT
Start: 2025-05-28 | End: 2025-06-06 | Stop reason: HOSPADM

## 2025-05-28 RX ORDER — SODIUM CHLORIDE 9 MG/ML
INJECTION, SOLUTION INTRAVENOUS
Status: DISCONTINUED | OUTPATIENT
Start: 2025-05-28 | End: 2025-05-28 | Stop reason: SDUPTHER

## 2025-05-28 RX ORDER — SODIUM CHLORIDE 9 MG/ML
INJECTION, SOLUTION INTRAVENOUS PRN
Status: DISCONTINUED | OUTPATIENT
Start: 2025-05-28 | End: 2025-06-06 | Stop reason: HOSPADM

## 2025-05-28 RX ORDER — HYDROMORPHONE HYDROCHLORIDE 1 MG/ML
0.5 INJECTION, SOLUTION INTRAMUSCULAR; INTRAVENOUS; SUBCUTANEOUS EVERY 5 MIN PRN
Status: DISCONTINUED | OUTPATIENT
Start: 2025-05-28 | End: 2025-05-30 | Stop reason: HOSPADM

## 2025-05-28 RX ORDER — BUPIVACAINE HYDROCHLORIDE 2.5 MG/ML
INJECTION, SOLUTION EPIDURAL; INFILTRATION; INTRACAUDAL; PERINEURAL PRN
Status: DISCONTINUED | OUTPATIENT
Start: 2025-05-28 | End: 2025-05-28 | Stop reason: HOSPADM

## 2025-05-28 RX ORDER — SODIUM CHLORIDE 0.9 % (FLUSH) 0.9 %
5-40 SYRINGE (ML) INJECTION EVERY 12 HOURS SCHEDULED
Status: DISCONTINUED | OUTPATIENT
Start: 2025-05-28 | End: 2025-06-06 | Stop reason: HOSPADM

## 2025-05-28 RX ORDER — MORPHINE SULFATE 2 MG/ML
2 INJECTION, SOLUTION INTRAMUSCULAR; INTRAVENOUS ONCE
Status: COMPLETED | OUTPATIENT
Start: 2025-05-28 | End: 2025-05-28

## 2025-05-28 RX ORDER — DIPHENHYDRAMINE HYDROCHLORIDE 50 MG/ML
12.5 INJECTION, SOLUTION INTRAMUSCULAR; INTRAVENOUS
Status: DISCONTINUED | OUTPATIENT
Start: 2025-05-28 | End: 2025-06-06 | Stop reason: HOSPADM

## 2025-05-28 RX ORDER — PROPOFOL 10 MG/ML
INJECTION, EMULSION INTRAVENOUS
Status: DISCONTINUED | OUTPATIENT
Start: 2025-05-28 | End: 2025-05-28 | Stop reason: SDUPTHER

## 2025-05-28 RX ORDER — HYDROMORPHONE HYDROCHLORIDE 1 MG/ML
0.25 INJECTION, SOLUTION INTRAMUSCULAR; INTRAVENOUS; SUBCUTANEOUS EVERY 5 MIN PRN
Status: DISCONTINUED | OUTPATIENT
Start: 2025-05-28 | End: 2025-05-30 | Stop reason: HOSPADM

## 2025-05-28 RX ORDER — NALOXONE HYDROCHLORIDE 0.4 MG/ML
INJECTION, SOLUTION INTRAMUSCULAR; INTRAVENOUS; SUBCUTANEOUS PRN
Status: DISCONTINUED | OUTPATIENT
Start: 2025-05-28 | End: 2025-06-06 | Stop reason: HOSPADM

## 2025-05-28 RX ORDER — CEFAZOLIN SODIUM 1 G/3ML
INJECTION, POWDER, FOR SOLUTION INTRAMUSCULAR; INTRAVENOUS
Status: DISCONTINUED | OUTPATIENT
Start: 2025-05-28 | End: 2025-05-28 | Stop reason: SDUPTHER

## 2025-05-28 RX ORDER — LIDOCAINE HYDROCHLORIDE 10 MG/ML
INJECTION, SOLUTION INFILTRATION; PERINEURAL PRN
Status: DISCONTINUED | OUTPATIENT
Start: 2025-05-28 | End: 2025-05-28 | Stop reason: HOSPADM

## 2025-05-28 RX ORDER — MEPERIDINE HYDROCHLORIDE 25 MG/ML
12.5 INJECTION INTRAMUSCULAR; INTRAVENOUS; SUBCUTANEOUS AS NEEDED
Status: DISCONTINUED | OUTPATIENT
Start: 2025-05-28 | End: 2025-05-30 | Stop reason: HOSPADM

## 2025-05-28 RX ORDER — HYDRALAZINE HYDROCHLORIDE 20 MG/ML
10 INJECTION INTRAMUSCULAR; INTRAVENOUS
Status: DISCONTINUED | OUTPATIENT
Start: 2025-05-28 | End: 2025-06-02

## 2025-05-28 RX ORDER — ONDANSETRON 2 MG/ML
4 INJECTION INTRAMUSCULAR; INTRAVENOUS
Status: DISCONTINUED | OUTPATIENT
Start: 2025-05-28 | End: 2025-05-30 | Stop reason: HOSPADM

## 2025-05-28 RX ORDER — LIDOCAINE HYDROCHLORIDE 20 MG/ML
INJECTION, SOLUTION INFILTRATION; PERINEURAL
Status: DISCONTINUED | OUTPATIENT
Start: 2025-05-28 | End: 2025-05-28 | Stop reason: SDUPTHER

## 2025-05-28 RX ORDER — PROCHLORPERAZINE EDISYLATE 5 MG/ML
5 INJECTION INTRAMUSCULAR; INTRAVENOUS
Status: DISCONTINUED | OUTPATIENT
Start: 2025-05-28 | End: 2025-05-30 | Stop reason: HOSPADM

## 2025-05-28 RX ORDER — MORPHINE SULFATE 2 MG/ML
1 INJECTION, SOLUTION INTRAMUSCULAR; INTRAVENOUS
Status: DISCONTINUED | OUTPATIENT
Start: 2025-05-28 | End: 2025-06-02

## 2025-05-28 RX ADMIN — PHENYLEPHRINE HYDROCHLORIDE 100 MCG: 10 INJECTION, SOLUTION INTRAVENOUS at 18:14

## 2025-05-28 RX ADMIN — PROPOFOL 50 MCG/KG/MIN: 10 INJECTION, EMULSION INTRAVENOUS at 17:01

## 2025-05-28 RX ADMIN — PHENYLEPHRINE HYDROCHLORIDE 200 MCG: 10 INJECTION, SOLUTION INTRAVENOUS at 17:26

## 2025-05-28 RX ADMIN — PHENYLEPHRINE HYDROCHLORIDE 100 MCG: 10 INJECTION, SOLUTION INTRAVENOUS at 17:57

## 2025-05-28 RX ADMIN — Medication: at 23:08

## 2025-05-28 RX ADMIN — DEXMEDETOMIDINE HYDROCHLORIDE 8 MCG: 100 INJECTION, SOLUTION INTRAVENOUS at 17:01

## 2025-05-28 RX ADMIN — SODIUM CHLORIDE: 9 INJECTION, SOLUTION INTRAVENOUS at 16:55

## 2025-05-28 RX ADMIN — PHENYLEPHRINE HYDROCHLORIDE 200 MCG: 10 INJECTION, SOLUTION INTRAVENOUS at 17:39

## 2025-05-28 RX ADMIN — PHENYLEPHRINE HYDROCHLORIDE 100 MCG: 10 INJECTION, SOLUTION INTRAVENOUS at 19:19

## 2025-05-28 RX ADMIN — MORPHINE SULFATE 2 MG: 2 INJECTION, SOLUTION INTRAMUSCULAR; INTRAVENOUS at 14:45

## 2025-05-28 RX ADMIN — DOPAMINE HYDROCHLORIDE IN DEXTROSE 7.5 MCG/KG/MIN: 1.6 INJECTION, SOLUTION INTRAVENOUS at 20:41

## 2025-05-28 RX ADMIN — APIXABAN 5 MG: 5 TABLET, FILM COATED ORAL at 07:36

## 2025-05-28 RX ADMIN — PHENYLEPHRINE HYDROCHLORIDE 200 MCG: 10 INJECTION, SOLUTION INTRAVENOUS at 17:02

## 2025-05-28 RX ADMIN — PROPOFOL 30 MG: 10 INJECTION, EMULSION INTRAVENOUS at 17:08

## 2025-05-28 RX ADMIN — Medication 5 MG: at 23:04

## 2025-05-28 RX ADMIN — MIDODRINE HYDROCHLORIDE 5 MG: 5 TABLET ORAL at 07:36

## 2025-05-28 RX ADMIN — Medication: at 07:37

## 2025-05-28 RX ADMIN — APIXABAN 5 MG: 5 TABLET, FILM COATED ORAL at 23:04

## 2025-05-28 RX ADMIN — LIDOCAINE: 50 OINTMENT TOPICAL at 12:13

## 2025-05-28 RX ADMIN — LIDOCAINE HYDROCHLORIDE 100 MG: 20 INJECTION, SOLUTION INFILTRATION; PERINEURAL at 17:00

## 2025-05-28 RX ADMIN — MORPHINE SULFATE 1 MG: 2 INJECTION, SOLUTION INTRAMUSCULAR; INTRAVENOUS at 04:51

## 2025-05-28 RX ADMIN — CEFAZOLIN SODIUM 2 G: 1 POWDER, FOR SOLUTION INTRAMUSCULAR; INTRAVENOUS at 17:21

## 2025-05-28 RX ADMIN — ATORVASTATIN CALCIUM 40 MG: 40 TABLET, FILM COATED ORAL at 23:05

## 2025-05-28 RX ADMIN — PROPOFOL 30 MG: 10 INJECTION, EMULSION INTRAVENOUS at 17:00

## 2025-05-28 RX ADMIN — ACETAMINOPHEN 650 MG: 325 TABLET ORAL at 08:01

## 2025-05-28 RX ADMIN — DOPAMINE HYDROCHLORIDE IN DEXTROSE 5 MCG/KG/MIN: 1.6 INJECTION, SOLUTION INTRAVENOUS at 09:04

## 2025-05-28 ASSESSMENT — PAIN DESCRIPTION - ORIENTATION
ORIENTATION: RIGHT

## 2025-05-28 ASSESSMENT — PAIN DESCRIPTION - DESCRIPTORS: DESCRIPTORS: ACHING

## 2025-05-28 ASSESSMENT — PAIN SCALES - GENERAL
PAINLEVEL_OUTOF10: 7
PAINLEVEL_OUTOF10: 9
PAINLEVEL_OUTOF10: 0
PAINLEVEL_OUTOF10: 7
PAINLEVEL_OUTOF10: 0
PAINLEVEL_OUTOF10: 9

## 2025-05-28 ASSESSMENT — ENCOUNTER SYMPTOMS: SHORTNESS OF BREATH: 1

## 2025-05-28 ASSESSMENT — PAIN DESCRIPTION - LOCATION
LOCATION: ELBOW

## 2025-05-28 NOTE — ANESTHESIA PRE PROCEDURE
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    Establishing care with new doctor, encounter for Z76.89    Irregular heart rate I49.9    Palpitations R00.2    Mixed hyperlipidemia E78.2    PAF (paroxysmal atrial fibrillation) ([facility]) I48.0    Essential hypertension I10    Abnormal echocardiogram R93.1    Hypotension I95.9    Abnormal ECG R94.31    HFrEF (heart failure with reduced ejection fraction) ([facility]) I50.20    Medication management Z79.899    Unequal blood pressure in upper extremities R09.89    Hematuria R31.9    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    Hydronephrosis N13.30    Anticoagulated Z79.01    History of prostate cancer Z85.46    Acute CVA (cerebrovascular accident) ([facility]) I63.9    Paroxysmal atrial fibrillation ([facility]) I48.0    Acute right MCA stroke ([facility]) I63.511    Sick sinus syndrome ([facility]) I49.5    Junctional rhythm I49.8       Past Medical History:        Diagnosis Date    CHF (congestive heart failure) ([facility])     Chronic kidney disease     kidney stones    Colon cancer ([facility])     Guillain Barré syndrome     Hyperlipidemia     Hypertension     Left ureteral stone     Right Ureteral mass     Stroke ([facility]) 05/15/2025       Past Surgical History:        Procedure Laterality Date    ADRENALECTOMY      groin    BACK SURGERY      CARPAL TUNNEL RELEASE      bilateral    COLONOSCOPY      CYSTOSCOPY Bilateral 03/27/2025    CYSTOSCOPY, BILATERAL STENT PLACEMENT,BILATERAL RETROGRADE PYELOGRAM performed by Eduardo Mendoza MD at [facility] [place] OR    CYSTOSCOPY Bilateral 04/25/2025    CYSTOSCOPY RIGHT DIAGNOSTIC URETEROSCOPY RIGHT URETERAL MASS, EXCISION WITH HOLMIUM LASER, LEFT URETEROSCOPY HOLMIUM LASER STONE MANIPULATION WITH CVAC, BILATERAL STENT EXCHANGE performed by Eduardo Mendoza MD at [facility] OR    INTRACAPSULAR CATARACT EXTRACTION Right 03/19/2019    PHACOEMULSIFICATION OF CATARACT RIGHT EYE WITH INTRAOCULARLENS IMPLANT performed by Thierno Jensen MD at [facility] OR

## 2025-05-28 NOTE — H&P
[facility]          Electrophysiology H&P Note       Date of Procedure: 5/28/2025  Patient's Name: Jhony Hdez  YOB: 1940   Medical Record Number: [id number]    Indication:  Junctional bradycardia  Paroxsymal atrial fibrillation  ANNIE cardioversion  Dual chamber ICD +/- CS lead placement     Consent:   I have discussed with the patient and/or the patient representative the indication, alternatives, and the possible risks and/or complications of the planned procedure and the anesthesia methods. The patient and/or patient representative appear to understand and agree to proceed.    Past Medical History:   Diagnosis Date    CHF (congestive heart failure) (HCC)     Chronic kidney disease     kidney stones    Colon cancer (HCC)     Guillain Barré syndrome     Hyperlipidemia     Hypertension     Left ureteral stone     Right Ureteral mass     Stroke ([facility]) 05/15/2025       Past Surgical History:   Procedure Laterality Date    ADRENALECTOMY      groin    BACK SURGERY      CARPAL TUNNEL RELEASE      bilateral    COLONOSCOPY      CYSTOSCOPY Bilateral 03/27/2025    CYSTOSCOPY, BILATERAL STENT PLACEMENT,BILATERAL RETROGRADE PYELOGRAM performed by Eduardo Mendoza MD at [facility] [place] OR    CYSTOSCOPY Bilateral 04/25/2025    CYSTOSCOPY RIGHT DIAGNOSTIC URETEROSCOPY RIGHT URETERAL MASS, EXCISION WITH HOLMIUM LASER, LEFT URETEROSCOPY HOLMIUM LASER STONE MANIPULATION WITH CVAC, BILATERAL STENT EXCHANGE performed by Eduardo Mendoza MD at [facility] OR    INTRACAPSULAR CATARACT EXTRACTION Right 03/19/2019    PHACOEMULSIFICATION OF CATARACT RIGHT EYE WITH INTRAOCULARLENS IMPLANT performed by Thierno Jensen MD at [facility] OR    KIDNEY STONE SURGERY      several    MANDIBLE SURGERY      NEUROVASCULAR PROCEDURE N/A 5/15/2025    ANGIOGRAM CEREBRAL (09508) performed by Gomez Sheth MD at [facility] CARDIAC CATH LAB    NEUROVASCULAR PROCEDURE N/A 5/16/2025    ANGIOGRAM CEREBRAL (42489) performed by Gomez Sheth

## 2025-05-29 ENCOUNTER — TELEPHONE (OUTPATIENT)
Dept: CARDIOLOGY CLINIC | Age: 85
End: 2025-05-29

## 2025-05-29 ENCOUNTER — APPOINTMENT (OUTPATIENT)
Dept: GENERAL RADIOLOGY | Age: 85
DRG: 023 | End: 2025-05-29
Payer: MEDICARE

## 2025-05-29 ENCOUNTER — APPOINTMENT (OUTPATIENT)
Age: 85
DRG: 023 | End: 2025-05-29
Attending: INTERNAL MEDICINE
Payer: MEDICARE

## 2025-05-29 ENCOUNTER — NURSE ONLY (OUTPATIENT)
Dept: CARDIOLOGY CLINIC | Age: 85
End: 2025-05-29

## 2025-05-29 DIAGNOSIS — Z95.810 ICD (IMPLANTABLE CARDIOVERTER-DEFIBRILLATOR), DUAL, IN SITU: Primary | ICD-10-CM

## 2025-05-29 DIAGNOSIS — I49.5 SICK SINUS SYNDROME (HCC): ICD-10-CM

## 2025-05-29 DIAGNOSIS — I42.8 NICM (NONISCHEMIC CARDIOMYOPATHY) (HCC): ICD-10-CM

## 2025-05-29 DIAGNOSIS — I49.8 JUNCTIONAL RHYTHM: ICD-10-CM

## 2025-05-29 DIAGNOSIS — I48.0 PAROXYSMAL ATRIAL FIBRILLATION (HCC): ICD-10-CM

## 2025-05-29 LAB
ECHO BSA: 2.11 M2
GLUCOSE BLD-MCNC: 130 MG/DL (ref 70–99)
PERFORMED ON: ABNORMAL

## 2025-05-29 PROCEDURE — 2060000000 HC ICU INTERMEDIATE R&B

## 2025-05-29 PROCEDURE — 93308 TTE F-UP OR LMTD: CPT | Performed by: INTERNAL MEDICINE

## 2025-05-29 PROCEDURE — 99291 CRITICAL CARE FIRST HOUR: CPT | Performed by: INTERNAL MEDICINE

## 2025-05-29 PROCEDURE — 6360000002 HC RX W HCPCS

## 2025-05-29 PROCEDURE — 83735 ASSAY OF MAGNESIUM: CPT

## 2025-05-29 PROCEDURE — 6370000000 HC RX 637 (ALT 250 FOR IP)

## 2025-05-29 PROCEDURE — 97112 NEUROMUSCULAR REEDUCATION: CPT

## 2025-05-29 PROCEDURE — 2500000003 HC RX 250 WO HCPCS: Performed by: INTERNAL MEDICINE

## 2025-05-29 PROCEDURE — 6360000002 HC RX W HCPCS: Performed by: SURGERY

## 2025-05-29 PROCEDURE — 6370000000 HC RX 637 (ALT 250 FOR IP): Performed by: SURGERY

## 2025-05-29 PROCEDURE — 80069 RENAL FUNCTION PANEL: CPT

## 2025-05-29 PROCEDURE — 85025 COMPLETE CBC W/AUTO DIFF WBC: CPT

## 2025-05-29 PROCEDURE — 71046 X-RAY EXAM CHEST 2 VIEWS: CPT

## 2025-05-29 PROCEDURE — 92526 ORAL FUNCTION THERAPY: CPT

## 2025-05-29 PROCEDURE — 97535 SELF CARE MNGMENT TRAINING: CPT

## 2025-05-29 PROCEDURE — 93308 TTE F-UP OR LMTD: CPT

## 2025-05-29 PROCEDURE — 6370000000 HC RX 637 (ALT 250 FOR IP): Performed by: INTERNAL MEDICINE

## 2025-05-29 PROCEDURE — 6360000002 HC RX W HCPCS: Performed by: NURSE PRACTITIONER

## 2025-05-29 PROCEDURE — 2580000003 HC RX 258

## 2025-05-29 PROCEDURE — 97530 THERAPEUTIC ACTIVITIES: CPT

## 2025-05-29 PROCEDURE — 92507 TX SP LANG VOICE COMM INDIV: CPT

## 2025-05-29 PROCEDURE — 2500000003 HC RX 250 WO HCPCS: Performed by: ANESTHESIOLOGY

## 2025-05-29 PROCEDURE — 36415 COLL VENOUS BLD VENIPUNCTURE: CPT

## 2025-05-29 PROCEDURE — 97110 THERAPEUTIC EXERCISES: CPT

## 2025-05-29 RX ORDER — DOPAMINE HYDROCHLORIDE 160 MG/100ML
1-20 INJECTION, SOLUTION INTRAVENOUS CONTINUOUS
Status: DISCONTINUED | OUTPATIENT
Start: 2025-05-29 | End: 2025-05-29

## 2025-05-29 RX ORDER — MIDODRINE HYDROCHLORIDE 5 MG/1
5 TABLET ORAL ONCE
Status: COMPLETED | OUTPATIENT
Start: 2025-05-29 | End: 2025-05-29

## 2025-05-29 RX ORDER — MIDODRINE HYDROCHLORIDE 5 MG/1
5 TABLET ORAL
Status: DISCONTINUED | OUTPATIENT
Start: 2025-05-29 | End: 2025-05-30

## 2025-05-29 RX ORDER — MIDODRINE HYDROCHLORIDE 5 MG/1
2.5 TABLET ORAL
Status: DISCONTINUED | OUTPATIENT
Start: 2025-05-29 | End: 2025-05-29

## 2025-05-29 RX ORDER — SODIUM CHLORIDE 9 MG/ML
INJECTION, SOLUTION INTRAVENOUS CONTINUOUS
Status: ACTIVE | OUTPATIENT
Start: 2025-05-29 | End: 2025-05-30

## 2025-05-29 RX ORDER — MIDODRINE HYDROCHLORIDE 5 MG/1
10 TABLET ORAL
Status: DISCONTINUED | OUTPATIENT
Start: 2025-05-29 | End: 2025-05-29

## 2025-05-29 RX ORDER — SIMETHICONE 80 MG
80 TABLET,CHEWABLE ORAL DAILY
Qty: 180 TABLET | Refills: 3 | Status: CANCELLED | OUTPATIENT
Start: 2025-05-30

## 2025-05-29 RX ORDER — MIDODRINE HYDROCHLORIDE 5 MG/1
5 TABLET ORAL
Qty: 90 TABLET | Refills: 3 | Status: CANCELLED | OUTPATIENT
Start: 2025-05-29

## 2025-05-29 RX ORDER — DOPAMINE HYDROCHLORIDE 160 MG/100ML
1-20 INJECTION, SOLUTION INTRAVENOUS CONTINUOUS
Status: DISCONTINUED | OUTPATIENT
Start: 2025-05-29 | End: 2025-06-06 | Stop reason: HOSPADM

## 2025-05-29 RX ORDER — ATORVASTATIN CALCIUM 40 MG/1
40 TABLET, FILM COATED ORAL NIGHTLY
Qty: 30 TABLET | Refills: 3 | Status: CANCELLED | OUTPATIENT
Start: 2025-05-29

## 2025-05-29 RX ADMIN — SODIUM CHLORIDE, PRESERVATIVE FREE 10 ML: 5 INJECTION INTRAVENOUS at 08:30

## 2025-05-29 RX ADMIN — SODIUM CHLORIDE: 0.9 INJECTION, SOLUTION INTRAVENOUS at 15:40

## 2025-05-29 RX ADMIN — Medication 5 MG: at 21:22

## 2025-05-29 RX ADMIN — APIXABAN 5 MG: 5 TABLET, FILM COATED ORAL at 08:30

## 2025-05-29 RX ADMIN — DOPAMINE HYDROCHLORIDE 5 MCG/KG/MIN: 160 INJECTION, SOLUTION INTRAVENOUS at 12:55

## 2025-05-29 RX ADMIN — MORPHINE SULFATE 1 MG: 2 INJECTION, SOLUTION INTRAMUSCULAR; INTRAVENOUS at 13:59

## 2025-05-29 RX ADMIN — MIDODRINE HYDROCHLORIDE 5 MG: 5 TABLET ORAL at 08:29

## 2025-05-29 RX ADMIN — MIDODRINE HYDROCHLORIDE 5 MG: 5 TABLET ORAL at 10:40

## 2025-05-29 RX ADMIN — APIXABAN 5 MG: 5 TABLET, FILM COATED ORAL at 21:22

## 2025-05-29 RX ADMIN — DOPAMINE HYDROCHLORIDE 2.5 MCG/KG/MIN: 160 INJECTION, SOLUTION INTRAVENOUS at 20:49

## 2025-05-29 RX ADMIN — SODIUM CHLORIDE, PRESERVATIVE FREE 10 ML: 5 INJECTION INTRAVENOUS at 21:22

## 2025-05-29 RX ADMIN — MIDODRINE HYDROCHLORIDE 10 MG: 5 TABLET ORAL at 12:28

## 2025-05-29 RX ADMIN — SIMETHICONE 80 MG: 80 TABLET, CHEWABLE ORAL at 08:29

## 2025-05-29 RX ADMIN — ACETAMINOPHEN 650 MG: 325 TABLET ORAL at 21:23

## 2025-05-29 RX ADMIN — DOPAMINE HYDROCHLORIDE 5 MCG/KG/MIN: 160 INJECTION, SOLUTION INTRAVENOUS at 10:43

## 2025-05-29 RX ADMIN — LIDOCAINE: 50 OINTMENT TOPICAL at 21:44

## 2025-05-29 RX ADMIN — PANTOPRAZOLE SODIUM 40 MG: 40 TABLET, DELAYED RELEASE ORAL at 08:29

## 2025-05-29 RX ADMIN — ATORVASTATIN CALCIUM 40 MG: 40 TABLET, FILM COATED ORAL at 21:22

## 2025-05-29 RX ADMIN — SODIUM CHLORIDE, PRESERVATIVE FREE 10 ML: 5 INJECTION INTRAVENOUS at 08:31

## 2025-05-29 RX ADMIN — MIDODRINE HYDROCHLORIDE 5 MG: 5 TABLET ORAL at 17:14

## 2025-05-29 RX ADMIN — DOPAMINE HYDROCHLORIDE 2.5 MCG/KG/MIN: 160 INJECTION, SOLUTION INTRAVENOUS at 15:38

## 2025-05-29 RX ADMIN — Medication: at 21:22

## 2025-05-29 RX ADMIN — Medication: at 08:30

## 2025-05-29 ASSESSMENT — PAIN SCALES - GENERAL
PAINLEVEL_OUTOF10: 5
PAINLEVEL_OUTOF10: 0
PAINLEVEL_OUTOF10: 7
PAINLEVEL_OUTOF10: 0
PAINLEVEL_OUTOF10: 0
PAINLEVEL_OUTOF10: 7
PAINLEVEL_OUTOF10: 7

## 2025-05-29 ASSESSMENT — PAIN SCALES - WONG BAKER
WONGBAKER_NUMERICALRESPONSE: NO HURT
WONGBAKER_NUMERICALRESPONSE: NO HURT

## 2025-05-29 NOTE — TELEPHONE ENCOUNTER
Please schedule 1 week site check on device schedule and ~3 month hospital follow up with EP provider at [place]. Thank you.

## 2025-05-29 NOTE — TELEPHONE ENCOUNTER
----- Message from Dr. Christoph Martinez MD sent at 5/28/2025  7:48 PM EDT -----  Patient at [facility] who I implanted a dual-chamber ICD, primary prevention, bradycardia and paroxysmal atrial fibrillation, low expected ventricular pacing burden, patient follows with cardiology at [facility], patient would like to follow-up with [facility] EP, can we please have a 1 week wound/device check followed by 3-month follow-up in EP clinic.    Thanks    Juan

## 2025-05-29 NOTE — CARE COORDINATION
Discharge Planning:    CM continues to follow for DCP- [facility] Pt had pacemaker placed yesterday. No precert needed for rehab placement once ready to DC. CM will follow.    Thank you  Dorcas Davis RN, BSN,   ICU   [phone]

## 2025-05-29 NOTE — ANESTHESIA POSTPROCEDURE EVALUATION
Department of Anesthesiology  Postprocedure Note    Patient: Jhony Hdez  MRN: [id number]  YOB: 1940  Date of evaluation: 5/28/2025    Procedure Summary       Date: 05/28/25 Room / Location: [facility] SPECIAL PROCEDURES 3 / [facility] CARDIAC CATH LAB    Anesthesia Start: 1655 Anesthesia Stop: 1935    Procedure: Insert ICD dual Diagnosis:       Cardiomyopathy, unspecified type (HCC)      (Cardiomyopathy, unspecified type (HCC) [I42.9])    Providers: Christoph Martinez MD Responsible Provider: Girish Ornelas DO    Anesthesia Type: MAC ASA Status: 4            Anesthesia Type: No value filed.    Aly Phase I: Aly Score: 8    Aly Phase II:      Anesthesia Post Evaluation    Patient location during evaluation: PACU  Patient participation: complete - patient participated  Level of consciousness: awake and alert  Pain score: 0  Airway patency: patent  Nausea & Vomiting: no nausea and no vomiting  Cardiovascular status: hemodynamically stable  Respiratory status: acceptable  Hydration status: stable  Pain management: adequate and satisfactory to patient    No notable events documented.

## 2025-05-30 LAB
ALBUMIN SERPL-MCNC: 2.5 G/DL (ref 3.4–5)
ALBUMIN SERPL-MCNC: 2.6 G/DL (ref 3.4–5)
ALP SERPL-CCNC: 237 U/L (ref 40–129)
ALT SERPL-CCNC: 23 U/L (ref 10–40)
ANION GAP SERPL CALCULATED.3IONS-SCNC: 11 MMOL/L (ref 3–16)
ANION GAP SERPL CALCULATED.3IONS-SCNC: 11 MMOL/L (ref 3–16)
AST SERPL-CCNC: 68 U/L (ref 15–37)
BASOPHILS # BLD: 0 K/UL (ref 0–0.2)
BASOPHILS # BLD: 0 K/UL (ref 0–0.2)
BASOPHILS NFR BLD: 0.2 %
BASOPHILS NFR BLD: 0.3 %
BILIRUB DIRECT SERPL-MCNC: 0.9 MG/DL (ref 0–0.3)
BILIRUB INDIRECT SERPL-MCNC: 0.4 MG/DL (ref 0–1)
BILIRUB SERPL-MCNC: 1.3 MG/DL (ref 0–1)
BUN SERPL-MCNC: 17 MG/DL (ref 7–20)
BUN SERPL-MCNC: 18 MG/DL (ref 7–20)
CALCIUM SERPL-MCNC: 8.3 MG/DL (ref 8.3–10.6)
CALCIUM SERPL-MCNC: 8.3 MG/DL (ref 8.3–10.6)
CHLORIDE SERPL-SCNC: 100 MMOL/L (ref 99–110)
CHLORIDE SERPL-SCNC: 100 MMOL/L (ref 99–110)
CO2 SERPL-SCNC: 22 MMOL/L (ref 21–32)
CO2 SERPL-SCNC: 23 MMOL/L (ref 21–32)
CREAT SERPL-MCNC: 1 MG/DL (ref 0.8–1.3)
CREAT SERPL-MCNC: 1.1 MG/DL (ref 0.8–1.3)
DEPRECATED RDW RBC AUTO: 15.6 % (ref 12.4–15.4)
DEPRECATED RDW RBC AUTO: 15.7 % (ref 12.4–15.4)
EOSINOPHIL # BLD: 0 K/UL (ref 0–0.6)
EOSINOPHIL # BLD: 0.1 K/UL (ref 0–0.6)
EOSINOPHIL NFR BLD: 0.1 %
EOSINOPHIL NFR BLD: 0.6 %
GFR SERPLBLD CREATININE-BSD FMLA CKD-EPI: 66 ML/MIN/{1.73_M2}
GFR SERPLBLD CREATININE-BSD FMLA CKD-EPI: 73 ML/MIN/{1.73_M2}
GLUCOSE SERPL-MCNC: 101 MG/DL (ref 70–99)
GLUCOSE SERPL-MCNC: 110 MG/DL (ref 70–99)
HCT VFR BLD AUTO: 27.5 % (ref 40.5–52.5)
HCT VFR BLD AUTO: 28.6 % (ref 40.5–52.5)
HGB BLD-MCNC: 9.4 G/DL (ref 13.5–17.5)
HGB BLD-MCNC: 9.5 G/DL (ref 13.5–17.5)
LYMPHOCYTES # BLD: 0.6 K/UL (ref 1–5.1)
LYMPHOCYTES # BLD: 0.6 K/UL (ref 1–5.1)
LYMPHOCYTES NFR BLD: 5.9 %
LYMPHOCYTES NFR BLD: 6.1 %
MAGNESIUM SERPL-MCNC: 2.04 MG/DL (ref 1.8–2.4)
MCH RBC QN AUTO: 28.2 PG (ref 26–34)
MCH RBC QN AUTO: 28.6 PG (ref 26–34)
MCHC RBC AUTO-ENTMCNC: 33.4 G/DL (ref 31–36)
MCHC RBC AUTO-ENTMCNC: 34 G/DL (ref 31–36)
MCV RBC AUTO: 84.1 FL (ref 80–100)
MCV RBC AUTO: 84.6 FL (ref 80–100)
MONOCYTES # BLD: 1 K/UL (ref 0–1.3)
MONOCYTES # BLD: 1.3 K/UL (ref 0–1.3)
MONOCYTES NFR BLD: 10.3 %
MONOCYTES NFR BLD: 12.5 %
NEUTROPHILS # BLD: 8.1 K/UL (ref 1.7–7.7)
NEUTROPHILS # BLD: 8.1 K/UL (ref 1.7–7.7)
NEUTROPHILS NFR BLD: 81.2 %
NEUTROPHILS NFR BLD: 82.8 %
PHOSPHATE SERPL-MCNC: 2.7 MG/DL (ref 2.5–4.9)
PHOSPHATE SERPL-MCNC: 2.8 MG/DL (ref 2.5–4.9)
PLATELET # BLD AUTO: 353 K/UL (ref 135–450)
PLATELET # BLD AUTO: 384 K/UL (ref 135–450)
PMV BLD AUTO: 8 FL (ref 5–10.5)
PMV BLD AUTO: 8.1 FL (ref 5–10.5)
POTASSIUM SERPL-SCNC: 3.7 MMOL/L (ref 3.5–5.1)
POTASSIUM SERPL-SCNC: 3.8 MMOL/L (ref 3.5–5.1)
PROT SERPL-MCNC: 6.2 G/DL (ref 6.4–8.2)
RBC # BLD AUTO: 3.27 M/UL (ref 4.2–5.9)
RBC # BLD AUTO: 3.38 M/UL (ref 4.2–5.9)
SODIUM SERPL-SCNC: 133 MMOL/L (ref 136–145)
SODIUM SERPL-SCNC: 134 MMOL/L (ref 136–145)
WBC # BLD AUTO: 10 K/UL (ref 4–11)
WBC # BLD AUTO: 9.8 K/UL (ref 4–11)

## 2025-05-30 PROCEDURE — 6370000000 HC RX 637 (ALT 250 FOR IP): Performed by: SURGERY

## 2025-05-30 PROCEDURE — 99233 SBSQ HOSP IP/OBS HIGH 50: CPT | Performed by: INTERNAL MEDICINE

## 2025-05-30 PROCEDURE — 97530 THERAPEUTIC ACTIVITIES: CPT

## 2025-05-30 PROCEDURE — 80069 RENAL FUNCTION PANEL: CPT

## 2025-05-30 PROCEDURE — 85025 COMPLETE CBC W/AUTO DIFF WBC: CPT

## 2025-05-30 PROCEDURE — 2060000000 HC ICU INTERMEDIATE R&B

## 2025-05-30 PROCEDURE — 6370000000 HC RX 637 (ALT 250 FOR IP)

## 2025-05-30 PROCEDURE — 92507 TX SP LANG VOICE COMM INDIV: CPT

## 2025-05-30 PROCEDURE — 80076 HEPATIC FUNCTION PANEL: CPT

## 2025-05-30 PROCEDURE — 36415 COLL VENOUS BLD VENIPUNCTURE: CPT

## 2025-05-30 PROCEDURE — 6370000000 HC RX 637 (ALT 250 FOR IP): Performed by: INTERNAL MEDICINE

## 2025-05-30 PROCEDURE — 2500000003 HC RX 250 WO HCPCS: Performed by: ANESTHESIOLOGY

## 2025-05-30 PROCEDURE — 97112 NEUROMUSCULAR REEDUCATION: CPT

## 2025-05-30 PROCEDURE — 92526 ORAL FUNCTION THERAPY: CPT

## 2025-05-30 PROCEDURE — 2500000003 HC RX 250 WO HCPCS: Performed by: INTERNAL MEDICINE

## 2025-05-30 PROCEDURE — 97110 THERAPEUTIC EXERCISES: CPT

## 2025-05-30 PROCEDURE — 2580000003 HC RX 258: Performed by: INTERNAL MEDICINE

## 2025-05-30 RX ORDER — MIDODRINE HYDROCHLORIDE 5 MG/1
10 TABLET ORAL
Status: DISCONTINUED | OUTPATIENT
Start: 2025-05-30 | End: 2025-06-06 | Stop reason: HOSPADM

## 2025-05-30 RX ORDER — 0.9 % SODIUM CHLORIDE 0.9 %
500 INTRAVENOUS SOLUTION INTRAVENOUS ONCE
Status: COMPLETED | OUTPATIENT
Start: 2025-05-30 | End: 2025-05-30

## 2025-05-30 RX ADMIN — SODIUM CHLORIDE, PRESERVATIVE FREE 10 ML: 5 INJECTION INTRAVENOUS at 09:48

## 2025-05-30 RX ADMIN — SODIUM CHLORIDE 500 ML: 0.9 INJECTION, SOLUTION INTRAVENOUS at 09:39

## 2025-05-30 RX ADMIN — ATORVASTATIN CALCIUM 40 MG: 40 TABLET, FILM COATED ORAL at 19:50

## 2025-05-30 RX ADMIN — SODIUM CHLORIDE, PRESERVATIVE FREE 10 ML: 5 INJECTION INTRAVENOUS at 09:49

## 2025-05-30 RX ADMIN — ACETAMINOPHEN 650 MG: 325 TABLET ORAL at 19:50

## 2025-05-30 RX ADMIN — APIXABAN 5 MG: 5 TABLET, FILM COATED ORAL at 19:50

## 2025-05-30 RX ADMIN — Medication: at 09:47

## 2025-05-30 RX ADMIN — ACETAMINOPHEN 650 MG: 325 TABLET ORAL at 04:58

## 2025-05-30 RX ADMIN — Medication 5 MG: at 19:50

## 2025-05-30 RX ADMIN — LIDOCAINE: 50 OINTMENT TOPICAL at 19:51

## 2025-05-30 RX ADMIN — ACETAMINOPHEN 650 MG: 325 TABLET ORAL at 15:31

## 2025-05-30 RX ADMIN — Medication: at 19:51

## 2025-05-30 RX ADMIN — SODIUM CHLORIDE, PRESERVATIVE FREE 10 ML: 5 INJECTION INTRAVENOUS at 19:51

## 2025-05-30 RX ADMIN — SODIUM CHLORIDE, PRESERVATIVE FREE 10 ML: 5 INJECTION INTRAVENOUS at 09:47

## 2025-05-30 RX ADMIN — SIMETHICONE 80 MG: 80 TABLET, CHEWABLE ORAL at 09:47

## 2025-05-30 RX ADMIN — APIXABAN 5 MG: 5 TABLET, FILM COATED ORAL at 09:47

## 2025-05-30 RX ADMIN — PANTOPRAZOLE SODIUM 40 MG: 40 TABLET, DELAYED RELEASE ORAL at 05:02

## 2025-05-30 RX ADMIN — MIDODRINE HYDROCHLORIDE 10 MG: 5 TABLET ORAL at 18:26

## 2025-05-30 RX ADMIN — MIDODRINE HYDROCHLORIDE 10 MG: 5 TABLET ORAL at 09:44

## 2025-05-30 ASSESSMENT — PAIN DESCRIPTION - FREQUENCY
FREQUENCY: INTERMITTENT

## 2025-05-30 ASSESSMENT — PAIN - FUNCTIONAL ASSESSMENT
PAIN_FUNCTIONAL_ASSESSMENT: PREVENTS OR INTERFERES SOME ACTIVE ACTIVITIES AND ADLS

## 2025-05-30 ASSESSMENT — PAIN DESCRIPTION - ORIENTATION
ORIENTATION: RIGHT

## 2025-05-30 ASSESSMENT — PAIN DESCRIPTION - PAIN TYPE
TYPE: ACUTE PAIN

## 2025-05-30 ASSESSMENT — PAIN DESCRIPTION - DESCRIPTORS
DESCRIPTORS: ACHING
DESCRIPTORS: ACHING;DISCOMFORT

## 2025-05-30 ASSESSMENT — PAIN DESCRIPTION - ONSET
ONSET: ON-GOING

## 2025-05-30 ASSESSMENT — PAIN SCALES - GENERAL
PAINLEVEL_OUTOF10: 4
PAINLEVEL_OUTOF10: 4
PAINLEVEL_OUTOF10: 0
PAINLEVEL_OUTOF10: 4
PAINLEVEL_OUTOF10: 8
PAINLEVEL_OUTOF10: 6
PAINLEVEL_OUTOF10: 4
PAINLEVEL_OUTOF10: 0
PAINLEVEL_OUTOF10: 2

## 2025-05-30 ASSESSMENT — PAIN DESCRIPTION - LOCATION
LOCATION: KNEE;NECK
LOCATION: NECK
LOCATION: NECK;KNEE
LOCATION: KNEE;NECK

## 2025-05-30 NOTE — CARE COORDINATION
DISCHARGE PLANNING:  Chart reviewed.  Patient is from home with her his spouse. No current services.    Current discharge plan is [facility] who accepted.   They are able to take patient once medically stable.    Per Dr. Diaz, working on getting the Dopamine drip weaned off.    CM team will continue to follow.  Lisa Hammer RN Case manager

## 2025-05-31 PROBLEM — Z95.810 ICD (IMPLANTABLE CARDIOVERTER-DEFIBRILLATOR), DUAL, IN SITU: Status: ACTIVE | Noted: 2025-05-31

## 2025-05-31 LAB
ALBUMIN SERPL-MCNC: 2.4 G/DL (ref 3.4–5)
ALP SERPL-CCNC: 364 U/L (ref 40–129)
ALT SERPL-CCNC: 55 U/L (ref 10–40)
ANION GAP SERPL CALCULATED.3IONS-SCNC: 9 MMOL/L (ref 3–16)
AST SERPL-CCNC: 154 U/L (ref 15–37)
BASOPHILS # BLD: 0 K/UL (ref 0–0.2)
BASOPHILS NFR BLD: 0.5 %
BILIRUB DIRECT SERPL-MCNC: 0.8 MG/DL (ref 0–0.3)
BILIRUB INDIRECT SERPL-MCNC: 0.4 MG/DL (ref 0–1)
BILIRUB SERPL-MCNC: 1.2 MG/DL (ref 0–1)
BUN SERPL-MCNC: 17 MG/DL (ref 7–20)
CALCIUM SERPL-MCNC: 8.3 MG/DL (ref 8.3–10.6)
CHLORIDE SERPL-SCNC: 103 MMOL/L (ref 99–110)
CO2 SERPL-SCNC: 23 MMOL/L (ref 21–32)
CREAT SERPL-MCNC: 1 MG/DL (ref 0.8–1.3)
DEPRECATED RDW RBC AUTO: 16 % (ref 12.4–15.4)
EOSINOPHIL # BLD: 0.1 K/UL (ref 0–0.6)
EOSINOPHIL NFR BLD: 1.1 %
GFR SERPLBLD CREATININE-BSD FMLA CKD-EPI: 73 ML/MIN/{1.73_M2}
GLUCOSE SERPL-MCNC: 98 MG/DL (ref 70–99)
HCT VFR BLD AUTO: 27 % (ref 40.5–52.5)
HGB BLD-MCNC: 9.2 G/DL (ref 13.5–17.5)
LYMPHOCYTES # BLD: 0.6 K/UL (ref 1–5.1)
LYMPHOCYTES NFR BLD: 6.5 %
MCH RBC QN AUTO: 28.7 PG (ref 26–34)
MCHC RBC AUTO-ENTMCNC: 34.2 G/DL (ref 31–36)
MCV RBC AUTO: 83.9 FL (ref 80–100)
MONOCYTES # BLD: 1 K/UL (ref 0–1.3)
MONOCYTES NFR BLD: 11.5 %
NEUTROPHILS # BLD: 7.1 K/UL (ref 1.7–7.7)
NEUTROPHILS NFR BLD: 80.4 %
PHOSPHATE SERPL-MCNC: 2.5 MG/DL (ref 2.5–4.9)
PLATELET # BLD AUTO: 352 K/UL (ref 135–450)
PMV BLD AUTO: 8.1 FL (ref 5–10.5)
POTASSIUM SERPL-SCNC: 3.5 MMOL/L (ref 3.5–5.1)
PROT SERPL-MCNC: 6.2 G/DL (ref 6.4–8.2)
RBC # BLD AUTO: 3.21 M/UL (ref 4.2–5.9)
SODIUM SERPL-SCNC: 135 MMOL/L (ref 136–145)
WBC # BLD AUTO: 8.9 K/UL (ref 4–11)

## 2025-05-31 PROCEDURE — 80069 RENAL FUNCTION PANEL: CPT

## 2025-05-31 PROCEDURE — 6360000002 HC RX W HCPCS

## 2025-05-31 PROCEDURE — 6370000000 HC RX 637 (ALT 250 FOR IP): Performed by: INTERNAL MEDICINE

## 2025-05-31 PROCEDURE — 6370000000 HC RX 637 (ALT 250 FOR IP)

## 2025-05-31 PROCEDURE — 99232 SBSQ HOSP IP/OBS MODERATE 35: CPT | Performed by: INTERNAL MEDICINE

## 2025-05-31 PROCEDURE — 2500000003 HC RX 250 WO HCPCS: Performed by: ANESTHESIOLOGY

## 2025-05-31 PROCEDURE — 36415 COLL VENOUS BLD VENIPUNCTURE: CPT

## 2025-05-31 PROCEDURE — 2500000003 HC RX 250 WO HCPCS: Performed by: INTERNAL MEDICINE

## 2025-05-31 PROCEDURE — 6370000000 HC RX 637 (ALT 250 FOR IP): Performed by: SURGERY

## 2025-05-31 PROCEDURE — 80076 HEPATIC FUNCTION PANEL: CPT

## 2025-05-31 PROCEDURE — 85025 COMPLETE CBC W/AUTO DIFF WBC: CPT

## 2025-05-31 PROCEDURE — 2060000000 HC ICU INTERMEDIATE R&B

## 2025-05-31 RX ADMIN — ONDANSETRON 4 MG: 2 INJECTION INTRAMUSCULAR; INTRAVENOUS at 19:59

## 2025-05-31 RX ADMIN — DOPAMINE HYDROCHLORIDE 2.5 MCG/KG/MIN: 160 INJECTION, SOLUTION INTRAVENOUS at 08:35

## 2025-05-31 RX ADMIN — APIXABAN 5 MG: 5 TABLET, FILM COATED ORAL at 21:37

## 2025-05-31 RX ADMIN — MIDODRINE HYDROCHLORIDE 10 MG: 5 TABLET ORAL at 17:57

## 2025-05-31 RX ADMIN — APIXABAN 5 MG: 5 TABLET, FILM COATED ORAL at 09:02

## 2025-05-31 RX ADMIN — LIDOCAINE: 50 OINTMENT TOPICAL at 21:38

## 2025-05-31 RX ADMIN — MIDODRINE HYDROCHLORIDE 10 MG: 5 TABLET ORAL at 12:30

## 2025-05-31 RX ADMIN — ACETAMINOPHEN 650 MG: 325 TABLET ORAL at 14:18

## 2025-05-31 RX ADMIN — SODIUM CHLORIDE, PRESERVATIVE FREE 10 ML: 5 INJECTION INTRAVENOUS at 21:41

## 2025-05-31 RX ADMIN — SODIUM CHLORIDE, PRESERVATIVE FREE 10 ML: 5 INJECTION INTRAVENOUS at 09:02

## 2025-05-31 RX ADMIN — SODIUM CHLORIDE, PRESERVATIVE FREE 10 ML: 5 INJECTION INTRAVENOUS at 09:03

## 2025-05-31 RX ADMIN — PANTOPRAZOLE SODIUM 40 MG: 40 TABLET, DELAYED RELEASE ORAL at 09:02

## 2025-05-31 RX ADMIN — SIMETHICONE 80 MG: 80 TABLET, CHEWABLE ORAL at 09:02

## 2025-05-31 RX ADMIN — ACETAMINOPHEN 650 MG: 325 TABLET ORAL at 04:15

## 2025-05-31 RX ADMIN — MIDODRINE HYDROCHLORIDE 10 MG: 5 TABLET ORAL at 09:02

## 2025-05-31 RX ADMIN — Medication: at 09:03

## 2025-05-31 RX ADMIN — Medication: at 21:40

## 2025-05-31 ASSESSMENT — PAIN DESCRIPTION - DESCRIPTORS
DESCRIPTORS: ACHING

## 2025-05-31 ASSESSMENT — PAIN SCALES - GENERAL
PAINLEVEL_OUTOF10: 0
PAINLEVEL_OUTOF10: 3
PAINLEVEL_OUTOF10: 0
PAINLEVEL_OUTOF10: 3
PAINLEVEL_OUTOF10: 0
PAINLEVEL_OUTOF10: 6
PAINLEVEL_OUTOF10: 3
PAINLEVEL_OUTOF10: 0

## 2025-05-31 ASSESSMENT — PAIN DESCRIPTION - ORIENTATION
ORIENTATION: RIGHT

## 2025-05-31 ASSESSMENT — PAIN DESCRIPTION - FREQUENCY
FREQUENCY: INTERMITTENT

## 2025-05-31 ASSESSMENT — PAIN - FUNCTIONAL ASSESSMENT
PAIN_FUNCTIONAL_ASSESSMENT: PREVENTS OR INTERFERES SOME ACTIVE ACTIVITIES AND ADLS

## 2025-05-31 ASSESSMENT — PAIN DESCRIPTION - LOCATION
LOCATION: NECK

## 2025-05-31 ASSESSMENT — PAIN DESCRIPTION - ONSET
ONSET: ON-GOING

## 2025-05-31 ASSESSMENT — PAIN DESCRIPTION - PAIN TYPE
TYPE: ACUTE PAIN

## 2025-06-01 ENCOUNTER — APPOINTMENT (OUTPATIENT)
Dept: CT IMAGING | Age: 85
DRG: 023 | End: 2025-06-01
Payer: MEDICARE

## 2025-06-01 PROBLEM — Z95.810 DUAL ICD (IMPLANTABLE CARDIOVERTER-DEFIBRILLATOR) IN PLACE: Status: ACTIVE | Noted: 2025-06-01

## 2025-06-01 PROBLEM — I42.8 NICM (NONISCHEMIC CARDIOMYOPATHY) (HCC): Status: ACTIVE | Noted: 2025-06-01

## 2025-06-01 PROBLEM — I48.19 PERSISTENT ATRIAL FIBRILLATION (HCC): Status: ACTIVE | Noted: 2025-06-01

## 2025-06-01 PROBLEM — R79.89 ABNORMAL LFTS: Status: ACTIVE | Noted: 2025-06-01

## 2025-06-01 LAB
ALBUMIN SERPL-MCNC: 2.6 G/DL (ref 3.4–5)
ALP SERPL-CCNC: 465 U/L (ref 40–129)
ALT SERPL-CCNC: 73 U/L (ref 10–40)
AMMONIA PLAS-SCNC: 17 UMOL/L (ref 16–60)
ANION GAP SERPL CALCULATED.3IONS-SCNC: 12 MMOL/L (ref 3–16)
AST SERPL-CCNC: 181 U/L (ref 15–37)
BACTERIA URNS QL MICRO: ABNORMAL /HPF
BASOPHILS # BLD: 0 K/UL (ref 0–0.2)
BASOPHILS NFR BLD: 0.3 %
BILIRUB DIRECT SERPL-MCNC: 0.8 MG/DL (ref 0–0.3)
BILIRUB INDIRECT SERPL-MCNC: 0.4 MG/DL (ref 0–1)
BILIRUB SERPL-MCNC: 1.2 MG/DL (ref 0–1)
BILIRUB UR QL STRIP.AUTO: ABNORMAL
BUN SERPL-MCNC: 19 MG/DL (ref 7–20)
CALCIUM SERPL-MCNC: 8.6 MG/DL (ref 8.3–10.6)
CHLORIDE SERPL-SCNC: 104 MMOL/L (ref 99–110)
CLARITY UR: ABNORMAL
CO2 SERPL-SCNC: 21 MMOL/L (ref 21–32)
COLOR UR: ABNORMAL
CREAT SERPL-MCNC: 1 MG/DL (ref 0.8–1.3)
DEPRECATED RDW RBC AUTO: 15.9 % (ref 12.4–15.4)
EOSINOPHIL # BLD: 0 K/UL (ref 0–0.6)
EOSINOPHIL NFR BLD: 0.4 %
EPI CELLS #/AREA URNS HPF: ABNORMAL /HPF (ref 0–5)
GFR SERPLBLD CREATININE-BSD FMLA CKD-EPI: 73 ML/MIN/{1.73_M2}
GLUCOSE SERPL-MCNC: 109 MG/DL (ref 70–99)
GLUCOSE UR STRIP.AUTO-MCNC: 100 MG/DL
HCT VFR BLD AUTO: 28.5 % (ref 40.5–52.5)
HGB BLD-MCNC: 9.6 G/DL (ref 13.5–17.5)
HGB UR QL STRIP.AUTO: ABNORMAL
KETONES UR STRIP.AUTO-MCNC: 15 MG/DL
LEUKOCYTE ESTERASE UR QL STRIP.AUTO: ABNORMAL
LYMPHOCYTES # BLD: 0.5 K/UL (ref 1–5.1)
LYMPHOCYTES NFR BLD: 4.4 %
MCH RBC QN AUTO: 28.4 PG (ref 26–34)
MCHC RBC AUTO-ENTMCNC: 33.8 G/DL (ref 31–36)
MCV RBC AUTO: 84 FL (ref 80–100)
MONOCYTES # BLD: 1.1 K/UL (ref 0–1.3)
MONOCYTES NFR BLD: 9.4 %
NEUTROPHILS # BLD: 9.9 K/UL (ref 1.7–7.7)
NEUTROPHILS NFR BLD: 85.5 %
NITRITE UR QL STRIP.AUTO: POSITIVE
PH UR STRIP.AUTO: 7 [PH] (ref 5–8)
PHOSPHATE SERPL-MCNC: 2.9 MG/DL (ref 2.5–4.9)
PLATELET # BLD AUTO: 409 K/UL (ref 135–450)
PMV BLD AUTO: 8 FL (ref 5–10.5)
POTASSIUM SERPL-SCNC: 3.5 MMOL/L (ref 3.5–5.1)
PROT SERPL-MCNC: 6.4 G/DL (ref 6.4–8.2)
PROT UR STRIP.AUTO-MCNC: >=300 MG/DL
RBC # BLD AUTO: 3.4 M/UL (ref 4.2–5.9)
RBC #/AREA URNS HPF: >100 /HPF (ref 0–4)
SODIUM SERPL-SCNC: 137 MMOL/L (ref 136–145)
SP GR UR STRIP.AUTO: 1.02 (ref 1–1.03)
UA COMPLETE W REFLEX CULTURE PNL UR: YES
UA DIPSTICK W REFLEX MICRO PNL UR: YES
URN SPEC COLLECT METH UR: ABNORMAL
UROBILINOGEN UR STRIP-ACNC: >=8 E.U./DL
WBC # BLD AUTO: 11.6 K/UL (ref 4–11)
WBC #/AREA URNS HPF: >100 /HPF (ref 0–5)

## 2025-06-01 PROCEDURE — 36415 COLL VENOUS BLD VENIPUNCTURE: CPT

## 2025-06-01 PROCEDURE — 2500000003 HC RX 250 WO HCPCS: Performed by: NURSE PRACTITIONER

## 2025-06-01 PROCEDURE — 6370000000 HC RX 637 (ALT 250 FOR IP)

## 2025-06-01 PROCEDURE — 2500000003 HC RX 250 WO HCPCS: Performed by: INTERNAL MEDICINE

## 2025-06-01 PROCEDURE — 99232 SBSQ HOSP IP/OBS MODERATE 35: CPT | Performed by: NURSE PRACTITIONER

## 2025-06-01 PROCEDURE — 99231 SBSQ HOSP IP/OBS SF/LOW 25: CPT | Performed by: NURSE PRACTITIONER

## 2025-06-01 PROCEDURE — 87077 CULTURE AEROBIC IDENTIFY: CPT

## 2025-06-01 PROCEDURE — 6370000000 HC RX 637 (ALT 250 FOR IP): Performed by: INTERNAL MEDICINE

## 2025-06-01 PROCEDURE — 2500000003 HC RX 250 WO HCPCS: Performed by: ANESTHESIOLOGY

## 2025-06-01 PROCEDURE — 87086 URINE CULTURE/COLONY COUNT: CPT

## 2025-06-01 PROCEDURE — 82140 ASSAY OF AMMONIA: CPT

## 2025-06-01 PROCEDURE — 6370000000 HC RX 637 (ALT 250 FOR IP): Performed by: SURGERY

## 2025-06-01 PROCEDURE — 85025 COMPLETE CBC W/AUTO DIFF WBC: CPT

## 2025-06-01 PROCEDURE — 80076 HEPATIC FUNCTION PANEL: CPT

## 2025-06-01 PROCEDURE — 51798 US URINE CAPACITY MEASURE: CPT

## 2025-06-01 PROCEDURE — 80069 RENAL FUNCTION PANEL: CPT

## 2025-06-01 PROCEDURE — 6360000002 HC RX W HCPCS: Performed by: NURSE PRACTITIONER

## 2025-06-01 PROCEDURE — 2060000000 HC ICU INTERMEDIATE R&B

## 2025-06-01 PROCEDURE — 87186 SC STD MICRODIL/AGAR DIL: CPT

## 2025-06-01 PROCEDURE — 70450 CT HEAD/BRAIN W/O DYE: CPT

## 2025-06-01 PROCEDURE — 81001 URINALYSIS AUTO W/SCOPE: CPT

## 2025-06-01 RX ORDER — POTASSIUM CHLORIDE 1500 MG/1
40 TABLET, EXTENDED RELEASE ORAL ONCE
Status: COMPLETED | OUTPATIENT
Start: 2025-06-01 | End: 2025-06-01

## 2025-06-01 RX ADMIN — LIDOCAINE: 50 OINTMENT TOPICAL at 20:22

## 2025-06-01 RX ADMIN — APIXABAN 5 MG: 5 TABLET, FILM COATED ORAL at 20:21

## 2025-06-01 RX ADMIN — SODIUM CHLORIDE, PRESERVATIVE FREE 10 ML: 5 INJECTION INTRAVENOUS at 08:04

## 2025-06-01 RX ADMIN — MIDODRINE HYDROCHLORIDE 10 MG: 5 TABLET ORAL at 08:40

## 2025-06-01 RX ADMIN — SODIUM CHLORIDE, PRESERVATIVE FREE 10 ML: 5 INJECTION INTRAVENOUS at 20:23

## 2025-06-01 RX ADMIN — Medication: at 20:23

## 2025-06-01 RX ADMIN — Medication: at 08:43

## 2025-06-01 RX ADMIN — WATER 1000 MG: 1 INJECTION INTRAMUSCULAR; INTRAVENOUS; SUBCUTANEOUS at 23:04

## 2025-06-01 RX ADMIN — MIDODRINE HYDROCHLORIDE 10 MG: 5 TABLET ORAL at 13:42

## 2025-06-01 RX ADMIN — SIMETHICONE 80 MG: 80 TABLET, CHEWABLE ORAL at 08:40

## 2025-06-01 RX ADMIN — APIXABAN 5 MG: 5 TABLET, FILM COATED ORAL at 08:03

## 2025-06-01 RX ADMIN — MIDODRINE HYDROCHLORIDE 10 MG: 5 TABLET ORAL at 17:36

## 2025-06-01 RX ADMIN — LOPERAMIDE HYDROCHLORIDE 2 MG: 2 CAPSULE ORAL at 08:40

## 2025-06-01 RX ADMIN — PANTOPRAZOLE SODIUM 40 MG: 40 TABLET, DELAYED RELEASE ORAL at 05:42

## 2025-06-01 RX ADMIN — POTASSIUM CHLORIDE 40 MEQ: 1500 TABLET, EXTENDED RELEASE ORAL at 08:40

## 2025-06-01 ASSESSMENT — PAIN SCALES - GENERAL
PAINLEVEL_OUTOF10: 0
PAINLEVEL_OUTOF10: 3
PAINLEVEL_OUTOF10: 0

## 2025-06-01 ASSESSMENT — PAIN DESCRIPTION - ONSET: ONSET: ON-GOING

## 2025-06-01 ASSESSMENT — PAIN DESCRIPTION - LOCATION: LOCATION: NECK

## 2025-06-01 ASSESSMENT — PAIN DESCRIPTION - DESCRIPTORS: DESCRIPTORS: ACHING

## 2025-06-01 ASSESSMENT — PAIN DESCRIPTION - ORIENTATION: ORIENTATION: RIGHT

## 2025-06-01 ASSESSMENT — PAIN DESCRIPTION - FREQUENCY: FREQUENCY: INTERMITTENT

## 2025-06-01 ASSESSMENT — PAIN DESCRIPTION - PAIN TYPE: TYPE: ACUTE PAIN

## 2025-06-01 ASSESSMENT — PAIN - FUNCTIONAL ASSESSMENT: PAIN_FUNCTIONAL_ASSESSMENT: PREVENTS OR INTERFERES SOME ACTIVE ACTIVITIES AND ADLS

## 2025-06-02 PROBLEM — Z51.5 ENCOUNTER FOR PALLIATIVE CARE: Status: ACTIVE | Noted: 2025-06-02

## 2025-06-02 PROBLEM — E43 SEVERE MALNUTRITION: Status: ACTIVE | Noted: 2025-06-02

## 2025-06-02 LAB
ALBUMIN SERPL-MCNC: 2.5 G/DL (ref 3.4–5)
ALP SERPL-CCNC: 462 U/L (ref 40–129)
ALT SERPL-CCNC: 112 U/L (ref 10–40)
ANION GAP SERPL CALCULATED.3IONS-SCNC: 12 MMOL/L (ref 3–16)
AST SERPL-CCNC: 219 U/L (ref 15–37)
BASE EXCESS BLDV CALC-SCNC: 0.1 MMOL/L (ref -2–3)
BASOPHILS # BLD: 0 K/UL (ref 0–0.2)
BASOPHILS NFR BLD: 0.5 %
BILIRUB DIRECT SERPL-MCNC: 0.8 MG/DL (ref 0–0.3)
BILIRUB INDIRECT SERPL-MCNC: 0.4 MG/DL (ref 0–1)
BILIRUB SERPL-MCNC: 1.2 MG/DL (ref 0–1)
BUN SERPL-MCNC: 24 MG/DL (ref 7–20)
CALCIUM SERPL-MCNC: 9 MG/DL (ref 8.3–10.6)
CHLORIDE SERPL-SCNC: 107 MMOL/L (ref 99–110)
CO2 BLDV-SCNC: 27 MMOL/L
CO2 SERPL-SCNC: 21 MMOL/L (ref 21–32)
COHGB MFR BLDV: 1.5 % (ref 0–1.5)
CREAT SERPL-MCNC: 1.2 MG/DL (ref 0.8–1.3)
DEPRECATED RDW RBC AUTO: 15.8 % (ref 12.4–15.4)
DO-HGB MFR BLDV: 54.4 %
EOSINOPHIL # BLD: 0.1 K/UL (ref 0–0.6)
EOSINOPHIL NFR BLD: 0.9 %
GFR SERPLBLD CREATININE-BSD FMLA CKD-EPI: 59 ML/MIN/{1.73_M2}
GLUCOSE SERPL-MCNC: 116 MG/DL (ref 70–99)
HCO3 BLDV-SCNC: 25.9 MMOL/L (ref 24–28)
HCT VFR BLD AUTO: 28.7 % (ref 40.5–52.5)
HGB BLD-MCNC: 9.5 G/DL (ref 13.5–17.5)
LYMPHOCYTES # BLD: 0.7 K/UL (ref 1–5.1)
LYMPHOCYTES NFR BLD: 6.8 %
MCH RBC QN AUTO: 27.8 PG (ref 26–34)
MCHC RBC AUTO-ENTMCNC: 33.1 G/DL (ref 31–36)
MCV RBC AUTO: 84.1 FL (ref 80–100)
METHGB MFR BLDV: 0.1 % (ref 0–1.5)
MONOCYTES # BLD: 1.2 K/UL (ref 0–1.3)
MONOCYTES NFR BLD: 11.7 %
NEUTROPHILS # BLD: 7.9 K/UL (ref 1.7–7.7)
NEUTROPHILS NFR BLD: 80.1 %
PCO2 BLDV: 45.9 MMHG (ref 41–51)
PH BLDV: 7.36 [PH] (ref 7.35–7.45)
PHOSPHATE SERPL-MCNC: 3.5 MG/DL (ref 2.5–4.9)
PLATELET # BLD AUTO: 418 K/UL (ref 135–450)
PMV BLD AUTO: 8.2 FL (ref 5–10.5)
PO2 BLDV: <30 MMHG (ref 25–40)
POTASSIUM SERPL-SCNC: 3.6 MMOL/L (ref 3.5–5.1)
PROT SERPL-MCNC: 6.5 G/DL (ref 6.4–8.2)
RBC # BLD AUTO: 3.42 M/UL (ref 4.2–5.9)
SAO2 % BLDV: 45 %
SODIUM SERPL-SCNC: 140 MMOL/L (ref 136–145)
WBC # BLD AUTO: 9.9 K/UL (ref 4–11)

## 2025-06-02 PROCEDURE — 97530 THERAPEUTIC ACTIVITIES: CPT

## 2025-06-02 PROCEDURE — 99221 1ST HOSP IP/OBS SF/LOW 40: CPT | Performed by: NURSE PRACTITIONER

## 2025-06-02 PROCEDURE — 97129 THER IVNTJ 1ST 15 MIN: CPT

## 2025-06-02 PROCEDURE — 6370000000 HC RX 637 (ALT 250 FOR IP): Performed by: INTERNAL MEDICINE

## 2025-06-02 PROCEDURE — 82803 BLOOD GASES ANY COMBINATION: CPT

## 2025-06-02 PROCEDURE — 97110 THERAPEUTIC EXERCISES: CPT

## 2025-06-02 PROCEDURE — 36415 COLL VENOUS BLD VENIPUNCTURE: CPT

## 2025-06-02 PROCEDURE — 6360000002 HC RX W HCPCS: Performed by: NURSE PRACTITIONER

## 2025-06-02 PROCEDURE — 95819 EEG AWAKE AND ASLEEP: CPT

## 2025-06-02 PROCEDURE — 2500000003 HC RX 250 WO HCPCS: Performed by: NURSE PRACTITIONER

## 2025-06-02 PROCEDURE — 6370000000 HC RX 637 (ALT 250 FOR IP)

## 2025-06-02 PROCEDURE — 85025 COMPLETE CBC W/AUTO DIFF WBC: CPT

## 2025-06-02 PROCEDURE — 92507 TX SP LANG VOICE COMM INDIV: CPT

## 2025-06-02 PROCEDURE — 2500000003 HC RX 250 WO HCPCS: Performed by: ANESTHESIOLOGY

## 2025-06-02 PROCEDURE — 99233 SBSQ HOSP IP/OBS HIGH 50: CPT | Performed by: INTERNAL MEDICINE

## 2025-06-02 PROCEDURE — 92526 ORAL FUNCTION THERAPY: CPT

## 2025-06-02 PROCEDURE — 2060000000 HC ICU INTERMEDIATE R&B

## 2025-06-02 PROCEDURE — 6370000000 HC RX 637 (ALT 250 FOR IP): Performed by: SURGERY

## 2025-06-02 PROCEDURE — 80076 HEPATIC FUNCTION PANEL: CPT

## 2025-06-02 PROCEDURE — 80069 RENAL FUNCTION PANEL: CPT

## 2025-06-02 PROCEDURE — 2500000003 HC RX 250 WO HCPCS: Performed by: INTERNAL MEDICINE

## 2025-06-02 PROCEDURE — 97535 SELF CARE MNGMENT TRAINING: CPT

## 2025-06-02 RX ADMIN — ACETAMINOPHEN 650 MG: 325 TABLET ORAL at 05:32

## 2025-06-02 RX ADMIN — SODIUM CHLORIDE, PRESERVATIVE FREE 10 ML: 5 INJECTION INTRAVENOUS at 22:00

## 2025-06-02 RX ADMIN — PANTOPRAZOLE SODIUM 40 MG: 40 TABLET, DELAYED RELEASE ORAL at 05:33

## 2025-06-02 RX ADMIN — SODIUM CHLORIDE, PRESERVATIVE FREE 10 ML: 5 INJECTION INTRAVENOUS at 08:40

## 2025-06-02 RX ADMIN — MIDODRINE HYDROCHLORIDE 10 MG: 5 TABLET ORAL at 17:08

## 2025-06-02 RX ADMIN — Medication: at 21:35

## 2025-06-02 RX ADMIN — APIXABAN 5 MG: 5 TABLET, FILM COATED ORAL at 08:40

## 2025-06-02 RX ADMIN — MIDODRINE HYDROCHLORIDE 10 MG: 5 TABLET ORAL at 08:40

## 2025-06-02 RX ADMIN — WATER 1000 MG: 1 INJECTION INTRAMUSCULAR; INTRAVENOUS; SUBCUTANEOUS at 23:21

## 2025-06-02 RX ADMIN — Medication: at 08:55

## 2025-06-02 RX ADMIN — APIXABAN 5 MG: 5 TABLET, FILM COATED ORAL at 21:35

## 2025-06-02 RX ADMIN — MIDODRINE HYDROCHLORIDE 10 MG: 5 TABLET ORAL at 12:31

## 2025-06-02 RX ADMIN — SIMETHICONE 80 MG: 80 TABLET, CHEWABLE ORAL at 08:40

## 2025-06-02 ASSESSMENT — PAIN SCALES - GENERAL
PAINLEVEL_OUTOF10: 3
PAINLEVEL_OUTOF10: 0

## 2025-06-02 ASSESSMENT — PAIN DESCRIPTION - ORIENTATION: ORIENTATION: RIGHT

## 2025-06-02 ASSESSMENT — PAIN DESCRIPTION - ONSET: ONSET: ON-GOING

## 2025-06-02 ASSESSMENT — PAIN DESCRIPTION - LOCATION: LOCATION: NECK

## 2025-06-02 ASSESSMENT — PAIN - FUNCTIONAL ASSESSMENT: PAIN_FUNCTIONAL_ASSESSMENT: PREVENTS OR INTERFERES SOME ACTIVE ACTIVITIES AND ADLS

## 2025-06-02 ASSESSMENT — ENCOUNTER SYMPTOMS
GASTROINTESTINAL NEGATIVE: 1
RESPIRATORY NEGATIVE: 1

## 2025-06-02 ASSESSMENT — PAIN DESCRIPTION - DESCRIPTORS: DESCRIPTORS: ACHING

## 2025-06-02 ASSESSMENT — PAIN DESCRIPTION - FREQUENCY: FREQUENCY: INTERMITTENT

## 2025-06-02 ASSESSMENT — PAIN DESCRIPTION - PAIN TYPE: TYPE: ACUTE PAIN

## 2025-06-02 NOTE — CONSULTS
Consulted for PEG placement. Discussed with critical care team and was notified that PEG is not needed at this time. Appreciate consult and will be available as needed.    Leonela Diaz PA-C  
     Urology Attending Consult Note      Reason for Consultation: Patient of Dr Mendoza, admitted for stroke    History: 85yo M with history of prostate cancer sp seed implantation, rectal cancer, Afib on Warfarin who presented with GH a few weeks ago at [place]. CT scan revealed severe right-sided hydroureteronephrosis and a distal right ureteral mass. Also noted was a L mid ureteral stone and a 2 cm stone in the renal pelvis without hydronephrosis. Underwent cystoscopy, bilateral stent insertion with Dr. Mendoza 3/27/25. He then underwent cystoscopy, R diagnostic ureteroscopy with ureteral mass biopsy, L URS with stone manipulation and CVAC with stent exchange 25. Pathology showed high grade urothelial carcinoma. Plan originally was to have stents removed this upcoming Monday and cancer consult with Dr. Mendoza on Tuesday. He has been off his Warfarin as he goes through workup of urothelial carcinoma. He presented to [facility] ED with L hemiplegia. CT suggested R MCA thrombus. Taken for thrombectomy 5/15/25 at [facility]. We were consulted given his history.     Family History, Social History, Review of Systems:  Reviewed and agreed to as per chart    Vitals:  /82   Pulse 55   Temp 98.1 °F (36.7 °C) (Oral)   Resp 17   Ht 1.676 m (5' 6\")   Wt 86.2 kg (190 lb 0.6 oz)   SpO2 98%   BMI 30.67 kg/m²   Temp  Av °F (37.2 °C)  Min: 98.1 °F (36.7 °C)  Max: 99.7 °F (37.6 °C)    Intake/Output Summary (Last 24 hours) at 2025 0935  Last data filed at 2025 0741  Gross per 24 hour   Intake 489.62 ml   Output 1300 ml   Net -810.38 ml         Physical:  Appears comfortable, sleeping  Neck is supple, trachea is midline  Respiratory effort is normal  Cardiovascular show no extremity swelling  Labs:  WBC:    Lab Results   Component Value Date/Time    WBC 8.6 2025 04:11 AM     Hemoglobin/Hematocrit:    Lab Results   Component Value Date/Time    HGB 12.3 2025 04:11 AM    HCT 36.9 2025 04:11 AM 
    Chart reviewed, events noted, and I have personally performed a face-to-face diagnostic evaluation on this patient. I have personally reviewed CNP's note and confirmed the documented past medical history, family history, social history, allergies, home medications, review of systems, vital signs, laboratory values, and hospital medications via my own chart review, in person with the patient, and/or in person with his family members as appropriate.  My findings are as follows:    Assessment  83yo man with afib off warfarin for ~1 month due to multiple procedures/stenting related to his cancers who presented to [facility] ER after falling out of bed and discovered to have dysarthria and left hemiplegia and underwent thrombectomy for right M1 occlusion with TICI 2c reperfusion  Pt is on heparin gtt due to retained catheter fragment in right ICA  Repeat CT and CTA done this evening demonstrate the retained catheter and development of some right frontal hypodensity  Mechanism of stroke is, unfortunately, due to his underlying afib having been off anticoagulation for about one month for surgical procedures   His post-op course has been complicated by some bradycardia with heart rate into the 40s and SBP < 100 at times  Will need to augment BP with pressors if necessary to keep SBP > 100    Recommendations  Follow all post-thrombectomy protocols in regards to frequency of neuro checks, timing of safety scan   Maintain SBP > 100; use pressors if necessary  Heparin gtt, as per Neurovascular surgery (x24 hours)  Will discontinue ASA, which would be started after 24 safety scan demonstrates no hemorrhage and initiate if OK with neurovascular surgery  MRI brain tomorrow  Echocardiogram (due to known low EF)  High intensity statin when taking po and LDL < 70  A1c < 7.0  PT/OT/Speech    History of Present Illness:  Jhony Hdez is a 84 y.o. man with rectal CA; prostate CA with seeding; newly diagnosed urothelial cancer of right 
    STROKE / INTRACRANIAL HEMORRHAGE HISTORY and PHYSICAL    Admitting Physician: ICU  Primary Care Physician: Jhony Valdovinos MD    Chief Complaint: Left hemiplegia    HPI: 84 year old man with a history of AF on coumadin (held recently for recurrent nephrolithiasis requiring surgery), recent diagnosis of papillary urothelial cancer (noninvasive, planned to be treated surgically per notes) who presents with an acute RMCA syndrome with RM1 occlusion. Fully functional, lives at home with his wife. Last definitely well at 10pm, when he said good night to his wife. At about 2:00am, his wife woke up and found him plegic on the L side. He was brought to [facility], where his NIHSS was 14 for L hemiplegia and neglect. CT head showed a hyperdense RMCA and favorable ASPECTS (9, although some motion could mean subtle changes are missed), and his CTA confirmed a RM1 occlusion.     Time last seen well: 5/14/25 2200    Time of arrival: 0415    PMHx:  Past Medical History:   Diagnosis Date    CHF (congestive heart failure) (HCC)     Chronic kidney disease     kidney stones    Colon cancer (HCC)     Guillain Barré syndrome     Hyperlipidemia     Hypertension     Left ureteral stone     Right Ureteral mass         SURGHx:  Past Surgical History:   Procedure Laterality Date    ADRENALECTOMY      groin    BACK SURGERY      CARPAL TUNNEL RELEASE      bilateral    COLONOSCOPY      CYSTOSCOPY Bilateral 03/27/2025    CYSTOSCOPY, BILATERAL STENT PLACEMENT,BILATERAL RETROGRADE PYELOGRAM performed [place]za [place] [facility] [place]    CYSTOSCOPY Bilateral 4/25/2025    CYSTOSCOPY RIGHT DIAGNOSTIC URETEROSCOPY RIGHT URETERAL MASS, EXCISION WITH HOLMIUM LASER, LEFT URETEROSCOPY HOLMIUM LASER STONE MANIPULATION WITH CVAC, BILATERAL STENT EXCHANGE performed by Eduardo Mendoza MD at [facility] OR    INTRACAPSULAR CATARACT EXTRACTION Right 03/19/2019    PHACOEMULSIFICATION OF CATARACT RIGHT EYE WITH INTRAOCULARLENS IMPLANT 
   Consult Note      Jhony Hdez  1940    Consultant: Bright  Reason for Consult:  Elevated liver enzymes  Requesting Physician:  Contreras    CHIEF COMPLAINT:  stroke    History Obtained From:  patient, wife, electronic medical record    HISTORY OF PRESENT ILLNESS:                The patient is a 84 y.o. male with significant past medical history of CHF who presents with stroke. Liver enzymes normal upon admission. Have been trending up past few days    Past Medical History:        Diagnosis Date    CHF (congestive heart failure) (HCC)     Chronic kidney disease     kidney stones    Colon cancer (HCC)     Guillain Barré syndrome     Hyperlipidemia     Hypertension     Left ureteral stone     Right Ureteral mass     Stroke (HCC) 05/15/2025     Past Surgical History:        Procedure Laterality Date    ADRENALECTOMY      groin    BACK SURGERY      CARPAL TUNNEL RELEASE      bilateral    COLONOSCOPY      CYSTOSCOPY Bilateral 03/27/2025    CYSTOSCOPY, BILATERAL STENT PLACEMENT,BILATERAL RETROGRADE PYELOGRAM performed by Eduardo Mendoza MD at [facility] [place]    CYSTOSCOPY Bilateral 04/25/2025    CYSTOSCOPY RIGHT DIAGNOSTIC URETEROSCOPY RIGHT URETERAL MASS, EXCISION WITH HOLMIUM LASER, LEFT URETEROSCOPY HOLMIUM LASER STONE MANIPULATION WITH CVAC, BILATERAL STENT EXCHANGE performed by Eduardo Mendoza MD at [facility] OR    INTRACAPSULAR CATARACT EXTRACTION Right 03/19/2019    PHACOEMULSIFICATION OF CATARACT RIGHT EYE WITH INTRAOCULARLENS IMPLANT performed by Thierno Jensen MD at [facility] OR    KIDNEY STONE SURGERY      several    MANDIBLE SURGERY      NEUROVASCULAR PROCEDURE N/A 5/15/2025    ANGIOGRAM CEREBRAL (81021) performed by Gomez Sheth MD at [facility] CARDIAC CATH LAB    NEUROVASCULAR PROCEDURE N/A 5/16/2025    ANGIOGRAM CEREBRAL (33522) performed by Gomez Sheth MD at [facility] CARDIAC CATH LAB    OTHER SURGICAL HISTORY  10/25/2017     CYSTOSCOPY; TRANSURETHRAL RESECTION PROSTATE    THROMBECTOMY Right 
  Comprehensive Nutrition Assessment    RECOMMENDATIONS:  Nutrition Support:  Start: Jevity 1.5 (Standard Formula with Fiber ) @ 10 mL/hr  Advance: As tolerated, increase by 10 mL/hr q 6 hours  Goal: 50 mL/hr  Water Flushes: 30ml q4 (Maintenance)    NUTRITION ASSESSMENT:   Nutritional summary & status: Consult for EN ordering and management. SLP following and pt had been on full nectar liquids however more somnolent and lethargic today. Pt requiring levo, VDE=5.  NGT placed to initiate enteral feeds. Neuro-crit following and EN management deferred to RD. Labs appear WNL, bowels moving. RD will order EN and monitor tolerance.   Admission // PMH: Stroke // Afib, HLD, HTN, CKD, CHF, rectal cancer, prostate cancer, new urothelial carcinoma    MALNUTRITION ASSESSMENT  Context of Malnutrition: Acute Illness   Malnutrition Status: At risk for malnutrition (Needs NFPE as able)  Findings of the 6 clinical characteristics of malnutrition (Minimum of 2 out of 6 clinical characteristics is required to make the diagnosis of moderate or severe Protein Calorie Malnutrition based on AND/ASPEN Guidelines):  Energy Intake:  50% or less of estimated energy requirements for 5 or more days  Weight Loss:  No weight loss     Body Fat Loss:  Unable to assess     Muscle Mass Loss:  Unable to assess    Fluid Accumulation:  No fluid accumulation     Strength:  Normal  strength    NUTRITION DIAGNOSIS   Inadequate oral intake related to cognitive or neurological impairment as evidenced by intake 0-25%, nutrition support - enteral nutrition    Nutrition Monitoring and Evaluation:   Food/Nutrient Intake Outcomes:  Progression of Nutrition, Enteral Nutrition Intake/Tolerance  Physical Signs/Symptoms Outcomes:  Biochemical Data, Chewing or Swallowing, GI Status, Hemodynamic Status, Nutrition Focused Physical Findings, Skin     OBJECTIVE DATA: Significant to nutrition assessment  Nutrition Related Findings: LBM 5/18, labs reviewed, NGT 
  Comprehensive Nutrition Assessment    RECOMMENDATIONS:  PO Diet: Moderately thick full liquids per SLP recs; 1:1 when alert, po with SLP/RN/PCA only    Continue Nutrition Support: (held this am r/t emesis)  Continue Jevity 1.5 (Standard Formula with Fiber ) Resume as able following enema  Goal: 50 mL/hr  Water Flushes: 100 ml Q4 hrs    NUTRITION ASSESSMENT:   Nutritional summary & status: Follow up. Patient started on tube feeds 5/20 and has been tolerating well however RN reported large emesis this am and EN held. Patient started on mylicon and NS, enema planned this am. Labs are WNL and minimal amount of levo running. SLP continues to work with patient who is tolerating full honey liquid diet with minimal intake, possible need for PEG discussed. RD will monitor ability to resume enteral feeds.   Admission // PMH: Stroke // Afib, HLD, HTN, CKD, CHF, rectal cancer, prostate cancer, new urothelial carcinoma    MALNUTRITION ASSESSMENT  Context of Malnutrition: Acute Illness   Malnutrition Status: At risk for malnutrition (Needs NFPE as able)  Findings of the 6 clinical characteristics of malnutrition (Minimum of 2 out of 6 clinical characteristics is required to make the diagnosis of moderate or severe Protein Calorie Malnutrition based on AND/ASPEN Guidelines):  Energy Intake:  50% or less of estimated energy requirements for 5 or more days  Weight Loss:  No weight loss     Body Fat Loss:  Unable to assess     Muscle Mass Loss:  Unable to assess    Fluid Accumulation:  No fluid accumulation     Strength:  Normal  strength    NUTRITION DIAGNOSIS   Inadequate oral intake related to cognitive or neurological impairment as evidenced by intake 0-25%, nutrition support - enteral nutrition    Nutrition Monitoring and Evaluation:   Food/Nutrient Intake Outcomes:  Progression of Nutrition, Enteral Nutrition Intake/Tolerance  Physical Signs/Symptoms Outcomes:  Biochemical Data, Chewing or Swallowing, GI Status, 
  Comprehensive Nutrition Assessment    RECOMMENDATIONS:  PO Diet: Moderately thick full liquids per SLP recs; 1:1 when alert, po with SLP/RN/PCA only    Continue Nutrition Support: (held this am r/t procedure)  Continue Jevity 1.5 (Standard Formula with Fiber ) Resume as able following procedure  Goal: 50 mL/hr  Water Flushes: 100 ml Q4 hrs    NUTRITION ASSESSMENT:   Nutritional summary & status: Follow up. Patients EN held yesterday r/t distention, enema given and pt had large BM, noted NG remains clamped as patient may have procedure today with IR per IDT. Continues minimal amount of levo, mental status improving per MD and RN, SLP continues to follow and pt remains on honey thick fulls. Family initially elected for PEG placement, now reconsidering and would like to hold off over the weekend to make decision. Labs WNL. RD will monitor ability to resume enteral feeds following procedure as pt is unable to meet nutrient needs via po alone.   Admission // PMH: Stroke // Afib, HLD, HTN, CKD, CHF, rectal cancer, prostate cancer, new urothelial carcinoma    MALNUTRITION ASSESSMENT  Context of Malnutrition: Acute Illness   Malnutrition Status: At risk for malnutrition (Needs NFPE as able)  Findings of the 6 clinical characteristics of malnutrition (Minimum of 2 out of 6 clinical characteristics is required to make the diagnosis of moderate or severe Protein Calorie Malnutrition based on AND/ASPEN Guidelines):  Energy Intake:  50% or less of estimated energy requirements for 5 or more days  Weight Loss:  No weight loss     Body Fat Loss:  Unable to assess     Muscle Mass Loss:  Unable to assess    Fluid Accumulation:  No fluid accumulation     Strength:  Normal  strength    NUTRITION DIAGNOSIS   Inadequate oral intake related to cognitive or neurological impairment as evidenced by intake 0-25%, nutrition support - enteral nutrition    Nutrition Monitoring and Evaluation:   Food/Nutrient Intake Outcomes:  
  Comprehensive Nutrition Assessment    RECOMMENDATIONS:  PO Diet: NPO @MN for EP procedure, Pureed diet with nectar liquids per SLP recs; 1:1 when alert, po with SLP/RN/PCA only  Nutrition Support: (NGT pulled 5/23, not replaced)  Recommend PEG placement as patient is unable to adequately meet nutrient needs via po diet alone, even with advanced texture- MD to consult GI  If EN to start:  Jevity 1.5 (Standard Formula with Fiber ) Start at 10 ml/hr and advance by 10 ml Q6 hrs to goal rate of 50 ml/hr  Goal: 50 mL/hr  Water Flushes: 30 ml Q4 hrs    NUTRITION ASSESSMENT:   Nutritional summary & status: Pt remains in ICU, NPO tonight for pacemaker tomorrow. NGT was pulled 5/24 and never replaced, family had been considering PEG if po intake did not improve over the weekend. Per SLP notes swallow function seems to be improving however po intake is still minimal and pt would benefit from PEG placement prior to d/c to ARU. RD reached out to MD who is placing consult to GI for possible PEG placement. Will continue to monitor.   Admission // PMH: Stroke // Afib, HLD, HTN, CKD, CHF, rectal cancer, prostate cancer, new urothelial carcinoma    MALNUTRITION ASSESSMENT  Context of Malnutrition: Acute Illness   Malnutrition Status: At risk for malnutrition (Needs NFPE as able)  Findings of the 6 clinical characteristics of malnutrition (Minimum of 2 out of 6 clinical characteristics is required to make the diagnosis of moderate or severe Protein Calorie Malnutrition based on AND/ASPEN Guidelines):  Energy Intake:  50% or less of estimated energy requirements for 5 or more days  Weight Loss:  No weight loss     Body Fat Loss:  Unable to assess     Muscle Mass Loss:  Unable to assess    Fluid Accumulation:  No fluid accumulation     Strength:  Normal  strength    NUTRITION DIAGNOSIS   Inadequate oral intake related to cognitive or neurological impairment as evidenced by intake 0-25%    Nutrition Monitoring and Evaluation: 
ADDENDUM:  Per telephone call with Dr Maxi Cruz - would like to hold Lovenox and not administer warfarin this evening pending PEG tube insertion. Confirmed would like anticoagulation initiated once PEG tube is in place. Physician is aware of delayed onset of action with warfarin and confirmed would like medication held pending PEG tube.     Per discussion, once dose of warfarin discontinued. Lovenox order already held.     Caro Pandya, RaquelD, [facility] 5/22/2025 3:51 PM      [facility] -  Clinical Pharmacy Note    Warfarin - Management by Pharmacy    Consult Date(s): 5/22/25  Consulting Provider(s): Dr. Carlos Ruth    Assessment / Plan  pAF - Warfarin  Goal INR: 2 - 3  Concurrent Anticoagulants / Antiplatelets: therapeutic Lovenox bridge (1 mg/kg SC q12h) until INR within target range x 2 occurrences   Interactions: No significant interactions noted at this time  Current Regimen / Plan:   Pt therapeutic on low-dose, no bolus UFH gtt as of this AM. Per ICU team, will plan to transition patient to Lovenox + warfarin for bridge until INR therapeutic x 2 occurrences  INR today of 1.11 (subtherapeutic)  Pt remains at high risk of thromboembolism as well as bleeding due to catheter fracture/retained catheter in R ICA in setting of recent EVT + CVA  Hgb/Hct/PLT count all slightly down from yesterday, will continue to monitor closely as therapeutic anticoagulation is continued  Full liquid diet currently ordered, patient having episodes of emesis per documentation in chart  Will plan to give warfarin 2.5 mg PO x 1 dose this evening  PTD warfarin placeholder added to MAR at this time. PT/INR, CBC ordered daily per protocol  Please continue to monitor patient closely for any change in mental status, signs/symptoms of bleeding and notify provider immediately with any concerning change  Will monitor pt's clinical status and INR daily, and make dose adjustments as needed    Thank you for consulting pharmacy,    Yolie 
Cardiology Consultation                                                                    Pt Name: Jhony Hdez  Age: 84 y.o.  Sex: male  : 1940  Location: [facility]/4502-01    Referring Physician: Abdi Diaz MD  Primary cardiologist: Dr Sandhu      Reason for Consult:     Reason for Consultation/Chief Complaint: bradycardia, hypotension, assess for SSS    HPI:      Jhony Hdez is a 84 y.o. male with a past medical history of PAF, HFrEF due to NICMO, pheochromocytoma s/p resection , colon cancer s/p XRT and resection, prostate cancer, [place] Barré syndrome, HTN, HLP, mild CAD, recent diagnosis of ureteral cancer.    R/LHC 2024: Mid LAD 30%, distal LAD 50%, normal ramus/circumflex, RCA with luminal irregularities.  RA 5, PA 35/10/, W6, CI 2.0F.     Patient presented to the hospital on 5/15 with a fall, left-sided weakness.  Imaging suggested ischemic stroke due to right MCA thrombus.  Apparently patient had held his warfarin for about a month due to bladder biopsy prior to admission.  Patient underwent thrombectomy on 5/15 with complication of catheter tip retained in right ICA (could not be snared out) s/p SHAHEED sacrifice to trap fractured catheter tip in SHAHEED by N-Surg .  Cardiology was consulted for persistent hypotension and bradycardia, Dr Godoy assessed patient.  Telemetry consistent with intermittent AF alternating with sinus rhythm, possible idioventricular rhythm versus AF, no significant pauses.  Patient was placed on Levophed with improvement of HR and BP.  Patient remains off anticoagulation including heparin drip due to ongoing hematuria in the setting of urothelial bladder cancer.  Cardiology had assessed patient and signed off on 5/15 due to normal sinus rhythm and maintenance of adequate blood pressure on midodrine.    Echo 2025: LVEF 30-35%, LVDD, diffuse hypokinesis, mild LVH, moderate RVD with moderate dysfunction, mild TR, KAI.  Compared to echo 2024, LVEF 25 
Clinical Pharmacy Progress Note    Warfarin has been discontinued. Will sign off pharmacy to dose warfarin consult.  If medication is restarted and pharmacy is to manage dosing, please re-consult at that time.    Steven Mahajan, PharmD 5/25/2025, 10:07 AM      
History and Physical  [facility]   EP Cardiology    Chief Complaint: bradycardia    HPI:     Patient is a 84 y.o. male presented here in afib/flutter off warfarin for ~1 month due to multiple procedures/stenting related to his cancers who presented to [facility] ER after falling out of bed and discovered to have dysarthria and left hemiplegia and underwent thrombectomy for right M1 occlusion with TICI 2c reperfusion. The telemetry/strips reveals atrial flutter/afib on admission but sinus rhythm or junctional since 5/16/25. He was in afib/flutter since 3/2024 per records and ekgs. At times he has modest bradycardia which appears to be junction rhythm with sinus bradycardia. The patient was placed on dopamine 5/24/25 with adequate sinus rates, stable. .  EP consult for pacer consideration.       Past Medical History:   Diagnosis Date    CHF (congestive heart failure) (HCC)     Chronic kidney disease     kidney stones    Colon cancer (HCC)     Guillain Barré syndrome     Hyperlipidemia     Hypertension     Left ureteral stone     Right Ureteral mass     Stroke (HCC) 05/15/2025      Past Surgical History:   Procedure Laterality Date    ADRENALECTOMY      groin    BACK SURGERY      CARPAL TUNNEL RELEASE      bilateral    COLONOSCOPY      CYSTOSCOPY Bilateral 03/27/2025    CYSTOSCOPY, BILATERAL STENT PLACEMENT,BILATERAL RETROGRADE PYELOGRAM performed by Eduardo Mendoza MD at [facility] [place]    CYSTOSCOPY Bilateral 04/25/2025    CYSTOSCOPY RIGHT DIAGNOSTIC URETEROSCOPY RIGHT URETERAL MASS, EXCISION WITH HOLMIUM LASER, LEFT URETEROSCOPY HOLMIUM LASER STONE MANIPULATION WITH CVAC, BILATERAL STENT EXCHANGE performed by Eduardo Mendoza MD at [facility] OR    INTRACAPSULAR CATARACT EXTRACTION Right 03/19/2019    PHACOEMULSIFICATION OF CATARACT RIGHT EYE WITH INTRAOCULARLENS IMPLANT performed by Thierno Jensen MD at [facility] OR    KIDNEY STONE SURGERY      several    MANDIBLE SURGERY      NEUROVASCULAR PROCEDURE N/A 
Jhony Hdez  5/16/2025  [id number]    Chief Complaint: Cerebrovascular accident (CVA) due to embolism of right middle cerebral artery (HCC)    Subjective:   This is an 84-year-old male with a past medical history including:  Past Medical History:   Diagnosis Date    CHF (congestive heart failure) (HCC)     Chronic kidney disease     kidney stones    Colon cancer (HCC)     Guillain Barré syndrome     Hyperlipidemia     Hypertension     Left ureteral stone     Right Ureteral mass     Stroke (HCC) 05/15/2025   Who came to the hospital with left hemiplegia.  Patient was recently diagnosed with high-grade carcinoma and has not been taking warfarin for 1 month due to biopsy. CT head performed which showed Hyperdense right MCA suggesting right MCA thrombus. 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 Patient underwent thrombectomy on the 15th with complication of retained catheter in the R ICA.   Currently the patient is very limited in functional mobility and ADLs.  Patient has going back for procedure again today.  Currently patient is 6/6 and AM-PAC scores.  Will continue to follow patient for possible ARU admit in the future      ROS: No CP, SOB, dyspnea    Objective:  Patient Vitals for the past 24 hrs:   BP Temp Temp src Pulse Resp SpO2 Height Weight   05/16/25 1200 125/72 -- -- 52 14 99 % -- --   05/16/25 1145 102/60 -- -- (!) 46 19 99 % -- --   05/16/25 1130 100/71 -- -- (!) 46 22 98 % -- --   05/16/25 1115 103/67 -- -- (!) 44 16 97 % -- --   05/16/25 1100 (!) 109/56 -- -- (!) 41 18 99 % -- --   05/16/25 1045 94/62 -- -- 60 19 95 % -- --   05/16/25 1030 109/70 -- -- 72 17 95 % -- --   05/16/25 1015 133/79 -- -- 57 (!) 8 98 % -- --   05/16/25 1000 117/82 -- -- 76 21 95 % -- --   05/16/25 0945 125/74 -- -- 74 (!) 5 97 % -- --   05/16/25 0930 123/82 -- -- 55 17 98 % -- --   05/16/25 0915 121/65 -- -- 75 15 95 % -- --   05/16/25 0900 135/78 -- -- 79 (!) 8 96 % -- --   05/16/25 0845 (!) 125/90 -- -- 84 
[facility] -  Clinical Pharmacy Note    Warfarin - Management by Pharmacy    Consult Date(s): 5/24/25  Consulting Provider(s): Dr. Zimmer    Assessment / Plan  CVA & Afib/Aflutter - Warfarin  Goal INR: 2 - 2.5  Concurrent Anticoagulants / Antiplatelets: heparin gtt  Interactions: No significant interactions noted.  Current Regimen / Plan:   INR is at 1.05 today. Patient on warfarin at home previously managed at [facility].   S/p bolus dose of 7.5mg x 1 yesterday   Plan: will continue on home regimen as it will take a few days to see rise in INR.  Discussed with team, patient unable to afford DOAC so plan is to have patient continue on warfarin.   Recommend to continue bridging until INR therapeutic  Will monitor pt's clinical status and INR daily, and make dose adjustments as needed.    Thank you for consulting pharmacy,  Hugh Murphy, PharmD  PGY1 Pharmacy Resident   Wireless: [phone]  05/25/25       Interval update: TORIBIO. Cardio consulted, will consult EP on Tuesday for possible pacemaker implant.     Subjective/Objective:   Jhony Hdez is a 84 y.o. 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     Pharmacy is consulted to dose warfarin.    Ht Readings from Last 1 Encounters:   05/15/25 1.676 m (5' 6\")     Wt Readings from Last 1 Encounters:   05/25/25 84.1 kg (185 lb 6.5 oz)     Prior / Home Warfarin Regimen:  5mg daily except 2.5mg on MWF per [facility] note    Date INR Warfarin   5/22 1.11 --   5/23 1.10 --   5/24 1.11 7.5 mg    5/25 1.05 5 mg   5/26         Recent Labs     05/23/25  0456 05/24/25  0546 05/24/25  0547 05/25/25  0242   INR 1.10 1.11  --  1.05   HGB 10.0* 9.6*  --  10.9*    302  --  383   CREATININE 1.1  --  1.2 0.9       
[facility] -  Clinical Pharmacy Note    Warfarin - Management by Pharmacy    Consult Date(s): 5/24/25  Consulting Provider(s): Dr. Zimmer    Assessment / Plan  CVA & Afib/Aflutter - Warfarin  Goal INR: 2 - 2.5  Concurrent Anticoagulants / Antiplatelets: heparin gtt  Interactions: No significant interactions noted.  Current Regimen / Plan:   INR is at 1.11 today. Patient on warfarin at home previously managed at [facility]. Will give bolus dose of 7.5mg x 1 today then continue on home regimen.   Discussed with team, patient unable to afford DOAC so plan is to have patient continue on warfarin.   Recommend continuing bridging until INR therapeutic  Will monitor pt's clinical status and INR daily, and make dose adjustments as needed.    Thank you for consulting pharmacy,  Hugh Murphy, PharmD  PGY1 Pharmacy Resident   Wireless: [phone]  05/24/25       Interval update:  therapy initiation     Subjective/Objective:   Jhony Hdez is a 84 y.o. 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     Pharmacy is consulted to dose warfarin.    Ht Readings from Last 1 Encounters:   05/15/25 1.676 m (5' 6\")     Wt Readings from Last 1 Encounters:   05/24/25 84.4 kg (186 lb)     Prior / Home Warfarin Regimen:  5mg daily except 2.5mg on MWF per [facility] note    Date INR Warfarin   5/22 1.11 --   5/23 1.10 --   5/24 1.11 7.5 mg    5/25         Recent Labs     05/22/25  0345 05/22/25  1043 05/22/25  2026 05/23/25  0456 05/24/25  0546 05/24/25  0547   INR  --    < > 1.12 1.10 1.11  --    HGB 9.9*  --  9.6* 10.0* 9.6*  --      --  372 356 302  --    CREATININE 1.0  --   --  1.1  --  1.2    < > = values in this interval not displayed.       
[facility]  Palliative Medicine Consultation Note      Date Of Admission:5/15/2025  Date of consult: 06/02/25  Seen by PC in the past:  No    Recommendations:        Met with pt's wife this morning and she became upset with palliative care involvement, stating no one had told her about palliative care or discussed anything about his wishes if he were to worsen. She became tearful when discussing how her  has become less interactive. She seems overwhelmed by multiple providers seeing her  and expressed questions/concerns about the treatment plan. She requested that a provider talk with her and her kids today around 1 PM. I d/w GODFREY Falk and we will talk with them at that time.    1 PM- I met with pt's three daughters in the ICU conference room. The pt's wife did not want to join the conversation, and Gladys DONAHUE had just spoken with the wife and two daughters. The three daughters voiced several concerns, some of which I addressed to the best of my ability, but some of which I would need to defer to neurosurgery or neurology. The daughters stated their mother wants him to be full code. One or two of the daughters are concerned about the aggressive nature of CPR, but know that their father is not ready to die, and respect their mother's wish to keep him full code. Will d/w Dr. Chairez.    1. Goals of Care/Advanced Care planning/Code status: Full Code. Was unable to discuss with wife due to her feeling very stressed. Family has questions for neurosurgery or neurology. They do want to continue aggressive management at this point.  2. Pain: pt denies pain per nursing assessment  3. Neuro: p/w left hemiplegia and slurred speech, found with a right M1 occlusion and underwent mechanical thrombectomy 5/15. He had a complication of aspiration catheter fracture with retained segment in SHAHEED, which was attempted to be removed but unable to be removed. Today, he is following 
Warfarin has been discontinued. Pharmacy will sign off consult. If medication dosing is resumed, please re-consult pharmacy.    Rqauel PereaD, [facility] 5/22/2025 7:54 PM        
Mandible surgery; other surgical history (10/25/2017); Kidney stone surgery; Intracapsular cataract extraction (Right, 03/19/2019); Cystoscopy (Bilateral, 03/27/2025); Colonoscopy; Cystoscopy (Bilateral, 04/25/2025); thrombectomy (Right, 05/15/2025); and neurovascular procedure (N/A, 5/15/2025).     Social History:   reports that he has never smoked. He has never used smokeless tobacco. He reports that he does not drink alcohol and does not use drugs.     Family History:  Noncontributory      Medications:       Home Medications  Were reviewed and are listed in nursing record. and/or listed below  Prior to Admission medications    Medication Sig Start Date End Date Taking? Authorizing Provider   allopurinol (ZYLOPRIM) 300 MG tablet Take 1 tablet by mouth at bedtime   Yes Alhaji Alonzo MD   furosemide (LASIX) 20 MG tablet Take 1 tablet by mouth every other day   Yes Alhaji Alonzo MD   omeprazole (PRILOSEC) 40 MG delayed release capsule Take 1 capsule by mouth daily as needed (heartburn)   Yes Alhaji Alonzo MD   warfarin (COUMADIN) 5 MG tablet Take 1 tablet by mouth See Admin Instructions Takes 2.5mg on MWF, then 5mg on the other four days.   Yes Alhaji Alonzo MD   rosuvastatin (CRESTOR) 20 MG tablet Take 1 tablet by mouth nightly 3/3/25  Yes Jono Sandhu MD   metoprolol succinate (TOPROL XL) 25 MG extended release tablet TAKE 1 TABLET BY MOUTH AT BEDTIME 11/6/24  Yes Jono Sandhu MD   ENTRESTO 24-26 MG per tablet Take 1 tablet by mouth 2 times daily 4/18/24  Yes Jono Sandhu MD   nitroGLYCERIN (NITROSTAT) 0.4 MG SL tablet Place 1 tablet under the tongue every 5 minutes as needed 2/5/24  Yes Alhaji Alonzo MD   Diphenhydramine-APAP, sleep, (TYLENOL PM EXTRA STRENGTH PO) Take 1 tablet by mouth nightly   Yes Alhaji Alonzo MD   donepezil (ARICEPT) 5 MG tablet Take 1 tablet by mouth nightly   Yes Alhaji Alonzo MD          Inpatient Medications:   
is not jaundiced or pale.   Neurological:      Mental Status: He is lethargic.      GCS: GCS eye subscore is 4. GCS verbal subscore is 5. GCS motor subscore is 6.      Motor: Weakness (LUE) present.      Comments: Patient unable to comply with CN exam.           LABS:    CBC:   Recent Labs     05/15/25  0215 05/15/25  0742   WBC 6.3 6.9   HGB 12.7* 11.8*   HCT 40.1* 35.6*    252   MCV 89.6 87.6     Renal:   Recent Labs     05/15/25  0215 05/15/25  0218     --    K 3.7  --      --    CO2 23  --    BUN 17  --    CREATININE 1.2  --    GLUCOSE 110* 106   CALCIUM 8.9  --    ANIONGAP 11  --      Hepatic:   Recent Labs     05/15/25  0215   AST 15   ALT 9*   BILITOT 0.3   ALKPHOS 128     Troponin: No results for input(s): \"TROPONINI\" in the last 72 hours.  BNP: No results for input(s): \"BNP\" in the last 72 hours.  Lipids: No results for input(s): \"CHOL\", \"HDL\" in the last 72 hours.    Invalid input(s): \"LDLCALCU\", \"TRIGLYCERIDE\"  ABGs:   Recent Labs     05/15/25  0938   PHART 7.398   IHT5BJY 37.6   PO2ART 77.0   QBV7YAC 23.2   BEART -2   Y4BHHNTU 95   WVI6QNY 24       INR:   Recent Labs     05/15/25  0215 05/15/25  0742   INR 1.00 1.14     Lactate: No results for input(s): \"LACTATE\" in the last 72 hours.  Cultures: None sent  -----------------------------------------------------------------  Imaging/tests:    EKG:  AF with slow ventricular response. Wide QRS.    Echocardiogram:  3/20/2024: LVEF of 25 - 30%. Global hypokineses. G2DD based on mildly dilated LA. RA also dilated.    PFT:  None recorded.    RAD:  CT HEAD WO CONTRAST   Final Result   1. Stable CT scan of the brain demonstrating periventricular white matter small   vessel ischemic disease. No cortical infarct, intracranial hemorrhage or mass is   detected. Sensitivity for detection of subtle hemorrhage is diminished given the   presence of some persistent intravascular contrast material.      Electronically signed by Henok Villanueva      CT head

## 2025-06-03 ENCOUNTER — APPOINTMENT (OUTPATIENT)
Dept: ULTRASOUND IMAGING | Age: 85
DRG: 023 | End: 2025-06-03
Payer: MEDICARE

## 2025-06-03 LAB
ALBUMIN SERPL-MCNC: 2.7 G/DL (ref 3.4–5)
ALP SERPL-CCNC: 456 U/L (ref 40–129)
ALT SERPL-CCNC: 118 U/L (ref 10–40)
ANION GAP SERPL CALCULATED.3IONS-SCNC: 11 MMOL/L (ref 3–16)
AST SERPL-CCNC: 199 U/L (ref 15–37)
BACTERIA UR CULT: ABNORMAL
BACTERIA UR CULT: ABNORMAL
BASOPHILS # BLD: 0.1 K/UL (ref 0–0.2)
BASOPHILS NFR BLD: 0.7 %
BILIRUB DIRECT SERPL-MCNC: 0.7 MG/DL (ref 0–0.3)
BILIRUB INDIRECT SERPL-MCNC: 0.3 MG/DL (ref 0–1)
BILIRUB SERPL-MCNC: 1 MG/DL (ref 0–1)
BUN SERPL-MCNC: 25 MG/DL (ref 7–20)
CALCIUM SERPL-MCNC: 9.3 MG/DL (ref 8.3–10.6)
CHLORIDE SERPL-SCNC: 107 MMOL/L (ref 99–110)
CO2 SERPL-SCNC: 24 MMOL/L (ref 21–32)
CREAT SERPL-MCNC: 1.1 MG/DL (ref 0.8–1.3)
DEPRECATED RDW RBC AUTO: 16.3 % (ref 12.4–15.4)
EOSINOPHIL # BLD: 0.2 K/UL (ref 0–0.6)
EOSINOPHIL NFR BLD: 1.9 %
GFR SERPLBLD CREATININE-BSD FMLA CKD-EPI: 66 ML/MIN/{1.73_M2}
GLUCOSE SERPL-MCNC: 108 MG/DL (ref 70–99)
HAV IGM SERPL QL IA: NORMAL
HBV CORE IGM SERPL QL IA: NORMAL
HBV SURFACE AG SERPL QL IA: NORMAL
HCT VFR BLD AUTO: 29.2 % (ref 40.5–52.5)
HCV AB SERPL QL IA: NORMAL
HGB BLD-MCNC: 9.8 G/DL (ref 13.5–17.5)
LYMPHOCYTES # BLD: 0.9 K/UL (ref 1–5.1)
LYMPHOCYTES NFR BLD: 9.2 %
MCH RBC QN AUTO: 28.2 PG (ref 26–34)
MCHC RBC AUTO-ENTMCNC: 33.6 G/DL (ref 31–36)
MCV RBC AUTO: 83.9 FL (ref 80–100)
MONOCYTES # BLD: 0.8 K/UL (ref 0–1.3)
MONOCYTES NFR BLD: 8.9 %
NEUTROPHILS # BLD: 7.5 K/UL (ref 1.7–7.7)
NEUTROPHILS NFR BLD: 79.3 %
ORGANISM: ABNORMAL
PHOSPHATE SERPL-MCNC: 3.5 MG/DL (ref 2.5–4.9)
PLATELET # BLD AUTO: 433 K/UL (ref 135–450)
PMV BLD AUTO: 7.9 FL (ref 5–10.5)
POTASSIUM SERPL-SCNC: 3.6 MMOL/L (ref 3.5–5.1)
PROT SERPL-MCNC: 6.7 G/DL (ref 6.4–8.2)
RBC # BLD AUTO: 3.48 M/UL (ref 4.2–5.9)
SODIUM SERPL-SCNC: 142 MMOL/L (ref 136–145)
WBC # BLD AUTO: 9.4 K/UL (ref 4–11)

## 2025-06-03 PROCEDURE — 2500000003 HC RX 250 WO HCPCS: Performed by: NURSE PRACTITIONER

## 2025-06-03 PROCEDURE — 6370000000 HC RX 637 (ALT 250 FOR IP): Performed by: INTERNAL MEDICINE

## 2025-06-03 PROCEDURE — 97129 THER IVNTJ 1ST 15 MIN: CPT

## 2025-06-03 PROCEDURE — 92507 TX SP LANG VOICE COMM INDIV: CPT

## 2025-06-03 PROCEDURE — 80069 RENAL FUNCTION PANEL: CPT

## 2025-06-03 PROCEDURE — 92526 ORAL FUNCTION THERAPY: CPT

## 2025-06-03 PROCEDURE — 6370000000 HC RX 637 (ALT 250 FOR IP)

## 2025-06-03 PROCEDURE — 36415 COLL VENOUS BLD VENIPUNCTURE: CPT

## 2025-06-03 PROCEDURE — 85025 COMPLETE CBC W/AUTO DIFF WBC: CPT

## 2025-06-03 PROCEDURE — 2500000003 HC RX 250 WO HCPCS: Performed by: INTERNAL MEDICINE

## 2025-06-03 PROCEDURE — 2500000003 HC RX 250 WO HCPCS: Performed by: ANESTHESIOLOGY

## 2025-06-03 PROCEDURE — 6360000002 HC RX W HCPCS: Performed by: NURSE PRACTITIONER

## 2025-06-03 PROCEDURE — 80074 ACUTE HEPATITIS PANEL: CPT

## 2025-06-03 PROCEDURE — 80076 HEPATIC FUNCTION PANEL: CPT

## 2025-06-03 PROCEDURE — 76705 ECHO EXAM OF ABDOMEN: CPT

## 2025-06-03 PROCEDURE — 99232 SBSQ HOSP IP/OBS MODERATE 35: CPT

## 2025-06-03 PROCEDURE — 2060000000 HC ICU INTERMEDIATE R&B

## 2025-06-03 PROCEDURE — 6370000000 HC RX 637 (ALT 250 FOR IP): Performed by: SURGERY

## 2025-06-03 RX ORDER — MIDODRINE HYDROCHLORIDE 10 MG/1
10 TABLET ORAL
Qty: 90 TABLET | Refills: 3 | Status: SHIPPED | OUTPATIENT
Start: 2025-06-03

## 2025-06-03 RX ORDER — CEFDINIR 300 MG/1
300 CAPSULE ORAL 2 TIMES DAILY
Qty: 6 CAPSULE | Refills: 0 | Status: SHIPPED | OUTPATIENT
Start: 2025-06-03 | End: 2025-06-03 | Stop reason: HOSPADM

## 2025-06-03 RX ORDER — ATORVASTATIN CALCIUM 40 MG/1
40 TABLET, FILM COATED ORAL NIGHTLY
Qty: 30 TABLET | Refills: 3 | Status: SHIPPED | OUTPATIENT
Start: 2025-06-03

## 2025-06-03 RX ORDER — LINEZOLID 600 MG/1
600 TABLET, FILM COATED ORAL 2 TIMES DAILY
Qty: 8 TABLET | Refills: 0 | Status: SHIPPED | OUTPATIENT
Start: 2025-06-03 | End: 2025-06-04 | Stop reason: HOSPADM

## 2025-06-03 RX ADMIN — APIXABAN 5 MG: 5 TABLET, FILM COATED ORAL at 21:17

## 2025-06-03 RX ADMIN — MIDODRINE HYDROCHLORIDE 10 MG: 5 TABLET ORAL at 08:38

## 2025-06-03 RX ADMIN — SODIUM CHLORIDE, PRESERVATIVE FREE 10 ML: 5 INJECTION INTRAVENOUS at 23:07

## 2025-06-03 RX ADMIN — Medication: at 08:39

## 2025-06-03 RX ADMIN — PANTOPRAZOLE SODIUM 40 MG: 40 TABLET, DELAYED RELEASE ORAL at 08:38

## 2025-06-03 RX ADMIN — SIMETHICONE 80 MG: 80 TABLET, CHEWABLE ORAL at 08:38

## 2025-06-03 RX ADMIN — WATER 1000 MG: 1 INJECTION INTRAMUSCULAR; INTRAVENOUS; SUBCUTANEOUS at 23:07

## 2025-06-03 RX ADMIN — SODIUM CHLORIDE, PRESERVATIVE FREE 10 ML: 5 INJECTION INTRAVENOUS at 08:39

## 2025-06-03 RX ADMIN — ACETAMINOPHEN 650 MG: 325 TABLET ORAL at 21:30

## 2025-06-03 RX ADMIN — MIDODRINE HYDROCHLORIDE 10 MG: 5 TABLET ORAL at 12:22

## 2025-06-03 RX ADMIN — MIDODRINE HYDROCHLORIDE 10 MG: 5 TABLET ORAL at 17:12

## 2025-06-03 RX ADMIN — APIXABAN 5 MG: 5 TABLET, FILM COATED ORAL at 08:39

## 2025-06-03 RX ADMIN — Medication: at 21:18

## 2025-06-03 ASSESSMENT — PAIN DESCRIPTION - ORIENTATION: ORIENTATION: RIGHT;LEFT

## 2025-06-03 ASSESSMENT — PAIN SCALES - GENERAL
PAINLEVEL_OUTOF10: 0
PAINLEVEL_OUTOF10: 3
PAINLEVEL_OUTOF10: 0

## 2025-06-03 ASSESSMENT — PAIN SCALES - WONG BAKER: WONGBAKER_NUMERICALRESPONSE: NO HURT

## 2025-06-03 ASSESSMENT — PAIN - FUNCTIONAL ASSESSMENT: PAIN_FUNCTIONAL_ASSESSMENT: ACTIVITIES ARE NOT PREVENTED

## 2025-06-03 ASSESSMENT — PAIN DESCRIPTION - LOCATION: LOCATION: BUTTOCKS

## 2025-06-03 ASSESSMENT — PAIN DESCRIPTION - DESCRIPTORS: DESCRIPTORS: ACHING

## 2025-06-03 NOTE — PROCEDURES
Date of Procedure: 5/15/2025    History and indications: The patient is a 84year-old  man who presented to [facility] with acute onset stroke symptoms. The was shown on CT angiography to have evidence of right MCA M1 occlusion consistent with the stroke symptoms. The patient is transferred as an emergency for interventional stroke management. Due to the retained catheter, a full diagnostic angiogram was medically necessary.    Patient last seen normal: 5/15/2025  Patient Arrived to [facility]: 0418  Arrival to angiography: 0420  Time of groin puncture: 0440  Time of first thrombectomy attempt:0450  Time of Reperfusion: 0611  Reperfusion grade: TICI 2c    Procedure:  1. Diagnostic cerebral angiogram  2. Mechanical thrombectomy for stroke  3. Retrieval of foreign body  4. Right common femoral artery Angio-Seal closure arteriotomy    Vessels catheterized  1. Superselective catheterization of right middle cerebral artery for thrombectomy   2. Right internal carotid artery  3. Left internal carotid artery  4. Left vertebral artery      Consent: The risks and benefits of the procedure were discussed with the patient's family who understands and agrees to proceed.    Attending physician: Gomez Sheth      Anesthesia: Prior to the procedure, the patient's personal and family history were reviewed for any adverse reactions to anesthesia and no pertinent concerns were raised .  Local with conscious sedation administered by the nursing staff under the supervision of the attending physician for 120 minutes.  Medications given: Fentanyl and Versed, heparin     ASA thgthrthathdtheth:th th5th Mallampati: 2  EBL: 100 mL    Devices used:  1. 5 Faroese Select catheter  2. 8 Faroese [id number] sheath  3. Zoom 71 aspiration catheter  4. Zoom 35 microcatheter  5. Traxcess 0.014 inch wire  6. NAIMA 6 x 20 mm stent retriever  7. En-Snare trifoil snare retriever  8. Goose neck snare retriever  9. Headway 21 microcatheter  10. Embotrap 5 x 24 stent retriever  11. 
  INSTRUMENTAL SWALLOW REPORT  MODIFIED BARIUM SWALLOW    NAME: Jhony Hdez     : 1940  MRN: [id number]       Date of Eval: 2025     Ordering Physician: Abdi Diaz MD  Referring Diagnosis: Dysphagia    Past Medical History:  has a past medical history of CHF (congestive heart failure) (HCC), Chronic kidney disease, Colon cancer (HCC), Guillain Barré syndrome, Hyperlipidemia, Hypertension, Left ureteral stone, Right Ureteral mass, and Stroke (HCC).  Past Surgical History:  has a past surgical history that includes back surgery; Adrenalectomy; Carpal tunnel release; Total elbow arthroplasty (Left); Mandible surgery; other surgical history (10/25/2017); Kidney stone surgery; Intracapsular cataract extraction (Right, 2019); Cystoscopy (Bilateral, 2025); Colonoscopy; Cystoscopy (Bilateral, 2025); thrombectomy (Right, 05/15/2025); neurovascular procedure (N/A, 5/15/2025); and neurovascular procedure (N/A, 2025).    CXR: 2025 IMPRESSION:  The enteric tube is seen within the lower chest on the chest radiograph but thereafter has been repositioned/advanced into the stomach on the abdominal radiograph. Heart appears borderline enlarged with interstitial opacities suggesting interstitial edema. Renal stents are seen. Clips in the abdomen. Residual contrast within partially seen bowel loops.    Prior MBS Results: 2025 IMPRESSIONS:  Pt presents with moderate oropharyngeal dysphagia [...] Increased fatigue negatively impacting full alertness/attention [...] Disorganized and prolonged mastication of regular solids with unchewed pieces [...] Delayed swallow with bolus head in the pyriforms, reduced hyolaryngeal elevation/excursion, and reduced/incomplete epiglottic inversion resulted in incomplete laryngeal vestibular closure with deep penetration of thin and mildly thick liquids during/after the swallow, and silent aspiration of thin, and suspect mildly thick liquids (difficult to 
  INSTRUMENTAL SWALLOW REPORT  MODIFIED BARIUM SWALLOW/Treatment     NAME: Jhony Hdez     : 1940  MRN: [id number]       Date of Eval: 2025     Ordering Physician: Dr. Diaz  Referring Diagnosis: Dysphagia    Past Medical History:  has a past medical history of CHF (congestive heart failure) (HCC), Chronic kidney disease, Colon cancer (HCC), Guillain Barré syndrome, Hyperlipidemia, Hypertension, Left ureteral stone, Right Ureteral mass, and Stroke (HCC).  Past Surgical History:  has a past surgical history that includes back surgery; Adrenalectomy; Carpal tunnel release; Total elbow arthroplasty (Left); Mandible surgery; other surgical history (10/25/2017); Kidney stone surgery; Intracapsular cataract extraction (Right, 2019); Cystoscopy (Bilateral, 2025); Colonoscopy; Cystoscopy (Bilateral, 2025); thrombectomy (Right, 05/15/2025); and neurovascular procedure (N/A, 5/15/2025).    CXR: none recent     Prior MBS Results: none    Patient Complaints/Reason for Referral:  Jhony Hdez was referred for a MBS to assess the efficiency of his/her swallow function, assess for aspiration, and to make recommendations regarding safe dietary consistencies, effective compensatory strategies, and safe eating environment.    Onset of problem: 5/15/25    Behavior/Cognition: alert, fatigued, requires cues to attend to PO presentations   Vision/Hearing: wears glasses, no [place] concerns     Impressions:  Pt presents with moderate oropharyngeal dysphagia. Pt seated upright, increasing L lateral lean as procedure progressed 2/2 weakness. Pt required cues to readily accept all trials via SLP 1:1 feed assist, noted increased fatigue negatively impacting full alertness/attention. Reduced labial seal resulted in trace anterior escape of liquids from L side oral cavity. Lingual pumping noted with various consistencies with trace residue escaping to FOM and posterior escape of masticated solids progressing down 
PROCEDURE NOTE  Date: 2025   Name: Jhony Hdez  YOB: 1940    Name: Jhony Hdez   : 1940   Interpreting Physician: Treva Tay MD   Referring Physician: Duc Patricio MD   Date of EE2025      Clinical History:Altered mental status and stroke    Current Antiepileptic Medications: Current Facility-Administered Medications: cefTRIAXone (ROCEPHIN) 1,000 mg in sterile water 10 mL IV syringe, 1,000 mg, IntraVENous, Q24H  midodrine (PROAMATINE) tablet 10 mg, 10 mg, Oral, TID WC  [Held by provider] DOPamine (INTROPIN) 1600 mcg/mL in dextrose 5% infusion, 1-20 mcg/kg/min (Adjusted), IntraVENous, Continuous  naloxone 0.4 mg in 10 mL sodium chloride syringe, , IntraVENous, PRN  sodium chloride flush 0.9 % injection 5-40 mL, 5-40 mL, IntraVENous, 2 times per day  sodium chloride flush 0.9 % injection 5-40 mL, 5-40 mL, IntraVENous, PRN  0.9 % sodium chloride infusion, , IntraVENous, PRN  diphenhydrAMINE (BENADRYL) injection 12.5 mg, 12.5 mg, IntraVENous, Once PRN  sodium chloride flush 0.9 % injection 5-40 mL, 5-40 mL, IntraVENous, 2 times per day  sodium chloride flush 0.9 % injection 5-40 mL, 5-40 mL, IntraVENous, PRN  0.9 % sodium chloride infusion, , IntraVENous, PRN  apixaban (ELIQUIS) tablet 5 mg, 5 mg, Oral, BID  simethicone (MYLICON) chewable tablet 80 mg, 80 mg, Oral, Daily  calcium carbonate (TUMS) chewable tablet 500 mg, 500 mg, Oral, TID PRN  lidocaine (XYLOCAINE) 5 % ointment, , Topical, Daily PRN  acetaminophen (TYLENOL) tablet 650 mg, 650 mg, Oral, Q4H PRN  [Held by provider] melatonin disintegrating tablet 5 mg, 5 mg, Oral, Nightly  balsum peru-castor oil (VENELEX) ointment, , Topical, BID  loperamide (IMODIUM) capsule 2 mg, 2 mg, Oral, 4x Daily PRN  [Held by provider] atorvastatin (LIPITOR) tablet 40 mg, 40 mg, Oral, Nightly  promethazine (PHENERGAN) tablet 12.5 mg, 12.5 mg, Oral, Q6H PRN **OR** ondansetron (ZOFRAN) injection 4 mg, 4 mg, IntraVENous, Q6H PRN  [Held 
very hypoplastic A1 segment. There is no filling to the right hemisphere. The capillary and venous phases are within normal limits. There are no signs of fistulas, dissections or de bhavana aneurysms.         LEFT VERTEBRAL, INTRACRANIAL: Normal contrast enhancement of the vertebral artery is noted. It demonstrates a normal contour and caliber. The left posterior inferior cerebellar artery is adequately seen. The basilar artery appears normal course and caliber. There is normal filling of bilateral superior cerebellar arteries and bilateral posterior cerebral arteries. There is robust antegrade flow thru the right posterior communicating artery to the right carotid terminus. This provides robust filling of the middle and anterior cerebral arteries on the right with a distal pericallosal branch occlusion and distal posterior parietal branch occlusion.      Complications: None    IMPRESSION:  1. Failed attempts to retrieve the retained catheter segment or to trap it with stenting.  2. Successful right internal carotid sacrifice by endovascular occlusion at its origin.  3. Preserved robust collateral flow to the right carotid territory via the right posterior communicating artery from the left vertebral artery.

## 2025-06-03 NOTE — CARE COORDINATION
UTDATE 14:38  [facility] accepted  UTDATE 14:31  [facility] accepted.  Wife needs to talk with family before accepting- told her I would discuss decision tomorrow / no precert needed    UPDATE 13:21  Referral sent to [facility] - family has not looked through list yet but wanted to give them these options.      Message sent to Dr. Lawson - oscar's wife upset - perfect serve for  To speak with wife.  SNF list given to wife.  She will talk with children.

## 2025-06-03 NOTE — DISCHARGE INSTR - COC
Continuity of Care Form    Patient Name: Jhony Hdez   :  1940  MRN:  [id number]    Admit date:  5/15/2025  Discharge date:  2025    Code Status Order: Full Code   Advance Directives:     Admitting Physician:  Abdi Diaz MD  PCP: Jhony Valdovinos MD    Discharging Nurse: Maliha Wheatley  Discharging Hospital Unit/Room#: 4314/4314-01  Discharging Unit Phone Number: [phone]    Emergency Contact:   Extended Emergency Contact Information  Primary Emergency Contact: Aura Hdez  Address: [id number] [place] [address]  Home Phone: [phone]  Mobile Phone: [phone]  Relation: Spouse   needed? No  Secondary Emergency Contact: Angelica Hdez  Mobile Phone: [phone]  Relation: Child  Preferred language: English   needed? No    Past Surgical History:  Past Surgical History:   Procedure Laterality Date    ADRENALECTOMY      groin    BACK SURGERY      CARPAL TUNNEL RELEASE      bilateral    COLONOSCOPY      CYSTOSCOPY Bilateral 2025    CYSTOSCOPY, BILATERAL STENT PLACEMENT,BILATERAL RETROGRADE PYELOGRAM performed by Eduardo Mendoza MD at [facility]    CYSTOSCOPY Bilateral 2025    CYSTOSCOPY RIGHT DIAGNOSTIC URETEROSCOPY RIGHT URETERAL MASS, EXCISION WITH HOLMIUM LASER, LEFT URETEROSCOPY HOLMIUM LASER STONE MANIPULATION WITH CVAC, BILATERAL STENT EXCHANGE performed by Eduardo Mendoza MD at [facility] OR    EP DEVICE PROCEDURE N/A 2025    Insert ICD dual performed by Christoph Martinez MD at [facility] CARDIAC CATH LAB    INTRACAPSULAR CATARACT EXTRACTION Right 2019    PHACOEMULSIFICATION OF CATARACT RIGHT EYE WITH INTRAOCULARLENS IMPLANT performed by Thierno Jensen MD at [facility] OR    KIDNEY STONE SURGERY      several    MANDIBLE SURGERY      NEUROVASCULAR PROCEDURE N/A 5/15/2025    ANGIOGRAM CEREBRAL (08242) performed by Gomez Sheth MD at [facility] CARDIAC CATH LAB    NEUROVASCULAR PROCEDURE N/A

## 2025-06-04 LAB
ALBUMIN SERPL-MCNC: 2.6 G/DL (ref 3.4–5)
ALP SERPL-CCNC: 396 U/L (ref 40–129)
ALT SERPL-CCNC: 123 U/L (ref 10–40)
ANION GAP SERPL CALCULATED.3IONS-SCNC: 11 MMOL/L (ref 3–16)
AST SERPL-CCNC: 195 U/L (ref 15–37)
BASOPHILS # BLD: 0.1 K/UL (ref 0–0.2)
BASOPHILS NFR BLD: 0.7 %
BILIRUB DIRECT SERPL-MCNC: 0.4 MG/DL (ref 0–0.3)
BILIRUB INDIRECT SERPL-MCNC: 0.2 MG/DL (ref 0–1)
BILIRUB SERPL-MCNC: 0.6 MG/DL (ref 0–1)
BUN SERPL-MCNC: 23 MG/DL (ref 7–20)
CALCIUM SERPL-MCNC: 9 MG/DL (ref 8.3–10.6)
CHLORIDE SERPL-SCNC: 106 MMOL/L (ref 99–110)
CO2 SERPL-SCNC: 23 MMOL/L (ref 21–32)
CREAT SERPL-MCNC: 1 MG/DL (ref 0.8–1.3)
DEPRECATED RDW RBC AUTO: 15.9 % (ref 12.4–15.4)
EOSINOPHIL # BLD: 0.2 K/UL (ref 0–0.6)
EOSINOPHIL NFR BLD: 2.6 %
GFR SERPLBLD CREATININE-BSD FMLA CKD-EPI: 73 ML/MIN/{1.73_M2}
GLUCOSE SERPL-MCNC: 99 MG/DL (ref 70–99)
HCT VFR BLD AUTO: 27 % (ref 40.5–52.5)
HGB BLD-MCNC: 9.1 G/DL (ref 13.5–17.5)
LYMPHOCYTES # BLD: 0.9 K/UL (ref 1–5.1)
LYMPHOCYTES NFR BLD: 11.4 %
MCH RBC QN AUTO: 28.2 PG (ref 26–34)
MCHC RBC AUTO-ENTMCNC: 33.6 G/DL (ref 31–36)
MCV RBC AUTO: 83.9 FL (ref 80–100)
MONOCYTES # BLD: 0.8 K/UL (ref 0–1.3)
MONOCYTES NFR BLD: 10 %
NEUTROPHILS # BLD: 6.2 K/UL (ref 1.7–7.7)
NEUTROPHILS NFR BLD: 75.3 %
PHOSPHATE SERPL-MCNC: 3 MG/DL (ref 2.5–4.9)
PLATELET # BLD AUTO: 430 K/UL (ref 135–450)
PMV BLD AUTO: 7.8 FL (ref 5–10.5)
POTASSIUM SERPL-SCNC: 3.4 MMOL/L (ref 3.5–5.1)
PROT SERPL-MCNC: 6.1 G/DL (ref 6.4–8.2)
RBC # BLD AUTO: 3.21 M/UL (ref 4.2–5.9)
SODIUM SERPL-SCNC: 140 MMOL/L (ref 136–145)
WBC # BLD AUTO: 8.2 K/UL (ref 4–11)

## 2025-06-04 PROCEDURE — 80076 HEPATIC FUNCTION PANEL: CPT

## 2025-06-04 PROCEDURE — 92507 TX SP LANG VOICE COMM INDIV: CPT

## 2025-06-04 PROCEDURE — 97129 THER IVNTJ 1ST 15 MIN: CPT

## 2025-06-04 PROCEDURE — 6370000000 HC RX 637 (ALT 250 FOR IP)

## 2025-06-04 PROCEDURE — 36415 COLL VENOUS BLD VENIPUNCTURE: CPT

## 2025-06-04 PROCEDURE — 6370000000 HC RX 637 (ALT 250 FOR IP): Performed by: INTERNAL MEDICINE

## 2025-06-04 PROCEDURE — 2500000003 HC RX 250 WO HCPCS: Performed by: INTERNAL MEDICINE

## 2025-06-04 PROCEDURE — 92526 ORAL FUNCTION THERAPY: CPT

## 2025-06-04 PROCEDURE — 2500000003 HC RX 250 WO HCPCS: Performed by: ANESTHESIOLOGY

## 2025-06-04 PROCEDURE — 6370000000 HC RX 637 (ALT 250 FOR IP): Performed by: SURGERY

## 2025-06-04 PROCEDURE — 85025 COMPLETE CBC W/AUTO DIFF WBC: CPT

## 2025-06-04 PROCEDURE — 2060000000 HC ICU INTERMEDIATE R&B

## 2025-06-04 PROCEDURE — 80069 RENAL FUNCTION PANEL: CPT

## 2025-06-04 RX ORDER — AMOXICILLIN 250 MG/1
500 CAPSULE ORAL EVERY 8 HOURS SCHEDULED
Status: DISCONTINUED | OUTPATIENT
Start: 2025-06-04 | End: 2025-06-06 | Stop reason: HOSPADM

## 2025-06-04 RX ORDER — AMOXICILLIN 500 MG/1
500 CAPSULE ORAL 3 TIMES DAILY
Qty: 15 CAPSULE | Refills: 0 | Status: SHIPPED | OUTPATIENT
Start: 2025-06-04 | End: 2025-06-09

## 2025-06-04 RX ADMIN — MIDODRINE HYDROCHLORIDE 10 MG: 5 TABLET ORAL at 11:45

## 2025-06-04 RX ADMIN — APIXABAN 5 MG: 5 TABLET, FILM COATED ORAL at 19:30

## 2025-06-04 RX ADMIN — AMOXICILLIN 500 MG: 250 CAPSULE ORAL at 14:09

## 2025-06-04 RX ADMIN — Medication: at 08:37

## 2025-06-04 RX ADMIN — SODIUM CHLORIDE, PRESERVATIVE FREE 10 ML: 5 INJECTION INTRAVENOUS at 08:35

## 2025-06-04 RX ADMIN — SIMETHICONE 80 MG: 80 TABLET, CHEWABLE ORAL at 08:34

## 2025-06-04 RX ADMIN — MIDODRINE HYDROCHLORIDE 10 MG: 5 TABLET ORAL at 08:34

## 2025-06-04 RX ADMIN — PANTOPRAZOLE SODIUM 40 MG: 40 TABLET, DELAYED RELEASE ORAL at 07:31

## 2025-06-04 RX ADMIN — MIDODRINE HYDROCHLORIDE 10 MG: 5 TABLET ORAL at 17:22

## 2025-06-04 RX ADMIN — APIXABAN 5 MG: 5 TABLET, FILM COATED ORAL at 08:35

## 2025-06-04 RX ADMIN — SODIUM CHLORIDE, PRESERVATIVE FREE 10 ML: 5 INJECTION INTRAVENOUS at 08:37

## 2025-06-04 RX ADMIN — AMOXICILLIN 500 MG: 250 CAPSULE ORAL at 22:26

## 2025-06-04 RX ADMIN — Medication: at 19:30

## 2025-06-04 ASSESSMENT — PAIN SCALES - GENERAL
PAINLEVEL_OUTOF10: 0

## 2025-06-04 ASSESSMENT — PAIN SCALES - WONG BAKER
WONGBAKER_NUMERICALRESPONSE: NO HURT
WONGBAKER_NUMERICALRESPONSE: NO HURT

## 2025-06-04 NOTE — CARE COORDINATION
Met with Pt's wife and family at bedside to discuss SNF placement.  Family is still leery about not sending pt to ARU but wife is understanding that pt needs to first go to SNF to gain strength before he can participate in the 3 hour PT/OT required at an ARU.      Wife is now requesting a referral to the [facility] /which I will submit today.  Family would like to visit facility and I encouraged them to do so tomorrow morning so a decision could be expedited.      Family would also like to see a patient advocate.  I reached out to [phone] leaving a message for Katie Lomax to please call or visit family in the afternoon.

## 2025-06-04 NOTE — TELEPHONE ENCOUNTER
Received call from hospital. Pt is being d/c soon and they wanted to change the 1 week site check to [place]. Pt was scheduled for 6/11. Pt was also scheduled as a new pt with will in September for 3m f/u. Will confirm with pt when he comes in on 6/11 to cancel the 9/2 3m f/u with [facility].

## 2025-06-05 LAB
ALBUMIN SERPL-MCNC: 2.6 G/DL (ref 3.4–5)
ALP SERPL-CCNC: 372 U/L (ref 40–129)
ALT SERPL-CCNC: 117 U/L (ref 10–40)
ANION GAP SERPL CALCULATED.3IONS-SCNC: 11 MMOL/L (ref 3–16)
AST SERPL-CCNC: 154 U/L (ref 15–37)
BASOPHILS # BLD: 0.1 K/UL (ref 0–0.2)
BASOPHILS NFR BLD: 1 %
BILIRUB DIRECT SERPL-MCNC: 0.4 MG/DL (ref 0–0.3)
BILIRUB INDIRECT SERPL-MCNC: 0.3 MG/DL (ref 0–1)
BILIRUB SERPL-MCNC: 0.7 MG/DL (ref 0–1)
BUN SERPL-MCNC: 18 MG/DL (ref 7–20)
CALCIUM SERPL-MCNC: 9 MG/DL (ref 8.3–10.6)
CHLORIDE SERPL-SCNC: 106 MMOL/L (ref 99–110)
CO2 SERPL-SCNC: 23 MMOL/L (ref 21–32)
CREAT SERPL-MCNC: 0.9 MG/DL (ref 0.8–1.3)
DEPRECATED RDW RBC AUTO: 16.7 % (ref 12.4–15.4)
EOSINOPHIL # BLD: 0.2 K/UL (ref 0–0.6)
EOSINOPHIL NFR BLD: 2.4 %
GFR SERPLBLD CREATININE-BSD FMLA CKD-EPI: 83 ML/MIN/{1.73_M2}
GLUCOSE SERPL-MCNC: 103 MG/DL (ref 70–99)
HCT VFR BLD AUTO: 29 % (ref 40.5–52.5)
HGB BLD-MCNC: 9.7 G/DL (ref 13.5–17.5)
LYMPHOCYTES # BLD: 1 K/UL (ref 1–5.1)
LYMPHOCYTES NFR BLD: 14 %
MCH RBC QN AUTO: 27.9 PG (ref 26–34)
MCHC RBC AUTO-ENTMCNC: 33.4 G/DL (ref 31–36)
MCV RBC AUTO: 83.6 FL (ref 80–100)
MONOCYTES # BLD: 0.7 K/UL (ref 0–1.3)
MONOCYTES NFR BLD: 9.8 %
NEUTROPHILS # BLD: 5.4 K/UL (ref 1.7–7.7)
NEUTROPHILS NFR BLD: 72.8 %
PHOSPHATE SERPL-MCNC: 2.8 MG/DL (ref 2.5–4.9)
PLATELET # BLD AUTO: 459 K/UL (ref 135–450)
PMV BLD AUTO: 7.7 FL (ref 5–10.5)
POTASSIUM SERPL-SCNC: 3.5 MMOL/L (ref 3.5–5.1)
PROT SERPL-MCNC: 6.3 G/DL (ref 6.4–8.2)
RBC # BLD AUTO: 3.47 M/UL (ref 4.2–5.9)
SODIUM SERPL-SCNC: 140 MMOL/L (ref 136–145)
WBC # BLD AUTO: 7.4 K/UL (ref 4–11)

## 2025-06-05 PROCEDURE — 80069 RENAL FUNCTION PANEL: CPT

## 2025-06-05 PROCEDURE — 36415 COLL VENOUS BLD VENIPUNCTURE: CPT

## 2025-06-05 PROCEDURE — 85025 COMPLETE CBC W/AUTO DIFF WBC: CPT

## 2025-06-05 PROCEDURE — 97530 THERAPEUTIC ACTIVITIES: CPT

## 2025-06-05 PROCEDURE — 6370000000 HC RX 637 (ALT 250 FOR IP)

## 2025-06-05 PROCEDURE — 97112 NEUROMUSCULAR REEDUCATION: CPT

## 2025-06-05 PROCEDURE — 80076 HEPATIC FUNCTION PANEL: CPT

## 2025-06-05 PROCEDURE — 2580000003 HC RX 258: Performed by: SURGERY

## 2025-06-05 PROCEDURE — 2500000003 HC RX 250 WO HCPCS: Performed by: ANESTHESIOLOGY

## 2025-06-05 PROCEDURE — 97535 SELF CARE MNGMENT TRAINING: CPT

## 2025-06-05 PROCEDURE — 51798 US URINE CAPACITY MEASURE: CPT

## 2025-06-05 PROCEDURE — 6370000000 HC RX 637 (ALT 250 FOR IP): Performed by: INTERNAL MEDICINE

## 2025-06-05 PROCEDURE — 2060000000 HC ICU INTERMEDIATE R&B

## 2025-06-05 PROCEDURE — 6370000000 HC RX 637 (ALT 250 FOR IP): Performed by: SURGERY

## 2025-06-05 PROCEDURE — 2500000003 HC RX 250 WO HCPCS: Performed by: INTERNAL MEDICINE

## 2025-06-05 RX ORDER — 0.9 % SODIUM CHLORIDE 0.9 %
500 INTRAVENOUS SOLUTION INTRAVENOUS ONCE
Status: DISCONTINUED | OUTPATIENT
Start: 2025-06-05 | End: 2025-06-05

## 2025-06-05 RX ORDER — 0.9 % SODIUM CHLORIDE 0.9 %
1000 INTRAVENOUS SOLUTION INTRAVENOUS ONCE
Status: DISCONTINUED | OUTPATIENT
Start: 2025-06-05 | End: 2025-06-05

## 2025-06-05 RX ORDER — 0.9 % SODIUM CHLORIDE 0.9 %
1000 INTRAVENOUS SOLUTION INTRAVENOUS ONCE
Status: COMPLETED | OUTPATIENT
Start: 2025-06-05 | End: 2025-06-05

## 2025-06-05 RX ADMIN — AMOXICILLIN 500 MG: 250 CAPSULE ORAL at 15:35

## 2025-06-05 RX ADMIN — MIDODRINE HYDROCHLORIDE 10 MG: 5 TABLET ORAL at 08:04

## 2025-06-05 RX ADMIN — SODIUM CHLORIDE, PRESERVATIVE FREE 10 ML: 5 INJECTION INTRAVENOUS at 08:03

## 2025-06-05 RX ADMIN — SIMETHICONE 80 MG: 80 TABLET, CHEWABLE ORAL at 08:04

## 2025-06-05 RX ADMIN — Medication: at 08:04

## 2025-06-05 RX ADMIN — AMOXICILLIN 500 MG: 250 CAPSULE ORAL at 07:20

## 2025-06-05 RX ADMIN — SODIUM CHLORIDE 1000 ML: 0.9 INJECTION, SOLUTION INTRAVENOUS at 17:13

## 2025-06-05 RX ADMIN — AMOXICILLIN 500 MG: 250 CAPSULE ORAL at 21:03

## 2025-06-05 RX ADMIN — MIDODRINE HYDROCHLORIDE 10 MG: 5 TABLET ORAL at 11:30

## 2025-06-05 RX ADMIN — APIXABAN 5 MG: 5 TABLET, FILM COATED ORAL at 08:04

## 2025-06-05 RX ADMIN — MIDODRINE HYDROCHLORIDE 10 MG: 5 TABLET ORAL at 17:09

## 2025-06-05 RX ADMIN — Medication: at 21:03

## 2025-06-05 RX ADMIN — PANTOPRAZOLE SODIUM 40 MG: 40 TABLET, DELAYED RELEASE ORAL at 07:20

## 2025-06-05 RX ADMIN — ACETAMINOPHEN 650 MG: 325 TABLET ORAL at 12:28

## 2025-06-05 RX ADMIN — APIXABAN 5 MG: 5 TABLET, FILM COATED ORAL at 21:03

## 2025-06-05 ASSESSMENT — PAIN DESCRIPTION - ORIENTATION: ORIENTATION: RIGHT;LEFT;MID

## 2025-06-05 ASSESSMENT — PAIN DESCRIPTION - PAIN TYPE: TYPE: ACUTE PAIN

## 2025-06-05 ASSESSMENT — PAIN DESCRIPTION - DESCRIPTORS: DESCRIPTORS: ACHING

## 2025-06-05 ASSESSMENT — PAIN DESCRIPTION - LOCATION: LOCATION: LEG

## 2025-06-05 ASSESSMENT — PAIN SCALES - GENERAL
PAINLEVEL_OUTOF10: 0
PAINLEVEL_OUTOF10: 4

## 2025-06-05 ASSESSMENT — PAIN DESCRIPTION - FREQUENCY: FREQUENCY: INTERMITTENT

## 2025-06-05 ASSESSMENT — PAIN - FUNCTIONAL ASSESSMENT: PAIN_FUNCTIONAL_ASSESSMENT: ACTIVITIES ARE NOT PREVENTED

## 2025-06-05 ASSESSMENT — PAIN DESCRIPTION - ONSET: ONSET: SUDDEN

## 2025-06-05 NOTE — CARE COORDINATION
Message left for Mala Clement / [facility] following up on referral left for snf placement. Awaiting Call /answer back

## 2025-06-05 NOTE — CARE COORDINATION
[facility]. Director, Nereida Roper, spoke with wife.  She will speak with family (all are very involved with Pts care) and make a decision.  Will follow up with wife in morning.

## 2025-06-06 VITALS
HEART RATE: 58 BPM | WEIGHT: 179.8 LBS | HEIGHT: 66 IN | OXYGEN SATURATION: 97 % | DIASTOLIC BLOOD PRESSURE: 56 MMHG | RESPIRATION RATE: 16 BRPM | BODY MASS INDEX: 28.9 KG/M2 | SYSTOLIC BLOOD PRESSURE: 90 MMHG | TEMPERATURE: 97.7 F

## 2025-06-06 LAB
ALBUMIN SERPL-MCNC: 2.6 G/DL (ref 3.4–5)
ALP SERPL-CCNC: 297 U/L (ref 40–129)
ALT SERPL-CCNC: 95 U/L (ref 10–40)
ANION GAP SERPL CALCULATED.3IONS-SCNC: 9 MMOL/L (ref 3–16)
AST SERPL-CCNC: 106 U/L (ref 15–37)
BILIRUB DIRECT SERPL-MCNC: 0.3 MG/DL (ref 0–0.3)
BILIRUB INDIRECT SERPL-MCNC: 0.2 MG/DL (ref 0–1)
BILIRUB SERPL-MCNC: 0.5 MG/DL (ref 0–1)
BUN SERPL-MCNC: 20 MG/DL (ref 7–20)
CALCIUM SERPL-MCNC: 8.7 MG/DL (ref 8.3–10.6)
CHLORIDE SERPL-SCNC: 104 MMOL/L (ref 99–110)
CO2 SERPL-SCNC: 24 MMOL/L (ref 21–32)
CREAT SERPL-MCNC: 1 MG/DL (ref 0.8–1.3)
GFR SERPLBLD CREATININE-BSD FMLA CKD-EPI: 73 ML/MIN/{1.73_M2}
GLUCOSE SERPL-MCNC: 101 MG/DL (ref 70–99)
PHOSPHATE SERPL-MCNC: 3.1 MG/DL (ref 2.5–4.9)
POTASSIUM SERPL-SCNC: 3.5 MMOL/L (ref 3.5–5.1)
PROT SERPL-MCNC: 5.9 G/DL (ref 6.4–8.2)
SODIUM SERPL-SCNC: 137 MMOL/L (ref 136–145)

## 2025-06-06 PROCEDURE — 36415 COLL VENOUS BLD VENIPUNCTURE: CPT

## 2025-06-06 PROCEDURE — 6370000000 HC RX 637 (ALT 250 FOR IP)

## 2025-06-06 PROCEDURE — 2500000003 HC RX 250 WO HCPCS: Performed by: INTERNAL MEDICINE

## 2025-06-06 PROCEDURE — 6370000000 HC RX 637 (ALT 250 FOR IP): Performed by: INTERNAL MEDICINE

## 2025-06-06 PROCEDURE — 80069 RENAL FUNCTION PANEL: CPT

## 2025-06-06 PROCEDURE — 92507 TX SP LANG VOICE COMM INDIV: CPT

## 2025-06-06 PROCEDURE — 6370000000 HC RX 637 (ALT 250 FOR IP): Performed by: SURGERY

## 2025-06-06 PROCEDURE — 80076 HEPATIC FUNCTION PANEL: CPT

## 2025-06-06 PROCEDURE — 2500000003 HC RX 250 WO HCPCS: Performed by: ANESTHESIOLOGY

## 2025-06-06 PROCEDURE — 97129 THER IVNTJ 1ST 15 MIN: CPT

## 2025-06-06 PROCEDURE — 92526 ORAL FUNCTION THERAPY: CPT

## 2025-06-06 RX ADMIN — MIDODRINE HYDROCHLORIDE 10 MG: 5 TABLET ORAL at 08:33

## 2025-06-06 RX ADMIN — AMOXICILLIN 500 MG: 250 CAPSULE ORAL at 05:34

## 2025-06-06 RX ADMIN — ACETAMINOPHEN 650 MG: 325 TABLET ORAL at 06:18

## 2025-06-06 RX ADMIN — AMOXICILLIN 500 MG: 250 CAPSULE ORAL at 15:06

## 2025-06-06 RX ADMIN — SIMETHICONE 80 MG: 80 TABLET, CHEWABLE ORAL at 08:33

## 2025-06-06 RX ADMIN — MIDODRINE HYDROCHLORIDE 10 MG: 5 TABLET ORAL at 12:34

## 2025-06-06 RX ADMIN — SODIUM CHLORIDE, PRESERVATIVE FREE 10 ML: 5 INJECTION INTRAVENOUS at 08:34

## 2025-06-06 RX ADMIN — APIXABAN 5 MG: 5 TABLET, FILM COATED ORAL at 08:33

## 2025-06-06 RX ADMIN — PANTOPRAZOLE SODIUM 40 MG: 40 TABLET, DELAYED RELEASE ORAL at 05:34

## 2025-06-06 RX ADMIN — Medication: at 11:06

## 2025-06-06 RX ADMIN — SODIUM CHLORIDE, PRESERVATIVE FREE 10 ML: 5 INJECTION INTRAVENOUS at 11:06

## 2025-06-06 ASSESSMENT — PAIN SCALES - GENERAL
PAINLEVEL_OUTOF10: 0
PAINLEVEL_OUTOF10: 3

## 2025-06-06 ASSESSMENT — PAIN - FUNCTIONAL ASSESSMENT: PAIN_FUNCTIONAL_ASSESSMENT: PREVENTS OR INTERFERES SOME ACTIVE ACTIVITIES AND ADLS

## 2025-06-06 ASSESSMENT — PAIN DESCRIPTION - LOCATION: LOCATION: HEAD

## 2025-06-06 ASSESSMENT — PAIN DESCRIPTION - DESCRIPTORS: DESCRIPTORS: ACHING

## 2025-06-06 ASSESSMENT — PAIN DESCRIPTION - ORIENTATION: ORIENTATION: MID

## 2025-06-06 NOTE — DISCHARGE INSTR - DIET

## 2025-06-06 NOTE — CARE COORDINATION
Case Management Assessment            Discharge Note                    Date / Time of Note: 6/6/2025 1:41 PM                  Discharge Note Completed by: Anali Freedman    Patient Name: Jhony Hdez   YOB: 1940  Diagnosis: Stroke (HCC) [I63.9]  Acute CVA (cerebrovascular accident) (HCC) [I63.9]   Date / Time: 5/15/2025  7:12 AM    Current PCP: Jhony Valdovinos MD  Clinic patient: No    Hospitalization in the last 30 days: No       Advance Directives:  Code Status: Full Code  [place] DNR form completed and on chart: No    Financial:  Payor: MEDICARE / Plan: MEDICARE PART A AND B / Product Type: *No Product type* /      Pharmacy:    [id number] - [place], [place] - [address] - P [phone] - F [phone]  [address] [facility] [place] [address]  Phone: [phone] Fax: [phone]      Assistance purchasing medications?: Potential Assistance Purchasing Medications: No  Assistance provided by Case Management: None at this time    Does patient want to participate in local refill/ meds to beds program?: Yes    Meds To Beds General Rules:  1. Can ONLY be done Monday- Friday between 8:30am-5pm  2. Prescription(s) must be in pharmacy by 3pm to be filled same day  3.Copy of patient's insurance/ prescription drug card and patient face sheet must be sent along with the prescription(s)  4. Cost of Rx cannot be added to hospital bill. If financial assistance is needed, please contact unit  or ;  or  CANNOT provide pharmacy voucher for patients co-pays  5. Patients can then  the prescription on their way out of the hospital at discharge, or pharmacy can deliver to the bedside if staff is available. (payment due at time of pick-up or delivery - cash, check, or card accepted)     Able to afford home medications/ co-pay costs: Yes    ADLS:  Current PT AM-PAC Score: 6 /24  Current OT AM-PAC Score: 9 /24    DISCHARGE

## 2025-06-06 NOTE — PLAN OF CARE
[facility]              Progress Note      Admit Date 5/15/2025  HPI: patient's device functioning as intended. Monitor reviewed and no afib noted overnight.   He remains on eliquis.      Interval history:     Mr. Hdez is arousable.      Subjective:       Scheduled Meds:   midodrine  5 mg Oral TID WC    sodium chloride flush  5-40 mL IntraVENous 2 times per day    sodium chloride flush  5-40 mL IntraVENous 2 times per day    apixaban  5 mg Oral BID    simethicone  80 mg Oral Daily    melatonin  5 mg Oral Nightly    balsum peru-castor oil   Topical BID    atorvastatin  40 mg Oral Nightly    [Held by provider] donepezil  5 mg Oral Nightly    pantoprazole  40 mg Oral QAM AC     Continuous Infusions:   DOPamine 2.5 mcg/kg/min (25)    sodium chloride      sodium chloride       PRN Meds:morphine, naloxone 0.4 mg in 10 mL sodium chloride syringe, sodium chloride flush, sodium chloride, meperidine, HYDROmorphone, HYDROmorphone, ondansetron, prochlorperazine, diphenhydrAMINE, hydrALAZINE, sodium chloride flush, sodium chloride, calcium carbonate, lidocaine, acetaminophen, loperamide, promethazine **OR** ondansetron       Objective:     Wt Readings from Last 3 Encounters:   25 73.5 kg (162 lb 0.6 oz)   25 86.2 kg (190 lb 0.6 oz)   05/15/25 89.3 kg (196 lb 13.9 oz)   Admit weight: Weight - Scale: 86.1 kg (189 lb 13.1 oz)      Temperature range over 24hrs:   Temp  Av.2 °F (36.8 °C)  Min: 97.8 °F (36.6 °C)  Max: 98.4 °F (36.9 °C)  Current Respiratory Rate:  Respirations: 16  Current Pulse:  Pulse: 68  Current Blood Pressure:  BP: 106/63  24hr Blood Pressure Range:  Systolic (24hrs), Av , Min:83 , Max:117   ; Diastolic (24hrs), Av, Min:46, Max:74    Current Pulse Oximetry:  SpO2: 95 %      Intake/Output Summary (Last 24 hours) at 2025 0907  Last data filed at 2025 0530  Gross per 24 hour   Intake 574.12 ml   Output 800 ml   Net -225.88 ml       Telemetry monitor: 
    Met with multiple family members at ~16:45 to ~17:30 in order to answer questions. I was able to discuss questions related to the stroke itself, the intracranial circulation, the collateral blood supply, the hopeful trajectory of his recovery, etc. Many other questions were outside of my scope of practice or were administrative/procedural, which I do not have the knowledge to address. After the conversation I spoke to nursing supervisor to provide family with any administrative contacts, but with it being Friday evening it was unlikely there will be anyone available until Monday.     Clifford Hoyos MD  Neurology/Neurocritical Care  5/16/2025   
    Problem: Pain  Goal: Verbalizes/displays adequate comfort level or baseline comfort level  6/1/2025 1408 by Laly Chang RN  Outcome: Progressing     Problem: Chronic Conditions and Co-morbidities  Goal: Patient's chronic conditions and co-morbidity symptoms are monitored and maintained or improved  6/1/2025 1408 by Laly Chang, RN  Outcome: Progressing     Problem: Safety - Adult  Goal: Free from fall injury  6/1/2025 1408 by Laly Chang RN  Outcome: Progressing       
  Heparin gtt started this morning.       PHYSICAL EXAM:  Vitals:    05/20/25 1800 05/20/25 1830 05/20/25 1845 05/20/25 1900   BP: 123/60 118/60 118/73 134/64   Pulse: 85 59 62 75   Resp: 27 16 29 30   Temp:       TempSrc:       SpO2: 98% 100% 97% 90%   Weight:       Height:             General: Drowsy, no distress, well-nourished  Neurologic  Mental status:   orientation to person, place, time, situation   Attention  Attends well   Language dysarthric but able to be understood   Comprehension intact; follows simple commands in four extremities     Cranial nerves:   CN2: Visual fields full w/o extinction on confrontational testing  CN 3,4,6: Pupils 3 mm> 2 mm equal and reactive to light, right gaze preference that does break midline to the left.  CN5: Reduced sensation to light touch on the left.  CN7: left facial weakness  CN8: Hearing symmetric to spoken voice  CN9: Palate elevated symmetrically  CN11: Trap strength weak on them left. Strong and preserved on the right.   CN12: Tongue midline with protrusion    Motor Exam:  Left keron neglect.   RUE - 5/5  LUE - 2/5  BLE 3/5    Sensory: Can feel light touch in all four extremities but reduced on the left compared to the right with sensory extinction..   Cerebellar/coordination: FNF normal in RUE. DERRICK in LUE secondary to weakness.    Tone: normal in all 4 extremities  Gait: Deferred for safety  Right groin: Soft, no hematoma. Non tender to palpation. No bruising.  Left groin: Soft, nontender to palpation. No hematoma. No bruising      
  Intervention: Swallow Evaluation/treatment  SLP completed evaluation. Please refer to notes in EMR.    Electronically signed by:  Vicki Colin M.A., CCC-SLP  [id number]  Speech-Language Pathologist  [place] phone: [phone]  
  Problem: Chronic Conditions and Co-morbidities  Goal: Patient's chronic conditions and co-morbidity symptoms are monitored and maintained or improved  5/16/2025 0906 by Joann Rg RN  Outcome: Progressing  5/16/2025 0350 by Shelton Lucas RN  Outcome: Progressing     Problem: Discharge Planning  Goal: Discharge to home or other facility with appropriate resources  5/16/2025 0906 by Joann Rg RN  Outcome: Progressing  5/16/2025 0350 by Shelton Lucas RN  Outcome: Progressing     Problem: Safety - Adult  Goal: Free from fall injury  5/16/2025 0906 by Joann Rg RN  Outcome: Progressing  5/16/2025 0350 by Shelton Lucas RN  Outcome: Progressing     Problem: Skin/Tissue Integrity  Goal: Skin integrity remains intact  Description: 1.  Monitor for areas of redness and/or skin breakdown2.  Assess vascular access sites hourly3.  Every 4-6 hours minimum:  Change oxygen saturation probe site4.  Every 4-6 hours:  If on nasal continuous positive airway pressure, respiratory therapy assess nares and determine need for appliance change or resting period  5/16/2025 0906 by Joann Rg RN  Outcome: Progressing  Flowsheets (Taken 5/16/2025 0800)  Skin Integrity Remains Intact:   Assess vascular access sites hourly   Monitor for areas of redness and/or skin breakdown   Every 4-6 hours:  If on nasal continuous positive airway pressure, assess nares and determine need for appliance change or resting period   Every 4-6 hours minimum:  Change oxygen saturation probe site   Turn and reposition as indicated   Assess need for specialty bed   Positioning devices  5/16/2025 0350 by Shelton Lucas RN  Outcome: Progressing     Problem: ABCDS Injury Assessment  Goal: Absence of physical injury  5/16/2025 0906 by Joann Rg RN  Outcome: Progressing  5/16/2025 0350 by Shelton Lucas RN  Outcome: Progressing     Problem: Risk for Elopement  Goal: Patient will not exit the unit/facility without proper 
  Problem: Chronic Conditions and Co-morbidities  Goal: Patient's chronic conditions and co-morbidity symptoms are monitored and maintained or improved  5/17/2025 1933 by Leonor Ortiz RN  Outcome: Progressing  5/17/2025 1721 by Shelly Tay RN  Outcome: Progressing  Flowsheets (Taken 5/17/2025 0800)  Care Plan - Patient's Chronic Conditions and Co-Morbidity Symptoms are Monitored and Maintained or Improved:   Monitor and assess patient's chronic conditions and comorbid symptoms for stability, deterioration, or improvement   Collaborate with multidisciplinary team to address chronic and comorbid conditions and prevent exacerbation or deterioration   Update acute care plan with appropriate goals if chronic or comorbid symptoms are exacerbated and prevent overall improvement and discharge     Problem: Discharge Planning  Goal: Discharge to home or other facility with appropriate resources  5/17/2025 1933 by Leonor Ortiz RN  Outcome: Progressing  5/17/2025 1721 by Shelly Tay RN  Outcome: Progressing  Flowsheets (Taken 5/17/2025 0800)  Discharge to home or other facility with appropriate resources:   Identify barriers to discharge with patient and caregiver   Arrange for needed discharge resources and transportation as appropriate   Identify discharge learning needs (meds, wound care, etc)   Arrange for interpreters to assist at discharge as needed   Refer to discharge planning if patient needs post-hospital services based on physician order or complex needs related to functional status, cognitive ability or social support system     Problem: Safety - Adult  Goal: Free from fall injury  5/17/2025 1933 by Leonor Ortiz RN  Outcome: Progressing  5/17/2025 1721 by Shelly Tay RN  Outcome: Progressing  Flowsheets (Taken 5/17/2025 0800)  Free From Fall Injury:   Instruct family/caregiver on patient safety   Based on caregiver fall risk screen, instruct family/caregiver to ask for 
  Problem: Chronic Conditions and Co-morbidities  Goal: Patient's chronic conditions and co-morbidity symptoms are monitored and maintained or improved  5/18/2025 2011 by Leonor Ortiz RN  Outcome: Progressing  5/18/2025 1228 by Shelly Tay RN  Outcome: Progressing  Flowsheets (Taken 5/18/2025 0800)  Care Plan - Patient's Chronic Conditions and Co-Morbidity Symptoms are Monitored and Maintained or Improved:   Monitor and assess patient's chronic conditions and comorbid symptoms for stability, deterioration, or improvement   Collaborate with multidisciplinary team to address chronic and comorbid conditions and prevent exacerbation or deterioration   Update acute care plan with appropriate goals if chronic or comorbid symptoms are exacerbated and prevent overall improvement and discharge     Problem: Discharge Planning  Goal: Discharge to home or other facility with appropriate resources  5/18/2025 2011 by Leonor Ortiz RN  Outcome: Progressing  5/18/2025 1228 by Shelly Tay RN  Outcome: Progressing  Flowsheets (Taken 5/18/2025 0800)  Discharge to home or other facility with appropriate resources:   Identify barriers to discharge with patient and caregiver   Arrange for needed discharge resources and transportation as appropriate   Identify discharge learning needs (meds, wound care, etc)   Arrange for interpreters to assist at discharge as needed   Refer to discharge planning if patient needs post-hospital services based on physician order or complex needs related to functional status, cognitive ability or social support system     Problem: Safety - Adult  Goal: Free from fall injury  5/18/2025 2011 by Leonor Ortiz RN  Outcome: Progressing  5/18/2025 1228 by Shelly Tay RN  Outcome: Progressing  Flowsheets (Taken 5/18/2025 0800)  Free From Fall Injury:   Instruct family/caregiver on patient safety   Based on caregiver fall risk screen, instruct family/caregiver to ask for 
  Problem: Chronic Conditions and Co-morbidities  Goal: Patient's chronic conditions and co-morbidity symptoms are monitored and maintained or improved  5/19/2025 0822 by Arian Torres RN  Outcome: Progressing  Flowsheets (Taken 5/19/2025 0800)  Care Plan - Patient's Chronic Conditions and Co-Morbidity Symptoms are Monitored and Maintained or Improved: Monitor and assess patient's chronic conditions and comorbid symptoms for stability, deterioration, or improvement     Problem: Discharge Planning  Goal: Discharge to home or other facility with appropriate resources  5/19/2025 0822 by Arian Torres RN  Outcome: Progressing  Flowsheets (Taken 5/19/2025 0800)  Discharge to home or other facility with appropriate resources: Identify barriers to discharge with patient and caregiver     Problem: Safety - Adult  Goal: Free from fall injury  5/19/2025 0822 by Arian Torres RN  Outcome: Progressing  Flowsheets (Taken 5/19/2025 0821)  Free From Fall Injury: Instruct family/caregiver on patient safety     Problem: Skin/Tissue Integrity  Goal: Skin integrity remains intact  Description: 1.  Monitor for areas of redness and/or skin breakdown2.  Assess vascular access sites hourly3.  Every 4-6 hours minimum:  Change oxygen saturation probe site4.  Every 4-6 hours:  If on nasal continuous positive airway pressure, respiratory therapy assess nares and determine need for appliance change or resting period  5/19/2025 0822 by Arian Torres RN  Outcome: Progressing  Flowsheets  Taken 5/19/2025 0821  Skin Integrity Remains Intact: Monitor for areas of redness and/or skin breakdown  Taken 5/19/2025 0800  Skin Integrity Remains Intact: Monitor for areas of redness and/or skin breakdown     Problem: ABCDS Injury Assessment  Goal: Absence of physical injury  5/19/2025 0822 by Arian Torres, RN  Outcome: Progressing  Flowsheets (Taken 5/19/2025 0821)  Absence of Physical Injury: Implement safety measures based on patient 
  Problem: Chronic Conditions and Co-morbidities  Goal: Patient's chronic conditions and co-morbidity symptoms are monitored and maintained or improved  5/21/2025 0708 by Melissa Wallace RN  Outcome: Progressing    Problem: Discharge Planning  Goal: Discharge to home or other facility with appropriate resources  5/21/2025 0708 by Melissa Wallace RN  Outcome: Progressing    Problem: Safety - Adult  Goal: Free from fall injury  5/21/2025 0708 by Melissa Wallace RN  Outcome: Progressing     Problem: Skin/Tissue Integrity  Goal: Skin integrity remains intact  Description: 1.  Monitor for areas of redness and/or skin breakdown2.  Assess vascular access sites hourly3.  Every 4-6 hours minimum:  Change oxygen saturation probe site4.  Every 4-6 hours:  If on nasal continuous positive airway pressure, respiratory therapy assess nares and determine need for appliance change or resting period  5/21/2025 0708 by Melissa Wallace RN  Outcome: Progressing    Problem: ABCDS Injury Assessment  Goal: Absence of physical injury  5/21/2025 0708 by Melissa Wallace RN  Outcome: Progressing     Problem: Risk for Elopement  Goal: Patient will not exit the unit/facility without proper excort  5/21/2025 0708 by Melissa Wallace RN  Outcome: Progressing    Problem: Neurosensory - Adult  Goal: Achieves maximal functionality and self care  5/21/2025 0708 by Melissa Wallace RN  Outcome: Progressing    Problem: Pain  Goal: Verbalizes/displays adequate comfort level or baseline comfort level  5/21/2025 0708 by Melissa Wallace RN  Outcome: Progressing     Problem: Cardiovascular - Adult  Goal: Maintains optimal cardiac output and hemodynamic stability  5/21/2025 0708 by Melissa Wallace RN  Outcome: Progressing    Problem: Cardiovascular - Adult  Goal: Absence of cardiac dysrhythmias or at baseline  5/21/2025 0708 by Melissa Wallace RN  Outcome: Progressing    Problem: Skin/Tissue Integrity - Adult  Goal: Skin integrity remains 
  Problem: Chronic Conditions and Co-morbidities  Goal: Patient's chronic conditions and co-morbidity symptoms are monitored and maintained or improved  5/23/2025 1824 by Mayte Cortes RN  Outcome: Progressing  5/23/2025 1109 by Mayte Cortes RN  Outcome: Progressing  Flowsheets  Taken 5/23/2025 0800 by Mayte Cortes RN  Care Plan - Patient's Chronic Conditions and Co-Morbidity Symptoms are Monitored and Maintained or Improved:   Monitor and assess patient's chronic conditions and comorbid symptoms for stability, deterioration, or improvement   Collaborate with multidisciplinary team to address chronic and comorbid conditions and prevent exacerbation or deterioration   Update acute care plan with appropriate goals if chronic or comorbid symptoms are exacerbated and prevent overall improvement and discharge  Taken 5/23/2025 0400 by Richie Gill RN  Care Plan - Patient's Chronic Conditions and Co-Morbidity Symptoms are Monitored and Maintained or Improved:   Monitor and assess patient's chronic conditions and comorbid symptoms for stability, deterioration, or improvement   Collaborate with multidisciplinary team to address chronic and comorbid conditions and prevent exacerbation or deterioration  Taken 5/23/2025 0000 by Richie Gill RN  Care Plan - Patient's Chronic Conditions and Co-Morbidity Symptoms are Monitored and Maintained or Improved:   Monitor and assess patient's chronic conditions and comorbid symptoms for stability, deterioration, or improvement   Collaborate with multidisciplinary team to address chronic and comorbid conditions and prevent exacerbation or deterioration     Problem: Discharge Planning  Goal: Discharge to home or other facility with appropriate resources  5/23/2025 1824 by Mayte Cortes RN  Outcome: Progressing  5/23/2025 1109 by Mayte Cortes RN  Outcome: Progressing  Flowsheets  Taken 5/23/2025 0800 by Mayte Cortes RN  Discharge to home or other 
  Problem: Chronic Conditions and Co-morbidities  Goal: Patient's chronic conditions and co-morbidity symptoms are monitored and maintained or improved  5/26/2025 1927 by Josh Malloy RN  Outcome: Progressing     Problem: Discharge Planning  Goal: Discharge to home or other facility with appropriate resources  5/26/2025 1927 by Josh Malloy RN  Outcome: Progressing     Problem: Safety - Adult  Goal: Free from fall injury  5/26/2025 1927 by Josh Malloy RN  Outcome: Progressing     Problem: Skin/Tissue Integrity  Goal: Skin integrity remains intact  Description: 1.  Monitor for areas of redness and/or skin breakdown2.  Assess vascular access sites hourly3.  Every 4-6 hours minimum:  Change oxygen saturation probe site4.  Every 4-6 hours:  If on nasal continuous positive airway pressure, respiratory therapy assess nares and determine need for appliance change or resting period  5/26/2025 1927 by Josh Malloy RN  Outcome: Progressing     Problem: ABCDS Injury Assessment  Goal: Absence of physical injury  5/26/2025 1927 by Josh Malloy RN  Outcome: Progressing     Problem: Neurosensory - Adult  Goal: Achieves stable or improved neurological status  5/26/2025 1927 by Josh Malloy RN  Outcome: Progressing     Problem: Neurosensory - Adult  Goal: Achieves maximal functionality and self care  5/26/2025 1927 by Josh Malloy RN  Outcome: Progressing     Problem: Pain  Goal: Verbalizes/displays adequate comfort level or baseline comfort level  5/26/2025 1927 by Josh Malloy RN  Outcome: Progressing     Problem: Cardiovascular - Adult  Goal: Maintains optimal cardiac output and hemodynamic stability  5/26/2025 1927 by Josh Malloy RN  Outcome: Progressing     Problem: Cardiovascular - Adult  Goal: Absence of cardiac dysrhythmias or at baseline  5/26/2025 1927 by Josh Malloy RN  Outcome: Progressing     Problem: Skin/Tissue Integrity - Adult  Goal: Skin integrity remains intact  Description: 1.  
  Problem: Chronic Conditions and Co-morbidities  Goal: Patient's chronic conditions and co-morbidity symptoms are monitored and maintained or improved  5/27/2025 0859 by Joann Rg RN  Outcome: Progressing  5/27/2025 0825 by Joann Rg RN  Outcome: Progressing  5/26/2025 1927 by Josh Malloy RN  Outcome: Progressing     Problem: Discharge Planning  Goal: Discharge to home or other facility with appropriate resources  5/27/2025 0859 by Joann Rg RN  Outcome: Progressing  5/27/2025 0825 by Joann Rg RN  Outcome: Progressing  5/26/2025 1927 by Josh Malloy RN  Outcome: Progressing     Problem: Safety - Adult  Goal: Free from fall injury  5/27/2025 0859 by Joann Rg RN  Outcome: Progressing  5/27/2025 0825 by Joann Rg RN  Outcome: Progressing  5/26/2025 1927 by Josh Malloy RN  Outcome: Progressing     Problem: Skin/Tissue Integrity  Goal: Skin integrity remains intact  Description: 1.  Monitor for areas of redness and/or skin breakdown2.  Assess vascular access sites hourly3.  Every 4-6 hours minimum:  Change oxygen saturation probe site4.  Every 4-6 hours:  If on nasal continuous positive airway pressure, respiratory therapy assess nares and determine need for appliance change or resting period  5/27/2025 0859 by Joann Rg RN  Outcome: Progressing  5/27/2025 0825 by Joann Rg RN  Outcome: Progressing  5/26/2025 1927 by Josh Malloy RN  Outcome: Progressing     Problem: ABCDS Injury Assessment  Goal: Absence of physical injury  5/27/2025 0859 by Joann Rg RN  Outcome: Progressing  5/27/2025 0825 by Joann Rg RN  Outcome: Progressing  5/26/2025 1927 by Josh Malloy RN  Outcome: Progressing     Problem: Risk for Elopement  Goal: Patient will not exit the unit/facility without proper excort  5/27/2025 0859 by Joann Rg RN  Outcome: Progressing  5/27/2025 0825 by Joann Rg, RN  Outcome: Progressing     Problem: 
  Problem: Chronic Conditions and Co-morbidities  Goal: Patient's chronic conditions and co-morbidity symptoms are monitored and maintained or improved  5/27/2025 1937 by Josh Malloy RN  Outcome: Progressing     Problem: Discharge Planning  Goal: Discharge to home or other facility with appropriate resources  5/27/2025 1937 by Josh Malloy RN  Outcome: Progressing     Problem: Safety - Adult  Goal: Free from fall injury  5/27/2025 1937 by Josh Malloy RN  Outcome: Progressing     Problem: Skin/Tissue Integrity  Goal: Skin integrity remains intact  Description: 1.  Monitor for areas of redness and/or skin breakdown2.  Assess vascular access sites hourly3.  Every 4-6 hours minimum:  Change oxygen saturation probe site4.  Every 4-6 hours:  If on nasal continuous positive airway pressure, respiratory therapy assess nares and determine need for appliance change or resting period  5/27/2025 1937 by Josh Malloy RN  Outcome: Progressing     Problem: ABCDS Injury Assessment  Goal: Absence of physical injury  5/27/2025 1937 by Josh Malloy RN  Outcome: Progressing     Problem: Neurosensory - Adult  Goal: Achieves stable or improved neurological status  5/27/2025 1937 by Josh Malloy RN  Outcome: Progressing     Problem: Neurosensory - Adult  Goal: Achieves maximal functionality and self care  5/27/2025 1937 by Josh Malloy RN  Outcome: Progressing     Problem: Pain  Goal: Verbalizes/displays adequate comfort level or baseline comfort level  5/27/2025 1937 by Josh Malloy RN  Outcome: Progressing     Problem: Cardiovascular - Adult  Goal: Maintains optimal cardiac output and hemodynamic stability  5/27/2025 1937 by Josh Malloy RN  Outcome: Progressing     Problem: Cardiovascular - Adult  Goal: Absence of cardiac dysrhythmias or at baseline  5/27/2025 1937 by Josh Malloy RN  Outcome: Progressing     Problem: Skin/Tissue Integrity - Adult  Goal: Skin integrity remains intact  Description: 1.  
  Problem: Chronic Conditions and Co-morbidities  Goal: Patient's chronic conditions and co-morbidity symptoms are monitored and maintained or improved  5/31/2025 0204 by Suze Hernandez RN  Outcome: Progressing  Flowsheets  Taken 5/30/2025 1950 by Szue Hernandez RN  Care Plan - Patient's Chronic Conditions and Co-Morbidity Symptoms are Monitored and Maintained or Improved: Monitor and assess patient's chronic conditions and comorbid symptoms for stability, deterioration, or improvement  Problem: Discharge Planning  Goal: Discharge to home or other facility with appropriate resources  5/31/2025 0204 by Suze Hernandez RN  Outcome: Progressing  Flowsheets  Taken 5/30/2025 1950 by Suze Hernandez RN  Discharge to home or other facility with appropriate resources:   Identify barriers to discharge with patient and caregiver   Arrange for needed discharge resources and transportation as appropriate   Identify discharge learning needs (meds, wound care, etc)  Problem: Safety - Adult  Goal: Free from fall injury  5/31/2025 0204 by Suze Hernandez RN  Outcome: Progressing  Flowsheets (Taken 5/30/2025 1950)  Free From Fall Injury: Instruct family/caregiver on patient safety     Problem: Skin/Tissue Integrity  Goal: Skin integrity remains intact  Description: 1.  Monitor for areas of redness and/or skin breakdown2.  Assess vascular access sites hourly3.  Every 4-6 hours minimum:  Change oxygen saturation probe site4.  Every 4-6 hours:  If on nasal continuous positive airway pressure, respiratory therapy assess nares and determine need for appliance change or resting period  5/31/2025 0204 by Suze Hernandez RN  Outcome: Progressing  Flowsheets  Taken 5/30/2025 1950 by Suze Hernandez RN  Skin Integrity Remains Intact: Monitor for areas of redness and/or skin breakdown     Problem: ABCDS Injury Assessment  Goal: Absence of physical injury  5/31/2025 0204 by Suze Hernandez RN  Outcome: Progressing  Flowsheets (Taken 
  Problem: Chronic Conditions and Co-morbidities  Goal: Patient's chronic conditions and co-morbidity symptoms are monitored and maintained or improved  6/2/2025 1548 by Gabe Arroyo  Outcome: Progressing  Flowsheets (Taken 6/2/2025 1548)  Care Plan - Patient's Chronic Conditions and Co-Morbidity Symptoms are Monitored and Maintained or Improved:   Monitor and assess patient's chronic conditions and comorbid symptoms for stability, deterioration, or improvement   Collaborate with multidisciplinary team to address chronic and comorbid conditions and prevent exacerbation or deterioration   Update acute care plan with appropriate goals if chronic or comorbid symptoms are exacerbated and prevent overall improvement and discharge     Problem: Discharge Planning  Goal: Discharge to home or other facility with appropriate resources  6/2/2025 1548 by Gabe Arroyo  Outcome: Progressing  Flowsheets (Taken 6/2/2025 1548)  Discharge to home or other facility with appropriate resources:   Identify barriers to discharge with patient and caregiver   Arrange for needed discharge resources and transportation as appropriate   Identify discharge learning needs (meds, wound care, etc)     Problem: Safety - Adult  Goal: Free from fall injury  6/2/2025 1548 by Gabe Arroyo  Outcome: Progressing  Flowsheets (Taken 6/2/2025 1548)  Free From Fall Injury: Instruct family/caregiver on patient safety  Note: Pt will remain free from accidental injury this shift. Pt has fall risk measures (fall risk bracelet, fall risk sign, fall risk cloth, non-slip socks, dome light on) in place. Pt bed is in low position, bed alarm on, bed wheels locked, call light within reach, bedside table within reach, chair wheels locked, chair alarm on.      Problem: Skin/Tissue Integrity  Goal: Skin integrity remains intact  Description: 1.  Monitor for areas of redness and/or skin breakdown2.  Assess vascular access sites hourly3.  Every 4-6 hours minimum:  Change 
  Problem: Chronic Conditions and Co-morbidities  Goal: Patient's chronic conditions and co-morbidity symptoms are monitored and maintained or improved  6/4/2025 0222 by Duc Santos RN  Outcome: Progressing  6/3/2025 1449 by Gabe Arroyo  Outcome: Progressing  Flowsheets (Taken 6/3/2025 1449)  Care Plan - Patient's Chronic Conditions and Co-Morbidity Symptoms are Monitored and Maintained or Improved:   Monitor and assess patient's chronic conditions and comorbid symptoms for stability, deterioration, or improvement   Collaborate with multidisciplinary team to address chronic and comorbid conditions and prevent exacerbation or deterioration   Update acute care plan with appropriate goals if chronic or comorbid symptoms are exacerbated and prevent overall improvement and discharge     Problem: Discharge Planning  Goal: Discharge to home or other facility with appropriate resources  6/4/2025 0222 by Duc Santos RN  Outcome: Progressing  Flowsheets (Taken 6/3/2025 2000)  Discharge to home or other facility with appropriate resources: Identify barriers to discharge with patient and caregiver  6/3/2025 1449 by Gabe Arroyo  Outcome: Progressing  Flowsheets (Taken 6/3/2025 1449)  Discharge to home or other facility with appropriate resources:   Identify barriers to discharge with patient and caregiver   Arrange for needed discharge resources and transportation as appropriate   Identify discharge learning needs (meds, wound care, etc)     Problem: Safety - Adult  Goal: Free from fall injury  6/4/2025 0222 by Duc Santos, RN  Outcome: Progressing  Flowsheets (Taken 6/4/2025 0000)  Free From Fall Injury: Instruct family/caregiver on patient safety  6/3/2025 1449 by Gabe Arroyo  Outcome: Progressing  Flowsheets (Taken 6/3/2025 1449)  Free From Fall Injury: Instruct family/caregiver on patient safety  Note: Pt will remain free from accidental injury this shift. Pt has fall risk measures (fall risk bracelet, fall 
  Problem: Chronic Conditions and Co-morbidities  Goal: Patient's chronic conditions and co-morbidity symptoms are monitored and maintained or improved  6/5/2025 1449 by Ashli Gutiérrez RN  Outcome: Progressing  Flowsheets (Taken 6/5/2025 0507 by Kaity Perez, RN)  Care Plan - Patient's Chronic Conditions and Co-Morbidity Symptoms are Monitored and Maintained or Improved:   Monitor and assess patient's chronic conditions and comorbid symptoms for stability, deterioration, or improvement   Collaborate with multidisciplinary team to address chronic and comorbid conditions and prevent exacerbation or deterioration   Update acute care plan with appropriate goals if chronic or comorbid symptoms are exacerbated and prevent overall improvement and discharge     Problem: Safety - Adult  Goal: Free from fall injury  6/5/2025 1449 by Ashli Gutiérrez RN  Outcome: Progressing  Flowsheets (Taken 6/5/2025 1449)  Free From Fall Injury:   Based on caregiver fall risk screen, instruct family/caregiver to ask for assistance with transferring infant if caregiver noted to have fall risk factors   Instruct family/caregiver on patient safety     Problem: Risk for Elopement  Goal: Patient will not exit the unit/facility without proper excort  Outcome: Progressing  Flowsheets  Taken 6/5/2025 1125 by Ashli Gutiérrez RN  Nursing Interventions for Elopement Risk: Assist with personal care needs such as toileting, eating, dressing, as needed to reduce the risk of wandering  Taken 6/5/2025 0750 by Ashli Gutiérrez RN  Nursing Interventions for Elopement Risk: Assist with personal care needs such as toileting, eating, dressing, as needed to reduce the risk of wandering  Taken 6/5/2025 0503 by Kaity Perez, RN  Nursing Interventions for Elopement Risk: Assist with personal care needs such as toileting, eating, dressing, as needed to reduce the risk of wandering     Problem: Nutrition Deficit:  Goal: Optimize 
  Problem: Chronic Conditions and Co-morbidities  Goal: Patient's chronic conditions and co-morbidity symptoms are monitored and maintained or improved  6/6/2025 0154 by Kaity Perez, RN  Outcome: Progressing  Flowsheets (Taken 6/6/2025 0154)  Care Plan - Patient's Chronic Conditions and Co-Morbidity Symptoms are Monitored and Maintained or Improved:   Monitor and assess patient's chronic conditions and comorbid symptoms for stability, deterioration, or improvement   Collaborate with multidisciplinary team to address chronic and comorbid conditions and prevent exacerbation or deterioration   Update acute care plan with appropriate goals if chronic or comorbid symptoms are exacerbated and prevent overall improvement and discharge     Problem: Discharge Planning  Goal: Discharge to home or other facility with appropriate resources  6/6/2025 0154 by Kaity Perez, RN  Outcome: Progressing  Flowsheets (Taken 6/6/2025 0154)  Discharge to home or other facility with appropriate resources:   Identify barriers to discharge with patient and caregiver   Arrange for needed discharge resources and transportation as appropriate   Identify discharge learning needs (meds, wound care, etc)   Refer to discharge planning if patient needs post-hospital services based on physician order or complex needs related to functional status, cognitive ability or social support system     Problem: Safety - Adult  Goal: Free from fall injury  6/6/2025 0154 by Kaity Perez RN  Outcome: Progressing  Flowsheets (Taken 6/6/2025 0154)  Free From Fall Injury: Instruct family/caregiver on patient safety  Note: fall protocol in place      Problem: Skin/Tissue Integrity  Goal: Skin integrity remains intact  Description: 1.  Monitor for areas of redness and/or skin breakdown2.  Assess vascular access sites hourly3.  Every 4-6 hours minimum:  Change oxygen saturation probe site4.  Every 4-6 hours:  If on nasal 
  Problem: Chronic Conditions and Co-morbidities  Goal: Patient's chronic conditions and co-morbidity symptoms are monitored and maintained or improved  Outcome: Progressing     Problem: Discharge Planning  Goal: Discharge to home or other facility with appropriate resources  Outcome: Progressing     
  Problem: Chronic Conditions and Co-morbidities  Goal: Patient's chronic conditions and co-morbidity symptoms are monitored and maintained or improved  Outcome: Progressing     Problem: Discharge Planning  Goal: Discharge to home or other facility with appropriate resources  Outcome: Progressing     Problem: Safety - Adult  Goal: Free from fall injury  Outcome: Progressing     Problem: Skin/Tissue Integrity  Goal: Skin integrity remains intact  Description: 1.  Monitor for areas of redness and/or skin breakdown2.  Assess vascular access sites hourly3.  Every 4-6 hours minimum:  Change oxygen saturation probe site4.  Every 4-6 hours:  If on nasal continuous positive airway pressure, respiratory therapy assess nares and determine need for appliance change or resting period  Outcome: Progressing     Problem: ABCDS Injury Assessment  Goal: Absence of physical injury  Outcome: Progressing     Problem: Risk for Elopement  Goal: Patient will not exit the unit/facility without proper excort  Outcome: Progressing     Problem: SLP Adult - Impaired Swallowing  Goal: By Discharge: Advance to least restrictive diet without signs or symptoms of aspiration for planned discharge setting.  See evaluation for individualized goals.  5/15/2025 1354 by Vicki Colin, SLP  Outcome: Progressing     Problem: Neurosensory - Adult  Goal: Achieves stable or improved neurological status  Outcome: Progressing  Goal: Achieves maximal functionality and self care  Outcome: Progressing     
  Problem: Chronic Conditions and Co-morbidities  Goal: Patient's chronic conditions and co-morbidity symptoms are monitored and maintained or improved  Outcome: Progressing     Problem: Discharge Planning  Goal: Discharge to home or other facility with appropriate resources  Outcome: Progressing     Problem: Safety - Adult  Goal: Free from fall injury  Outcome: Progressing  Flowsheets (Taken 5/19/2025 1530 by Arian Torres, RN)  Free From Fall Injury: Instruct family/caregiver on patient safety     Problem: Skin/Tissue Integrity  Goal: Skin integrity remains intact  Description: 1.  Monitor for areas of redness and/or skin breakdown2.  Assess vascular access sites hourly3.  Every 4-6 hours minimum:  Change oxygen saturation probe site4.  Every 4-6 hours:  If on nasal continuous positive airway pressure, respiratory therapy assess nares and determine need for appliance change or resting period  Outcome: Progressing  Flowsheets (Taken 5/19/2025 1530 by Arian Torres, RN)  Skin Integrity Remains Intact: Monitor for areas of redness and/or skin breakdown     Problem: ABCDS Injury Assessment  Goal: Absence of physical injury  Outcome: Progressing  Flowsheets (Taken 5/19/2025 1530 by Arian Torres, RN)  Absence of Physical Injury: Implement safety measures based on patient assessment     Problem: Risk for Elopement  Goal: Patient will not exit the unit/facility without proper excort  Outcome: Progressing  Flowsheets  Taken 5/20/2025 0000 by Gail Headley, RN  Nursing Interventions for Elopement Risk:   Assist with personal care needs such as toileting, eating, dressing, as needed to reduce the risk of wandering   Collaborate with family members/caregivers to mitigate the elopement risk   Collaborate with treatment team for drug withdrawal symptoms treatment   Collaborate with treatment team for nicotine replacement   Communicate/escalate to charge nurse the risk of elopement   Communicate/escalate to case 
  Problem: Chronic Conditions and Co-morbidities  Goal: Patient's chronic conditions and co-morbidity symptoms are monitored and maintained or improved  Outcome: Progressing  Flowsheets  Taken 5/21/2025 0000  Care Plan - Patient's Chronic Conditions and Co-Morbidity Symptoms are Monitored and Maintained or Improved:   Monitor and assess patient's chronic conditions and comorbid symptoms for stability, deterioration, or improvement   Collaborate with multidisciplinary team to address chronic and comorbid conditions and prevent exacerbation or deterioration  Taken 5/20/2025 2000  Care Plan - Patient's Chronic Conditions and Co-Morbidity Symptoms are Monitored and Maintained or Improved:   Monitor and assess patient's chronic conditions and comorbid symptoms for stability, deterioration, or improvement   Collaborate with multidisciplinary team to address chronic and comorbid conditions and prevent exacerbation or deterioration     Problem: Discharge Planning  Goal: Discharge to home or other facility with appropriate resources  Outcome: Progressing  Flowsheets  Taken 5/21/2025 0000  Discharge to home or other facility with appropriate resources:   Identify barriers to discharge with patient and caregiver   Arrange for needed discharge resources and transportation as appropriate  Taken 5/20/2025 2000  Discharge to home or other facility with appropriate resources:   Identify barriers to discharge with patient and caregiver   Arrange for needed discharge resources and transportation as appropriate     Problem: Safety - Adult  Goal: Free from fall injury  Outcome: Progressing     Problem: Skin/Tissue Integrity  Goal: Skin integrity remains intact  Description: 1.  Monitor for areas of redness and/or skin breakdown2.  Assess vascular access sites hourly3.  Every 4-6 hours minimum:  Change oxygen saturation probe site4.  Every 4-6 hours:  If on nasal continuous positive airway pressure, respiratory therapy assess nares and 
  Problem: Chronic Conditions and Co-morbidities  Goal: Patient's chronic conditions and co-morbidity symptoms are monitored and maintained or improved  Outcome: Progressing  Flowsheets  Taken 5/22/2025 0000  Care Plan - Patient's Chronic Conditions and Co-Morbidity Symptoms are Monitored and Maintained or Improved:   Monitor and assess patient's chronic conditions and comorbid symptoms for stability, deterioration, or improvement   Collaborate with multidisciplinary team to address chronic and comorbid conditions and prevent exacerbation or deterioration  Taken 5/21/2025 2000  Care Plan - Patient's Chronic Conditions and Co-Morbidity Symptoms are Monitored and Maintained or Improved:   Monitor and assess patient's chronic conditions and comorbid symptoms for stability, deterioration, or improvement   Collaborate with multidisciplinary team to address chronic and comorbid conditions and prevent exacerbation or deterioration     Problem: Discharge Planning  Goal: Discharge to home or other facility with appropriate resources  Outcome: Progressing  Flowsheets  Taken 5/22/2025 0000  Discharge to home or other facility with appropriate resources:   Identify barriers to discharge with patient and caregiver   Arrange for needed discharge resources and transportation as appropriate  Taken 5/21/2025 2000  Discharge to home or other facility with appropriate resources:   Identify barriers to discharge with patient and caregiver   Arrange for needed discharge resources and transportation as appropriate     Problem: Safety - Adult  Goal: Free from fall injury  Outcome: Progressing  Flowsheets (Taken 5/21/2025 2130)  Free From Fall Injury:   Instruct family/caregiver on patient safety   Based on caregiver fall risk screen, instruct family/caregiver to ask for assistance with transferring infant if caregiver noted to have fall risk factors     Problem: Skin/Tissue Integrity  Goal: Skin integrity remains intact  Description: 1.  
  Problem: Chronic Conditions and Co-morbidities  Goal: Patient's chronic conditions and co-morbidity symptoms are monitored and maintained or improved  Outcome: Progressing  Flowsheets  Taken 6/5/2025 0507 by Kaity Perez RN  Care Plan - Patient's Chronic Conditions and Co-Morbidity Symptoms are Monitored and Maintained or Improved:   Monitor and assess patient's chronic conditions and comorbid symptoms for stability, deterioration, or improvement   Collaborate with multidisciplinary team to address chronic and comorbid conditions and prevent exacerbation or deterioration   Update acute care plan with appropriate goals if chronic or comorbid symptoms are exacerbated and prevent overall improvement and discharge  Taken 6/4/2025 2000 by Suzie Barraza RN  Care Plan - Patient's Chronic Conditions and Co-Morbidity Symptoms are Monitored and Maintained or Improved: Monitor and assess patient's chronic conditions and comorbid symptoms for stability, deterioration, or improvement  Taken 6/4/2025 1553 by Suzie Barraza RN  Care Plan - Patient's Chronic Conditions and Co-Morbidity Symptoms are Monitored and Maintained or Improved: Monitor and assess patient's chronic conditions and comorbid symptoms for stability, deterioration, or improvement     Problem: Discharge Planning  Goal: Discharge to home or other facility with appropriate resources  Outcome: Progressing  Flowsheets  Taken 6/5/2025 0507 by Kaity Perez RN  Discharge to home or other facility with appropriate resources:   Identify barriers to discharge with patient and caregiver   Arrange for needed discharge resources and transportation as appropriate   Identify discharge learning needs (meds, wound care, etc)   Refer to discharge planning if patient needs post-hospital services based on physician order or complex needs related to functional status, cognitive ability or social support system  Taken 6/4/2025 2000 by Madi 
  Problem: Chronic Conditions and Co-morbidities  Goal: Patient's chronic conditions and co-morbidity symptoms are monitored and maintained or improved  Outcome: Progressing  Flowsheets (Taken 5/16/2025 2000)  Care Plan - Patient's Chronic Conditions and Co-Morbidity Symptoms are Monitored and Maintained or Improved:   Monitor and assess patient's chronic conditions and comorbid symptoms for stability, deterioration, or improvement   Collaborate with multidisciplinary team to address chronic and comorbid conditions and prevent exacerbation or deterioration     Problem: Discharge Planning  Goal: Discharge to home or other facility with appropriate resources  Outcome: Progressing  Flowsheets (Taken 5/16/2025 2000)  Discharge to home or other facility with appropriate resources:   Identify barriers to discharge with patient and caregiver   Arrange for needed discharge resources and transportation as appropriate     Problem: Safety - Adult  Goal: Free from fall injury  Outcome: Progressing     Problem: Skin/Tissue Integrity  Goal: Skin integrity remains intact  Description: 1.  Monitor for areas of redness and/or skin breakdown2.  Assess vascular access sites hourly3.  Every 4-6 hours minimum:  Change oxygen saturation probe site4.  Every 4-6 hours:  If on nasal continuous positive airway pressure, respiratory therapy assess nares and determine need for appliance change or resting period  Outcome: Progressing  Flowsheets (Taken 5/16/2025 2000)  Skin Integrity Remains Intact:   Monitor for areas of redness and/or skin breakdown   Assess vascular access sites hourly   Every 4-6 hours minimum:  Change oxygen saturation probe site     Problem: ABCDS Injury Assessment  Goal: Absence of physical injury  Outcome: Progressing     Problem: Risk for Elopement  Goal: Patient will not exit the unit/facility without proper excort  Outcome: Progressing  Flowsheets (Taken 5/16/2025 2000)  Nursing Interventions for Elopement Risk:   
  Problem: Chronic Conditions and Co-morbidities  Goal: Patient's chronic conditions and co-morbidity symptoms are monitored and maintained or improved  Outcome: Progressing  Flowsheets (Taken 5/18/2025 0800)  Care Plan - Patient's Chronic Conditions and Co-Morbidity Symptoms are Monitored and Maintained or Improved:   Monitor and assess patient's chronic conditions and comorbid symptoms for stability, deterioration, or improvement   Collaborate with multidisciplinary team to address chronic and comorbid conditions and prevent exacerbation or deterioration   Update acute care plan with appropriate goals if chronic or comorbid symptoms are exacerbated and prevent overall improvement and discharge     Problem: Discharge Planning  Goal: Discharge to home or other facility with appropriate resources  Outcome: Progressing  Flowsheets (Taken 5/18/2025 0800)  Discharge to home or other facility with appropriate resources:   Identify barriers to discharge with patient and caregiver   Arrange for needed discharge resources and transportation as appropriate   Identify discharge learning needs (meds, wound care, etc)   Arrange for interpreters to assist at discharge as needed   Refer to discharge planning if patient needs post-hospital services based on physician order or complex needs related to functional status, cognitive ability or social support system     Problem: Safety - Adult  Goal: Free from fall injury  Outcome: Progressing  Flowsheets (Taken 5/18/2025 0800)  Free From Fall Injury:   Instruct family/caregiver on patient safety   Based on caregiver fall risk screen, instruct family/caregiver to ask for assistance with transferring infant if caregiver noted to have fall risk factors     Problem: Skin/Tissue Integrity  Goal: Skin integrity remains intact  Description: 1.  Monitor for areas of redness and/or skin breakdown2.  Assess vascular access sites hourly3.  Every 4-6 hours minimum:  Change oxygen saturation probe 
  Problem: Chronic Conditions and Co-morbidities  Goal: Patient's chronic conditions and co-morbidity symptoms are monitored and maintained or improved  Outcome: Progressing  Flowsheets (Taken 5/22/2025 2000)  Care Plan - Patient's Chronic Conditions and Co-Morbidity Symptoms are Monitored and Maintained or Improved:   Monitor and assess patient's chronic conditions and comorbid symptoms for stability, deterioration, or improvement   Collaborate with multidisciplinary team to address chronic and comorbid conditions and prevent exacerbation or deterioration     Problem: Discharge Planning  Goal: Discharge to home or other facility with appropriate resources  Outcome: Progressing  Flowsheets (Taken 5/22/2025 2000)  Discharge to home or other facility with appropriate resources:   Identify barriers to discharge with patient and caregiver   Arrange for needed discharge resources and transportation as appropriate     Problem: Safety - Adult  Goal: Free from fall injury  Outcome: Progressing     Problem: Skin/Tissue Integrity  Goal: Skin integrity remains intact  Description: 1.  Monitor for areas of redness and/or skin breakdown2.  Assess vascular access sites hourly3.  Every 4-6 hours minimum:  Change oxygen saturation probe site4.  Every 4-6 hours:  If on nasal continuous positive airway pressure, respiratory therapy assess nares and determine need for appliance change or resting period  Outcome: Progressing  Flowsheets (Taken 5/22/2025 2000)  Skin Integrity Remains Intact:   Monitor for areas of redness and/or skin breakdown   Assess vascular access sites hourly   Every 4-6 hours minimum:  Change oxygen saturation probe site   Every 4-6 hours:  If on nasal continuous positive airway pressure, assess nares and determine need for appliance change or resting period     Problem: ABCDS Injury Assessment  Goal: Absence of physical injury  Outcome: Progressing     Problem: Risk for Elopement  Goal: Patient will not exit the 
  Problem: Chronic Conditions and Co-morbidities  Goal: Patient's chronic conditions and co-morbidity symptoms are monitored and maintained or improved  Outcome: Progressing  Flowsheets (Taken 5/25/2025 0800 by Mayte Cortes, RN)  Care Plan - Patient's Chronic Conditions and Co-Morbidity Symptoms are Monitored and Maintained or Improved:   Monitor and assess patient's chronic conditions and comorbid symptoms for stability, deterioration, or improvement   Collaborate with multidisciplinary team to address chronic and comorbid conditions and prevent exacerbation or deterioration   Update acute care plan with appropriate goals if chronic or comorbid symptoms are exacerbated and prevent overall improvement and discharge     Problem: Discharge Planning  Goal: Discharge to home or other facility with appropriate resources  Outcome: Progressing  Flowsheets (Taken 5/25/2025 0800 by Mayte Cortes, RN)  Discharge to home or other facility with appropriate resources: Identify barriers to discharge with patient and caregiver     Problem: Safety - Adult  Goal: Free from fall injury  Outcome: Progressing  Flowsheets (Taken 5/25/2025 1744 by Mayte Cortes, RN)  Free From Fall Injury: Instruct family/caregiver on patient safety     Problem: Skin/Tissue Integrity  Goal: Skin integrity remains intact  Description: 1.  Monitor for areas of redness and/or skin breakdown2.  Assess vascular access sites hourly3.  Every 4-6 hours minimum:  Change oxygen saturation probe site4.  Every 4-6 hours:  If on nasal continuous positive airway pressure, respiratory therapy assess nares and determine need for appliance change or resting period  Outcome: Progressing  Flowsheets (Taken 5/28/2025 1804)  Skin Integrity Remains Intact:   Monitor for areas of redness and/or skin breakdown   Assess vascular access sites hourly   Every 4-6 hours minimum:  Change oxygen saturation probe site     Problem: ABCDS Injury Assessment  Goal: Absence of 
  Problem: Chronic Conditions and Co-morbidities  Goal: Patient's chronic conditions and co-morbidity symptoms are monitored and maintained or improved  Outcome: Progressing  Flowsheets (Taken 6/1/2025 2009)  Care Plan - Patient's Chronic Conditions and Co-Morbidity Symptoms are Monitored and Maintained or Improved:   Monitor and assess patient's chronic conditions and comorbid symptoms for stability, deterioration, or improvement   Collaborate with multidisciplinary team to address chronic and comorbid conditions and prevent exacerbation or deterioration   Update acute care plan with appropriate goals if chronic or comorbid symptoms are exacerbated and prevent overall improvement and discharge     Problem: Discharge Planning  Goal: Discharge to home or other facility with appropriate resources  Outcome: Progressing  Flowsheets (Taken 6/1/2025 2009)  Discharge to home or other facility with appropriate resources:   Identify barriers to discharge with patient and caregiver   Arrange for needed discharge resources and transportation as appropriate   Identify discharge learning needs (meds, wound care, etc)     Problem: Safety - Adult  Goal: Free from fall injury  Outcome: Progressing  Flowsheets (Taken 6/1/2025 2009)  Free From Fall Injury: Instruct family/caregiver on patient safety     Problem: Skin/Tissue Integrity  Goal: Skin integrity remains intact  Description: 1.  Monitor for areas of redness and/or skin breakdown2.  Assess vascular access sites hourly3.  Every 4-6 hours minimum:  Change oxygen saturation probe site4.  Every 4-6 hours:  If on nasal continuous positive airway pressure, respiratory therapy assess nares and determine need for appliance change or resting period  Outcome: Progressing  Flowsheets (Taken 6/1/2025 2009)  Skin Integrity Remains Intact: Monitor for areas of redness and/or skin breakdown     Problem: ABCDS Injury Assessment  Goal: Absence of physical injury  Outcome: 
  Problem: Chronic Conditions and Co-morbidities  Goal: Patient's chronic conditions and co-morbidity symptoms are monitored and maintained or improved  Outcome: Progressing  Flowsheets (Taken 6/3/2025 1449)  Care Plan - Patient's Chronic Conditions and Co-Morbidity Symptoms are Monitored and Maintained or Improved:   Monitor and assess patient's chronic conditions and comorbid symptoms for stability, deterioration, or improvement   Collaborate with multidisciplinary team to address chronic and comorbid conditions and prevent exacerbation or deterioration   Update acute care plan with appropriate goals if chronic or comorbid symptoms are exacerbated and prevent overall improvement and discharge     Problem: Discharge Planning  Goal: Discharge to home or other facility with appropriate resources  Outcome: Progressing  Flowsheets (Taken 6/3/2025 1449)  Discharge to home or other facility with appropriate resources:   Identify barriers to discharge with patient and caregiver   Arrange for needed discharge resources and transportation as appropriate   Identify discharge learning needs (meds, wound care, etc)     Problem: Safety - Adult  Goal: Free from fall injury  Outcome: Progressing  Flowsheets (Taken 6/3/2025 1449)  Free From Fall Injury: Instruct family/caregiver on patient safety  Note: Pt will remain free from accidental injury this shift. Pt has fall risk measures (fall risk bracelet, fall risk sign, fall risk cloth, non-slip socks, dome light on) in place. Pt bed is in low position, bed alarm on, bed wheels locked, call light within reach, bedside table within reach, chair wheels locked, chair alarm on.      Problem: Skin/Tissue Integrity  Goal: Skin integrity remains intact  Description: 1.  Monitor for areas of redness and/or skin breakdown2.  Assess vascular access sites hourly3.  Every 4-6 hours minimum:  Change oxygen saturation probe site4.  Every 4-6 hours:  If on nasal continuous positive airway 
  Problem: Discharge Planning  Goal: Discharge to home or other facility with appropriate resources  6/6/2025 0918 by Maliha Wheatley, RN  Outcome: Progressing  Flowsheets (Taken 6/6/2025 0918)  Discharge to home or other facility with appropriate resources:   Identify barriers to discharge with patient and caregiver   Arrange for needed discharge resources and transportation as appropriate   Arrange for interpreters to assist at discharge as needed     Problem: Safety - Adult  Goal: Free from fall injury  6/6/2025 0918 by Maliha Wheatley, RN  Outcome: Progressing  Flowsheets (Taken 6/6/2025 0918)  Free From Fall Injury: Instruct family/caregiver on patient safety  Note: Pt is a Fall Risk. See Zaragoza Fall Risk Score. Pt bed in low position and side rails up. Call light and belongings in reach. Pt encouraged to call for assistance. Will continue with hourly rounds for PO intake, pain needs, toileting, and repositioning as needed.        Problem: Skin/Tissue Integrity  Goal: Skin integrity remains intact  Description: 1.  Monitor for areas of redness and/or skin breakdown2.  Assess vascular access sites hourly3.  Every 4-6 hours minimum:  Change oxygen saturation probe site4.  Every 4-6 hours:  If on nasal continuous positive airway pressure, respiratory therapy assess nares and determine need for appliance change or resting period  6/6/2025 0918 by Maliha Wheatley RN  Outcome: Progressing  Flowsheets (Taken 6/6/2025 0918)  Skin Integrity Remains Intact:   Monitor for areas of redness and/or skin breakdown   Pressure redistribution bed/mattress (bed type)   Check visual cues for pain   Turn and reposition as indicated     Problem: Risk for Elopement  Goal: Patient will not exit the unit/facility without proper excort  Outcome: Progressing  Flowsheets  Taken 6/6/2025 0918 by Maliha Wheatley, RN  Nursing Interventions for Elopement Risk:   Assist with personal care needs such as toileting, eating, dressing, as needed to reduce 
  Problem: Neurosensory - Adult  Goal: Achieves maximal functionality and self care  5/20/2025 0749 by Melissa Wallace RN  Outcome: Progressing    Problem: Chronic Conditions and Co-morbidities  Goal: Patient's chronic conditions and co-morbidity symptoms are monitored and maintained or improved  5/20/2025 0749 by Melissa Wallace RN  Outcome: Progressing     Problem: Discharge Planning  Goal: Discharge to home or other facility with appropriate resources  5/20/2025 0749 by Melissa Wallace RN  Outcome: Progressing     Problem: Safety - Adult  Goal: Free from fall injury  5/20/2025 0749 by Melissa Wallace RN  Outcome: Progressing     Problem: Skin/Tissue Integrity  Goal: Skin integrity remains intact  Description: 1.  Monitor for areas of redness and/or skin breakdown2.  Assess vascular access sites hourly3.  Every 4-6 hours minimum:  Change oxygen saturation probe site4.  Every 4-6 hours:  If on nasal continuous positive airway pressure, respiratory therapy assess nares and determine need for appliance change or resting period  5/20/2025 0749 by Melissa Wallace RN  Outcome: Progressing     Problem: ABCDS Injury Assessment  Goal: Absence of physical injury  5/20/2025 0749 by Melissa Wallace RN  Outcome: Progressing     Problem: Neurosensory - Adult  Goal: Achieves stable or improved neurological status  5/20/2025 0749 by Melissa Wallace RN  Outcome: Progressing     Problem: Neurosensory - Adult  Goal: Achieves maximal functionality and self care  5/20/2025 0749 by Melissa Wallace RN  Outcome: Progressing     Problem: Pain  Goal: Verbalizes/displays adequate comfort level or baseline comfort level  5/20/2025 0749 by Melissa Wallcae RN  Outcome: Progressing     Problem: Cardiovascular - Adult  Goal: Maintains optimal cardiac output and hemodynamic stability  5/20/2025 0749 by Melissa Wallace RN  Outcome: Progressing     Problem: Cardiovascular - Adult  Goal: Absence of cardiac dysrhythmias or at 
  Problem: Neurosensory - Adult  Goal: Achieves stable or improved neurological status  5/23/2025 2052 by Mara Cornejo RN  Outcome: Progressing  Flowsheets (Taken 5/23/2025 2000)  Achieves stable or improved neurological status: Assess for and report changes in neurological status  5/23/2025 1841 by Mayte Cortes RN  Outcome: Progressing  5/23/2025 1824 by Mayte Cortes RN  Outcome: Progressing  5/23/2025 1109 by Mayte Cortes RN  Outcome: Progressing  Flowsheets  Taken 5/23/2025 0800 by Mayte Cortes RN  Achieves stable or improved neurological status:   Assess for and report changes in neurological status   Initiate measures to prevent increased intracranial pressure   Maintain blood pressure and fluid volume within ordered parameters to optimize cerebral perfusion and minimize risk of hemorrhage   Monitor temperature, glucose, and sodium. Initiate appropriate interventions as ordered  Taken 5/23/2025 0400 by Richie Gill RN  Achieves stable or improved neurological status:   Assess for and report changes in neurological status   Initiate measures to prevent increased intracranial pressure   Maintain blood pressure and fluid volume within ordered parameters to optimize cerebral perfusion and minimize risk of hemorrhage  Taken 5/23/2025 0000 by Richie Gill RN  Achieves stable or improved neurological status:   Assess for and report changes in neurological status   Initiate measures to prevent increased intracranial pressure   Maintain blood pressure and fluid volume within ordered parameters to optimize cerebral perfusion and minimize risk of hemorrhage    A&Ox4, neuro stable     Problem: Pain  Goal: Verbalizes/displays adequate comfort level or baseline comfort level  5/23/2025 2052 by Mara Cornejo RN  Outcome: Progressing  Flowsheets (Taken 5/23/2025 2000)  Verbalizes/displays adequate comfort level or baseline comfort level:   Encourage patient to monitor pain and request 
  Problem: Safety - Adult  Goal: Free from fall injury  Outcome: Progressing     Problem: Pain  Goal: Verbalizes/displays adequate comfort level or baseline comfort level  Outcome: Progressing     Problem: Chronic Conditions and Co-morbidities  Goal: Patient's chronic conditions and co-morbidity symptoms are monitored and maintained or improved  Outcome: Progressing     
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 Initial NIHSS was 14 and CT with favorable ASPECTs. He was transferred to [facility] for EVT w/ TICI 2c reperfusion complicated by catheter fracture. POD#4 from right ICA sacrifice to trap fractured catheter in right ICA.      Dicussed with patient and his family at bedside regarding our findings. Risks and benefits of hep gtt were discussed. The benefit of hep gtt is to prevent additional stroke. The risks of hep gtt including but not limited to bleeding,  permeant disability and death. Family agreed to start hep gtt.      Therapeutic Hep gtt CT did not show haemorrhage      Denies all complaints at time of exam.   Most recent anti Xa 0.25  Patient remains on neuro protocol heparin gtt  Currently on 2 mcg/min of Levophed.   BP at time of exam is 94/46  Remains on 15 mg Midodrine TID    PHYSICAL EXAM:  Vitals:    05/21/25 1830 05/21/25 1845 05/21/25 1900 05/21/25 2000   BP: (!) 117/58 (!) 122/51 (!) 130/50    Pulse: (!) 40 51 (!) 45    Resp: 22 18 16    Temp:    98.5 °F (36.9 °C)   TempSrc:    Temporal   SpO2: 100% 100% 97%    Weight:       Height:             General: Alert, no distress, well-nourished  Neurologic  Mental status:   orientation to person, place, time, situation   Attention intact as able to attend well to the exam     Language Moderate dysarthria.    Comprehension intact; follows simple commands    Cranial nerves:   CN 3,4,6: Pupils 3 mm> 2 mm equal and reactive to light, extraocular muscles intact  CN5: Facial sensation symmetric   CN7: L facial weakness.   CN8: Hearing symmetric to spoken voice  CN9: Palate elevated symmetrically  CN11: Trap strength weak on the left with shoulder shrug  CN12: Tongue midline with protrusion    Motor Exam:   R  L    Deltoid 5  2   Biceps 5 2 
Dr Mendoza met with patient/family yesterday at bedside to discuss urology plan of care moving forward. If there are any further urological issues, please reach out. We will see PRN at this time.  
I spoke with [facility] regarding patient's code status. She was previously documented as full code but there is documentation signed that she is DNR-CCA. We will correctly reflect this as LIMITED code. Documentation is uploaded.  
MRI pending  Heprain gtt stopped and patient now on ASA and Plavix   Patient denies all complaints at time of exam and states \"I feel great.\"  At time of exam 6 mcg/min Levophed.  /76  Patient wife at bedside. All questions answered. POC discussed at length.     PHYSICAL EXAM:  Vitals:    05/17/25 1915 05/17/25 1930 05/17/25 1945 05/17/25 2000   BP: 135/85 (!) 150/70 (!) 140/70 132/77   Pulse: 55 52 50 50   Resp: 25 21 21 25   Temp:    98.6 °F (37 °C)   TempSrc:    Temporal   SpO2: 98% 99% 99% 100%   Weight:       Height:             General: Drowsy, no distress, well-nourished  Neurologic  Mental status:   orientation to person, place, time, situation   Attention  drowsy but able to attend to the exam well.    Language dysarthric but able to be understood   Comprehension intact; follows simple commands in four extremities     Cranial nerves:   CN2: Blinks to threat right eye not left eye.   CN 3,4,6: Pupils 3 mm> 2 mm equal and reactive to light, right gaze preference that does break midline to the left.  CN5: Reduced sensation to light touch on the left.  CN7: left facial weakness  CN8: Hearing symmetric to spoken voice  CN9: Palate elevated symmetrically  CN11: Trap strength weak on them left. Strong and preserved on the right.   CN12: Tongue midline with protrusion    Motor Exam:  RLE exam limited seocondary to knee pain.   Left keron neglect.    R  L    Deltoid 5  1   Biceps 5 1   Triceps 5 1   Wrist extension  5 1   Interossei 5 3      R  L    Hip flexion  3- 3-   Hip extension  3- 3-   Knee flexion  3 3-   Knee extension  3 3-   Ankle dorsiflexion  4 3   Ankle plantar flexion  4 3-     NIH Stroke Scale    1a  Level of consciousness: 1=not alert but arousable by minor stimulation to obey, answer or respond   1b. LOC questions:  0=Performs both tasks correctly   1c. LOC commands: 0=Performs both tasks correctly   2.  Best Gaze: 1=partial gaze palsy   3.  Visual: 2=Complete hemianopia   4. Facial Palsy: 
Noted that Warfarin and Lovenox was held due to possibility of PEG tomorrow. Discussed with family at bedside who expressed that they were undecided about PEG at this time. Discussed case with hospitalist who recommended resuming heparin ggt at this time for anticoagulation.    Will resume heparin ggt at this time and defer decision for bridging warfarin with lovenox tomorrow for primary team.  
Off Levophed. /44 (61)  No complaints at time of exam.   MRI reviewed.     PHYSICAL EXAM:  Vitals:    05/18/25 1930 05/18/25 1945 05/18/25 2000 05/18/25 2015   BP: (!) 114/54 113/65 (!) 106/55 (!) 101/52   Pulse: (!) 44 50 (!) 44 (!) 46   Resp: 16 20 20 16   Temp:   98.6 °F (37 °C)    TempSrc:   Temporal    SpO2: 93% 99% 100% 100%   Weight:       Height:             General: Drowsy, no distress, well-nourished  Neurologic  Mental status:   orientation to person, place, time, situation   Attention  drowsy but able to attend to the exam well.    Language dysarthric but able to be understood   Comprehension intact; follows simple commands in four extremities     Cranial nerves:   CN2: Blinks to threat right eye not left eye.   CN 3,4,6: Pupils 3 mm> 2 mm equal and reactive to light, right gaze preference that does break midline to the left.  CN5: Reduced sensation to light touch on the left.  CN7: left facial weakness  CN8: Hearing symmetric to spoken voice  CN9: Palate elevated symmetrically  CN11: Trap strength weak on them left. Strong and preserved on the right.   CN12: Tongue midline with protrusion    Motor Exam:  RLE exam limited seocondary to knee pain.   Left keron neglect.    R  L    Deltoid 5  1   Biceps 5 1   Triceps 5 1   Wrist extension  5 1   Interossei 5 3      R  L    Hip flexion  3 3-   Hip extension  3 3-   Knee flexion  3 3   Knee extension  3 3+   Ankle dorsiflexion  5 3   Ankle plantar flexion  5 3-     Sensory: Can feel light touch in all four extremities but reduced on the left compared to the right with sensory extinction..   Cerebellar/coordination: FNF normal in RUE. DERRICK in LUE secondary to weakness.    Tone: normal in all 4 extremities  Gait: Deferred for safety  Right groin: Soft, no hematoma. Non tender to palpation. No bruising.  Left groin: Soft, nontender to palpation. No hematoma. No bruising    OTHER SYSTEMS:  Cardiovascular: Warm, appears well perfused   Respiratory: Easy, 
Off Levophed. /44 (61)  No complaints at time of exam.   MRI reviewed.     PHYSICAL EXAM:  Vitals:    05/19/25 1800 05/19/25 1815 05/19/25 1830 05/19/25 1845   BP: 116/61 105/67 (!) 119/52 124/60   Pulse: (!) 41 (!) 42 (!) 41 60   Resp: 24 19 24 25   Temp:       TempSrc:       SpO2: 100% 97% 97% 99%   Weight:       Height:             General: Drowsy, no distress, well-nourished  Neurologic  Mental status:   orientation to person, place, time, situation   Attention  Attends well   Language dysarthric but able to be understood   Comprehension intact; follows simple commands in four extremities     Cranial nerves:   CN2: Visual fields full w/o extinction on confrontational testing  CN 3,4,6: Pupils 3 mm> 2 mm equal and reactive to light, right gaze preference that does break midline to the left.  CN5: Reduced sensation to light touch on the left.  CN7: left facial weakness  CN8: Hearing symmetric to spoken voice  CN9: Palate elevated symmetrically  CN11: Trap strength weak on them left. Strong and preserved on the right.   CN12: Tongue midline with protrusion    Motor Exam:  Left keron neglect.   RUE - 5/5  LUE - 2/5  BLE 3/5    Sensory: Can feel light touch in all four extremities but reduced on the left compared to the right with sensory extinction..   Cerebellar/coordination: FNF normal in RUE. DERRICK in LUE secondary to weakness.    Tone: normal in all 4 extremities  Gait: Deferred for safety  Right groin: Soft, no hematoma. Non tender to palpation. No bruising.  Left groin: Soft, nontender to palpation. No hematoma. No bruising    OTHER SYSTEMS:  Cardiovascular: Warm, appears well perfused   Respiratory: Easy, non-labored respiratory pattern   Abdominal: Abdomen is without distention   Extremities: Upper and lower extremities are atraumatic in appearance without deformity. No swelling or erythema.   Neurovascular: +2 DP/PT palpable pulses. Cap refill < 3 seconds on all toes bilaterally. Skin color even. 
Patient taken to procedure by Dr. Sheth this afternoon   R carotid artery sacrificed - unable to retreive cath / unable to stent. Family was aware of this possibility prior to procedure.   Family was unavailable for update from Dr. Sheth after procedure.   I was able to locate family after patient returned from room and updated them on patient's current status and basic details of procedure as reported to me by Dr. Sheth.   Unable to answer very specific questions regarding details of case. I recommended to family to hold these questions for when they are able to speak with Dr. Sheth.   Dr. Hoyos was able to speak with family as well.     Small amount of oozing noted by nursing at groin site, held pressure x 30 minutes total and oozing at this point has stopped.     Will continue to follow close. Please call with questions.     GODFREY Garcias - JESSE   NEUROLOGY  5/16/2025 6:07 PM   [facility]: [facility] NEUROLOGY                
Pt qtc ranges from 510 to 550. Apaced on tele to SR with PVCs. Pt denies cp, sob, or dizziness. VSS. Pt had episode of vomiting x2 before shift change and x2 early on the shift and c/o nausea, zofran given at 1959. Notify Dr. Menchaca.     Problem: Chronic Conditions and Co-morbidities  Goal: Patient's chronic conditions and co-morbidity symptoms are monitored and maintained or improved  6/1/2025 0317 by Suze Hernandez RN  Outcome: Progressing     Problem: Discharge Planning  Goal: Discharge to home or other facility with appropriate resources  6/1/2025 0317 by Suze Hernandez RN  Outcome: Progressing     Problem: Safety - Adult  Goal: Free from fall injury  6/1/2025 0317 by Suze Hernandez RN  Outcome: Progressing     Problem: Skin/Tissue Integrity  Goal: Skin integrity remains intact  Description: 1.  Monitor for areas of redness and/or skin breakdown2.  Assess vascular access sites hourly3.  Every 4-6 hours minimum:  Change oxygen saturation probe site4.  Every 4-6 hours:  If on nasal continuous positive airway pressure, respiratory therapy assess nares and determine need for appliance change or resting period  6/1/2025 0317 by Suze Hernandez, RN  Outcome: Progressing    Problem: Pain  Goal: Verbalizes/displays adequate comfort level or baseline comfort level  6/1/2025 0317 by Suze Hernandez, RN  Outcome: Progressing     
Exam:  RLE exam limited seocondary to knee pain.   Left keron neglect.    R  L    Deltoid 5  2   Biceps 5 2   Triceps 5 2   Wrist extension  5 2   Interossei 5 3+      R  L    Hip flexion  3- 3-   Hip extension  3- 3-   Knee flexion  3 3-   Knee extension  3 3-   Ankle dorsiflexion  4 3   Ankle plantar flexion  4 3     NIH Stroke Scale      1a  Level of consciousness: 1=not alert but arousable by minor stimulation to obey, answer or respond   1b. LOC questions:  0=Performs both tasks correctly   1c. LOC commands: 0=Performs both tasks correctly   2.  Best Gaze: 1=partial gaze palsy   3.  Visual: 2=Complete hemianopia   4. Facial Palsy: 2=Partial paralysis (total or near total paralysis of the lower face)   5a.  Motor left arm: 3=No effort against gravity, limb falls   5b.  Motor right arm: 0=No drift, limb holds 90 (or 45) degrees for full 10 seconds   6a. motor left le=Some effort against gravity, limb cannot get to or maintain (if cured) 90 (or 45) degrees, drifts down to bed, but has some effort against gravity   6b  Motor right le=Some effort against gravity, limb cannot get to or maintain (if cured) 90 (or 45) degrees, drifts down to bed, but has some effort against gravity   7. Limb Ataxia: 0=Absent   8.  Sensory: 1=Mild to moderate sensory loss; patient feels pinprick is less sharp or is dull on the affected side; there is a loss of superficial pain with pinprick but patient is aware He is being touched   9. Best Language:  0=No aphasia, normal   10. Dysarthria: 1=Mild to moderate, patient slurs at least some words and at worst, can be understood with some difficulty   11. Extinction and Inattention: 2=Profound keron-inattention or keron-inattention to more than one modality. Does not recognize own hand or orients only to one side of space    Total:   17         Sensory: Can feel light touch in all four extremities but reduced on the left compared to the right with sensory extinction.. 
RN  Outcome: Progressing  Flowsheets (Taken 5/16/2025 2000)  Discharge to home or other facility with appropriate resources:   Identify barriers to discharge with patient and caregiver   Arrange for needed discharge resources and transportation as appropriate     Problem: Safety - Adult  Goal: Free from fall injury  5/17/2025 1721 by Shelly Tay RN  Outcome: Progressing  Flowsheets (Taken 5/17/2025 0800)  Free From Fall Injury:   Instruct family/caregiver on patient safety   Based on caregiver fall risk screen, instruct family/caregiver to ask for assistance with transferring infant if caregiver noted to have fall risk factors  5/17/2025 0402 by Reilly Owusu, RN  Outcome: Progressing  Flowsheets (Taken 5/17/2025 0400)  Free From Fall Injury: Instruct family/caregiver on patient safety     Problem: Skin/Tissue Integrity  Goal: Skin integrity remains intact  Description: 1.  Monitor for areas of redness and/or skin breakdown2.  Assess vascular access sites hourly3.  Every 4-6 hours minimum:  Change oxygen saturation probe site4.  Every 4-6 hours:  If on nasal continuous positive airway pressure, respiratory therapy assess nares and determine need for appliance change or resting period  5/17/2025 1721 by Shelly Tay RN  Outcome: Progressing  Flowsheets (Taken 5/17/2025 0800)  Skin Integrity Remains Intact:   Monitor for areas of redness and/or skin breakdown   Assess vascular access sites hourly   Every 4-6 hours minimum:  Change oxygen saturation probe site   Every 4-6 hours:  If on nasal continuous positive airway pressure, assess nares and determine need for appliance change or resting period   Turn and reposition as indicated   Assess need for specialty bed   Positioning devices   Pressure redistribution bed/mattress (bed type)   Check visual cues for pain   Monitor skin under medical devices  5/17/2025 0402 by Reilly Owusu, RN  Outcome: Progressing  Flowsheets  Taken 5/17/2025 
chronic and comorbid conditions and prevent exacerbation or deterioration     Problem: Discharge Planning  Goal: Discharge to home or other facility with appropriate resources  5/23/2025 1109 by Mayte Cortes RN  Outcome: Progressing  Flowsheets  Taken 5/23/2025 0800 by Mayte Cortes RN  Discharge to home or other facility with appropriate resources: Identify barriers to discharge with patient and caregiver  Taken 5/23/2025 0400 by Richie Gill RN  Discharge to home or other facility with appropriate resources:   Identify barriers to discharge with patient and caregiver   Arrange for needed discharge resources and transportation as appropriate  Taken 5/23/2025 0000 by Richie Gill RN  Discharge to home or other facility with appropriate resources:   Identify barriers to discharge with patient and caregiver   Arrange for needed discharge resources and transportation as appropriate  5/22/2025 2238 by Richie Gill RN  Outcome: Progressing  Flowsheets (Taken 5/22/2025 2000)  Discharge to home or other facility with appropriate resources:   Identify barriers to discharge with patient and caregiver   Arrange for needed discharge resources and transportation as appropriate     Problem: Safety - Adult  Goal: Free from fall injury  5/23/2025 1109 by Mayte Cortes RN  Outcome: Progressing  Flowsheets (Taken 5/22/2025 2239 by Richie Gill RN)  Free From Fall Injury:   Instruct family/caregiver on patient safety   Based on caregiver fall risk screen, instruct family/caregiver to ask for assistance with transferring infant if caregiver noted to have fall risk factors  5/22/2025 2238 by Richie Gill RN  Outcome: Progressing     Problem: Skin/Tissue Integrity  Goal: Skin integrity remains intact  Description: 1.  Monitor for areas of redness and/or skin breakdown2.  Assess vascular access sites hourly3.  Every 4-6 hours minimum:  Change oxygen saturation probe site4.  Every 4-6 hours:  If on 
DERRICK, patient would not perform command.   Tone: normal in all 4 extremities  Gait: Deferred for safety      OTHER SYSTEMS:  Cardiovascular: Warm, appears well perfused   Respiratory: Easy, non-labored respiratory pattern   Abdominal: Abdomen is without distention   Extremities: Upper and lower extremities are atraumatic in appearance without deformity. No swelling or erythema.     
alarm, gripper socks and call light used this shift.      Problem: Risk for Elopement  Goal: Patient will not exit the unit/facility without proper excort  Outcome: Progressing  Flowsheets  Taken 5/30/2025 1204  Nursing Interventions for Elopement Risk:   Assist with personal care needs such as toileting, eating, dressing, as needed to reduce the risk of wandering   Reduce environmental triggers   Make sure patient has all necessary personal care items  Taken 5/30/2025 0911  Nursing Interventions for Elopement Risk:   Assist with personal care needs such as toileting, eating, dressing, as needed to reduce the risk of wandering   Reduce environmental triggers   Make sure patient has all necessary personal care items     Problem: Neurosensory - Adult  Goal: Achieves stable or improved neurological status  Outcome: Progressing  Flowsheets  Taken 5/30/2025 1204  Achieves stable or improved neurological status:   Assess for and report changes in neurological status   Initiate measures to prevent increased intracranial pressure   Maintain blood pressure and fluid volume within ordered parameters to optimize cerebral perfusion and minimize risk of hemorrhage   Monitor temperature, glucose, and sodium. Initiate appropriate interventions as ordered  Taken 5/30/2025 0911  Achieves stable or improved neurological status:   Assess for and report changes in neurological status   Initiate measures to prevent increased intracranial pressure   Maintain blood pressure and fluid volume within ordered parameters to optimize cerebral perfusion and minimize risk of hemorrhage   Monitor temperature, glucose, and sodium. Initiate appropriate interventions as ordered  Note: Pt neuro assessment and NIH scale done every 4 hours this shift.      Problem: Neurosensory - Adult  Goal: Achieves maximal functionality and self care  Outcome: Progressing  Flowsheets  Taken 5/30/2025 1204  Achieves maximal functionality and self care:   Encourage and 
heart rate   Monitor urine output and notify Licensed Independent Practitioner for values outside of normal range  Goal: Absence of cardiac dysrhythmias or at baseline  Outcome: Progressing  Flowsheets (Taken 5/24/2025 0800)  Absence of cardiac dysrhythmias or at baseline:   Monitor cardiac rate and rhythm   Assess for signs of decreased cardiac output   Administer antiarrhythmia medication and electrolyte replacement as ordered     Problem: Skin/Tissue Integrity - Adult  Goal: Skin integrity remains intact  Description: 1.  Monitor for areas of redness and/or skin breakdown2.  Assess vascular access sites hourly3.  Every 4-6 hours minimum:  Change oxygen saturation probe site4.  Every 4-6 hours:  If on nasal continuous positive airway pressure, respiratory therapy assess nares and determine need for appliance change or resting period  Outcome: Progressing  Flowsheets (Taken 5/24/2025 0800)  Skin Integrity Remains Intact: Monitor for areas of redness and/or skin breakdown  Goal: Incisions, wounds, or drain sites healing without S/S of infection  Outcome: Progressing  Flowsheets (Taken 5/24/2025 0800)  Incisions, Wounds, or Drain Sites Healing Without Sign and Symptoms of Infection: ADMISSION and DAILY: Assess and document risk factors for pressure ulcer development  Goal: Oral mucous membranes remain intact  Outcome: Progressing  Flowsheets (Taken 5/24/2025 0800)  Oral Mucous Membranes Remain Intact:   Assess oral mucosa and hygiene practices   Implement preventative oral hygiene regimen   Implement oral medicated treatments as ordered     Problem: Musculoskeletal - Adult  Goal: Return mobility to safest level of function  Outcome: Progressing  Flowsheets (Taken 5/24/2025 0800)  Return Mobility to Safest Level of Function:   Assess patient stability and activity tolerance for standing, transferring and ambulating with or without assistive devices   Assist with transfers and ambulation using safe patient handling 
LIP2. [place] high risk fall precautions, as indicated3. Provide frequent short contacts to provide reality reorientation, refocusing and direction4. Decrease environmental stimuli, including noise as appropriate5. Monitor and intervene to maintain adequate nutrition, hydration, elimination, sleep and activity6. If unable to ensure safety without constant attention obtain sitter and review sitter guidelines with assigned personnel7. Initiate Psychosocial CNS and Spiritual Care consult, as indicated  INTERVENTIONS:1. Assess for possible contributors to thought disturbance, including medications, impaired vision or hearing, underlying metabolic abnormalities, dehydration, psychiatric diagnoses, and notify attending LIP2. [place] high risk fall precautions, as indicated3. Provide frequent short contacts to provide reality reorientation, refocusing and direction4. Decrease environmental stimuli, including noise as appropriate5. Monitor and intervene to maintain adequate nutrition, hydration, elimination, sleep and activity6. If unable to ensure safety without constant attention obtain sitter and review sitter guidelines with assigned personnel7. Initiate Psychosocial CNS and Spiritual Care consult, as indicated  INTERVENTIONS:1. Assess for possible contributors to thought disturbance, including medications, impaired vision or hearing, underlying metabolic abnormalities, dehydration, psychiatric diagnoses, and notify attending LIP2. [place] high risk fall precautions, as indicated3. Provide frequent short contacts to provide reality reorientation, refocusing and direction4. Decrease environmental stimuli, including noise as appropriate5. Monitor and intervene to maintain adequate nutrition, hydration, elimination, sleep and activity6. If unable to ensure safety without constant attention obtain sitter and review sitter guidelines with assigned personnel7. Initiate Psychosocial CNS and Spiritual Care consult, as 
contributors to thought disturbance, including medications, impaired vision or hearing, underlying metabolic abnormalities, dehydration, psychiatric diagnoses, and notify attending LIP2. [place] high risk fall precautions, as indicated3. Provide frequent short contacts to provide reality reorientation, refocusing and direction4. Decrease environmental stimuli, including noise as appropriate5. Monitor and intervene to maintain adequate nutrition, hydration, elimination, sleep and activity6. If unable to ensure safety without constant attention obtain sitter and review sitter guidelines with assigned personnel7. Initiate Psychosocial CNS and Spiritual Care consult, as indicated  INTERVENTIONS:1. Assess for possible contributors to thought disturbance, including medications, impaired vision or hearing, underlying metabolic abnormalities, dehydration, psychiatric diagnoses, and notify attending LIP2. [place] high risk fall precautions, as indicated3. Provide frequent short contacts to provide reality reorientation, refocusing and direction4. Decrease environmental stimuli, including noise as appropriate5. Monitor and intervene to maintain adequate nutrition, hydration, elimination, sleep and activity6. If unable to ensure safety without constant attention obtain sitter and review sitter guidelines with assigned personnel7. Initiate Psychosocial CNS and Spiritual Care consult, as indicated  INTERVENTIONS:1. Assess for possible contributors to thought disturbance, including medications, impaired vision or hearing, underlying metabolic abnormalities, dehydration, psychiatric diagnoses, and notify attending LIP2. [place] high risk fall precautions, as indicated3. Provide frequent short contacts to provide reality reorientation, refocusing and direction4. Decrease environmental stimuli, including noise as appropriate5. Monitor and intervene to maintain adequate nutrition, hydration, elimination, sleep and activity6. If 
Progressing  Flowsheets (Taken 5/25/2025 0800)  Verbalizes/displays adequate comfort level or baseline comfort level:   Encourage patient to monitor pain and request assistance   Assess pain using appropriate pain scale   Administer analgesics based on type and severity of pain and evaluate response   Implement non-pharmacological measures as appropriate and evaluate response     Problem: Hematologic - Adult  Goal: Maintains hematologic stability  Outcome: Progressing  Flowsheets (Taken 5/25/2025 0800)  Maintains hematologic stability:   Assess for signs and symptoms of bleeding or hemorrhage   Monitor labs for bleeding or clotting disorders     Problem: Confusion  Goal: Confusion, delirium, dementia, or psychosis is improved or at baseline  Description: INTERVENTIONS:1. Assess for possible contributors to thought disturbance, including medications, impaired vision or hearing, underlying metabolic abnormalities, dehydration, psychiatric diagnoses, and notify attending LIP2. [place] high risk fall precautions, as indicated3. Provide frequent short contacts to provide reality reorientation, refocusing and direction4. Decrease environmental stimuli, including noise as appropriate5. Monitor and intervene to maintain adequate nutrition, hydration, elimination, sleep and activity6. If unable to ensure safety without constant attention obtain sitter and review sitter guidelines with assigned personnel7. Initiate Psychosocial CNS and Spiritual Care consult, as indicated  INTERVENTIONS:1. Assess for possible contributors to thought disturbance, including medications, impaired vision or hearing, underlying metabolic abnormalities, dehydration, psychiatric diagnoses, and notify attending LIP2. [place] high risk fall precautions, as indicated3. Provide frequent short contacts to provide reality reorientation, refocusing and direction4. Decrease environmental stimuli, including noise as appropriate5. Monitor and intervene to

## 2025-06-06 NOTE — PROGRESS NOTES
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 Initial NIHSS was 14 and CT with favorable ASPECTs. He was transferred to [facility] for EVT w/ TICI 2c reperfusion complicated by catheter fracture. POD#2 from right ICA sacrifice to trap fractured catheter in right ICA.     Plan  DAPT for now. Will plan to initiate DOAC 5/20 as per neurosurgery recommendations  When DOAC initiated, will d/c ASA + Plavix  MRI brain w/o marcell today   Keep SBP between 100 and 160 mmHg   Continue statin (LDL at goal)  Q4h neuro checks  Continue PT/OT/SLP  Aspiration precautions  NIHSS per guidelines  Groin checks per post procedure guidelines    Discussed plan w/ Dr. Lawton who is in agreement w/ the above    GODFREY Mariee - [facility]   NEUROCRITICAL CARE  5/18/2025 8:06 AM  [facility]: [facility] NEUROCRITICAL CARE    HISTORY     Jhony Hdez is a 84 y.o. y/o male with history significant for HLD, pAF (on coumadin), CAD, CHF, CKD, HTN, rectal cancer, prostate cancer with seeding, recent diagnosis of urothelial cancer of the R ureter who presented to [facility] ED after following out of bed with L hemiplegia and dysarthria. LKW was 2200 on 5/14. A stroke alert was called immediately. Initial NIHSS was 14.  CT head showed a hyperdense R MCA suggestive of a thrombus, and no acute intracranial hemorrhage. CTA head/neck showed a occlusion in the R M1. Patient was 
         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 Initial NIHSS was 14 and CT with favorable ASPECTs. He was transferred to [facility] for EVT w/ TICI 2c reperfusion complicated by catheter fracture. POD#3 from right ICA sacrifice to trap fractured catheter in right ICA.     Plan  DAPT for now. Will plan to initiate hep gtt tomorrow   When DOAC initiated, will d/c ASA + Plavix  Keep SBP between 100 and 160 mmHg   Continue statin (LDL at goal)  Q4h neuro checks  Continue PT/OT/SLP  Aspiration precautions  NIHSS per guidelines  Groin checks per post procedure guidelines      Liliam Lawton,    NEUROCRITICAL CARE  5/19/2025 9:27 AM  [facility]: [facility] NEUROCRITICAL CARE    HISTORY     Jhony Hdez is a 84 y.o. y/o male with history significant for HLD, pAF (on coumadin), CAD, CHF, CKD, HTN, rectal cancer, prostate cancer with seeding, recent diagnosis of urothelial cancer of the R ureter who presented to [facility] ED after following out of bed with L hemiplegia and dysarthria. LKW was 2200 on 5/14. A stroke alert was called immediately. Initial NIHSS was 14.  CT head showed a hyperdense R MCA suggestive of a thrombus, and no acute intracranial hemorrhage. CTA head/neck showed a occlusion in the R M1. Patient was transferred to [facility] for EVT. Patient was not a TNK candidate given LKW. TICI 2c reperfusion achieved. EVT complicated by retained 
         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 Initial NIHSS was 14 and CT with favorable ASPECTs. He was transferred to [facility] for EVT w/ TICI 2c reperfusion complicated by catheter fracture. POD#4 from right ICA sacrifice to trap fractured catheter in right ICA.     Dicussed with patient and his family at bedside regarding our findings. Risks and benefits of hep gtt were discussed. The benefit of hep gtt is to prevent additional stroke. The risks of hep gtt including but not limited to bleeding,  permeant disability and death. Family agreed to start hep gtt.        Plan  Plan to start Neuro Hep gtt, aXa 0.3-0.5, Repeat CT head once therapeutic x2   Keep SBP between 100 and 160 mmHg   Continue statin (LDL at goal)  Q1h neuro checks  Continue PT/OT/SLP  Aspiration precautions  NIHSS per guidelines  Groin checks per post procedure guidelines      Liliam Lawton, DO   NEUROCRITICAL CARE  5/20/2025 9:13 AM  [facility]: [facility] NEUROCRITICAL CARE    HISTORY     Jhony Hdez is a 84 y.o. y/o male with history significant for HLD, pAF (on coumadin), CAD, CHF, CKD, HTN, rectal cancer, prostate cancer with seeding, recent diagnosis of urothelial cancer of the R ureter who presented to [facility] ED after following out of bed with L hemiplegia and dysarthria. LKW was 2200 on 5/14. A stroke alert was called immediately. Initial NIHSS was 14.  
         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 Initial NIHSS was 14 and CT with favorable ASPECTs. He was transferred to [facility] for EVT w/ TICI 2c reperfusion complicated by catheter fracture. POD#4 from right ICA sacrifice to trap fractured catheter in right ICA.     Dicussed with patient and his family at bedside regarding our findings. Risks and benefits of hep gtt were discussed. The benefit of hep gtt is to prevent additional stroke. The risks of hep gtt including but not limited to bleeding,  permeant disability and death. Family agreed to start hep gtt.     Therapeutic Hep gtt CT did not show haemorrhage    Plan  Since patient is getting TF, we will defer the timing of starting coumadin to MICU/Primary team. I have discussed with the MICU team.   Keep SBP between 90 and 160 mmHg, can stop Levo  Neuro q2, can transfer out of ICU later today and Neuro q4 if he is tolerating his BP without BP augmentation    Continue statin (LDL at goal)  Continue PT/OT/SLP  Aspiration precautions  NIHSS per guidelines  Groin checks per post procedure guidelines    Neurology follow up in 3 months       Lilaim Lawton DO   NEUROCRITICAL CARE  5/21/2025 9:17 AM  [facility]: [facility] NEUROCRITICAL CARE    HISTORY     Jhony Hdez is a 84 y.o. y/o male with history significant for HLD, pAF (on coumadin), CAD, CHF, CKD, HTN, rectal cancer, prostate 
         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 Initial NIHSS was 14 and CT with favorable ASPECTs. He was transferred to [facility] for EVT w/ TICI 2c reperfusion complicated by catheter fracture. POD#4 from right ICA sacrifice to trap fractured catheter in right ICA.     Therapeutic Hep gtt CT did not show haemorrhage    Plan  Since patient is getting TF, unclear if he needs PEG. We will defer the timing of starting coumadin to MICU/Primary team.    Keep SBP between 90 and 160 mmHg  Hypotension workup/management per ICU   Neuro q4  Continue statin (LDL at goal)  Continue PT/OT/SLP  Aspiration precautions  NIHSS per guidelines  Groin checks per post procedure guidelines    NCC will sign off  Neurology follow up in 3 months       Liliam Lawton, DO   NEUROCRITICAL CARE  5/22/2025 9:18 AM  [facility]: [facility] NEUROCRITICAL CARE    HISTORY     Jhony Hdez is a 84 y.o. y/o male with history significant for HLD, pAF (on coumadin), CAD, CHF, CKD, HTN, rectal cancer, prostate cancer with seeding, recent diagnosis of urothelial cancer of the R ureter who presented to [facility] ED after following out of bed with L hemiplegia and dysarthria. LKW was 2200 on 5/14. A stroke alert was called immediately. Initial NIHSS was 14.  CT head showed a hyperdense R MCA suggestive of a thrombus, and no acute intracranial hemorrhage. CTA head/neck showed a 
       NEUROLOGY PROGRESS NOTE       Patient Name: Jhony Hdez YOB: 1940   Sex: Male Age: 84 yrs     Presenting CC: No chief complaint on file.    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 Initial NIHSS was 14 and CT with favorable ASPECTs. He was transferred to [facility] for EVT w/ TICI 2c reperfusion complicated by catheter fracture. POD#16 right ICA sacrifice to trap fractured catheter in right ICA.     Neurology is re-engaged for somnolence and slowed mentation, which according to family and our notes has fluctuated, some days he is quite somnolent and others he is engaged, eating all of his meals, etc.  Overall, picture is most concerning for delirium, given no new weakness and fluctuations  Family has concerns for seizure as he will scrunch his face at times. Routine EEG is ordered. No events during my visit. Discussed with RN, seems to scrunch his face/move out of discomfort.  Routine EEG was completed during a period of worse encephalopathy and was read as normal.  As of 6/3 patient was alert, interacting with examiner    Plan:  - Ammonia WNL. CT Head with expected evolution of stroke. UA with likely UTI  - Continue Eliquis  - Routine EEG without epileptiform discharges, read as normal  - Hold melatonin  - Statin on hold given LFT elevation, multiple factors could have related to this as well given hypotension and pressor 
       NEUROLOGY PROGRESS NOTE       Patient Name: Jhony Hdez YOB: 1940   Sex: Male Age: 84 yrs     Presenting CC: No chief complaint on file.    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 Initial NIHSS was 14 and CT with favorable ASPECTs. He was transferred to [facility] for EVT w/ TICI 2c reperfusion complicated by catheter fracture. POD#16 right ICA sacrifice to trap fractured catheter in right ICA.     Neurology is re-engaged for somnolence and slowed mentation, which according to family and our notes has fluctuated, some days he is quite somnolent and others he is engaged, eating all of his meals, etc.  Overall, picture is most concerning for delirium, given no new weakness and fluctuations  Family has concerns for seizure as he will scrunch his face at times. Routine EEG is ordered. No events during my visit. Discussed with RN, seems to scrunch his face/move out of discomfort.  Also discussed past imaging with family, they are concerned that patient had additional clot in his brain. We discussed cardio embolic stroke at length and that there was not an additional intervenable clot. We also discussed stroke recovery, and that there can be good and bad days, especially in the setting of metabolic abnormalities.  Recrudescence is a consideration given his UTI, elevated LFTS, etc. Recently placed ICD.    Plan:  - Ammonia WNL. CT Head with expected evolution of stroke. UA with likely UTI  - Continue Eliquis  - Routine EEG 
       NEUROLOGY PROGRESS NOTE       Patient Name: Jhony Hdez YOB: 1940   Sex: Male Age: 84 yrs     Presenting CC: No chief complaint on file.    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 Initial NIHSS was 14 and CT with favorable ASPECTs. He was transferred to [facility] for EVT w/ TICI 2c reperfusion complicated by catheter fracture. POD#16 right ICA sacrifice to trap fractured catheter in right ICA.     Neurology is re-engaged for somnolence and slowed mentation, which is mild on my visit (GCS is 15). His age and prolonged hospitalization are likely driving this, but his increasing LFTs are definitely worth exploring.     Plan:  - Check ammonia, urinalysis (ordered). Remainder of infectious/metabolic work up as per primary team. Consider CT C/A/P.   - Continue Eliquis  - Head CT (ordered); Would ideally check MRI but this may not be feasible with recent ICD placement.   - Routine EEG (ordered)  - Hold melatonin  - Delirium Precautions: Maintain a regular night-day/sleep-wake cycle: discourage daytime naps, re-orient frequently, shades up during the daytime, family at bedside as often as possible, minimize nighttime awakenings, utilize relaxation channel on television     Discussed w/ Dr. Hoyos who is in agreement w/ the above.    GODFREY Mariee - [facility]   Neurology  6/1/2025 12:59 PM  [facility]: [facility] Neurology    HISTORY     Jhony Hdez is a 84 y.o. y/o male with history significant for HLD, pAF (on coumadin), CAD, CHF, CKD, HTN, rectal cancer, prostate cancer with seeding, recent diagnosis of urothelial 
      [facility] Medicine Progress Note  V 5.17      Date of Admission: 5/15/2025    Hospital Day: 5      Chief Admission Complaint:  Left hemiplegia     Subjective:  Patient seen and examined at bedside. No acute events overnight. Reports feeling okay today. Still on low dose levophed. NG tube placed for tube feeds. Does have some left sided movement now.     Presenting Admission History:       Mr. Hdez is a 83 yo male with pmhx of afib on warfarin, rectal cancer, prostate cancer with seeding, GBS, recent diagnosis of urothelial cancer of the right ureter who presents to [facility] ED with chief complaint of left hemiplegia that was noticed at around 2am. His LKW as 10pm on the 14th. 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 Upon arrival to the ED he was noted to have left facial droop with left hemiplegia so code stroke was called. CT head performed which showed Hyperdense right MCA suggesting right MCA thrombus. 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 Patient was transferred to [facility] for neurosurgical eval. He was taken for thrombectomy this morning (15th) with complication of retained catheter.     Assessment/Plan:      Acute right MCA stroke  Occlusion of right M1 segment of MCA  - Presented to hospital with left hemiplegia. Symptoms noted around 2am, LKW 10pm the night prior. CTA showed right M1 occlusion thus transferred to [facility] for neurosurgical evaluation  - Taken for thrombectomy 5/15 AM per neurosurgery. CTA head/neck following showed right cervical and intracranial internal carotid artery catheter fragment. Patient started on hep gtt following this.   - Has since been switched off hep gtt to DAPT, aspirin, plavix. Plan to initiate DOAC 5/20 per neurosurgery, will dc DAPT once started.   - MRI brain w/o showed right greater than left cerebral multiple acute infarcts compatible with thromboembolic disease from 
      Hospital Medicine Progress Note  V 5.17      Date of Admission: 5/15/2025    Hospital Day: 10      Chief Admission Complaint:  Left hemiplegia     Subjective: Patient is without complaints. No adverse interval events. ARU pending stability.      Presenting Admission History:       Mr. Hdez is a 85 yo male with pmhx of afib on warfarin, rectal cancer, prostate cancer with seeding, GBS, recent diagnosis of urothelial cancer of the right ureter who presents to [facility] ED with chief complaint of left hemiplegia that was noticed at around 2am. His LKW as 10pm on the 14th. 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 Upon arrival to the ED he was noted to have left facial droop with left hemiplegia so code stroke was called. CT head performed which showed Hyperdense right MCA suggesting right MCA thrombus. 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 Patient was transferred to [facility] for neurosurgical eval. He was taken for thrombectomy this morning (15th) with complication of retained catheter.     Assessment/Plan:      Acute right MCA stroke  Occlusion of right M1 segment of MCA  - Presented to hospital with left hemiplegia. Symptoms noted around 2am, LKW 10pm the night prior. CTA showed right M1 occlusion thus transferred to [facility] for neurosurgical evaluation  - Taken for thrombectomy 5/15 AM per neurosurgery. CTA head/neck following showed right cervical and intracranial internal carotid artery catheter fragment. Patient started on hep gtt following this.   - Has since been switched off hep gtt to DAPT, aspirin, plavix. Plan to initiate DOAC 5/20 per neurosurgery, will dc DAPT once started.   - 5/19 MRI brain w/o showed right greater than left cerebral multiple acute infarcts compatible with thromboembolic disease from central source. Goal SBP <160. PT/OT/SLP ordered. Recommending IP rehab. PM&R following. Remains on low 
      Hospital Medicine Progress Note  V 5.17      Date of Admission: 5/15/2025    Hospital Day: 11      Chief Admission Complaint:  Left hemiplegia     Subjective: Patient is without complaints. No adverse interval events. ARU pending stability. Reduce cardiac stimulation.       Presenting Admission History:       Mr. Hdez is a 85 yo male with pmhx of afib on warfarin, rectal cancer, prostate cancer with seeding, GBS, recent diagnosis of urothelial cancer of the right ureter who presents to [facility] ED with chief complaint of left hemiplegia that was noticed at around 2am. His LKW as 10pm on the 14th. 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 Upon arrival to the ED he was noted to have left facial droop with left hemiplegia so code stroke was called. CT head performed which showed Hyperdense right MCA suggesting right MCA thrombus. 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 Patient was transferred to [facility] for neurosurgical eval. He was taken for thrombectomy this morning (15th) with complication of retained catheter.     Assessment/Plan:      Acute right MCA stroke  Occlusion of right M1 segment of MCA  - Presented to hospital with left hemiplegia. Symptoms noted around 2am, LKW 10pm the night prior. CTA showed right M1 occlusion thus transferred to [facility] for neurosurgical evaluation  - Taken for thrombectomy 5/15 AM per neurosurgery. CTA head/neck following showed right cervical and intracranial internal carotid artery catheter fragment. Patient started on hep gtt following this.   - Has since been switched off hep gtt to DAPT, aspirin, plavix. Plan to initiate DOAC 5/20 per neurosurgery, will dc DAPT once started.   - 5/19 MRI brain w/o showed right greater than left cerebral multiple acute infarcts compatible with thromboembolic disease from central source. Goal SBP <160. PT/OT/SLP ordered. Recommending IP rehab. 
      Hospital Medicine Progress Note  V 5.17      Date of Admission: 5/15/2025    Hospital Day: 12      Chief Admission Complaint:  Left hemiplegia     Subjective: Patient is without complaints. No adverse interval events. ARU pending stability. Possible PPM placement Tuesday.      Presenting Admission History:       Mr. Hdez is a 83 yo male with pmhx of afib on warfarin, rectal cancer, prostate cancer with seeding, GBS, recent diagnosis of urothelial cancer of the right ureter who presents to [facility] ED with chief complaint of left hemiplegia that was noticed at around 2am. His LKW as 10pm on the 14th. 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 Upon arrival to the ED he was noted to have left facial droop with left hemiplegia so code stroke was called. CT head performed which showed Hyperdense right MCA suggesting right MCA thrombus. 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 Patient was transferred to [facility] for neurosurgical eval. He was taken for thrombectomy this morning (15th) with complication of retained catheter.     Assessment/Plan:      Acute right MCA stroke  Occlusion of right M1 segment of MCA  - Presented to hospital with left hemiplegia. Symptoms noted around 2am, LKW 10pm the night prior. CTA showed right M1 occlusion thus transferred to [facility] for neurosurgical evaluation  - Taken for thrombectomy 5/15 AM per neurosurgery. CTA head/neck following showed right cervical and intracranial internal carotid artery catheter fragment. Patient started on hep gtt following this.   - Has since been switched off hep gtt to DAPT, aspirin, plavix. Plan to initiate DOAC 5/20 per neurosurgery, will dc DAPT once started.   - 5/19 MRI brain w/o showed right greater than left cerebral multiple acute infarcts compatible with thromboembolic disease from central source. Goal SBP <160. PT/OT/SLP ordered. Recommending IP rehab. 
      Hospital Medicine Progress Note  V 5.17      Date of Admission: 5/15/2025    Hospital Day: 13      Chief Admission Complaint:  Left hemiplegia     Subjective: Patient is without complaints. No adverse interval events. ARU pending stability. Possible PPM placement today.      Presenting Admission History:       Mr. Hdez is a 85 yo male with pmhx of afib on warfarin, rectal cancer, prostate cancer with seeding, GBS, recent diagnosis of urothelial cancer of the right ureter who presents to [facility] ED with chief complaint of left hemiplegia that was noticed at around 2am. His LKW as 10pm on the 14th. 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 Upon arrival to the ED he was noted to have left facial droop with left hemiplegia so code stroke was called. CT head performed which showed Hyperdense right MCA suggesting right MCA thrombus. 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 Patient was transferred to [facility] for neurosurgical eval. He was taken for thrombectomy this morning (15th) with complication of retained catheter.     Assessment/Plan:      Acute right MCA stroke  Occlusion of right M1 segment of MCA  - Presented to hospital with left hemiplegia. Symptoms noted around 2am, LKW 10pm the night prior. CTA showed right M1 occlusion thus transferred to [facility] for neurosurgical evaluation  - Taken for thrombectomy 5/15 AM per neurosurgery. CTA head/neck following showed right cervical and intracranial internal carotid artery catheter fragment. Patient started on hep gtt following this.   - Has since been switched off hep gtt to DAPT, aspirin, plavix. Plan to initiate DOAC 5/20 per neurosurgery, will dc DAPT once started.   - 5/19 MRI brain w/o showed right greater than left cerebral multiple acute infarcts compatible with thromboembolic disease from central source. Goal SBP <160. PT/OT/SLP ordered. Recommending IP rehab. 
      Hospital Medicine Progress Note  V 5.17      Date of Admission: 5/15/2025    Hospital Day: 15      Chief Admission Complaint:  Left hemiplegia     Subjective: Weaning dopamine, once weaned will be medically ready for ARU.       Presenting Admission History:       Mr. Hdez is a 83 yo male with pmhx of afib on warfarin, rectal cancer, prostate cancer with seeding, GBS, recent diagnosis of urothelial cancer of the right ureter who presents to [facility] ED with chief complaint of left hemiplegia that was noticed at around 2am. His LKW as 10pm on the 14th. 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 Upon arrival to the ED he was noted to have left facial droop with left hemiplegia so code stroke was called. CT head performed which showed Hyperdense right MCA suggesting right MCA thrombus. 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 Patient was transferred to [facility] for neurosurgical eval. He was taken for thrombectomy this morning (15th) with complication of retained catheter.     Assessment/Plan:      Acute right MCA stroke  Occlusion of right M1 segment of MCA  - Presented to hospital with left hemiplegia. Symptoms noted around 2am, LKW 10pm the night prior. CTA showed right M1 occlusion thus transferred to [facility] for neurosurgical evaluation  - Taken for thrombectomy 5/15 AM per neurosurgery. CTA head/neck following showed right cervical and intracranial internal carotid artery catheter fragment. Patient started on hep gtt following this.   - Has since been switched off hep gtt to DAPT, aspirin, plavix. Plan to initiate DOAC 5/20 per neurosurgery, will dc DAPT once started.   - 5/19 MRI brain w/o showed right greater than left cerebral multiple acute infarcts compatible with thromboembolic disease from central source. Goal SBP <160. PT/OT/SLP ordered. Recommending IP rehab. PM&R following. Remains on low dose vasopressor, 
      Hospital Medicine Progress Note  V 5.17      Date of Admission: 5/15/2025    Hospital Day: 6      Chief Admission Complaint:  Left hemiplegia     Subjective:  Patient seen and examined at bedside. No acute events overnight. Patient a bit somnolent today, able to follow commands. Difficult to arouse, though is arousable to voice.      Presenting Admission History:       Mr. Hdez is a 83 yo male with pmhx of afib on warfarin, rectal cancer, prostate cancer with seeding, GBS, recent diagnosis of urothelial cancer of the right ureter who presents to [facility] ED with chief complaint of left hemiplegia that was noticed at around 2am. His LKW as 10pm on the 14th. 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 Upon arrival to the ED he was noted to have left facial droop with left hemiplegia so code stroke was called. CT head performed which showed Hyperdense right MCA suggesting right MCA thrombus. 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 Patient was transferred to [facility] for neurosurgical eval. He was taken for thrombectomy this morning (15th) with complication of retained catheter.     Assessment/Plan:      Acute right MCA stroke  Occlusion of right M1 segment of MCA  - Presented to hospital with left hemiplegia. Symptoms noted around 2am, LKW 10pm the night prior. CTA showed right M1 occlusion thus transferred to [facility] for neurosurgical evaluation  - Taken for thrombectomy 5/15 AM per neurosurgery. CTA head/neck following showed right cervical and intracranial internal carotid artery catheter fragment. Patient started on hep gtt following this.   - Has since been switched off hep gtt to DAPT, aspirin, plavix. Plan to initiate DOAC 5/20 per neurosurgery, will dc DAPT once started.   - 5/19 MRI brain w/o showed right greater than left cerebral multiple acute infarcts compatible with thromboembolic disease from central source. 
      Hospital Medicine Progress Note  V 5.17      Date of Admission: 5/15/2025    Hospital Day: 8      Chief Admission Complaint:  Left hemiplegia     Subjective:  Patient seen and examined at bedside. No acute events overnight. Had large emesis this morning, tube feeds paused. Attempting to wean levophed today for SBP >90.     Presenting Admission History:       Mr. Hdez is a 85 yo male with pmhx of afib on warfarin, rectal cancer, prostate cancer with seeding, GBS, recent diagnosis of urothelial cancer of the right ureter who presents to [facility] ED with chief complaint of left hemiplegia that was noticed at around 2am. His LKW as 10pm on the 14th. 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 Upon arrival to the ED he was noted to have left facial droop with left hemiplegia so code stroke was called. CT head performed which showed Hyperdense right MCA suggesting right MCA thrombus. 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 Patient was transferred to [facility] for neurosurgical eval. He was taken for thrombectomy this morning (15th) with complication of retained catheter.     Assessment/Plan:      Acute right MCA stroke  Occlusion of right M1 segment of MCA  - Presented to hospital with left hemiplegia. Symptoms noted around 2am, LKW 10pm the night prior. CTA showed right M1 occlusion thus transferred to [facility] for neurosurgical evaluation  - Taken for thrombectomy 5/15 AM per neurosurgery. CTA head/neck following showed right cervical and intracranial internal carotid artery catheter fragment. Patient started on hep gtt following this.   - Has since been switched off hep gtt to DAPT, aspirin, plavix. Plan to initiate DOAC 5/20 per neurosurgery, will dc DAPT once started.   - 5/19 MRI brain w/o showed right greater than left cerebral multiple acute infarcts compatible with thromboembolic disease from central source. Goal SBP 
      Hospital Medicine Progress Note  V 5.17      Date of Admission: 5/15/2025    Hospital Day: 9      Chief Admission Complaint:  Left hemiplegia     Subjective:  Patient seen and examined at bedside. No acute events overnight. Discussed with family at bedside yesterday afternoon regarding PEG tube placement. They were amenable at the time however had discussed things further and decided to hold off on PEG for now. Resumed on heparin gtt pending possibly PEG tube placement following the weekend.     Presenting Admission History:       Mr. Hdez is a 85 yo male with pmhx of afib on warfarin, rectal cancer, prostate cancer with seeding, GBS, recent diagnosis of urothelial cancer of the right ureter who presents to [facility] ED with chief complaint of left hemiplegia that was noticed at around 2am. His LKW as 10pm on the 14th. 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 Upon arrival to the ED he was noted to have left facial droop with left hemiplegia so code stroke was called. CT head performed which showed Hyperdense right MCA suggesting right MCA thrombus. 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 Patient was transferred to [facility] for neurosurgical eval. He was taken for thrombectomy this morning (15th) with complication of retained catheter.     Assessment/Plan:      Acute right MCA stroke  Occlusion of right M1 segment of MCA  - Presented to hospital with left hemiplegia. Symptoms noted around 2am, LKW 10pm the night prior. CTA showed right M1 occlusion thus transferred to [facility] for neurosurgical evaluation  - Taken for thrombectomy 5/15 AM per neurosurgery. CTA head/neck following showed right cervical and intracranial internal carotid artery catheter fragment. Patient started on hep gtt following this.   - Has since been switched off hep gtt to DAPT, aspirin, plavix. Plan to initiate DOAC 5/20 per neurosurgery, will dc 
      [facility] Medicine Progress Note  V 5.17      Date of Admission: 5/15/2025    Hospital Day: 4      Chief Admission Complaint:  Left hemiplegia     Subjective:  Patient seen and examined at bedside. No acute events overnight. Patient reports feeling okay. Still with some left sided weakness, though according to family seems to have improved somewhat since admission. Denies any other acute concerns.     Presenting Admission History:       Mr. Hdez is a 85 yo male with pmhx of afib on warfarin, rectal cancer, prostate cancer with seeding, GBS, recent diagnosis of urothelial cancer of the right ureter who presents to [facility] ED with chief complaint of left hemiplegia that was noticed at around 2am. His LKW as 10pm on the 14th. 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 Upon arrival to the ED he was noted to have left facial droop with left hemiplegia so code stroke was called. CT head performed which showed Hyperdense right MCA suggesting right MCA thrombus. 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 Patient was transferred to [facility] for neurosurgical eval. He was taken for thrombectomy this morning (15th) with complication of retained catheter.     Assessment/Plan:      Acute right MCA stroke  Occlusion of right M1 segment of MCA  - Presented to hospital with left hemiplegia. Symptoms noted around 2am, LKW 10pm the night prior. CTA showed right M1 occlusion thus transferred to [facility] for neurosurgical evaluation  - Taken for thrombectomy 5/15 AM per neurosurgery. CTA head/neck following showed right cervical and intracranial internal carotid artery catheter fragment. Patient started on hep gtt following this.   - Has since been switched off hep gtt to DAPT, aspirin, plavix. Plan to initiate DOAC 5/20 per neurosurgery, will dc DAPT once started.   - MRI brain w/o showed right greater than left cerebral multiple acute 
      [facility] Medicine Progress Note  V 5.17      Date of Admission: 5/15/2025    Hospital Day: 18      Chief Admission Complaint:  CVA    Subjective:  Patient seen at bedside this morning. Family at bedside. Patient somnolent, slowed mentation. AO3. Patient 1x vomiting episode over past 24 hours, pain ongoing in neck. No fevers, chills, pain anywhere else.    Presenting Admission History:       Jhony Hdez is a 84 y.o. male with pmh of Proximal A-fib, HFrEF, pheochromocytoma status post section 2010, colon cancer status post resection and radiation therapy, GBS, recent diagnosis of ureteral cancer admitted to the hospital on 5/15 with a fall and left-sided weakness.  Imaging suggested ischemic stroke due to right MCA.  Apparently patient was off of Coumadin for a month for bladder biopsy prior to admission.     Assessment/Plan:      #Right MCA thrombus  Status post thrombectomy on 5/15 with complication of catheter tip retained in the right ICA  Status post right ICA sacrifice to trip fractured catheter patent right ICA 5/16  Neurosurgery recommended DOAC  Echo 5/2025 EF 30 to 35%, diffuse hypokinesis, mild LVH, moderate RVD with moderate dysfunction  Neurochecks in place  Continue Eliquis  SLP following  To consider Neurology consultation as needed     #Bradycardia with hypotension  #A-fib  Status post dual-chamber AICD 5/28/2025 preprocedural ANNIE did not reveal any LV/LA/DESIRAE thrombus or mass  He is currently on dopamine drip.  Midodrine 10 mg every 8 hours for hypotension  Rate controlled with metoprolol anticoagulation with Eliquis  Continue to monitor  Cardiology consulted, appreciate recommendations     #Elevated LFTs likely in setting of hypotension  Continue to trend AST, ALT     #Anemia  Hemoglobin 9.2 stable.  Transfuse as needed, maintain Hgb > 7    Ongoing threat to life and/or bodily function without ongoing treatment due to:  CVA    Consults:      IP CONSULT TO NEUROLOGY  IP CONSULT TO CRITICAL 
      [facility]              Progress Note      Admit Date 5/15/2025  HPI: No change in mental status.  ? Weights 10 kg up in two days.    Interval history:     Mr. Hdez is resting      Subjective:       Scheduled Meds:   apixaban  5 mg Oral BID    simethicone  80 mg Oral Daily    midodrine  5 mg Oral TID WC    melatonin  5 mg Oral Nightly    balsum peru-castor oil   Topical BID    atorvastatin  40 mg Oral Nightly    [Held by provider] donepezil  5 mg Oral Nightly    [Held by provider] furosemide  20 mg Oral Daily    pantoprazole  40 mg Oral QAM AC     Continuous Infusions:   DOPamine 5 mcg/kg/min (25 0904)     PRN Meds:morphine, calcium carbonate, lidocaine, acetaminophen, loperamide, promethazine **OR** ondansetron       Objective:     Wt Readings from Last 3 Encounters:   25 95.9 kg (211 lb 6.7 oz)   25 86.2 kg (190 lb 0.6 oz)   05/15/25 89.3 kg (196 lb 13.9 oz)   Admit weight: Weight - Scale: 86.1 kg (189 lb 13.1 oz)      Temperature range over 24hrs:   Temp  Av.7 °F (37.1 °C)  Min: 98.4 °F (36.9 °C)  Max: 99.4 °F (37.4 °C)  Current Respiratory Rate:  Respirations: 22  Current Pulse:  Pulse: 62  Current Blood Pressure:  BP: (!) 88/55  24hr Blood Pressure Range:  Systolic (24hrs), Av , Min:60 , Max:156   ; Diastolic (24hrs), Av, Min:48, Max:122    Current Pulse Oximetry:  SpO2: 94 %      Intake/Output Summary (Last 24 hours) at 2025 1021  Last data filed at 2025 0938  Gross per 24 hour   Intake 960.24 ml   Output 1325 ml   Net -364.76 ml       Telemetry monitor:     Physical Exam:  General:  Awakens,  NAD  Psych : sleeping  Skin:  Warm and dry  Chest:  Clear to auscultation, respiration easy  Cardiovascular:  RRR S1S2 no murmur to auscultation; no JVD noted  Abdomen:  Bowel sounds normal, abd soft, non-tender  Extremities:  No edema        Imaging      Lab Review     Renal Profile:   Lab Results   Component Value Date/Time    CREATININE 1.0 2025 05:28 AM 
     ICU Progress Note    Admit Date: 5/15/2025  Day: 10  Vent Day: N/A  IV Access:Peripheral  IV Fluids:None  Vasopressors: None        Antibiotics: N/A  Diet: Diet NPO Exceptions are: Sips of Clear Liquids, Sips of Water with Meds    CC: Left Hemiplegia    Interval history: Pt now on warfarin, last INR from this am 1.05, heparin gtt remains on. Dopamine gtt on to maintain HR > 55. No longer on levophed. UOP: 2.0L, urine looking more clear. Pt completed MBS today and diet changed to soft and bite sized.       HPI: Mr. Hdez is a 85 yo male with pmhx of afib on warfarin, rectal cancer, prostate cancer with seeding, GBS, recent diagnosis of urothelial cancer of the right ureter who presents to [facility] ED with chief complaint of left hemiplegia that was noticed at 2am on 5/15 which prompted his wife to take him to the ED. 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 CT head performed which showed Hyperdense right MCA suggesting right MCA thrombus. 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 Patient underwent thrombectomy on the 15th with complication of retained catheter in the R ICA.    Medications:     Scheduled Meds:   warfarin  5 mg Oral Once per day on Sunday Tuesday Thursday Saturday    And    [START ON 5/26/2025] warfarin  2.5 mg Oral Once per day on Monday Wednesday Friday    simethicone  80 mg Oral 4x Daily    melatonin  5 mg Oral Nightly    midodrine  15 mg Oral TID WC    balsum peru-castor oil   Topical BID    atorvastatin  40 mg Oral Nightly    [Held by provider] donepezil  5 mg Oral Nightly    [Held by provider] furosemide  20 mg Oral Daily    pantoprazole  40 mg Oral QAM AC     Continuous Infusions:   DOPamine 6.5 mcg/kg/min (05/25/25 0312)    heparin (PORCINE) Infusion 16 Units/kg/hr (05/24/25 2243)     PRN Meds:calcium carbonate, lidocaine, heparin (porcine), heparin (porcine), acetaminophen, loperamide, 
     ICU Progress Note    Admit Date: 5/15/2025  Day: 11  Vent Day: N/A  IV Access:Peripheral  IV Fluids:None  Vasopressors: None        Antibiotics: N/A  Diet: ADULT ORAL NUTRITION SUPPLEMENT; HS Snack; Standard High Calorie/High Protein Oral Supplement  ADULT DIET; Dysphagia - Soft and Bite Sized; Mildly Thick (Nectar)  Diet NPO Exceptions are: Sips of Water with Meds    CC: Left Hemiplegia    Interval history: Pt requiring 5mcg of Dopamine. Warfarin off in anticipation of possible pacemaker placement. Patient's diet got upgraded yesterday, tolerated well and not having abdominal pain. Patient's family is considering the pacemaker placement and they are looking forward to speaking with the EP physician possibly on Tuesday to get more information.    HPI: Mr. Hdez is a 85 yo male with pmhx of afib on warfarin, rectal cancer, prostate cancer with seeding, GBS, recent diagnosis of urothelial cancer of the right ureter who presents to [facility] ED with chief complaint of left hemiplegia that was noticed at 2am on 5/15 which prompted his wife to take him to the ED. 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 CT head performed which showed Hyperdense right MCA suggesting right MCA thrombus. 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 Patient underwent thrombectomy on the 15th with complication of retained catheter in the R ICA.    Medications:     Scheduled Meds:   simethicone  80 mg Oral Daily    midodrine  10 mg Oral TID WC    melatonin  5 mg Oral Nightly    balsum peru-castor oil   Topical BID    atorvastatin  40 mg Oral Nightly    [Held by provider] donepezil  5 mg Oral Nightly    [Held by provider] furosemide  20 mg Oral Daily    pantoprazole  40 mg Oral QAM AC     Continuous Infusions:   DOPamine 5 mcg/kg/min (05/26/25 0532)    heparin (PORCINE) Infusion 18 Units/kg/hr (05/26/25 0623)     PRN Meds:calcium carbonate, 
     ICU Progress Note    Admit Date: 5/15/2025  Day: 2  Vent Day: N/A  IV Access:Peripheral  IV Fluids:None  Vasopressors:None                Antibiotics: N/A  Diet: Diet NPO    CC: Left Hemiplegia    Interval history: CT/CTA performed which show focal occlusive or near occlusive clot of right supraclinoid internal carotid. Angiography will be performed today to retrieve clot/cathter. Remains on heparin drip. SBPs have been in goal range of 100-160 with 5 of levophed. Left hemianopsia present on exam.    HPI: Mr. Hdez is a 83 yo male with pmhx of afib on warfarin, rectal cancer, prostate cancer with seeding, GBS, recent diagnosis of urothelial cancer of the right ureter who presents to [facility] ED with chief complaint of left hemiplegia that was noticed at 2am on 5/15 which prompted his wife to take him to the ED. 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 CT head performed which showed Hyperdense right MCA suggesting right MCA thrombus. 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 Patient underwent thrombectomy on the 15th with complication of retained catheter in the R ICA.    Medications:     Scheduled Meds:   atorvastatin  80 mg Oral Nightly    [Held by provider] donepezil  5 mg Oral Nightly    [Held by provider] sacubitril-valsartan  1 tablet Oral BID    [Held by provider] furosemide  20 mg Oral Daily    [Held by provider] pantoprazole  40 mg Oral QAM AC     Continuous Infusions:   heparin (PORCINE) Infusion 16 Units/kg/hr (05/16/25 0331)    norepinephrine 7 mcg/min (05/16/25 0158)     PRN Meds:promethazine **OR** ondansetron, morphine **OR** morphine    Objective:   Vitals:   T-max:  Patient Vitals for the past 8 hrs:   BP Temp Temp src Pulse Resp SpO2   05/16/25 0345 126/67 -- -- 76 -- 95 %   05/16/25 0330 119/70 -- -- 58 18 96 %   05/16/25 0315 125/75 -- -- 69 (!) 9 95 %   05/16/25 0300 137/71 -- -- 64 10 96 % 
     ICU Progress Note    Admit Date: 5/15/2025  Day: 3  Vent Day: N/A  IV Access:Peripheral  IV Fluids:None  Vasopressors:None                Antibiotics: N/A  Diet: ADULT DIET; Full Liquid    CC: Left Hemiplegia    Interval history: Angio performed yesterday attempting to retrieve retained catheter but unfortunately unsuccessful. Urine now has mk blood. On aspirin and plavix, off heparin drip. BP stable on 6 of levophed. Pt becoming slightly delirious. Modified barium swallow today, gurgling noted while swallowing pills.     HPI: Mr. Hdez is a 85 yo male with pmhx of afib on warfarin, rectal cancer, prostate cancer with seeding, GBS, recent diagnosis of urothelial cancer of the right ureter who presents to [facility] ED with chief complaint of left hemiplegia that was noticed at 2am on 5/15 which prompted his wife to take him to the ED. 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 CT head performed which showed Hyperdense right MCA suggesting right MCA thrombus. 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 Patient underwent thrombectomy on the 15th with complication of retained catheter in the R ICA.    Medications:     Scheduled Meds:   pantoprazole (PROTONIX) 40 mg in sodium chloride (PF) 0.9 % 10 mL injection  40 mg IntraVENous Daily    atorvastatin  40 mg Oral Nightly    midodrine  10 mg Oral TID WC    [Held by provider] donepezil  5 mg Oral Nightly    [Held by provider] furosemide  20 mg Oral Daily    [Held by provider] pantoprazole  40 mg Oral QAM AC     Continuous Infusions:   norepinephrine 6 mcg/min (05/16/25 2208)    sodium chloride 125 mL/hr at 05/17/25 0021    heparin (PORCINE) Infusion 14 Units/kg/hr (05/17/25 0039)     PRN Meds:acetaminophen, HYDROcodone 5 mg - acetaminophen **OR** HYDROcodone 5 mg - acetaminophen, promethazine **OR** ondansetron, morphine **OR** morphine    Objective:   Vitals: 
     ICU Progress Note    Admit Date: 5/15/2025  Day: 4  Vent Day: N/A  IV Access:Peripheral  IV Fluids:None  Vasopressors: Levo gtt          Antibiotics: N/A  Diet: ADULT DIET; Full Liquid; Moderately Thick (Honey)    CC: Left Hemiplegia    Interval history:   On 6mcg/hr norepinephrine   Awaiting hgb  650mL UOP/24hrs net positive 3927mL  Bradycardic 40s  HPI: Mr. Hdez is a 83 yo male with pmhx of afib on warfarin, rectal cancer, prostate cancer with seeding, GBS, recent diagnosis of urothelial cancer of the right ureter who presents to [facility] ED with chief complaint of left hemiplegia that was noticed at 2am on 5/15 which prompted his wife to take him to the ED. 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 CT head performed which showed Hyperdense right MCA suggesting right MCA thrombus. 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 Patient underwent thrombectomy on the 15th with complication of retained catheter in the R ICA.    Medications:     Scheduled Meds:   aspirin  325 mg Oral Daily    Or    aspirin  300 mg Rectal Daily    clopidogrel  75 mg Oral Daily    balsum peru-castor oil   Topical BID    pantoprazole (PROTONIX) 40 mg in sodium chloride (PF) 0.9 % 10 mL injection  40 mg IntraVENous Daily    atorvastatin  40 mg Oral Nightly    midodrine  10 mg Oral TID WC    [Held by provider] donepezil  5 mg Oral Nightly    [Held by provider] furosemide  20 mg Oral Daily    [Held by provider] pantoprazole  40 mg Oral QAM AC     Continuous Infusions:   norepinephrine 6 mcg/min (05/17/25 2200)     PRN Meds:loperamide, promethazine **OR** ondansetron    Objective:   Vitals:   T-max:  Patient Vitals for the past 8 hrs:   BP Temp Temp src Pulse Resp SpO2   05/18/25 0500 (!) 125/59 -- -- 51 19 98 %   05/18/25 0445 118/70 -- -- 71 28 96 %   05/18/25 0430 (!) 131/55 -- -- 53 15 99 %   05/18/25 0415 (!) 123/56 -- -- (!) 46 (!) 8 95 % 
     ICU Progress Note    Admit Date: 5/15/2025  Day: 5  Vent Day: N/A  IV Access:Peripheral  IV Fluids:None  Vasopressors: Levo gtt          Antibiotics: N/A  Diet: ADULT DIET; Full Liquid; Moderately Thick (Honey)    CC: Left Hemiplegia    Interval history: - Bps stable and SBP within 100-160  - Remains intermittently bradycardic.   - q4hr neuro checks  - Start tube feeds today - Corpak placed  - Pressors: Levo 5, will wean and give 500cc bolus  - UOP: 700 ml last 24 hours, net + 3.8L.  - Will start DOAC on 5/20 and discontinue ASA/plavix.     HPI: Mr. Hdez is a 83 yo male with pmhx of afib on warfarin, rectal cancer, prostate cancer with seeding, GBS, recent diagnosis of urothelial cancer of the right ureter who presents to [facility] ED with chief complaint of left hemiplegia that was noticed at 2am on 5/15 which prompted his wife to take him to the ED. 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 CT head performed which showed Hyperdense right MCA suggesting right MCA thrombus. 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 Patient underwent thrombectomy on the 15th with complication of retained catheter in the R ICA.    Medications:     Scheduled Meds:   midodrine  15 mg Oral TID WC    heparin (porcine)  5,000 Units SubCUTAneous 3 times per day    aspirin  325 mg Oral Daily    Or    aspirin  300 mg Rectal Daily    clopidogrel  75 mg Oral Daily    balsum peru-castor oil   Topical BID    pantoprazole (PROTONIX) 40 mg in sodium chloride (PF) 0.9 % 10 mL injection  40 mg IntraVENous Daily    atorvastatin  40 mg Oral Nightly    [Held by provider] donepezil  5 mg Oral Nightly    [Held by provider] furosemide  20 mg Oral Daily    [Held by provider] pantoprazole  40 mg Oral QAM AC     Continuous Infusions:   norepinephrine 5 mcg/min (05/18/25 2200)     PRN Meds:loperamide, promethazine **OR** ondansetron    Objective:   Vitals: 
     ICU Progress Note    Admit Date: 5/15/2025  Day: 6  Vent Day: N/A  IV Access:Peripheral  IV Fluids:None  Vasopressors: Levo gtt          Antibiotics: N/A  Diet: ADULT DIET; Full Liquid; Moderately Thick (Honey)  ADULT TUBE FEEDING VOLUME BASED; Nasogastric; Standard with Fiber; 30; Continuous; 1,200; 24    CC: Left Hemiplegia    Interval history: - Pt spiked fever of 101.3 last night.  - SBPs remained at or around goal at night with no levophed  - MBS with same results - Thickened liquid diet  - Has Corpak, Tube feeds initiated yesterday  - ASA/Plavix, Heparin gtt will be initiated today. When DOAC initiated, can d/c ASA/plavix    HPI: Mr. Hdez is a 85 yo male with pmhx of afib on warfarin, rectal cancer, prostate cancer with seeding, GBS, recent diagnosis of urothelial cancer of the right ureter who presents to [facility] ED with chief complaint of left hemiplegia that was noticed at 2am on 5/15 which prompted his wife to take him to the ED. 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 CT head performed which showed Hyperdense right MCA suggesting right MCA thrombus. 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 Patient underwent thrombectomy on the 15th with complication of retained catheter in the R ICA.    Medications:     Scheduled Meds:   midodrine  15 mg Oral TID WC    heparin (porcine)  5,000 Units SubCUTAneous 3 times per day    aspirin  325 mg Oral Daily    Or    aspirin  300 mg Rectal Daily    balsum peru-castor oil   Topical BID    atorvastatin  40 mg Oral Nightly    [Held by provider] donepezil  5 mg Oral Nightly    [Held by provider] furosemide  20 mg Oral Daily    pantoprazole  40 mg Oral QAM AC     Continuous Infusions:   norepinephrine 4 mcg/min (05/19/25 0924)     PRN Meds:loperamide, promethazine **OR** ondansetron    Objective:   Vitals:   T-max:  Patient Vitals for the past 8 hrs:   BP Temp Temp src Pulse 
     ICU Progress Note    Admit Date: 5/15/2025  Day: 7  Vent Day: N/A  IV Access:Peripheral  IV Fluids:None  Vasopressors: Levo gtt          Antibiotics: N/A  Diet: ADULT DIET; Full Liquid; Moderately Thick (Honey)  ADULT TUBE FEEDING VOLUME BASED; Nasogastric; Standard with Fiber; 30; Continuous; 1,200; 24    CC: Left Hemiplegia    Interval history: - Levo restarted overnight, pt having right sided pain in setting of known right sided urothelial carcinoma of ureter. Will add free water flushes to tube feeds and obtain ST abd/pelvis for pain. Heparin gtt restarted, off DAPT    HPI: Mr. Hdez is a 83 yo male with pmhx of afib on warfarin, rectal cancer, prostate cancer with seeding, GBS, recent diagnosis of urothelial cancer of the right ureter who presents to [facility] ED with chief complaint of left hemiplegia that was noticed at 2am on 5/15 which prompted his wife to take him to the ED. 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 CT head performed which showed Hyperdense right MCA suggesting right MCA thrombus. 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 Patient underwent thrombectomy on the 15th with complication of retained catheter in the R ICA.    Medications:     Scheduled Meds:   melatonin  5 mg Oral Nightly    midodrine  15 mg Oral TID WC    balsum peru-castor oil   Topical BID    atorvastatin  40 mg Oral Nightly    [Held by provider] donepezil  5 mg Oral Nightly    [Held by provider] furosemide  20 mg Oral Daily    pantoprazole  40 mg Oral QAM AC     Continuous Infusions:   heparin (PORCINE) Infusion 16 Units/kg/hr (05/21/25 0654)    norepinephrine 4 mcg/min (05/21/25 0654)     PRN Meds:acetaminophen, loperamide, promethazine **OR** ondansetron    Objective:   Vitals:   T-max:  Patient Vitals for the past 8 hrs:   BP Pulse Resp SpO2   05/21/25 0645 (!) 102/48 57 23 --   05/21/25 0630 (!) 104/50 (!) 47 23 --   05/21/25 
     ICU Progress Note    Admit Date: 5/15/2025  Day: 8  Vent Day: N/A  IV Access:Peripheral  IV Fluids:None  Vasopressors: Levo gtt          Antibiotics: N/A  Diet: ADULT DIET; Full Liquid; Moderately Thick (Honey)  ADULT TUBE FEEDING VOLUME BASED; Nasogastric; Standard with Fiber; 100; Continuous; 1,200; 24    CC: Left Hemiplegia    Interval history: - No overnight events, on 2mcg of levo. CT while Theurepeutic on heparin is stable. CT abd/pelvis did not show any acute issues with stents, however, did show possible subcapsular splenic infarct. Patient continued to have pain on his right side all throughout yesterday which was partially relieved by passing gas and simethicone. Will bridge to warfarin with lovenox rather than heparin.    HPI: Mr. Hdez is a 85 yo male with pmhx of afib on warfarin, rectal cancer, prostate cancer with seeding, GBS, recent diagnosis of urothelial cancer of the right ureter who presents to [facility] ED with chief complaint of left hemiplegia that was noticed at 2am on 5/15 which prompted his wife to take him to the ED. 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 CT head performed which showed Hyperdense right MCA suggesting right MCA thrombus. 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 Patient underwent thrombectomy on the 15th with complication of retained catheter in the R ICA.    Medications:     Scheduled Meds:   simethicone  80 mg Oral 4x Daily    melatonin  5 mg Oral Nightly    midodrine  15 mg Oral TID WC    balsum peru-castor oil   Topical BID    atorvastatin  40 mg Oral Nightly    [Held by provider] donepezil  5 mg Oral Nightly    [Held by provider] furosemide  20 mg Oral Daily    pantoprazole  40 mg Oral QAM AC     Continuous Infusions:   heparin (PORCINE) Infusion 18 Units/kg/hr (05/22/25 0421)    norepinephrine 2 mcg/min (05/22/25 0539)     PRN Meds:lidocaine, acetaminophen, 
     ICU Progress Note    Admit Date: 5/15/2025  Day: 9  Vent Day: N/A  IV Access:Peripheral  IV Fluids:None  Vasopressors: None         Antibiotics: N/A  Diet: Diet NPO Exceptions are: Sips of Clear Liquids, Sips of Water with Meds    CC: Left Hemiplegia    Interval history: - No events overnight. Will transition from lovenox to heparin drip. No peg tube as of now. Lactic acid normal, less concerned for bowel ischemia. RUQ US shows distended gallbladder with wall thickening. Will speak with IR regarding drain placement. Off levophed.     HPI: Mr. Hdez is a 83 yo male with pmhx of afib on warfarin, rectal cancer, prostate cancer with seeding, GBS, recent diagnosis of urothelial cancer of the right ureter who presents to [facility] ED with chief complaint of left hemiplegia that was noticed at 2am on 5/15 which prompted his wife to take him to the ED. 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 CT head performed which showed Hyperdense right MCA suggesting right MCA thrombus. 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 Patient underwent thrombectomy on the 15th with complication of retained catheter in the R ICA.    Medications:     Scheduled Meds:   simethicone  80 mg Oral 4x Daily    melatonin  5 mg Oral Nightly    midodrine  15 mg Oral TID WC    balsum peru-castor oil   Topical BID    atorvastatin  40 mg Oral Nightly    [Held by provider] donepezil  5 mg Oral Nightly    [Held by provider] furosemide  20 mg Oral Daily    pantoprazole  40 mg Oral QAM AC     Continuous Infusions:   heparin (PORCINE) Infusion 12 Units/kg/hr (05/23/25 0818)     PRN Meds:lidocaine, HYDROmorphone **OR** [DISCONTINUED] HYDROmorphone, heparin (porcine), heparin (porcine), acetaminophen, loperamide, promethazine **OR** ondansetron    Objective:   Vitals:   T-max:  Patient Vitals for the past 8 hrs:   BP Temp Temp src Pulse Resp SpO2 Weight   05/23/25 
     ICU Progress Note    Admit Date: 5/15/2025  Day: 9  Vent Day: N/A  IV Access:Peripheral  IV Fluids:None  Vasopressors: None         Antibiotics: N/A  Diet: Diet NPO Exceptions are: Sips of Clear Liquids, Sips of Water with Meds    CC: Left Hemiplegia    Interval history: Levo gtt was discontinued yesterday, blood pressures soft. Patient continues to be intermittently bradycardic, however also had a 3 second pause on telemetry. Patient endorses having a good night, did not have any abdominal pain or require pain medications. Can possibly consider downgrade today.      HPI: Mr. Hdez is a 85 yo male with pmhx of afib on warfarin, rectal cancer, prostate cancer with seeding, GBS, recent diagnosis of urothelial cancer of the right ureter who presents to [facility] ED with chief complaint of left hemiplegia that was noticed at 2am on 5/15 which prompted his wife to take him to the ED. 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 CT head performed which showed Hyperdense right MCA suggesting right MCA thrombus. 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 Patient underwent thrombectomy on the 15th with complication of retained catheter in the R ICA.    Medications:     Scheduled Meds:   simethicone  80 mg Oral 4x Daily    melatonin  5 mg Oral Nightly    midodrine  15 mg Oral TID WC    balsum peru-castor oil   Topical BID    atorvastatin  40 mg Oral Nightly    [Held by provider] donepezil  5 mg Oral Nightly    [Held by provider] furosemide  20 mg Oral Daily    pantoprazole  40 mg Oral QAM AC     Continuous Infusions:   heparin (PORCINE) Infusion 14 Units/kg/hr (05/24/25 0637)     PRN Meds:lidocaine, HYDROmorphone **OR** [DISCONTINUED] HYDROmorphone, heparin (porcine), heparin (porcine), acetaminophen, loperamide, promethazine **OR** ondansetron    Objective:   Vitals:   T-max:  Patient Vitals for the past 8 hrs:   BP Temp Temp src 
     Urology Attending Progress Note      Reason for Consultation: Patient of Dr Mendoza, admitted for stroke    History: 85yo M with history of prostate cancer sp seed implantation, rectal cancer, Afib on Warfarin who presented with GH a few weeks ago at [facility]. CT scan revealed severe right-sided hydroureteronephrosis and a distal right ureteral mass. Also noted was a L mid ureteral stone and a 2 cm stone in the renal pelvis without hydronephrosis. Underwent cystoscopy, bilateral stent insertion with Dr. Mendoza 3/27/25. He then underwent cystoscopy, R diagnostic ureteroscopy with ureteral mass biopsy, L URS with stone manipulation and CVAC with stent exchange 25. Pathology showed high grade urothelial carcinoma. Plan originally was to have stents removed this upcoming Monday and cancer consult with Dr. Mendoza on Tuesday. He has been off his Warfarin as he goes through workup of urothelial carcinoma. He presented to [facility] ED with L hemiplegia. CT suggested R MCA thrombus. Taken for thrombectomy 5/15/25 at [facility]. We were consulted given his history.     Family History, Social History, Review of Systems:  Reviewed and agreed to as per chart    Vitals:  /63   Pulse 52   Temp 99 °F (37.2 °C) (Temporal)   Resp 22   Ht 1.676 m (5' 6\")   Wt 86.2 kg (190 lb 0.6 oz)   SpO2 98%   BMI 30.67 kg/m²   Temp  Av.9 °F (37.2 °C)  Min: 98 °F (36.7 °C)  Max: 99.4 °F (37.4 °C)    Intake/Output Summary (Last 24 hours) at 2025 1339  Last data filed at 2025 0823  Gross per 24 hour   Intake 5533.72 ml   Output 905 ml   Net 4628.72 ml         Physical:  Appears comfortable, sleeping  Neck is supple, trachea is midline  Respiratory effort is normal  Cardiovascular show no extremity swelling  Urine coke colored in purewick  No abdominal tenderness or suprapubic tenderness    Labs:  WBC:    Lab Results   Component Value Date/Time    WBC 10.1 2025 05:59 AM     Hemoglobin/Hematocrit:    Lab Results 
     Urology Attending Progress Note      Subjective: He developed R sided abdominal pain yesterday, mildly persistent today. Voiding into purewick without issue. Vomited this AM    Vitals:  BP (!) 95/58   Pulse 57   Temp 98.3 °F (36.8 °C) (Temporal)   Resp 27   Ht 1.676 m (5' 6\")   Wt 79.2 kg (174 lb 9.7 oz)   SpO2 99%   BMI 28.18 kg/m²   Temp  Av.3 °F (36.8 °C)  Min: 98.1 °F (36.7 °C)  Max: 98.6 °F (37 °C)    Intake/Output Summary (Last 24 hours) at 2025 0937  Last data filed at 2025 0724  Gross per 24 hour   Intake 2331.02 ml   Output 775 ml   Net 1556.02 ml       Exam: Urine amador in purewick cannister. No notable clots    Labs:  WBC:    Lab Results   Component Value Date/Time    WBC 7.6 2025 03:45 AM     Hemoglobin/Hematocrit:    Lab Results   Component Value Date/Time    HGB 9.9 2025 03:45 AM    HCT 29.4 2025 03:45 AM     BMP:    Lab Results   Component Value Date/Time     2025 03:45 AM    K 3.3 2025 03:45 AM    K 3.8 2025 04:11 AM     2025 03:45 AM    CO2 25 2025 03:45 AM    BUN 16 2025 03:45 AM    CREATININE 1.0 2025 03:45 AM    CALCIUM 8.6 2025 03:45 AM    GFRAA >60 10/26/2017 08:30 AM    LABGLOM 73 2025 03:45 AM    LABGLOM 39 2024 07:55 AM     PT/INR:    Lab Results   Component Value Date/Time    PROTIME 15.0 2025 10:17 AM    PROTIME 0.0 2025 08:31 AM    INR 1.16 2025 10:17 AM     PTT:    Lab Results   Component Value Date/Time    APTT 30.7 2025 10:17 AM   [APTT      Impression/Plan: 85yo M with Hx of prostate cancer, rectal cancer and recently biopsied R ureteral tumor consistent with urothelial carcinoma admitted with stroke.      -No issues voiding   -Urine amador in purewick. No signs of active GH  -CT scan was ordered yesterday to check for source of abdominal pain. Shows improvement of hydronephrosis on the right with stents in proper position  -Cr remains normal at 
     Urology Attending Progress Note      Subjective: Laying in bed, appears comfortable. No complaints    Vitals:  BP (!) 118/100   Pulse 84   Temp 98.2 °F (36.8 °C) (Axillary)   Resp 16   Ht 1.676 m (5' 6\")   Wt 79.3 kg (174 lb 13.2 oz)   SpO2 97%   BMI 28.22 kg/m²   Temp  Av.1 °F (37.3 °C)  Min: 98 °F (36.7 °C)  Max: 101.3 °F (38.5 °C)    Intake/Output Summary (Last 24 hours) at 2025 0834  Last data filed at 2025 0820  Gross per 24 hour   Intake 2150 ml   Output 350 ml   Net 1800 ml       Exam: Urine amador in purewick cannister    Labs:  WBC:    Lab Results   Component Value Date/Time    WBC 7.8 2025 04:39 AM     Hemoglobin/Hematocrit:    Lab Results   Component Value Date/Time    HGB 10.4 2025 04:39 AM    HCT 31.6 2025 04:39 AM     BMP:    Lab Results   Component Value Date/Time     2025 04:39 AM    K 3.7 2025 04:39 AM    K 3.8 2025 04:11 AM     2025 04:39 AM    CO2 21 2025 04:39 AM    BUN 14 2025 04:39 AM    CREATININE 1.1 2025 04:39 AM    CALCIUM 8.7 2025 04:39 AM    GFRAA >60 10/26/2017 08:30 AM    LABGLOM 66 2025 04:39 AM    LABGLOM 39 2024 07:55 AM     PT/INR:    Lab Results   Component Value Date/Time    PROTIME 14.8 05/15/2025 07:42 AM    PROTIME 0.0 2025 08:31 AM    INR 1.14 05/15/2025 07:42 AM     PTT:    Lab Results   Component Value Date/Time    APTT 87.9 05/15/2025 07:42 AM   [APTT      Impression/Plan: 85yo M with Hx of prostate cancer, rectal cancer and recently biopsied R ureteral tumor consistent with urothelial carcinoma admitted with stroke.      -His stent removal/cancer consult were cancelled  -Continue to monitor UOP. Appears amador today  -Please call with any questions    GLEN Watkins  
     Urology Attending Progress Note      Subjective: Patient sleeping    Vitals:  /65   Pulse 64   Temp 98.2 °F (36.8 °C) (Oral)   Resp 14   Ht 1.676 m (5' 6\")   Wt 81.1 kg (178 lb 12.7 oz)   SpO2 97%   BMI 28.86 kg/m²   Temp  Av.6 °F (37 °C)  Min: 98.2 °F (36.8 °C)  Max: 99.3 °F (37.4 °C)    Intake/Output Summary (Last 24 hours) at 2025 0907  Last data filed at 2025 0600  Gross per 24 hour   Intake 528.81 ml   Output 800 ml   Net -271.19 ml       Exam: Urine amador in purewick cannister    Labs:  WBC:    Lab Results   Component Value Date/Time    WBC 10.1 2025 05:59 AM     Hemoglobin/Hematocrit:    Lab Results   Component Value Date/Time    HGB 10.1 2025 04:30 AM    HCT 30.3 2025 04:30 AM     BMP:    Lab Results   Component Value Date/Time     2025 04:30 AM    K 3.9 2025 04:30 AM    K 3.8 2025 04:11 AM     2025 04:30 AM    CO2 23 2025 04:30 AM    BUN 12 2025 04:30 AM    CREATININE 1.1 2025 04:30 AM    CALCIUM 8.8 2025 04:30 AM    GFRAA >60 10/26/2017 08:30 AM    LABGLOM 66 2025 04:30 AM    LABGLOM 39 2024 07:55 AM     PT/INR:    Lab Results   Component Value Date/Time    PROTIME 14.8 05/15/2025 07:42 AM    PROTIME 0.0 2025 08:31 AM    INR 1.14 05/15/2025 07:42 AM     PTT:    Lab Results   Component Value Date/Time    APTT 87.9 05/15/2025 07:42 AM   [APTT      Impression/Plan: 85yo M with Hx of prostate cancer, rectal cancer and recently biopsied R ureteral tumor consistent with urothelial carcinoma admitted with stroke.      -His stent removal/cancer consult were cancelled (originally scheduled for today/tomorrow)  -Continue to monitor UOP. Appears amador today  -We will work on rescheduling his urological care once he is medically improved  -Please call with any questions    GLEN Watkins  
     Urology Attending Progress Note      Subjective: Sleeping on rounds    Vitals:  /76   Pulse 64   Temp 99.3 °F (37.4 °C) (Temporal)   Resp 25   Ht 1.676 m (5' 6\")   Wt 80 kg (176 lb 5.9 oz)   SpO2 96%   BMI 28.47 kg/m²   Temp  Av.1 °F (37.3 °C)  Min: 98.1 °F (36.7 °C)  Max: 100.8 °F (38.2 °C)    Intake/Output Summary (Last 24 hours) at 2025 0903  Last data filed at 2025 0857  Gross per 24 hour   Intake 1668.88 ml   Output 885 ml   Net 783.88 ml       Exam: Urine amador in purewick cannister. Slightly darker than yesterday    Labs:  WBC:    Lab Results   Component Value Date/Time    WBC 10.2 2025 05:47 AM     Hemoglobin/Hematocrit:    Lab Results   Component Value Date/Time    HGB 10.8 2025 05:47 AM    HCT 31.7 2025 05:47 AM     BMP:    Lab Results   Component Value Date/Time     2025 05:47 AM    K 3.5 2025 05:47 AM    K 3.8 2025 04:11 AM     2025 05:47 AM    CO2 23 2025 05:47 AM    BUN 16 2025 05:47 AM    CREATININE 1.1 2025 05:47 AM    CALCIUM 8.9 2025 05:47 AM    GFRAA >60 10/26/2017 08:30 AM    LABGLOM 66 2025 05:47 AM    LABGLOM 39 2024 07:55 AM     PT/INR:    Lab Results   Component Value Date/Time    PROTIME 15.0 2025 10:17 AM    PROTIME 0.0 2025 08:31 AM    INR 1.16 2025 10:17 AM     PTT:    Lab Results   Component Value Date/Time    APTT 30.7 2025 10:17 AM   [APTT      Impression/Plan: 83yo M with Hx of prostate cancer, rectal cancer and recently biopsied R ureteral tumor consistent with urothelial carcinoma admitted with stroke.      -His stent removal/cancer consult were cancelled  -Cr stable at 1.1  -Urine dark amador in purewick cannister. He is on heparin drip currently. Continue to monitor total urine output. If there is any concern of retention issues, can check PVR  -Will continue to follow    GLEN Watkins  
     Urology Progress Note      BP (!) 111/54   Pulse 55   Temp 98.4 °F (36.9 °C) (Temporal)   Resp 20   Ht 1.676 m (5' 6\")   Wt 79.2 kg (174 lb 9.7 oz)   SpO2 98%   BMI 28.18 kg/m²   Temp  Av.3 °F (36.8 °C)  Min: 98.2 °F (36.8 °C)  Max: 98.5 °F (36.9 °C)    Patient seen and examined.  Recent hospital course reviewed.  Family met with at the bedside.  I informed the family that the plan will be to delay any urologic intervention likely for several months.  I have closely reviewed the CT scan and he still has a stone in his distal left ureter.  Once improved clinically we will remove the right and left stent and perform a left ureteroscopy.  I did inform them that this could still be done on blood thinners.  However the stents are safe to be in place for another 3 to 5 months.  With regards to his urothelial carcinoma of the right ureter he is not in shape clinically for a right nephro ureterectomy at this time.  This will need to be delayed for the time being pending his overall recovery.      BHARGAV MARSHALL MD  
    I have spoken with the patient's family immediately after the procedure. I informed them about the catheter fracture, the position of the retained segment, our inability to remove it despite multiple attempts, but also that the occluded middle cerebral artery was successfully opened and is not currently compromised by the catheter. They were very angry, suggested that if Mr. Hdez were to go on to have a stroke that it was because of the catheter and that this event had to indicate improper performance of the procedure by me and the team. I stayed with them in the consultation room until every family member had left, allowing them to ask any and all questions. I tried to explain the concept of collateral flow, but with no success.      The immediate plan of care will involve standard post thrombectomy care, but with the addition of heparin gtt. I will plan to get a 12 hour CT and CTA at 1700 to ensure there is no hemorrhagic transformation or re-occlusion of the MCA.    
    Neurosurgery Progress Note      LOS: 2 days     S:  No acute events overnight. Heparin stopped, DAPT started     O:   Vitals:    05/17/25 1530 05/17/25 1545 05/17/25 1600 05/17/25 1615   BP: (!) 142/85 (!) 94/57 124/67 (!) 143/64   Pulse: 75 67 68 53   Resp: 15 23 19 22   Temp:   99 °F (37.2 °C)    TempSrc:   Temporal    SpO2: 100% 100% 99% 100%   Weight:       Height:                Intake/Output Summary (Last 24 hours) at 5/17/2025 1634  Last data filed at 5/17/2025 1406  Gross per 24 hour   Intake 5714.4 ml   Output 1000 ml   Net 4714.4 ml          Recent Results (from the past 24 hours)   Anti-Xa, Unfractionated Heparin    Collection Time: 05/16/25  5:43 PM   Result Value Ref Range    Anti-XA Unfrac Heparin 0.58 0.30 - 0.70 IU/mL   Anti-Xa, Unfractionated Heparin    Collection Time: 05/17/25 12:15 AM   Result Value Ref Range    Anti-XA Unfrac Heparin 0.61 0.30 - 0.70 IU/mL   CBC    Collection Time: 05/17/25  5:59 AM   Result Value Ref Range    WBC 10.1 4.0 - 11.0 K/uL    RBC 3.73 (L) 4.20 - 5.90 M/uL    Hemoglobin 11.0 (L) 13.5 - 17.5 g/dL    Hematocrit 32.2 (L) 40.5 - 52.5 %    MCV 86.4 80.0 - 100.0 fL    MCH 29.5 26.0 - 34.0 pg    MCHC 34.1 31.0 - 36.0 g/dL    RDW 15.1 12.4 - 15.4 %    Platelets 289 135 - 450 K/uL    MPV 8.5 5.0 - 10.5 fL   Anti-Xa, Unfractionated Heparin    Collection Time: 05/17/25  5:59 AM   Result Value Ref Range    Anti-XA Unfrac Heparin 0.24 (L) 0.30 - 0.70 IU/mL   Renal Function Panel    Collection Time: 05/17/25  5:59 AM   Result Value Ref Range    Sodium 141 136 - 145 mmol/L    Potassium 3.7 3.5 - 5.1 mmol/L    Chloride 108 99 - 110 mmol/L    CO2 21 21 - 32 mmol/L    Anion Gap 12 3 - 16    Glucose 114 (H) 70 - 99 mg/dL    BUN 11 7 - 20 mg/dL    Creatinine 1.1 0.8 - 1.3 mg/dL    Est, Glom Filt Rate 66 >60    Calcium 8.4 8.3 - 10.6 mg/dL    Phosphorus 2.9 2.5 - 4.9 mg/dL    Albumin 3.1 (L) 3.4 - 5.0 g/dL       Gen: NAD   Pulm: Easy WOB, symmetric chest rise   CV: RRR   Abd: S/NT/ND 
    Neurosurgery Progress Note      LOS: 8 days     S:  No acute events overnight. Wife is concerned about his stopped tube feeds.     O:   Vitals:    05/23/25 1015 05/23/25 1030 05/23/25 1045 05/23/25 1100   BP: 99/62 111/69 (!) 104/58 (!) 96/57   Pulse: (!) 48 (!) 45 (!) 47 (!) 43   Resp: 15 11 15 18   Temp:       TempSrc:       SpO2: 97% 100% 99% 99%   Weight:       Height:                Intake/Output Summary (Last 24 hours) at 5/23/2025 1731  Last data filed at 5/23/2025 0818  Gross per 24 hour   Intake 2984.45 ml   Output 850 ml   Net 2134.45 ml          Recent Results (from the past 24 hours)   Lipase    Collection Time: 05/22/25  6:03 PM   Result Value Ref Range    Lipase 31.0 13.0 - 60.0 U/L   Hepatic Function Panel    Collection Time: 05/22/25  6:04 PM   Result Value Ref Range    Total Protein 6.2 (L) 6.4 - 8.2 g/dL    Albumin 2.9 (L) 3.4 - 5.0 g/dL    Alkaline Phosphatase 171 (H) 40 - 129 U/L    ALT 9 (L) 10 - 40 U/L    AST 22 15 - 37 U/L    Total Bilirubin 0.7 0.0 - 1.0 mg/dL    Bilirubin, Direct 0.3 0.0 - 0.3 mg/dL    Bilirubin, Indirect 0.4 0.0 - 1.0 mg/dL   CBC    Collection Time: 05/22/25  8:26 PM   Result Value Ref Range    WBC 7.8 4.0 - 11.0 K/uL    RBC 3.33 (L) 4.20 - 5.90 M/uL    Hemoglobin 9.6 (L) 13.5 - 17.5 g/dL    Hematocrit 28.6 (L) 40.5 - 52.5 %    MCV 85.9 80.0 - 100.0 fL    MCH 29.0 26.0 - 34.0 pg    MCHC 33.7 31.0 - 36.0 g/dL    RDW 15.6 (H) 12.4 - 15.4 %    Platelets 372 135 - 450 K/uL    MPV 8.5 5.0 - 10.5 fL   APTT    Collection Time: 05/22/25  8:26 PM   Result Value Ref Range    aPTT 39.1 (H) 22.1 - 36.4 sec   Protime-INR    Collection Time: 05/22/25  8:26 PM   Result Value Ref Range    Protime 14.6 11.9 - 14.9 sec    INR 1.12 0.85 - 1.15   Anti-Xa, Unfractionated Heparin    Collection Time: 05/22/25  8:26 PM   Result Value Ref Range    Anti-XA Unfrac Heparin 0.24 (L) 0.30 - 0.70 IU/mL   POCT Glucose    Collection Time: 05/23/25 12:18 AM   Result Value Ref Range    POC Glucose 107 
    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    ICD (implantable cardioverter-defibrillator), dual, in situ 2025    Sick sinus syndrome (HCC) 2025    Junctional rhythm 2025    Acute right MCA stroke ([facility]) 2025    Acute CVA (cerebrovascular accident) ([facility]) 05/15/2025    Paroxysmal atrial fibrillation ([facility]) 05/15/2025    Anticoagulated 2025    History of prostate cancer 2025    Hematuria 2025    Mass of ureter 2025    Hydronephrosis 2025    Unequal blood pressure in upper extremities 2024    Medication management 10/18/2024    Abnormal ECG 2024    HFrEF (heart failure with reduced ejection fraction) ([facility]) 2024    Hypotension 2024    Abnormal echocardiogram 2024    PAF (paroxysmal atrial fibrillation) ([facility]) 2024    Essential hypertension 2024    Shortness of breath 2024    Bilateral lower extremity edema 2024    Combined systolic and diastolic congestive heart failure (HCC) 2024    Establishing care with new doctor, encounter for 2024    Irregular heart rate 2024    Palpitations 2024    Mixed hyperlipidemia 2024    Guillain Barré syndrome     Malignant neoplasm of splenic flexure (HCC) 10/27/2015       Past Medical History:   Diagnosis Date    CHF (congestive heart failure) (HCC)     Chronic kidney disease     kidney stones    Colon cancer (HCC)     
    Speech Language Pathology  Facility/Department:[facility] ICU  Dysphagia treatment   Speech/Language/Cognitive Treatment   Name: Jhony Hdez  : 1940  MRN: [id number]                                                       Patient Diagnosis(es):   Patient Active Problem List    Diagnosis Date Noted    Acute right MCA stroke (HCC) 2025    Acute CVA (cerebrovascular accident) ([facility]) 05/15/2025    Paroxysmal atrial fibrillation (HCC) 05/15/2025    Anticoagulated 2025    History of prostate cancer 2025    Hematuria 2025    Mass of ureter 2025    Hydronephrosis 2025    Unequal blood pressure in upper extremities 2024    Medication management 10/18/2024    Abnormal ECG 2024    HFrEF (heart failure with reduced ejection fraction) ([facility]) 2024    Hypotension 2024    Abnormal echocardiogram 2024    PAF (paroxysmal atrial fibrillation) ([facility]) 2024    Essential hypertension 2024    Shortness of breath 2024    Bilateral lower extremity edema 2024    Combined systolic and diastolic congestive heart failure (HCC) 2024    Establishing care with new doctor, encounter for 2024    Irregular heart rate 2024    Palpitations 2024    Mixed hyperlipidemia 2024    Guillain Barré syndrome     Malignant neoplasm of splenic flexure (HCC) 10/27/2015       Past Medical History:   Diagnosis Date    CHF (congestive heart failure) ([facility])     Chronic kidney disease     kidney stones    Colon cancer (HCC)     Guillain Barré syndrome     Hyperlipidemia     Hypertension     Left ureteral stone     Right Ureteral mass     Stroke (HCC) 05/15/2025     Past Surgical History:   Procedure Laterality Date    ADRENALECTOMY      groin    BACK SURGERY      CARPAL TUNNEL RELEASE      bilateral    COLONOSCOPY      CYSTOSCOPY Bilateral 2025    CYSTOSCOPY, BILATERAL STENT PLACEMENT,BILATERAL RETROGRADE PYELOGRAM performed by Eduardo Mendoza 
    Speech Language Pathology  Facility/Department:[facility] ICU  Dysphagia treatment     Name: Jhony Hdez  : 1940  MRN: [id number]                                                       Patient Diagnosis(es):   Patient Active Problem List    Diagnosis Date Noted    Acute right MCA stroke (HCC) 2025    Acute CVA (cerebrovascular accident) (HCC) 05/15/2025    Paroxysmal atrial fibrillation (HCC) 05/15/2025    Anticoagulated 2025    History of prostate cancer 2025    Hematuria 2025    Mass of ureter 2025    Hydronephrosis 2025    Unequal blood pressure in upper extremities 2024    Medication management 10/18/2024    Abnormal ECG 2024    HFrEF (heart failure with reduced ejection fraction) ([facility]) 2024    Hypotension 2024    Abnormal echocardiogram 2024    PAF (paroxysmal atrial fibrillation) ([facility]) 2024    Essential hypertension 2024    Shortness of breath 2024    Bilateral lower extremity edema 2024    Combined systolic and diastolic congestive heart failure (HCC) 2024    Establishing care with new doctor, encounter for 2024    Irregular heart rate 2024    Palpitations 2024    Mixed hyperlipidemia 2024    Guillain Barré syndrome     Malignant neoplasm of splenic flexure (HCC) 10/27/2015       Past Medical History:   Diagnosis Date    CHF (congestive heart failure) (HCC)     Chronic kidney disease     kidney stones    Colon cancer (HCC)     Guillain Barré syndrome     Hyperlipidemia     Hypertension     Left ureteral stone     Right Ureteral mass     Stroke (HCC) 05/15/2025     Past Surgical History:   Procedure Laterality Date    ADRENALECTOMY      groin    BACK SURGERY      CARPAL TUNNEL RELEASE      bilateral    COLONOSCOPY      CYSTOSCOPY Bilateral 2025    CYSTOSCOPY, BILATERAL STENT PLACEMENT,BILATERAL RETROGRADE PYELOGRAM [place] [facility] [place]    [place] 
    Speech Language Pathology  Facility/Department:[facility] ICU  Dysphagia treatment   Speech/Language/Cognitive Treatment   Name: Jhony Hdez  : 1940  MRN: [id number]                                                       Patient Diagnosis(es):   Patient Active Problem List    Diagnosis Date Noted    Acute CVA (cerebrovascular accident) ([facility]) 05/15/2025    Paroxysmal atrial fibrillation (HCC) 05/15/2025    Anticoagulated 2025    History of prostate cancer 2025    Hematuria 2025    Mass of ureter 2025    Hydronephrosis 2025    Unequal blood pressure in upper extremities 2024    Medication management 10/18/2024    Abnormal ECG 2024    HFrEF (heart failure with reduced ejection fraction) ([facility]) 2024    Hypotension 2024    Abnormal echocardiogram 2024    PAF (paroxysmal atrial fibrillation) ([facility]) 2024    Essential hypertension 2024    Shortness of breath 2024    Bilateral lower extremity edema 2024    Combined systolic and diastolic congestive heart failure ([facility]) 2024    Establishing care with new doctor, encounter for 2024    Irregular heart rate 2024    Palpitations 2024    Mixed hyperlipidemia 2024    Guillain Barré syndrome     Malignant neoplasm of splenic flexure ([facility]) 10/27/2015       Past Medical History:   Diagnosis Date    CHF (congestive heart failure) ([facility])     Chronic kidney disease     kidney stones    Colon cancer ([facility])     Guillain Barré syndrome     Hyperlipidemia     Hypertension     Left ureteral stone     Right Ureteral mass     Stroke ([facility]) 05/15/2025     Past Surgical History:   Procedure Laterality Date    ADRENALECTOMY      groin    BACK SURGERY      CARPAL TUNNEL RELEASE      bilateral    COLONOSCOPY      CYSTOSCOPY Bilateral 2025    CYSTOSCOPY, BILATERAL STENT PLACEMENT,BILATERAL RETROGRADE PYELOGRAM [place] [facility] [place]    [place] 
    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 2025    ICD (implantable cardioverter-defibrillator), dual, in situ 2025    Sick sinus syndrome (HCC) 2025    Junctional rhythm 2025    Acute right MCA stroke ([facility]) 2025    Acute CVA (cerebrovascular accident) ([facility]) 05/15/2025    Paroxysmal atrial fibrillation ([facility]) 05/15/2025    Anticoagulated 2025    History of prostate cancer 2025    Hematuria 2025    Mass of ureter 2025    Hydronephrosis 2025    Unequal blood pressure in upper extremities 2024    Medication management 10/18/2024    Abnormal ECG 2024    HFrEF (heart failure with reduced ejection fraction) ([facility]) 2024    Hypotension 2024    Abnormal echocardiogram 2024    PAF (paroxysmal atrial fibrillation) ([facility]) 2024    Essential hypertension 2024    Shortness of breath 2024    Bilateral lower extremity edema 2024    Combined systolic and diastolic congestive heart failure (HCC) 2024    Establishing care with new doctor, encounter for 2024    Irregular heart rate 2024    Palpitations 2024    Mixed hyperlipidemia 2024    Guillain Barré syndrome     Malignant neoplasm of splenic flexure (HCC) 10/27/2015       Past Medical History:   Diagnosis Date    CHF (congestive heart failure) (HCC)     Chronic kidney disease     kidney stones    Colon cancer (HCC)     Guillain Barré syndrome     Hyperlipidemia     Hypertension     Left ureteral 
    Speech Language Pathology  Facility/Department:[facility] ICU  Dysphagia treatment   Speech/Language/Cognitive Treatment   Name: Jhony Hdez  : 1940  MRN: [id number]                                                       Patient Diagnosis(es):   Patient Active Problem List    Diagnosis Date Noted    Acute right MCA stroke (HCC) 2025    Acute CVA (cerebrovascular accident) ([facility]) 05/15/2025    Paroxysmal atrial fibrillation (HCC) 05/15/2025    Anticoagulated 2025    History of prostate cancer 2025    Hematuria 2025    Mass of ureter 2025    Hydronephrosis 2025    Unequal blood pressure in upper extremities 2024    Medication management 10/18/2024    Abnormal ECG 2024    HFrEF (heart failure with reduced ejection fraction) ([facility]) 2024    Hypotension 2024    Abnormal echocardiogram 2024    PAF (paroxysmal atrial fibrillation) ([facility]) 2024    Essential hypertension 2024    Shortness of breath 2024    Bilateral lower extremity edema 2024    Combined systolic and diastolic congestive heart failure (HCC) 2024    Establishing care with new doctor, encounter for 2024    Irregular heart rate 2024    Palpitations 2024    Mixed hyperlipidemia 2024    Guillain Barré syndrome     Malignant neoplasm of splenic flexure (HCC) 10/27/2015       Past Medical History:   Diagnosis Date    CHF (congestive heart failure) ([facility])     Chronic kidney disease     kidney stones    Colon cancer (HCC)     Guillain Barré syndrome     Hyperlipidemia     Hypertension     Left ureteral stone     Right Ureteral mass     Stroke (HCC) 05/15/2025     Past Surgical History:   Procedure Laterality Date    ADRENALECTOMY      groin    BACK SURGERY      CARPAL TUNNEL RELEASE      bilateral    COLONOSCOPY      CYSTOSCOPY Bilateral 2025    CYSTOSCOPY, BILATERAL STENT PLACEMENT,BILATERAL RETROGRADE PYELOGRAM performed by Eduardo Mendoza 
    Speech Language Pathology  Facility/Department:[facility] ICU  Dysphagia treatment   Name: Jhony Hdez  : 1940  MRN: [id number]                                                       Patient Diagnosis(es):   Patient Active Problem List    Diagnosis Date Noted    Acute CVA (cerebrovascular accident) ([facility]) 05/15/2025    Paroxysmal atrial fibrillation ([facility]) 05/15/2025    Anticoagulated 2025    History of prostate cancer 2025    Hematuria 2025    Mass of ureter 2025    Hydronephrosis 2025    Unequal blood pressure in upper extremities 2024    Medication management 10/18/2024    Abnormal ECG 2024    HFrEF (heart failure with reduced ejection fraction) ([facility]) 2024    Hypotension 2024    Abnormal echocardiogram 2024    PAF (paroxysmal atrial fibrillation) ([facility]) 2024    Essential hypertension 2024    Shortness of breath 2024    Bilateral lower extremity edema 2024    Combined systolic and diastolic congestive heart failure (HCC) 2024    Establishing care with new doctor, encounter for 2024    Irregular heart rate 2024    Palpitations 2024    Mixed hyperlipidemia 2024    Guillain Barré syndrome     Malignant neoplasm of splenic flexure ([facility]) 10/27/2015       Past Medical History:   Diagnosis Date    CHF (congestive heart failure) ([facility])     Chronic kidney disease     kidney stones    Colon cancer ([facility])     Guillain Barré syndrome     Hyperlipidemia     Hypertension     Left ureteral stone     Right Ureteral mass     Stroke ([facility]) 05/15/2025     Past Surgical History:   Procedure Laterality Date    ADRENALECTOMY      groin    BACK SURGERY      CARPAL TUNNEL RELEASE      bilateral    COLONOSCOPY      CYSTOSCOPY Bilateral 2025    CYSTOSCOPY, BILATERAL STENT PLACEMENT,BILATERAL RETROGRADE PYELOGRAM performed by Eduardo Mendoza MD at [facility] [place]    CYSTOSCOPY Bilateral 2025    CYSTOSCOPY RIGHT 
    Speech Language Pathology  Facility/Department:[facility] ICU  Dysphagia treatment   Speech/Language/Cognitive Treatment   Name: Jhony Hdez  : 1940  MRN: [id number]                                                       Patient Diagnosis(es):   Patient Active Problem List    Diagnosis Date Noted    Acute CVA (cerebrovascular accident) ([facility]) 05/15/2025    Paroxysmal atrial fibrillation (HCC) 05/15/2025    Anticoagulated 2025    History of prostate cancer 2025    Hematuria 2025    Mass of ureter 2025    Hydronephrosis 2025    Unequal blood pressure in upper extremities 2024    Medication management 10/18/2024    Abnormal ECG 2024    HFrEF (heart failure with reduced ejection fraction) ([facility]) 2024    Hypotension 2024    Abnormal echocardiogram 2024    PAF (paroxysmal atrial fibrillation) ([facility]) 2024    Essential hypertension 2024    Shortness of breath 2024    Bilateral lower extremity edema 2024    Combined systolic and diastolic congestive heart failure ([facility]) 2024    Establishing care with new doctor, encounter for 2024    Irregular heart rate 2024    Palpitations 2024    Mixed hyperlipidemia 2024    Guillain Barré syndrome     Malignant neoplasm of splenic flexure ([facility]) 10/27/2015       Past Medical History:   Diagnosis Date    CHF (congestive heart failure) ([facility])     Chronic kidney disease     kidney stones    Colon cancer ([facility])     Guillain Barré syndrome     Hyperlipidemia     Hypertension     Left ureteral stone     Right Ureteral mass     Stroke ([facility]) 05/15/2025     Past Surgical History:   Procedure Laterality Date    ADRENALECTOMY      groin    BACK SURGERY      CARPAL TUNNEL RELEASE      bilateral    COLONOSCOPY      CYSTOSCOPY Bilateral 2025    CYSTOSCOPY, BILATERAL STENT PLACEMENT,BILATERAL RETROGRADE PYELOGRAM performed by Eduardo Mendoza MD [place] [facility] [place]    CYSTOSCOPY 
    Speech Language Pathology  Facility/Department:[facility] ICU  Dysphagia & Speech/Language/Cognitive Treatment Notes      Name: Jhony Hdez  : 1940  MRN: [id number]                                                       Patient Diagnosis(es):   Patient Active Problem List    Diagnosis Date Noted    Sick sinus syndrome (HCC) 2025    Junctional rhythm 2025    Acute right MCA stroke ([facility]) 2025    Acute CVA (cerebrovascular accident) ([facility]) 05/15/2025    Paroxysmal atrial fibrillation ([facility]) 05/15/2025    Anticoagulated 2025    History of prostate cancer 2025    Hematuria 2025    Mass of ureter 2025    Hydronephrosis 2025    Unequal blood pressure in upper extremities 2024    Medication management 10/18/2024    Abnormal ECG 2024    HFrEF (heart failure with reduced ejection fraction) ([facility]) 2024    Hypotension 2024    Abnormal echocardiogram 2024    PAF (paroxysmal atrial fibrillation) ([facility]) 2024    Essential hypertension 2024    Shortness of breath 2024    Bilateral lower extremity edema 2024    Combined systolic and diastolic congestive heart failure ([facility]) 2024    Establishing care with new doctor, encounter for 2024    Irregular heart rate 2024    Palpitations 2024    Mixed hyperlipidemia 2024    Guillain Barré syndrome     Malignant neoplasm of splenic flexure ([facility]) 10/27/2015       Past Medical History:   Diagnosis Date    CHF (congestive heart failure) ([facility])     Chronic kidney disease     kidney stones    Colon cancer ([facility])     Guillain Barré syndrome     Hyperlipidemia     Hypertension     Left ureteral stone     Right Ureteral mass     Stroke ([facility]) 05/15/2025     Past Surgical History:   Procedure Laterality Date    ADRENALECTOMY      groin    BACK SURGERY      CARPAL TUNNEL RELEASE      bilateral    COLONOSCOPY      CYSTOSCOPY Bilateral 2025    CYSTOSCOPY, BILATERAL STENT 
    Speech Language Pathology  Facility/Department:[facility] ICU  Dysphagia & Speech/Language/Cognitive Treatment Notes      Name: Jhony Hdez  : 1940  MRN: [id number]                                                       Patient Diagnosis(es):   Patient Active Problem List    Diagnosis Date Noted    Sick sinus syndrome (HCC) 2025    Junctional rhythm 2025    Acute right MCA stroke ([facility]) 2025    Acute CVA (cerebrovascular accident) ([facility]) 05/15/2025    Paroxysmal atrial fibrillation ([facility]) 05/15/2025    Anticoagulated 2025    History of prostate cancer 2025    Hematuria 2025    Mass of ureter 2025    Hydronephrosis 2025    Unequal blood pressure in upper extremities 2024    Medication management 10/18/2024    Abnormal ECG 2024    HFrEF (heart failure with reduced ejection fraction) ([facility]) 2024    Hypotension 2024    Abnormal echocardiogram 2024    PAF (paroxysmal atrial fibrillation) ([facility]) 2024    Essential hypertension 2024    Shortness of breath 2024    Bilateral lower extremity edema 2024    Combined systolic and diastolic congestive heart failure ([facility]) 2024    Establishing care with new doctor, encounter for 2024    Irregular heart rate 2024    Palpitations 2024    Mixed hyperlipidemia 2024    Guillain Barré syndrome     Malignant neoplasm of splenic flexure ([facility]) 10/27/2015       Past Medical History:   Diagnosis Date    CHF (congestive heart failure) ([facility])     Chronic kidney disease     kidney stones    Colon cancer ([facility] [facility]     Guillain Barré syndrome     Hyperlipidemia     Hypertension     Left ureteral stone     Right Ureteral mass     Stroke (Formerly [facility]) 05/15/2025     Past Surgical History:   Procedure Laterality Date    ADRENALECTOMY      groin    BACK SURGERY      CARPAL TUNNEL RELEASE      bilateral    COLONOSCOPY      CYSTOSCOPY Bilateral 2025    CYSTOSCOPY, BILATERAL STENT 
    Speech Language Pathology  Facility/Department:[facility] ICU  Dysphagia treatment   Speech/Language/Cognitive Treatment   Name: Jhony Hdez  : 1940  MRN: [id number]                                                       Patient Diagnosis(es):   Patient Active Problem List    Diagnosis Date Noted    Acute right MCA stroke (HCC) 2025    Acute CVA (cerebrovascular accident) ([facility]) 05/15/2025    Paroxysmal atrial fibrillation (HCC) 05/15/2025    Anticoagulated 2025    History of prostate cancer 2025    Hematuria 2025    Mass of ureter 2025    Hydronephrosis 2025    Unequal blood pressure in upper extremities 2024    Medication management 10/18/2024    Abnormal ECG 2024    HFrEF (heart failure with reduced ejection fraction) ([facility]) 2024    Hypotension 2024    Abnormal echocardiogram 2024    PAF (paroxysmal atrial fibrillation) ([facility]) 2024    Essential hypertension 2024    Shortness of breath 2024    Bilateral lower extremity edema 2024    Combined systolic and diastolic congestive heart failure (HCC) 2024    Establishing care with new doctor, encounter for 2024    Irregular heart rate 2024    Palpitations 2024    Mixed hyperlipidemia 2024    Guillain Barré syndrome     Malignant neoplasm of splenic flexure (HCC) 10/27/2015       Past Medical History:   Diagnosis Date    CHF (congestive heart failure) ([facility])     Chronic kidney disease     kidney stones    Colon cancer (HCC)     Guillain Barré syndrome     Hyperlipidemia     Hypertension     Left ureteral stone     Right Ureteral mass     Stroke (HCC) 05/15/2025     Past Surgical History:   Procedure Laterality Date    ADRENALECTOMY      groin    BACK SURGERY      CARPAL TUNNEL RELEASE      bilateral    COLONOSCOPY      CYSTOSCOPY Bilateral 2025    CYSTOSCOPY, BILATERAL STENT PLACEMENT,BILATERAL RETROGRADE PYELOGRAM performed by Eduardo Mendoza 
    V2.0    [facility] [place] Progress Note      Name:  Jhony Hdez /Age/Sex: 1940  (84 y.o. male)   MRN & CSN:  [id number] & [id number] Encounter Date/Time: 2025 12:12 PM EDT   Location:  [facility]14-01 PCP: Jhony Valdovinos MD     Attending:Gema Izaguirre MD       Hospital Day: 17    Assessment and Recommendations   Jhony Hdez is a 84 y.o. 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  Imaging suggested ischemic stroke due to right MCA.  Apparently patient was off of Coumadin for a month for bladder biopsy prior to admission.    Assessment and plan  #Right MCA thrombus  Status post thrombectomy on 5/15 with complication of catheter tip retained in the right ICA  Status post right ICA sacrifice to trip fractured catheter patent right ICA   Neurosurgery recommended DOAC  Echo 2025 EF 30 to 35%, diffuse hypokinesis, mild LVH, moderate RVD with moderate dysfunction    #Bradycardia with hypotension  #A-fib  Status post dual-chamber AICD 2025 preprocedural ANNIE did not reveal any LV/LA/DESIRAE thrombus or mass  He is currently on dopamine drip.  Midodrine 10 mg every 8 hours for hypotension  Rate controlled with metoprolol anticoagulation with Eliquis    #Elevated LFTs likely in setting of hypotension  Continue to trend AST, ALT    #Anemia  Hemoglobin 9.2 stable.      He is being followed by ARU will be discharged once patient is off of dopamine drip.    Diet ADULT DIET; Dysphagia - Pureed; Mildly Thick (Nectar)  ADULT ORAL NUTRITION SUPPLEMENT; Lunch, Dinner; Frozen Oral Supplement   DVT Prophylaxis [] Lovenox, []  Heparin, [] SCDs, [] Ambulation,  [x] Eliquis, [] Xarelto  [] Coumadin   Code Status Full Code   Disposition From: Home  Expected Disposition: Inpatient rehab  Estimated Date of Discharge: 
  Comprehensive Nutrition Assessment    RECOMMENDATIONS:  PO Diet: Continue dysphagia - pureed; nectar thickened liquid  Nutrition Supplement: Add magic cup BID  Continued recommendation for nutrition support  If family continues to decline need for PEG, recommend replacing corpak for temporary nutrition support  Nutrition Education: Education/Counseling not appropriate     NUTRITION ASSESSMENT:   Nutritional summary & status: Pt remains in ICU, pending ARU placement when declared medically stable. On a dysphagia - pureed diet; nectar thickened liquid. Original plan was for pt to receive PEG, however when GI evaluated pts wife declined PEG. Pt w/ minimal intakes (0-25%) for duration of stay. Limited nutrition supplements available d/t thickened liquid restriction. Will add magic cup BID. Will discuss adding appetite stimulant w/ MD. Continued recommendation for nutrition support, if not PEG placement then replacing corpak temporarily. Attempted to speak w/ wife about corpak placement however she was unavailable x2. Will revisit w/ wife tomorrow and start calorie count if she continues to decline.   Admission // PMH: Stroke // Afib, HLD, HTN, CKD, CHF, rectal cancer, prostate cancer, new urothelial carcinoma    MALNUTRITION ASSESSMENT  Context of Malnutrition: Acute Illness   Malnutrition Status: At risk for malnutrition  Findings of the 6 clinical characteristics of malnutrition (Minimum of 2 out of 6 clinical characteristics is required to make the diagnosis of moderate or severe Protein Calorie Malnutrition based on AND/ASPEN Guidelines):  Energy Intake:  50% or less of estimated energy requirements for 5 or more days  Weight Loss:  No weight loss     Body Fat Loss:  No body fat loss     Muscle Mass Loss:  No muscle mass loss    Fluid Accumulation:  Mild      NUTRITION DIAGNOSIS   Inadequate oral intake related to cognitive or neurological impairment as evidenced by intake 0-25%    Nutrition Monitoring and 
  Comprehensive Nutrition Assessment    RECOMMENDATIONS:  PO Diet: Pureed, nectar thick liquids per SLP, 1:1 feed  Nutrition Supplement: Continue Magic Cup BID  Nutrition Education: Education/Counseling not appropriate     NUTRITION ASSESSMENT:   Nutritional summary & status: Attempted to speak w/ pt's wife regarding ongoing RD recommendation for corpak placement given poor intake x 18 days. Pt's wife not in room despite attempted visit x2. Pt lethargic, did not wake to verbal stimulation. Reached out to MD regarding recommendation to provide nutirtion support. GI declined PEG but could place corpak. EMR shows 18#/14%wt loss this admit, +4L x 18 days. Likely true wt loss but also inacurrate bed wts. Noted episode of emesis x1  yesterday. May benefit pt to schedule antiemetics if able. Zofran last taken 5/31. High calorie supplement on board.     Admission // PMH: Stroke // Afib, HLD, HTN, CKD, CHF, rectal cancer, prostate cancer, new urothelial carcinoma    MALNUTRITION ASSESSMENT  Context of Malnutrition: Acute Illness   Malnutrition Status: Severe malnutrition  Findings of the 6 clinical characteristics of malnutrition (Minimum of 2 out of 6 clinical characteristics is required to make the diagnosis of moderate or severe Protein Calorie Malnutrition based on AND/ASPEN Guidelines):  Energy Intake:  50% or less of estimated energy requirements for 5 or more days  Weight Loss:  Greater than 2% over 1 week (bed scale wts)     Body Fat Loss:  No body fat loss     Muscle Mass Loss:  No muscle mass loss    Fluid Accumulation:  Mild     Strength:  Normal  strength    NUTRITION DIAGNOSIS   Severe malnutrition, in context of acute illness or injury related to cognitive or neurological impairment as evidenced by criteria as identified in malnutrition assessment    Nutrition Monitoring and Evaluation:   Food/Nutrient Intake Outcomes:  Progression of Nutrition, Food and Nutrient Intake, Supplement Intake  Physical 
  Comprehensive Nutrition Assessment    RECOMMENDATIONS:  PO Diet: Pureed, nectar thick liquids per SLP, 1:1 feed with ST or wife only  Nutrition Supplement: Continue Magic Cup BID  Nutrition Education: Education/Counseling not appropriate     NUTRITION ASSESSMENT:   Nutritional summary & status: Patient continues to work with SLP and is tolerating a pureed diet with nectar thick fluids. Patients wife declined PEG/NGT placement as of last RD assessment 6/2. Patient does not currently meet criteria for ARU admission and plans to d/c to SNF, RD concerned regarding multiple barriers to pt receiving adequate nutrition without supplemental access at d/c. Recorded weights this admission fluctuate significantly and are not in alignment with I/O's and wt loss can not be accurately assessed. Patients po intakes do appear to be improving however patient requires 1:1 full assistance from SLP or wife only who has been trained by ST to assist patient with feeds. Magic cup ordered as it is the only oral nutrition supplement compliant with nectar recommendations however patient does accept well. Per CM note wife is reviewing SNF options with hopes of patient improving enough at SNF to admit to ARU. Patient has had inadequate po intake for an extended period of time and lack of nutritional improvement will hinder patients ability to participate with therapy. RD continues to recommend a supplemental form of nutrition until patient is able to consume adequate intake of po meals. Wife had already left by the time visit was attempted this morning, no answer of attempted mobile call either. RD will continue to follow.     Admission // PMH: Stroke // Afib, HLD, HTN, CKD, CHF, rectal cancer, prostate cancer, new urothelial carcinoma    MALNUTRITION ASSESSMENT  Context of Malnutrition: Acute Illness   Malnutrition Status: Severe malnutrition  Findings of the 6 clinical characteristics of malnutrition (Minimum of 2 out of 6 clinical 
  Current NIHSS 13    Nursing Core Measures for Stroke:   [x]   Education template documentation (STROKE/TIA). Select only risk factors that are applicable to patient when selecting risk factors.  [x]   Care Plan template documentation (Physiologic Instability - Neurosensory). Selecting this will add care plan rows to the flowsheet under the Neuro section of Head to Toe.  [x]   Verified Swallow Screen completed prior to PO intake of food, drink, medications.          Please verify correct medication route prior to administration for intubated patients, patients who can not swallow or have alternative routes of intake (NG, OG, ME), etc  [x]   VTE Prophylaxis: SCDs ordered/addressed; SCDs: On           (As a reminder, ASA, Plavix, and TPA/TNK are not VTE prophylaxis.)    Reviewed the Following Education with Patient and/or Family:   - Personalized risk factors for patient, along with changes, modifications that will help prevent stroke.  - Signs and Symptoms of Stroke: (Facial droop, weakness/numbness especially on one side, speech difficulty, sudden confusion, sudden loss of vision, sudden severe headache, sudden loss of balance or having difficulty walking, syncope, or seizure)  - How to activate EMS (911)   - Importance of Follow Up Appointments at Discharge   - Importance of Compliance with Medications Prescribed at Discharge  - Available community resources and stroke advocacy groups if needed    Patient and/or family member: verbalized understanding.     Stroke Education booklet given to patient/family (or verified, if given already), which reviews above information. yes         Electronically signed by Josh Malloy RN on 5/28/2025 at 3:15 AM   
  Current NIHSS 13    Nursing Core Measures for Stroke:   [x]   Education template documentation (STROKE/TIA). Select only risk factors that are applicable to patient when selecting risk factors.  [x]   Care Plan template documentation (Physiologic Instability - Neurosensory). Selecting this will add care plan rows to the flowsheet under the Neuro section of Head to Toe.  [x]   Verified Swallow Screen completed prior to PO intake of food, drink, medications.          Please verify correct medication route prior to administration for intubated patients, patients who can not swallow or have alternative routes of intake (NG, OG, SD), etc  [x]   VTE Prophylaxis: SCDs ordered/addressed; SCDs: N/A Heparin           (As a reminder, ASA, Plavix, and TPA/TNK are not VTE prophylaxis.)    Reviewed the Following Education with Patient and/or Family:   - Personalized risk factors for patient, along with changes, modifications that will help prevent stroke.  - Signs and Symptoms of Stroke: (Facial droop, weakness/numbness especially on one side, speech difficulty, sudden confusion, sudden loss of vision, sudden severe headache, sudden loss of balance or having difficulty walking, syncope, or seizure)  - How to activate EMS (911)   - Importance of Follow Up Appointments at Discharge   - Importance of Compliance with Medications Prescribed at Discharge  - Available community resources and stroke advocacy groups if needed    Patient and/or family member: verbalized understanding.     Stroke Education booklet given to patient/family (or verified, if given already), which reviews above information. yes         Electronically signed by Josh Malloy RN on 5/28/2025 at 3:18 AM   
  Current NIHSS 16    Nursing Core Measures for Stroke:   [x]   Education template documentation (STROKE/TIA). Select only risk factors that are applicable to patient when selecting risk factors.  [x]   Care Plan template documentation (Physiologic Instability - Neurosensory). Selecting this will add care plan rows to the flowsheet under the Neuro section of Head to Toe.  [x]   Verified Swallow Screen completed prior to PO intake of food, drink, medications.          Please verify correct medication route prior to administration for intubated patients, patients who can not swallow or have alternative routes of intake (NG, OG, NC), etc  [x]   VTE Prophylaxis: SCDs ordered/addressed; SCDs: N/A Heparin           (As a reminder, ASA, Plavix, and TPA/TNK are not VTE prophylaxis.)    Reviewed the Following Education with Patient and/or Family:   - Personalized risk factors for patient, along with changes, modifications that will help prevent stroke.  - Signs and Symptoms of Stroke: (Facial droop, weakness/numbness especially on one side, speech difficulty, sudden confusion, sudden loss of vision, sudden severe headache, sudden loss of balance or having difficulty walking, syncope, or seizure)  - How to activate EMS (911)   - Importance of Follow Up Appointments at Discharge   - Importance of Compliance with Medications Prescribed at Discharge  - Available community resources and stroke advocacy groups if needed    Patient and/or family member: education needs reinforcement.     Stroke Education booklet given to patient/family (or verified, if given already), which reviews above information. yes         Electronically signed by Gail Headley RN on 5/20/2025 at 2:36 AM   
  Current NIHSS 17    Nursing Core Measures for Stroke:   [x]   Education template documentation (STROKE/TIA).   [x]   Care Plan template documentation (Physiologic Instability - Neurosensory).   [x]   Verified Swallow Screen completed prior to PO intake of food, drink, medications.  [x]   VTE Prophylaxis: SCDs ordered/addressed; SCDs: N/A Heparin               Reviewed the Following Education with Patient and/or Family:   - Personalized risk factors for patient, along with changes, modifications that will help prevent stroke.  - Signs and Symptoms of Stroke: (Facial droop, weakness/numbness especially on one side, speech difficulty, sudden confusion, sudden loss of vision, sudden severe headache, sudden loss of balance or having difficulty walking, syncope, or seizure)  - How to activate EMS (911)   - Importance of Follow Up Appointments at Discharge   - Importance of Compliance with Medications Prescribed at Discharge  - Available community resources and stroke advocacy groups if needed    Patient and/or family member: education needs reinforcement.     Stroke Education booklet given to patient/family (or verified, if given already), which reviews above information. yes         Electronically signed by Reilly Rodgers RN on 5/17/2025 at 6:45 AM   
  Current NIHSS 17    Nursing Core Measures for Stroke:   [x]   Education template documentation (STROKE/TIA). Select only risk factors that are applicable to patient when selecting risk factors.  [x]   Care Plan template documentation (Physiologic Instability - Neurosensory). Selecting this will add care plan rows to the flowsheet under the Neuro section of Head to Toe.  [x]   Verified Swallow Screen completed prior to PO intake of food, drink, medications.          Please verify correct medication route prior to administration for intubated patients, patients who can not swallow or have alternative routes of intake (NG, OG, [place]), etc  [x]   VTE Prophylaxis: SCDs ordered/addressed; SCDs: On           (As a reminder, ASA, Plavix, and TPA/TNK are not VTE prophylaxis.)    Reviewed the Following Education with Patient and/or Family:   - Personalized risk factors for patient, along with changes, modifications that will help prevent stroke.  - Signs and Symptoms of Stroke: (Facial droop, weakness/numbness especially on one side, speech difficulty, sudden confusion, sudden loss of vision, sudden severe headache, sudden loss of balance or having difficulty walking, syncope, or seizure)  - How to activate EMS (911)   - Importance of Follow Up Appointments at Discharge   - Importance of Compliance with Medications Prescribed at Discharge  - Available community resources and stroke advocacy groups if needed    Patient and/or family member: education needs reinforcement.     Stroke Education booklet given to patient/family (or verified, if given already), which reviews above information. yes        Electronically signed by Shelly Tay RN on 5/17/2025 at 7:43 AM   
  GI Progress Note      Jhony Hdez is a 84 y.o. male patient.  1. Paroxysmal atrial fibrillation (HCC)    2. Stroke (HCC)    3. Cerebrovascular accident (CVA) due to other mechanism (HCC)    4. Cerebrovascular accident (CVA), unspecified mechanism (HCC)    5. Cardiomyopathy, unspecified type (HCC)    6. Encounter for other specified surgical aftercare        Admit Date: 5/15/2025    Subjective:       No fever. No pain      ROS:  As per above    Scheduled Meds:   cefTRIAXone (ROCEPHIN) IV  1,000 mg IntraVENous Q24H    midodrine  10 mg Oral TID WC    sodium chloride flush  5-40 mL IntraVENous 2 times per day    sodium chloride flush  5-40 mL IntraVENous 2 times per day    apixaban  5 mg Oral BID    simethicone  80 mg Oral Daily    [Held by provider] melatonin  5 mg Oral Nightly    balsum peru-castor oil   Topical BID    [Held by provider] atorvastatin  40 mg Oral Nightly    [Held by provider] donepezil  5 mg Oral Nightly    pantoprazole  40 mg Oral QAM AC       Continuous Infusions:   [Held by provider] DOPamine Stopped (05/31/25 1200)    sodium chloride      sodium chloride         PRN Meds:  naloxone 0.4 mg in 10 mL sodium chloride syringe, sodium chloride flush, sodium chloride, diphenhydrAMINE, sodium chloride flush, sodium chloride, calcium carbonate, lidocaine, acetaminophen, loperamide, promethazine **OR** ondansetron      Objective:       Patient Vitals for the past 24 hrs:   BP Temp Temp src Pulse Resp SpO2 Weight   06/03/25 0818 134/69 97.8 °F (36.6 °C) Oral 83 18 95 % --   06/03/25 0700 -- -- -- -- -- -- 77.5 kg (170 lb 13.7 oz)   06/03/25 0330 112/65 98 °F (36.7 °C) Oral 63 20 96 % --   06/02/25 2316 111/64 98.1 °F (36.7 °C) Axillary 62 16 97 % --   06/02/25 2055 107/64 98 °F (36.7 °C) Oral 60 21 98 % --   06/02/25 1507 119/64 99 °F (37.2 °C) Oral 67 18 97 % --   06/02/25 1148 99/62 98 °F (36.7 °C) Axillary 53 18 96 % --       Exam:  VITALS:  /69   Pulse 83   Temp 97.8 °F (36.6 °C) (Oral)   
  Hospital Medicine Progress Note      Date of Admission: 5/15/2025  Hospital Day: 2    Chief Admission Complaint:  L hemiplegia     Subjective:  Patient is without complaints. Catheter trip broke off intracranially during life saving emergent and technically complex thrombectomy.      Presenting Admission History:       Mr. Hdez is a 85 yo male with pmhx of afib on warfarin, rectal cancer, prostate cancer with seeding, GBS, recent diagnosis of urothelial cancer of the right ureter who presents to [facility] ED with chief complaint of left hemiplegia that was noticed at 2am on 5/15 which prompted his wife to take him to the ED. 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 CT head performed which showed Hyperdense right MCA suggesting right MCA thrombus. 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 Patient underwent thrombectomy on the 15th with complication of retained catheter in the R ICA.     Assessment/Plan:      Current Principal Problem:  Cerebrovascular accident (CVA) due to embolism of right middle cerebral artery (HCC)    Right M1 Occlusion   Equipment failure with subsequent retained intracranial catheter tip  Pt presents with left hemiplegia at 2 am, LKW at 10pm on 14th.  with good reperfusion s/p thrombectomy on 5/15th. CT head repeated this am due to increased drowsiness but no acute bleed on CT.   - Neurocrit on board  - Aspirin   - Heparin gtt  - Lipitor   - Q1hr neuro checks  - Lipid panel   - Echo   -Keep SBP < 160 and > 100.  - Levophed started to maintain perfusion   - 24 hour CT scheduled 5/16 am     Afib   Patient takes metoprolol and warfarin at home. Held warfarin for 1 month due to biopsy.   - Hold off on metoprolol and warfarin. Rate is currently controlled.     Urothelial Carcinoma   Follows with Dr. Duc Mendoza, recent diagnosis on the 30th. Will need nephroureterectomy.   - C/s Heme/onc     Code 
  Hospital Medicine Progress Note      Date of Admission: 5/15/2025  Hospital Day: 4    Chief Admission Complaint:  L hemiplegia     Subjective:  Catheter trip broke off intracranially during life saving emergent and technically complex thrombectomy. NSGY in discussion with monica.       Presenting Admission History:       Mr. Hdez is a 85 yo male with pmhx of afib on warfarin, rectal cancer, prostate cancer with seeding, GBS, recent diagnosis of urothelial cancer of the right ureter who presents to [facility] ED with chief complaint of left hemiplegia that was noticed at 2am on 5/15 which prompted his wife to take him to the ED. 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 CT head performed which showed Hyperdense right MCA suggesting right MCA thrombus. 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 Patient underwent thrombectomy on the 15th with complication of retained catheter in the R ICA.     Assessment/Plan:      Current Principal Problem:  Acute CVA (cerebrovascular accident) (HCC)    Right M1 Occlusion   Equipment failure with subsequent retained intracranial catheter tip  Pt presents with left hemiplegia at 2 am, LKW at 10pm on 14th.  with good reperfusion s/p thrombectomy on 5/15th. CT head repeated this am due to increased drowsiness but no acute bleed on CT.   - Neurocrit on board  - Aspirin   - Heparin gtt  - Lipitor   - Q1hr neuro checks  - Lipid panel   - Echo   -Keep SBP < 160 and > 100.  - Levophed started to maintain perfusion   - 24 hour CT scheduled 5/16 am     Afib   Patient takes metoprolol and warfarin at home. Held warfarin for 1 month due to biopsy.   - Hold off on metoprolol and warfarin. Rate is currently controlled.     Urothelial Carcinoma   Follows with Dr. Duc Mendoza, recent diagnosis on the 30th. Will need nephroureterectomy.   - C/s Heme/onc     Code Status: Full Code   PPX: Heparin  DISPO: 
  Physician Progress Note      PATIENT:               MAHAD OLIVAS  CSN #:                  [id number]  :                       1940  ADMIT DATE:       5/15/2025 7:12 AM  DISCH DATE:  RESPONDING  PROVIDER #:        Abdi Diaz MD          QUERY TEXT:    Malnutrition is documented in the medical record  by Registered Dietician.    Please specify the degree/type:    The clinical indicators include:  83 yo admitted 5/15 S/P stroke, stemi, pacemaker    Per RD : Severe malnutrition. Energy Intake:  50% or less of estimated   energy requirements for 5 or more days. Weight Loss:  Greater than 2% over 1   week (bed scale wts). EMR shows 18#/14%wt loss this admit, +4L x 18 days.    RD monitoring, Pureed diet, magic cup BID  Options provided:  -- Severe Malnutrition  -- Other - I will add my own diagnosis  -- Disagree - Not applicable / Not valid  -- Disagree - Clinically unable to determine / Unknown  -- Refer to Clinical Documentation Reviewer    PROVIDER RESPONSE TEXT:    This patient has severe malnutrition.    Query created by: Maliha Flores on 6/3/2025 8:18 AM      Electronically signed by:  Abdi Diaz MD 6/3/2025 8:22 AM          
  Speech-Language Pathology  Cognitive Assessment Attempt      Wife present, wanted to wait for patient to be cleaned up prior to assessment.   Patient scheduled for MBS/swallow 11:00.   Wife agreeable to this.   Complete assessment as able.     Nahomy Sommers CCC-SLP  Speech Pathologist  [place] [id number]  Pager: [phone]   
  Speech-Language Pathology  Hold        Chart reviewed, d/w TAL David.  Pt is NPO for probable angio today.  Will follow up as pt appropriate and schedule allows.         AMINA WELCH M.S./CCC-SLP #[id number]  Speech/language Pathologist  Pg. # [phone]          
05:44 Patient shifted to the radiology department for a head CT. Returned back to the ICU at 06:00.  
4 Eyes Skin Assessment     NAME:  Jhony Hdez  YOB: 1940  MEDICAL RECORD NUMBER:  [id number]    The patient is being assessed for  Admission    I agree that at least one RN has performed a thorough Head to Toe Skin Assessment on the patient. ALL assessment sites listed below have been assessed.      Areas assessed by both nurses:    Head, Face, Ears, Shoulders, Back, Chest, Arms, Elbows, Hands, Sacrum. Buttock, Coccyx, Ischium, Legs. Feet and Heels, and Under Medical Devices         Does the Patient have a Wound? No noted wound(s)    Blanchable redness to back/sacrum/buttock, bruising and abrasion to L forehead       Chuck Prevention initiated by RN: Yes  Wound Care Orders initiated by RN: No    Pressure Injury (Stage 3,4, Unstageable, DTI, NWPT, and Complex wounds) if present, place Wound referral order by RN under : No    New Ostomies, if present place, Ostomy referral order under : No     Nurse 1 eSignature: Electronically signed by Ksenia Barnett RN on 5/15/25 at 12:55 PM EDT    **SHARE this note so that the co-signing nurse can place an eSignature**    Blanchable redness to sacrum/coccyx, lumbar spine and thoracic spine; mepilexes applied. Abrasions/ecchymosis scattered left hip, left shoulder, left forehead, left shin.     Nurse 2 eSignature: Electronically signed by Aundrea Valencia RN on 5/15/25 at 12:59 PM EDT    
After turning/repositioning, pt had symptomatic decrease in BP down to 80/42 manually, pt lethargic, but rousable with mild stimulation and able to follow simple commands, pupils even reactive, spontaneous movement in all extremities. A paced with rate set at 60.     On Dopamine at 2.5mcg/kg/hr, and receiving midodrine 5mg PO TID, and continuous NS at 100ml/hr.     APRN paged and updated and ICU Residents asked to come assess, CBC/Renal/Mg ordered. Labs resulted WDL    BP cycled q15m; stabilized and improving up to 104/60 and HR sustaining >60.     Advised to notify APRN/ICU residents with acute neuro changes/inability to rouse/or change in vitals and escalate accordingly      
At 0200 hourly rounding check, noted pt to have pulled NG and purewick off. New bout of confusion. Only oriented to self at this time.  Upon finishing cleaning up pt, orientation issue resolved, had a few beats of vtach. Asymptomatic.   Notified ICU residents of all concerns.  RN instructed not to replace NG at this time.  Continue with current plan of care otherwise.   
At 0430 patient became confused and started to pull his tele leads off. Patient was no longer aware of where he was or why he was at the hospital. RN notified NCC NP. Neuro exam rechecked at 0500 and remained the same despite reorientation. Head CT obtained at 0600. See results  
Blood ample was drawn by a phlebotomist earlier. However, the Anti-XA has not resulted yet. This RN called the lab to follow up. They will check now. ICU charge nurse made aware.    
Brief electrophysiology procedure note    Patient underwent successful dual-chamber ICD implantation.  Transesophageal echo performed at the beginning of the case demonstrated reduced LVEF, mild valve disease and no thrombus within the left ventricle, left atrium or LA appendage.  Atrial pacing up to 130 bpm exhibited one-to-one conduction with long DC, therefore no left bundle area pacing lead or coronary sinus lead was placed.  There was significant scar throughout the right ventricle and right atrium necessitating multiple attempts at lead placement.    Assessment:  1.  Successful dual-chamber ICD implant  2.  Atrial pacing up to 130 bpm with one-to-one conduction  3.  ANNIE with no LV/DESIRAE/DESIRAE thrombus or mass    Plan:  1.  2 view chest x-ray in a.m.  2.  Device interrogation in a.m.  3.  Continue therapeutic anticoagulation  4.  Antiarrhythmic therapy should be considered for atrial fibrillation with frequent recurrence    Patient would like to follow at [facility] on discharge, will need device/wound follow-up in 1 week and clinic visit in 3 months, message sent in epic    Full Procedure note to follow     Christoph Martinez MD  Cardiac electrophysiologist   [facility]  [address], [address]  Office: [phone]      
Called [facility] and gave report, all questions answered.  IV's, tele and purewick removed.  Discharge instructions given and questions answered.  Patient transported via [facility] .     Electronically signed by Maliha Wheatley RN on 6/6/2025 at 5:56 PM   
Called lab to collect Anti-XA.  
Cardiology Consult Service  Daily Progress Note        Admit Date:  5/15/2025  Primary cardiologist: Dr Sandhu     Reason for Consultation/Chief Complaint: bradycardia, hypotension, assess for SSS     Subjective:     Jhony Hdez is a 84 y.o. male with a past medical history of PAF, HFrEF due to NICMO, pheochromocytoma s/p resection 2010, colon cancer s/p XRT and resection, prostate cancer, [place] Barré syndrome, HTN, HLP, mild CAD, recent diagnosis of ureteral cancer.     R/LHC 03/2024: Mid LAD 30%, distal LAD 50%, normal ramus/circumflex, RCA with luminal irregularities.  RA 5, PA 35/10/18, W6, CI 2.0F.     Patient presented to the hospital on 5/15 with a fall, left-sided weakness.  Imaging suggested ischemic stroke due to right MCA thrombus.  Apparently patient had held his warfarin for about a month due to bladder biopsy prior to admission.  Patient underwent thrombectomy on 5/15 with complication of catheter tip retained in right ICA (could not be snared out) s/p SHAHEED sacrifice to trap fractured catheter tip in SHAHEED by N-Surg 5/16.  Cardiology was consulted for persistent hypotension and bradycardia, Dr Godoy assessed patient.  Telemetry consistent with intermittent AF alternating with sinus rhythm, possible idioventricular rhythm versus AF, no significant pauses.  Patient was placed on Levophed with improvement of HR and BP.  Patient remains off anticoagulation including heparin drip due to ongoing hematuria in the setting of urothelial bladder cancer.  Cardiology had assessed patient and signed off on 5/15 due to normal sinus rhythm and maintenance of adequate blood pressure on midodrine.     Echo 05/2025: LVEF 30-35%, LVDD, diffuse hypokinesis, mild LVH, moderate RVD with moderate dysfunction, mild TR, KAI.  Compared to echo 09/2024, LVEF 25 to 30%; echo 03/2024, LVEF 15-20%.     Cardiology was reconsulted today due to persistent low heart rate and associated low BP despite midodrine, concern for 
Cardiology Consult Service  Daily Progress Note        Admit Date:  5/15/2025  Primary cardiologist: Dr Sandhu     Reason for Consultation/Chief Complaint: bradycardia, hypotension, assess for SSS     Subjective:     Jhony Hdez is a 84 y.o. 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     R/LHC 03/2024: Mid LAD 30%, distal LAD 50%, normal ramus/circumflex, RCA with luminal irregularities.  RA 5, PA 35/10/18, W6, CI 2.0F.     Patient presented to the hospital on 5/15 with a fall, left-sided weakness.  Imaging suggested ischemic stroke due to right MCA thrombus.  Apparently patient had held his warfarin for about a month due to bladder biopsy prior to admission.  Patient underwent thrombectomy on 5/15 with complication of catheter tip retained in right ICA (could not be snared out) s/p SHAHEED sacrifice to trap fractured catheter tip in SHAHEED by N-Surg 5/16.  Cardiology was consulted for persistent hypotension and bradycardia, Dr Godoy assessed patient.  Telemetry consistent with intermittent AF alternating with sinus rhythm, possible idioventricular rhythm versus AF, no significant pauses.  Patient was placed on Levophed with improvement of HR and BP.  Patient remains off anticoagulation including heparin drip due to ongoing hematuria in the setting of urothelial bladder cancer.  Cardiology had assessed patient and signed off on 5/15 due to normal sinus rhythm and maintenance of adequate blood pressure on midodrine.     Echo 05/2025: LVEF 30-35%, LVDD, diffuse hypokinesis, mild LVH, moderate RVD with moderate dysfunction, mild TR, KAI.  Compared to echo 09/2024, LVEF 25 to 30%; echo 03/2024, LVEF 15-20%.     Cardiology was reconsulted today due to persistent low heart rate and associated low BP despite midodrine, concern for 
Cardiology Consult Service  Daily Progress Note        Admit Date:  5/15/2025  Primary cardiologist: Dr Sandhu     Reason for Consultation/Chief Complaint: bradycardia, hypotension, assess for SSS     Subjective:     Jhony Hdez is a 84 y.o. 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     R/LHC 03/2024: Mid LAD 30%, distal LAD 50%, normal ramus/circumflex, RCA with luminal irregularities.  RA 5, PA 35/10/18, W6, CI 2.0F.     Patient presented to the hospital on 5/15 with a fall, left-sided weakness.  Imaging suggested ischemic stroke due to right MCA thrombus.  Apparently patient had held his warfarin for about a month due to bladder biopsy prior to admission.  Patient underwent thrombectomy on 5/15 with complication of catheter tip retained in right ICA (could not be snared out) s/p SHAHEED sacrifice to trap fractured catheter tip in SHAHEED by N-Surg 5/16.  Cardiology was consulted for persistent hypotension and bradycardia, Dr Godoy assessed patient.  Telemetry consistent with intermittent AF alternating with sinus rhythm, possible idioventricular rhythm versus AF, no significant pauses.  Patient was placed on Levophed with improvement of HR and BP.  Patient remains off anticoagulation including heparin drip due to ongoing hematuria in the setting of urothelial bladder cancer.  Cardiology had assessed patient and signed off on 5/15 due to normal sinus rhythm and maintenance of adequate blood pressure on midodrine.     Echo 05/2025: LVEF 30-35%, LVDD, diffuse hypokinesis, mild LVH, moderate RVD with moderate dysfunction, mild TR, KAI.  Compared to echo 09/2024, LVEF 25 to 30%; echo 03/2024, LVEF 15-20%.     Cardiology was reconsulted today due to persistent low heart rate and associated low BP despite midodrine, concern for 
Cardiology Consult Service  Daily Progress Note        Admit Date:  5/15/2025  Primary cardiologist: Dr Sandhu     Reason for Consultation/Chief Complaint: bradycardia, hypotension, assess for SSS     Subjective:     Jhony Hdez is a 84 y.o. 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     R/LHC 03/2024: Mid LAD 30%, distal LAD 50%, normal ramus/circumflex, RCA with luminal irregularities.  RA 5, PA 35/10/18, W6, CI 2.0F.     Patient presented to the hospital on 5/15 with a fall, left-sided weakness.  Imaging suggested ischemic stroke due to right MCA thrombus.  Apparently patient had held his warfarin for about a month due to bladder biopsy prior to admission.  Patient underwent thrombectomy on 5/15 with complication of catheter tip retained in right ICA (could not be snared out) s/p SHAHEED sacrifice to trap fractured catheter tip in SHAHEED by N-Surg 5/16.  Cardiology was consulted for persistent hypotension and bradycardia, Dr Godoy assessed patient.  Telemetry consistent with intermittent AF alternating with sinus rhythm, possible idioventricular rhythm versus AF, no significant pauses.  Patient was placed on Levophed with improvement of HR and BP.  Patient remains off anticoagulation including heparin drip due to ongoing hematuria in the setting of urothelial bladder cancer.  Cardiology had assessed patient and signed off on 5/15 due to normal sinus rhythm and maintenance of adequate blood pressure on midodrine.     Echo 05/2025: LVEF 30-35%, LVDD, diffuse hypokinesis, mild LVH, moderate RVD with moderate dysfunction, mild TR, KAI.  Compared to echo 09/2024, LVEF 25 to 30%; echo 03/2024, LVEF 15-20%.     Cardiology was reconsulted today due to persistent low heart rate and associated low BP despite midodrine, concern for 
Cardiology Consult Service  Daily Progress Note        Admit Date:  5/15/2025  Primary cardiologist: Dr Sandhu     Reason for Consultation/Chief Complaint: bradycardia, hypotension, assess for SSS     Subjective:     Jhony Hdez is a 84 y.o. 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     R/LHC 03/2024: Mid LAD 30%, distal LAD 50%, normal ramus/circumflex, RCA with luminal irregularities.  RA 5, PA 35/10/18, W6, CI 2.0F.     Patient presented to the hospital on 5/15 with a fall, left-sided weakness.  Imaging suggested ischemic stroke due to right MCA thrombus.  Apparently patient had held his warfarin for about a month due to bladder biopsy prior to admission.  Patient underwent thrombectomy on 5/15 with complication of catheter tip retained in right ICA (could not be snared out) s/p SHAHEED sacrifice to trap fractured catheter tip in SHAHEED by N-Surg 5/16.  Cardiology was consulted for persistent hypotension and bradycardia, Dr Godoy assessed patient.  Telemetry consistent with intermittent AF alternating with sinus rhythm, possible idioventricular rhythm versus AF, no significant pauses.  Patient was placed on Levophed with improvement of HR and BP.  Patient remains off anticoagulation including heparin drip due to ongoing hematuria in the setting of urothelial bladder cancer.  Cardiology had assessed patient and signed off on 5/15 due to normal sinus rhythm and maintenance of adequate blood pressure on midodrine.     Echo 05/2025: LVEF 30-35%, LVDD, diffuse hypokinesis, mild LVH, moderate RVD with moderate dysfunction, mild TR, KAI.  Compared to echo 09/2024, LVEF 25 to 30%; echo 03/2024, LVEF 15-20%.     Cardiology was reconsulted today due to persistent low heart rate and associated low BP despite midodrine, concern for 
Clinical Pharmacy Progress Note  Medication History     Admit Date: 5/15/2025    List of of current medications patient is taking is complete. Home Medication list in EPIC updated to reflect changes noted below.    Source of information: interview at bedside with patient's wife, pharmacy fills    Patient's home pharmacy: [facility],      Changes made to medication list:   Medications removed:   None  Medications added:   Warfarin - per wife, patient stopped taking this about one month ago d/t procedures.  Pt had been following with [facility]  Medication doses adjusted:   Allopurinol - changed from 150mg to 300mg QHS.  Per wife, this was a recent dose increase  Furosemide - changed to 20mg every other day  Omeprazole - changed to 40mg daily PRN for heartburn  Other notes:   Entresto - per wife, they receive 90 days supply via mail through a discount program    Current Outpatient Medications   Medication Instructions    allopurinol (ZYLOPRIM) 300 mg, Nightly    Diphenhydramine-APAP, sleep, (TYLENOL PM EXTRA STRENGTH PO) 1 tablet, NIGHTLY    donepezil (ARICEPT) 5 mg, NIGHTLY    ENTRESTO 24-26 MG per tablet 1 tablet, Oral, 2 TIMES DAILY    furosemide (LASIX) 20 mg, EVERY OTHER DAY    metoprolol succinate (TOPROL XL) 25 mg, Oral, Nightly    nitroGLYCERIN (NITROSTAT) 0.4 MG SL tablet Place 1 tablet under the tongue every 5 minutes as needed    omeprazole (PRILOSEC) 40 mg, DAILY PRN    rosuvastatin (CRESTOR) 20 mg, Oral, NIGHTLY    warfarin (COUMADIN) 5 mg, SEE ADMIN INSTRUCTIONS       Please call with any questions.  Lexi Rodrigues PharmD., BCPS   5/15/2025 12:55 PM  Wireless: [phone]        
D/w [facility], heparin gtt is running at 900units/hr, which is equivalent to 10units/kg/hr. Based on anti-xa, increase to 14 units/kg/hr at this time, see MAR.   
During 0400 assessment, patient complained of pain in his R elbow with a severity of 9/10. This RN repositioned arm and assessed elbow and found no evidence of acute injury. Provider notified via PerfectServe. 1 mg one time dose of IV Morphine ordered and administered at this time. Patient assessment otherwise unchanged thus far through shift.   
EEG completed and available for interpretation on the [facility] database .    
Full bed bath with CHG solution completed at this time. Full linen change, gown changed, EKG leads and BP cuff changed. Patient has been hemodynamically stable thus far through shift.   
Heparin drip started at 1000 this am. Pt very somnolent/sleepy this morning (was prior to drip), waking to loud voice, oriented.   BP 98/56, levo restarted at noon (had been off overnight).    12:50pm: Systolic improving on low dose levo. Pt more alert, talking, moving.   Continuing to monitor.   
INTERVENTIONAL RADIOLOGY CONSULT NOTE    Subjective:    Jhony Hdez is a 84 y.o. male referred to Interventional Radiology in consultation for management of right upper quadrant pain.    Briefly, 83 yo male with recent stroke with re initiation of tube feeds and now having intermittent RUQ pain.    Review of Systems  Pertinent items are noted in HPI.     PMH/PSH:  Past Medical History:   Diagnosis Date    CHF (congestive heart failure) (HCC)     Chronic kidney disease     kidney stones    Colon cancer (HCC)     Guillain Barré syndrome     Hyperlipidemia     Hypertension     Left ureteral stone     Right Ureteral mass     Stroke (HCC) 05/15/2025     Past Surgical History:   Procedure Laterality Date    ADRENALECTOMY      groin    BACK SURGERY      CARPAL TUNNEL RELEASE      bilateral    COLONOSCOPY      CYSTOSCOPY Bilateral 03/27/2025    CYSTOSCOPY, BILATERAL STENT PLACEMENT,BILATERAL RETROGRADE PYELOGRAM performed by Eduardo Mendoza MD at [facility] OR    CYSTOSCOPY Bilateral 04/25/2025    CYSTOSCOPY RIGHT DIAGNOSTIC URETEROSCOPY RIGHT URETERAL MASS, EXCISION WITH HOLMIUM LASER, LEFT URETEROSCOPY HOLMIUM LASER STONE MANIPULATION WITH CVAC, BILATERAL STENT EXCHANGE performed by Eduardo Mendoza MD at [facility] OR    INTRACAPSULAR CATARACT EXTRACTION Right 03/19/2019    PHACOEMULSIFICATION OF CATARACT RIGHT EYE WITH INTRAOCULARLENS IMPLANT performed by Thierno Jensen MD at [facility] OR    KIDNEY STONE SURGERY      several    MANDIBLE SURGERY      NEUROVASCULAR PROCEDURE N/A 5/15/2025    ANGIOGRAM CEREBRAL (33054) performed by Gomez Sheth MD at [facility] CARDIAC CATH LAB    NEUROVASCULAR PROCEDURE N/A 5/16/2025    ANGIOGRAM CEREBRAL (85720) performed by Gomez Sheth MD at [facility] CARDIAC CATH LAB    OTHER SURGICAL HISTORY  10/25/2017     CYSTOSCOPY; TRANSURETHRAL RESECTION PROSTATE    THROMBECTOMY Right 05/15/2025    R M1 Gomez Sheth MD - [facility]    TOTAL ELBOW ARTHROPLASTY Left  
MD Diaz made aware of pt's urine output amount/color and bladder scan results via PerfectServe.  
NAME:  Jhony Hdez  YOB: 1940  MEDICAL RECORD NUMBER:  [id number]    TODAYS DATE:  5/15/2025      Last Known Well (date/time): 05/14 10pm    Consent Obtained: Emergent Consent     Endovascular Surgeon: vane    Pre-procedure NIHSS: 15    Pre-procedure pulses: Right Pedal pulses: Found by doppler                                       Left Pedal pulses: Found by doppler     Final TICI score: TICI 2C     Post-procedure NIHSS:18    Post-procedure pulses: Pedal pulses: Found by doppler     Patient transported to ICU for post-procedural care and handoff completed, including NIHSS exam at bedside in ICU.    Hand off report given to ICU RN day and night shiftpricilla .      Nurse eSignature: Electronically signed by Jayjay Conde RN on 5/15/2025 at 6:55 AM   
Occupational Therapy  [address]  Occupational Therapy Progress Note    Name: Jhony Hdez  : 1940  MRN: [id number]  Date of Service: 2025    Discharge Recommendations:  Subacute/Skilled Nursing Facility  OT Equipment Recommendations  Other: defer to next level of care       Patient Diagnosis(es): The primary encounter diagnosis was Paroxysmal atrial fibrillation (HCC). Diagnoses of Stroke (HCC), Cerebrovascular accident (CVA) due to other mechanism (HCC), Cerebrovascular accident (CVA), unspecified mechanism (HCC), Cardiomyopathy, unspecified type (HCC), and Encounter for other specified surgical aftercare were also pertinent to this visit.      Treatment Diagnosis: impaired ADLs/transfers s/p CVA      Assessment  Performance deficits / Impairments: Decreased functional mobility ;Decreased ADL status;Decreased ROM;Decreased strength;Decreased endurance;Decreased balance;Decreased vision/visual deficit  Assessment: Increased alertness and engagement today. Able to assist w/ bed mobility (both rolling to L and w/ supine to sit). Worked on core strength and control while seated at EOB x 20 minutes. Pt following directions for WS and for LOB corrections. Pt does assist w/ some AMITA SCOTT in gravity eliminated plane. Continue to recommend IP OT upon discharge. Continue per POC.  Treatment Diagnosis: impaired ADLs/transfers s/p CVA  Prognosis: Fair  REQUIRES OT FOLLOW-UP: Yes  Activity Tolerance  Activity Tolerance: Patient Tolerated treatment well;Patient limited by fatigue  Activity Tolerance Comments: increased alertness today     Plan  Occupational Therapy Plan  Times Per Week: 2-5  Current Treatment Recommendations: Strengthening, ROM, Balance training, Functional mobility training, Endurance training, Neuromuscular re-education, Safety education & training, Self-Care / ADL, Co-Treatment    Restrictions  Position Activity Restriction  Other Position/Activity Restrictions: up with 
Occupational Therapy  Facility/Department: [facility] ICU  Occupational Therapy Progress Note    Name: Jhony Hdez  : 1940  MRN: [id number]  Date of Service: 2025    Discharge Recommendations:  IP Rehab  OT Equipment Recommendations  Other: defer recommendations to discharge faciltiy       Patient Diagnosis(es): The primary encounter diagnosis was Paroxysmal atrial fibrillation (HCC). Diagnoses of Stroke (HCC), Cerebrovascular accident (CVA) due to other mechanism (HCC), and Cerebrovascular accident (CVA), unspecified mechanism (HCC) were also pertinent to this visit.      Treatment Diagnosis: impaired ADLs/transfers s/p CVA      Assessment  Performance deficits / Impairments: Decreased functional mobility ;Decreased ADL status;Decreased ROM;Decreased strength;Decreased endurance;Decreased balance;Decreased vision/visual deficit  Assessment: Pt drowsy. ModA of 2 for sit-stand transfers to Dede Stedy (unable to  w/ L hand). Bed to chair via Dede Stedy. Sitting balance impaired w/ significant lean to L, which increases w/ fatigue. AMITA SCOTT performed. Continue to recommend further IP OT u mya discharge. Continue per POC.  Treatment Diagnosis: impaired ADLs/transfers s/p CVA  Prognosis: Good  REQUIRES OT FOLLOW-UP: Yes  Activity Tolerance  Activity Tolerance: Patient limited by fatigue  Activity Tolerance Comments: and drowsiness - falling asleep during exercises     Plan  Occupational Therapy Plan  Times Per Week: 5-7  Current Treatment Recommendations: Strengthening, ROM, Balance training, Functional mobility training, Endurance training, Neuromuscular re-education, Safety education & training, Self-Care / ADL, Co-Treatment    Restrictions  Position Activity Restriction  Other Position/Activity Restrictions: up with assist    Subjective  General  Chart Reviewed: Yes  Patient assessed for rehabilitation services?: Yes  Additional Pertinent Hx: 84 y.o. M presented with left hemiplegia, aphasia, and 
Occupational Therapy  Facility/Department: [facility] ICU  Occupational Therapy Progress Note    Name: Jhony Hdez  : 1940  MRN: [id number]  Date of Service: 2025    Discharge Recommendations:  IP Rehab  OT Equipment Recommendations  Other: defer recommendations to discharge faciltiy       Patient Diagnosis(es): The primary encounter diagnosis was Paroxysmal atrial fibrillation (HCC). Diagnoses of Stroke (HCC), Cerebrovascular accident (CVA) due to other mechanism (HCC), and Cerebrovascular accident (CVA), unspecified mechanism (HCC) were also pertinent to this visit.  Past Medical History:  has a past medical history of CHF (congestive heart failure) (HCC), Chronic kidney disease, Colon cancer (HCC), Guillain Barré syndrome, Hyperlipidemia, Hypertension, Left ureteral stone, Right Ureteral mass, and Stroke (HCC).  Past Surgical History:  has a past surgical history that includes back surgery; Adrenalectomy; Carpal tunnel release; Total elbow arthroplasty (Left); Mandible surgery; other surgical history (10/25/2017); Kidney stone surgery; Intracapsular cataract extraction (Right, 2019); Cystoscopy (Bilateral, 2025); Colonoscopy; Cystoscopy (Bilateral, 2025); thrombectomy (Right, 05/15/2025); neurovascular procedure (N/A, 5/15/2025); and neurovascular procedure (N/A, 2025).    Treatment Diagnosis: impaired ADLs/transfers s/p CVA      Assessment  Performance deficits / Impairments: Decreased functional mobility ;Decreased ADL status;Decreased ROM;Decreased strength;Decreased endurance;Decreased balance;Decreased vision/visual deficit  Assessment: Increased alertness and participation today. LUE w/ notable increased movement today. Continues to demo impaired sitting/standing balance w/ lean to MARIA FERNANDA Narayan for bed > chair transfer. Recommend continued IP OT upon discharge. Continue per POC.  Treatment Diagnosis: impaired ADLs/transfers s/p CVA  Prognosis: Good  REQUIRES OT FOLLOW-UP: 
Occupational Therapy  Facility/Department: [facility] PCU  Daily Treatment Note  NAME: Jhony Hdez  : 1940  MRN: [id number]    Date of Service: 2025    Discharge Recommendations:  IP Rehab, Patient able to tolerate 3 hours of therapy per day  OT Equipment Recommendations  Equipment Needed: No  Other: defer to next level of care      Patient Diagnosis(es): The primary encounter diagnosis was Paroxysmal atrial fibrillation (HCC). Diagnoses of Stroke (HCC), Cerebrovascular accident (CVA) due to other mechanism (HCC), Cerebrovascular accident (CVA), unspecified mechanism (HCC), Cardiomyopathy, unspecified type (HCC), and Encounter for other specified surgical aftercare were also pertinent to this visit.     Assessment   Assessment: Pt tolerates session fair. Pt continues to be limited by decreased general command following/alertness (improves with session), decreased L sided awareness, and overall weakness. Pt continues to require assist x 2 for bed mobility and significant assist at times to maintain EOB sitting balance d/t L/posterior lean. With cues/commands and RUE support pt able to adjust sitting balance with CGA for brief periods. As pt continues to require skilled assistance and is below baseline he would benefit from cont skilled therapy to max progress towards PLOF/goals.  Activity Tolerance: Patient tolerated treatment well;Patient limited by fatigue;Patient limited by endurance;Treatment limited secondary to decreased cognition  Discharge Recommendations: IP Rehab;Patient able to tolerate 3 hours of therapy per day  Equipment Needed: No  Other: defer to next level of care     Plan  Occupational Therapy Plan  Times Per Week: 5-7  Current Treatment Recommendations: Strengthening;ROM;Balance training;Functional mobility training;Endurance training;Neuromuscular re-education;Safety education & training;Self-Care / ADL;Co-Treatment    Restrictions  Position Activity Restriction  Other Position/Activity 
Occupational Therapy  Facility/Department: [facility] ICU  Daily Treatment Note  NAME: Jhony Hdez  : 1940  MRN: [id number]    Date of Service: 2025    Discharge Recommendations:  IP Rehab  OT Equipment Recommendations  Equipment Needed: No (defer recommendations to discharge facility)      Patient Diagnosis(es): The primary encounter diagnosis was Paroxysmal atrial fibrillation (HCC). Diagnoses of Stroke (HCC) and Cerebrovascular accident (CVA) due to other mechanism (HCC) were also pertinent to this visit.  Past Medical History:  has a past medical history of CHF (congestive heart failure) (HCC), Chronic kidney disease, Colon cancer (HCC), Guillain Barré syndrome, Hyperlipidemia, Hypertension, Left ureteral stone, Right Ureteral mass, and Stroke (HCC).  Past Surgical History:  has a past surgical history that includes back surgery; Adrenalectomy; Carpal tunnel release; Total elbow arthroplasty (Left); Mandible surgery; other surgical history (10/25/2017); Kidney stone surgery; Intracapsular cataract extraction (Right, 2019); Cystoscopy (Bilateral, 2025); Colonoscopy; Cystoscopy (Bilateral, 2025); and thrombectomy (Right, 05/15/2025).    Assessment       Assessment: Pt tolerated session well with improved alertness. Continues to require assist of 2 for safety with mobility and progression of mobility. Max A x2 for bed mobility and requires consistent hands on assistance for maintaining upright sitting balance. Also still demos weakness in LUE with very minimal functional movement, but Pt does attempt to move/incorporate it after cueing. Recommend ongoing OT to work on ADLs, funct mob and transfers. Cont with POC      Activity Tolerance: Patient tolerated treatment well  Discharge Recommendations: IP Rehab  Equipment Needed: No (defer recommendations to discharge facility)     Plan  Occupational Therapy Plan  Times Per Week: 5-7    Restrictions  None 
Occupational Therapy  Facility/Department: [facility] ICU  Daily Treatment Note  NAME: Jhony Hdez  : 1940  MRN: [id number]    Date of Service: 2025    Discharge Recommendations:  IP Rehab, Patient able to tolerate 3 hours of therapy per day  OT Equipment Recommendations  Equipment Needed: No  Other: defer recommendations to discharge faciltiy      Patient Diagnosis(es): The primary encounter diagnosis was Paroxysmal atrial fibrillation (HCC). Diagnoses of Stroke (HCC), Cerebrovascular accident (CVA) due to other mechanism (HCC), and Cerebrovascular accident (CVA), unspecified mechanism (HCC) were also pertinent to this visit.     Assessment   Assessment: Pt tolerates session fair overall. Pt continues to be limited with decreased cognition at times, including decreased L sided awareness and delayed command processing speed. Pt provided mod x 2 assist for bed mobility and sit to stand from bed to rk steady. Heavy cues with transfers to correct L lean. Pt tolerates ~ 10-12 minutes seated balance exercises, including trunk flexion x 5 reps x 1 set, reaching with RUE in all planes x 5 reps total, and R elbow weight shift/lean x 5 reps total. Overall, as pt continues to demo significant deficits and requires skilled assist x 2, he would benefit from cont skilled inpt therapy to max progress towards PLOF.  Activity Tolerance: Patient tolerated treatment well  Discharge Recommendations: IP Rehab;Patient able to tolerate 3 hours of therapy per day  Equipment Needed: No  Other: defer recommendations to discharge faciltiy     Plan  Occupational Therapy Plan  Times Per Week: 5-7  Current Treatment Recommendations: Strengthening;ROM;Balance training;Functional mobility training;Endurance training;Neuromuscular re-education;Safety education & training;Self-Care / ADL;Co-Treatment    Restrictions  Position Activity Restriction  Other Position/Activity Restrictions: up with assist    Subjective  Subjective  Subjective: 
Occupational Therapy  Facility/Department: [facility] ICU  Occupational Therapy Treatment Note    Name: Jhony Hdez  : 1940  MRN: [id number]  Date of Service: 2025    Discharge Recommendations:  IP Rehab, Patient able to tolerate 3 hours of therapy per day  OT Equipment Recommendations  Other: defer to next level of care       Patient Diagnosis(es): The primary encounter diagnosis was Paroxysmal atrial fibrillation (HCC). Diagnoses of Stroke (HCC), Cerebrovascular accident (CVA) due to other mechanism (HCC), and Cerebrovascular accident (CVA), unspecified mechanism (HCC) were also pertinent to this visit.  Past Medical History:  has a past medical history of CHF (congestive heart failure) (HCC), Chronic kidney disease, Colon cancer (HCC), Guillain Barré syndrome, Hyperlipidemia, Hypertension, Left ureteral stone, Right Ureteral mass, and Stroke (HCC).  Past Surgical History:  has a past surgical history that includes back surgery; Adrenalectomy; Carpal tunnel release; Total elbow arthroplasty (Left); Mandible surgery; other surgical history (10/25/2017); Kidney stone surgery; Intracapsular cataract extraction (Right, 2019); Cystoscopy (Bilateral, 2025); Colonoscopy; Cystoscopy (Bilateral, 2025); thrombectomy (Right, 05/15/2025); neurovascular procedure (N/A, 5/15/2025); and neurovascular procedure (N/A, 2025).    Treatment Diagnosis: impaired ADLs/transfers s/p CVA      Assessment  Performance deficits / Impairments: Decreased functional mobility ;Decreased ADL status;Decreased ROM;Decreased strength;Decreased endurance;Decreased balance;Decreased vision/visual deficit  Assessment: Pt is max A of 1-2 for all bed mobility, initial max A with static sitting with brief period of CGA, dep with toileting/changing gown. Pt is dep of 2 with use of rk stedy to transfer.   He has flat affect, cues to initiate all mobility.  Recommend  ARU at discharge to maximize functional 
Occupational Therapy/Physical Therapy  Attempt    Per discussion w/ RN, pt recently received Morphine and is drowsy at this time. Pt is leaving floor between  for scan and request is for f/u this afternoon. Will attempt f/u later today as schedule permits.    Namrata Dougherty OTR/L #[id number]  Laurence León, PT #[id number]      
Occupational Therapy/Physical Therapy  Attempted Treatment    Attempted to see pt this morning. Pt alert and engaged in conversation - increased alertness from previous sessions. Per discussion w/ RN and medical team, pt to receive an enema today. Wife/pt expressing desire to receive enema enema now and therapy later today if possible. Will attempt f/u later this afternoon as schedule permits vs. Continue per POC. RN present and aware of discussion w/ pt.    Namrata Dougherty OTR/L #[id number]  Danica Bledsoe  PT  [id number]    
Occupational Therapy/Physical Therapy  HOLD    Per discussion w/ nurse, pt is too lethargic to participate in therapy today. Will continue per POC.    Namrata Dougherty OTR/L #[id number]    Nam Lofton, PT, DPT     
Palliative Care Chart Review  and Check in Note:     NAME:  Jhony Hdez  Admit Date: 5/15/2025  Hospital Day:  Hospital Day: 20   Current Code status: Full Code    Palliative care is continuing to following Mr. Hdez for symptom management,  and goals of care discussion as needed. Patient's chart reviewed today 6/3/25.      Approached by daughter in the hallway expressing frustration about discharge order and her father's condition, and requesting to speak with neurosurgery. I sent Dr. Sheth a message via [employer]. D/w SANDRA Briones and will d/w Dr. Diaz.    The following are the currently established goals/code status, and Symptom management.     Goals of care: Family  hopes pt can recover.    Code status: Full Code    Discharge plan: d/c to SNF when able    Maliha Chavira NP  Palliative Care  [phone]    
Patient got from chair to bed via lift. The room layout did not support this. It was very uncomfortable for him and staff. He is currently back in bed, lethargic, with dopamine drip at 2.5 and NS at 100.  Lila Baldwin RN    
Patient having episodes of bradycardia as low as 38 at times. HR typically resting in the 70s. NCC aware.   
Patient made NPO at this time per order.   
Patient off unit for angio.   
Patient passed 3oz swallow assessment, however after taking pills, patient was noted to have a wet cough and gurgling with speech. ICU resident Rosemarie Nathan, AKBAR NP and Vicki SLP all notified of above. Plan to keep patient NPO until MBS which is planned for today.  Wife at bedside updated on plan.   
Patients corpak came out to 50 and xray confirmed it was no longer in the stomach. Corpak replaced and chest xray confirmed placement.   
Per Gladys NCC if SBP is consistently below 100mmHg, start levophed. Levo held at this time d/t /67.  
Phlebotomist at bedside top collect Anti-XA. Charge nurse made aware about the delay.    
Physical Therapy  [address]  Physical [address] Treatment    Name: Jhony Hdez  : 1940  MRN: [id number]  Date of Service: 2025    Discharge Recommendations:  Subacute/Skilled Nursing Facility   PT Equipment Recommendations  Equipment Needed: No  Other: defer      Patient Diagnosis(es): The primary encounter diagnosis was Paroxysmal atrial fibrillation (HCC). Diagnoses of Stroke (HCC), Cerebrovascular accident (CVA) due to other mechanism (HCC), Cerebrovascular accident (CVA), unspecified mechanism (HCC), Cardiomyopathy, unspecified type (HCC), and Encounter for other specified surgical aftercare were also pertinent to this visit.  Past Medical History:  has a past medical history of CHF (congestive heart failure) (HCC), Chronic kidney disease, Colon cancer (HCC), Guillain Barré syndrome, Hyperlipidemia, Hypertension, Left ureteral stone, Right Ureteral mass, and Stroke (HCC).  Past Surgical History:  has a past surgical history that includes back surgery; Adrenalectomy; Carpal tunnel release; Total elbow arthroplasty (Left); Mandible surgery; other surgical history (10/25/2017); Kidney stone surgery; Intracapsular cataract extraction (Right, 2019); Cystoscopy (Bilateral, 2025); Colonoscopy; Cystoscopy (Bilateral, 2025); thrombectomy (Right, 05/15/2025); neurovascular procedure (N/A, 5/15/2025); neurovascular procedure (N/A, 2025); and ep device procedure (N/A, 2025).    Assessment  Body Structures, Functions, Activity Limitations Requiring Skilled Therapeutic Intervention: Decreased functional mobility ;Decreased strength;Decreased balance  Assessment: Pt continues to be lethargic which limits participation. Alertness improves some when in more upright position although pt unable to complete more than a few reps of LE exercises. Pt incontinent of bowels - pericare provided. Pt good candidate for maxi move for transfer to chair. Unable to complete today due 
Physical Therapy  Facility/Department: [facility] ICU  Physical Therapy Treatment    Name: Jhony Hdez  : 1940  MRN: [id number]  Date of Service: 2025    Discharge Recommendations:  IP Rehab, Patient able to tolerate 3 hours of therapy per day   PT Equipment Recommendations  Equipment Needed: No  Other: defer      Patient Diagnosis(es): The primary encounter diagnosis was Paroxysmal atrial fibrillation (HCC). Diagnoses of Stroke (HCC), Cerebrovascular accident (CVA) due to other mechanism (HCC), and Cerebrovascular accident (CVA), unspecified mechanism (HCC) were also pertinent to this visit.  Past Medical History:  has a past medical history of CHF (congestive heart failure) (HCC), Chronic kidney disease, Colon cancer (HCC), Guillain Barré syndrome, Hyperlipidemia, Hypertension, Left ureteral stone, Right Ureteral mass, and Stroke (HCC).  Past Surgical History:  has a past surgical history that includes back surgery; Adrenalectomy; Carpal tunnel release; Total elbow arthroplasty (Left); Mandible surgery; other surgical history (10/25/2017); Kidney stone surgery; Intracapsular cataract extraction (Right, 2019); Cystoscopy (Bilateral, 2025); Colonoscopy; Cystoscopy (Bilateral, 2025); thrombectomy (Right, 05/15/2025); neurovascular procedure (N/A, 5/15/2025); and neurovascular procedure (N/A, 2025).    Assessment  Assessment: Pt lives with wife and is independent at home without DME.  Improved participation today, able to tolerate bed mobility, transfers and sitting balance.  Continues to need A x 2 for all mobility and dependent for bed to chair.  Recommend continued IP therapies to maximize mobility, safety and independence.  Activity Tolerance  Activity Tolerance: Patient tolerated treatment well    Plan  Physical Therapy Plan  General Plan: 5-7 times per week  Current Treatment Recommendations: Strengthening, Balance training, Functional mobility training, Transfer training, 
Physical Therapy  Facility/Department: [facility] PCU  Daily Treatment Note  NAME: Jhony Hdez  : 1940  MRN: [id number]    Date of Service: 2025    Discharge Recommendations:  IP Rehab, Subacute/Skilled Nursing Facility   PT Equipment Recommendations  Equipment Needed: No  Other: defer    Patient Diagnosis(es): The primary encounter diagnosis was Paroxysmal atrial fibrillation (HCC). Diagnoses of Stroke (HCC), Cerebrovascular accident (CVA) due to other mechanism (HCC), Cerebrovascular accident (CVA), unspecified mechanism (HCC), Cardiomyopathy, unspecified type (HCC), and Encounter for other specified surgical aftercare were also pertinent to this visit.    Assessment  Assessment: Pt continues to function well below baseline. Requires assistance of 2 people for bed mobility & transfers. Pt demonstrates left hemiplegia & impaired balance. Pt unable to ambulate & requires use of rk stedy for transfer from bed to chair. Safety concerns for pt to go home upon D/C. Recommend further inpt PT. Will follow per plan of care.  Activity Tolerance: Patient tolerated treatment well;Patient limited by fatigue;Patient limited by endurance;Treatment limited secondary to decreased cognition  Other: defer    Plan  Physical Therapy Plan  General Plan: 5-7 times per week  Current Treatment Recommendations: Strengthening;Balance training;Functional mobility training;Transfer training;Endurance training;Gait training;Stair training;Safety education & training;Patient/Caregiver education & training;Therapeutic activities;Neuromuscular re-education    Restrictions  Position Activity Restriction  Other Position/Activity Restrictions: up with assist;  - Do not elevate affected arm above shoulder for 30 days (s/p pacemaker )     Subjective   Subjective  Subjective: Pt supine in bed & willing to participate. Pt lethargic & minmally verbal.  Pain: pt c/o pain in right elbow, bilat knees, neck, shoulders. not rated. RN 
Physical Therapy  Facility/Department: [facility] ICU  Daily Treatment Note  NAME: Jhony Hdez  : 1940  MRN: [id number]    Date of Service: 2025    Discharge Recommendations:  IP Rehab, Patient able to tolerate 3 hours of therapy per day   PT Equipment Recommendations  Other: defer    At baseline this patient was independent with functional mobility & ADL's. The current hospitalization has resulted in the patient now being limited with all aspects of mobility, negatively impacting the patient's functional independence, ability to safely access their home environment, and ability to complete valued daily tasks and life roles. Jhony Hdez is motivated for recovery and demonstrates the ability to tolerate inpatient rehabilitation 3 hours/day, 5 days/week for optimal return to functional independence, quality of life, and decreased caregiver burden.      Patient Diagnosis(es): The primary encounter diagnosis was Paroxysmal atrial fibrillation (HCC). Diagnoses of Stroke (HCC), Cerebrovascular accident (CVA) due to other mechanism (HCC), and Cerebrovascular accident (CVA), unspecified mechanism (HCC) were also pertinent to this visit.    Assessment  Assessment: Pt with inc'd alertness this date and tolerated therapy session with no adverse events. Pt participated in functional transfer training including bed mobility and EOB sitting static/dynamic tasks. Overall, pt required maximal assist to mobilize and varying assist levels to maintain his sitting balance at the EOB. Pt remains with impaired motor control, coordination, sensation and strength of the L hemibody and functioning well below his baseline (typically high functioning and independent in functional mobilty, self care). Pt will benefit from continued PT services during his hospital stay and upon medical clearance at ARU level of care to address the above stated deficits and to maximize his functional independence.  Activity Tolerance: Patient 
Physical Therapy  Facility/Department: [facility] ICU  Daily Treatment Note  NAME: Jhony Hdez  : 1940  MRN: [id number]    Date of Service: 2025    Discharge Recommendations:  IP Rehab   PT Equipment Recommendations  Equipment Needed: No  Other: defer    Patient Diagnosis(es): The primary encounter diagnosis was Paroxysmal atrial fibrillation (HCC). Diagnoses of Stroke (HCC), Cerebrovascular accident (CVA) due to other mechanism (HCC), and Cerebrovascular accident (CVA), unspecified mechanism (HCC) were also pertinent to this visit.    Assessment  Assessment: Pt continues to function well below baseline. Requires assistance of 2 people for bed mobility & transfers. Pt demonstrates left hemiplegia & impaired balance. Pt unable to ambulate & requires use of rk stedy for transfer from bed to chair. Safety concerns for pt to go home upon D/C. Recommend further inpt PT. Will follow per plan of care.  Activity Tolerance: Patient tolerated treatment well;Patient limited by fatigue;Patient limited by endurance;Treatment limited secondary to decreased cognition (lethargy)  Equipment Needed: No  Other: defer    Plan  Physical Therapy Plan  General Plan: 5-7 times per week  Current Treatment Recommendations: Strengthening;Balance training;Functional mobility training;Transfer training;Endurance training;Gait training;Stair training;Safety education & training;Patient/Caregiver education & training;Therapeutic activities;Neuromuscular re-education    Restrictions  Position Activity Restriction  Other Position/Activity Restrictions: up with assist     Subjective   Subjective  Subjective: Pt supine in bed & willing to participate. Pt lethargic & minmally verbal.  Pain: pt denies pain at rest. c/o bilat knee pain with mobility. not rated.    Objective     Bed Mobility Training  Bed Mobility Training: Yes  Rolling: Substantial/Maximal assistance (roll to right & left x 2 for pericare & pad change after BM)  Supine to 
Physical Therapy  Facility/Department: [facility] ICU  Physical Therapy Treatment    Name: Jhony Hdez  : 1940  MRN: [id number]  Date of Service: 2025    Discharge Recommendations:  IP Rehab, Patient able to tolerate 3 hours of therapy per day   PT Equipment Recommendations  Other: defer      Patient Diagnosis(es): The primary encounter diagnosis was Paroxysmal atrial fibrillation (HCC). Diagnoses of Stroke (HCC), Cerebrovascular accident (CVA) due to other mechanism (HCC), and Cerebrovascular accident (CVA), unspecified mechanism (HCC) were also pertinent to this visit.  Past Medical History:  has a past medical history of CHF (congestive heart failure) (HCC), Chronic kidney disease, Colon cancer (HCC), Guillain Barré syndrome, Hyperlipidemia, Hypertension, Left ureteral stone, Right Ureteral mass, and Stroke (HCC).  Past Surgical History:  has a past surgical history that includes back surgery; Adrenalectomy; Carpal tunnel release; Total elbow arthroplasty (Left); Mandible surgery; other surgical history (10/25/2017); Kidney stone surgery; Intracapsular cataract extraction (Right, 2019); Cystoscopy (Bilateral, 2025); Colonoscopy; Cystoscopy (Bilateral, 2025); thrombectomy (Right, 05/15/2025); neurovascular procedure (N/A, 5/15/2025); and neurovascular procedure (N/A, 2025).    Assessment  Assessment: Pt showing gradual progress in all areas.  Pt fairly alert and interactive during therapy.  Pt with soft tone of voice and difficult to understand at times.  Pt able to perform bed mobility, EOB sitting, transfer to and from [facility] with assist of 2.  Pt tends to lean L in sitting and standing, requiring some assist and heavy cues for upright posture.  Pt positioned in recliner to comfort.  Rec continued inpt PT at d/c to return pt to his PLOF of independence.    At baseline this patient was independent with functional mobility & ADL's. The current hospitalization has resulted in the 
Physical Therapy/Occupational Therapy  Hold    PT/OT orders received, pt currently off floor for CT and has pending procedure. Will follow up when appropriate.    Addendum @ 4996: Pt is on BEDREST until 1400. Will attempt f/u as schedule permits. Discussed w/ RN.    Elmira Hernandez PT, DPT, NCS, CSRS   Namrata Dougherty OTR/L #[id number]      
Physical Therapy/Occupational Therapy  Hold    PT/OT reviewed chart, Plan for angiogram today with neurosurgery. Will hold therapy and follow up per POC.      Elmira Hernandez PT, DPT, NCS, CSRS   Namrata Dougherty OTR/L #[id number]      
Physical Therapy/Occupational Therapy  Unavailable    Came to see pt for PT/OT.  Pt presently in [facility].  Will return later as schedule allows.    Laura Cisneros PT [id number]  [address] VANCE/KATHARINA        
Progress Note  Physical Medicine and Rehabilitation    Patient: Jhony Hdez  [id number]  Date: 5/22/2025      Chief Complaint: Left Hemiplegia    History of Present Illness/Hospital Course:  -Patient with emesis this AM, tube feeds were paused  -Patient seen at bedside this AM.  -He is with improvement of mental status.  -He is able to communicate verbally and express that he has discomfort in his abdomen that has been keeping him up all night and all day.  -ROS limited.    Physical Examination:  Vitals: Patient Vitals for the past 24 hrs:   BP Temp Temp src Pulse Resp SpO2 Weight   05/22/25 0900 (!) 95/58 -- -- 57 27 99 % --   05/22/25 0845 (!) 101/56 -- -- 58 22 98 % --   05/22/25 0830 103/73 -- -- 55 23 96 % --   05/22/25 0815 (!) 81/70 -- -- 60 19 97 % --   05/22/25 0800 103/65 98.3 °F (36.8 °C) Temporal 55 23 99 % --   05/22/25 0745 (!) 99/57 -- -- 58 16 96 % --   05/22/25 0730 (!) 94/57 -- -- 58 19 97 % --   05/22/25 0722 -- 98.3 °F (36.8 °C) -- -- -- -- --   05/22/25 0715 (!) 104/56 -- -- 53 21 97 % --   05/22/25 0700 99/68 -- -- 58 21 97 % --   05/22/25 0645 107/61 -- -- 60 18 96 % --   05/22/25 0630 103/69 -- -- 53 21 100 % --   05/22/25 0618 101/63 -- -- 58 21 -- --   05/22/25 0615 -- -- -- 52 20 100 % --   05/22/25 0600 102/60 -- -- 70 14 97 % --   05/22/25 0545 95/71 -- -- 65 17 98 % --   05/22/25 0530 127/61 -- -- 72 16 98 % --   05/22/25 0515 112/69 -- -- 65 19 98 % --   05/22/25 0500 (!) 119/52 -- -- 51 14 97 % --   05/22/25 0445 119/60 -- -- 50 22 97 % --   05/22/25 0430 (!) 106/57 -- -- 58 14 98 % 79.2 kg (174 lb 9.7 oz)   05/22/25 0415 (!) 117/52 -- -- 68 20 96 % --   05/22/25 0400 98/62 98.3 °F (36.8 °C) Temporal 69 23 98 % --   05/22/25 0345 111/62 -- -- 81 21 95 % --   05/22/25 0330 (!) 97/59 -- -- 66 20 97 % --   05/22/25 0315 (!) 84/59 -- -- 56 21 94 % --   05/22/25 0300 (!) 96/54 -- -- 50 16 97 % --   05/22/25 0245 95/64 -- -- 54 16 95 % --   05/22/25 0233 (!) 112/53 -- -- 91 20 -- -- 
Progress Note  Physical Medicine and Rehabilitation    Patient: Jhony Hdez  [id number]  Date: 6/3/2025      Chief Complaint: Left Hemiplegia    Interval history:  Continues to be very limited in therapy.  Patient will likely need skilled nursing facility.          Physical Examination:  Vitals: Patient Vitals for the past 24 hrs:   BP Temp Temp src Pulse Resp SpO2 Weight   06/03/25 0818 134/69 97.8 °F (36.6 °C) Oral 83 18 95 % --   06/03/25 0700 -- -- -- -- -- -- 77.5 kg (170 lb 13.7 oz)   06/03/25 0330 112/65 98 °F (36.7 °C) Oral 63 20 96 % --   06/02/25 2316 111/64 98.1 °F (36.7 °C) Axillary 62 16 97 % --   06/02/25 2055 107/64 98 °F (36.7 °C) Oral 60 21 98 % --   06/02/25 1507 119/64 99 °F (37.2 °C) Oral 67 18 97 % --   06/02/25 1148 99/62 98 °F (36.7 °C) Axillary 53 18 96 % --     Physical Exam  Constitutional:       General: He is not in acute distress.     Appearance: Normal appearance. He is not ill-appearing or diaphoretic.   HENT:      Head: Normocephalic and atraumatic.   Cardiovascular:      Rate and Rhythm: Normal rate and regular rhythm.   Pulmonary:      Effort: Pulmonary effort is normal.      Breath sounds: Normal breath sounds. No wheezing, rhonchi or rales.   Abdominal:      General: There is no distension.      Palpations: Abdomen is soft.      Tenderness: There is no abdominal tenderness. There is no guarding.   Musculoskeletal:      Right lower leg: No edema.      Left lower leg: No edema.   Skin:     General: Skin is warm and dry.   Neurological:      Mental Status: He is alert.      Motor: Weakness present.      Coordination: Coordination normal.      Comments: Limited neuro exam  Right arm in sling s/p ICD placement  Finger  intact on the right  2/5 muscle strength in all other extremities  Follows verbal commands  Unchanged exam          Lab Results   Component Value Date    WBC 9.4 06/03/2025    HGB 9.8 (L) 06/03/2025    HCT 29.2 (L) 06/03/2025    MCV 83.9 06/03/2025     
Progress Note  Physical Medicine and Rehabilitation    Patient: Jhony Hdez  [id number]  Date: 5/28/2025      Chief Complaint: Left Hemiplegia    History of Present Illness/Hospital Course:  -NAEO  -patient now saturating on room air  -patient seen this AM at bedside,  -He reports he is doing well and interactive this AM.  -He reports pain at the right arm.  -pending ARU placement when medically ready.  -pending pacer placement by EP.    Physical Examination:  Vitals: Patient Vitals for the past 24 hrs:   BP Temp Temp src Pulse Resp SpO2 Weight   05/28/25 0915 (!) 83/53 -- -- 73 23 95 % --   05/28/25 0900 (!) 103/58 -- -- 85 22 96 % --   05/28/25 0845 116/66 -- -- 86 23 96 % --   05/28/25 0830 (!) 114/55 -- -- 91 25 95 % --   05/28/25 0815 96/63 -- -- 72 22 94 % --   05/28/25 0800 101/60 -- -- 70 21 94 % --   05/28/25 0745 94/66 -- -- 70 23 95 % --   05/28/25 0730 (!) 96/59 99.4 °F (37.4 °C) Oral 76 21 96 % --   05/28/25 0715 115/64 -- -- 83 23 95 % --   05/28/25 0700 114/65 -- -- 82 23 96 % --   05/28/25 0645 108/60 -- -- 81 24 95 % --   05/28/25 0630 115/64 -- -- 85 25 94 % --   05/28/25 0615 112/62 -- -- 81 24 95 % --   05/28/25 0600 (!) 93/57 -- -- 72 26 95 % --   05/28/25 0545 (!) 91/58 -- -- 74 24 96 % --   05/28/25 0530 122/62 -- -- 85 21 95 % --   05/28/25 0521 -- -- -- -- 22 -- --   05/28/25 0515 93/75 -- -- 90 25 96 % --   05/28/25 0500 129/80 -- -- 86 27 98 % --   05/28/25 0451 -- -- -- -- 18 -- --   05/28/25 0445 104/74 -- -- 84 27 94 % --   05/28/25 0430 (!) 107/57 -- -- 94 26 94 % --   05/28/25 0415 (!) 137/55 -- -- 80 25 93 % --   05/28/25 0400 103/67 98.9 °F (37.2 °C) Axillary 90 21 94 % --   05/28/25 0345 111/75 -- -- 88 21 94 % --   05/28/25 0330 113/61 -- -- 89 27 96 % --   05/28/25 0315 119/73 -- -- 79 14 94 % --   05/28/25 0300 (!) 140/122 -- -- 89 18 96 % 95.9 kg (211 lb 6.7 oz)   05/28/25 0245 123/78 -- -- 87 18 96 % --   05/28/25 0230 98/61 -- -- 84 24 96 % --   05/28/25 0215 110/72 -- -- 
Progress Note  Physical Medicine and Rehabilitation    Patient: Jhony Hdez  [id number]  Date: 6/1/2025      Chief Complaint: Left Hemiplegia    History of Present Illness/Hospital Course:  Patient had IV medications discontinued- he continues to be limited in mobility and is lethargic today. Patient requires telemetry at this time.  Will continue to follow this week with hopes for improvement and medical clearance for ARU      Physical Examination:  Vitals: Patient Vitals for the past 24 hrs:   BP Temp Temp src Pulse Resp SpO2 Weight   06/01/25 0750 103/62 98.7 °F (37.1 °C) Oral 91 18 93 % --   06/01/25 0600 -- -- -- 81 -- -- --   06/01/25 0345 129/74 98.4 °F (36.9 °C) Oral 78 16 95 % 72.1 kg (158 lb 15.2 oz)   06/01/25 0200 -- -- -- 72 -- -- --   05/31/25 2300 134/73 98.1 °F (36.7 °C) Oral 75 16 96 % --   05/31/25 2208 -- -- -- 74 -- -- --   05/31/25 1940 123/73 98.1 °F (36.7 °C) Oral 72 16 97 % --   05/31/25 1815 116/66 -- -- 73 -- 93 % --   05/31/25 1656 110/62 98.2 °F (36.8 °C) Oral 76 18 94 % --   05/31/25 1300 111/65 98 °F (36.7 °C) Oral 80 17 93 % --     Physical Exam  Constitutional:       General: He is not in acute distress.     Appearance: Normal appearance. He is not ill-appearing or diaphoretic.   HENT:      Head: Normocephalic and atraumatic.   Cardiovascular:      Rate and Rhythm: Normal rate and regular rhythm.   Pulmonary:      Effort: Pulmonary effort is normal.      Breath sounds: Normal breath sounds. No wheezing, rhonchi or rales.   Abdominal:      General: There is no distension.      Palpations: Abdomen is soft.      Tenderness: There is no abdominal tenderness. There is no guarding.   Musculoskeletal:      Right lower leg: No edema.      Left lower leg: No edema.   Skin:     General: Skin is warm and dry.   Neurological:      Mental Status: He is alert.      Motor: Weakness present.      Coordination: Coordination normal.      Comments: Limited neuro exam  Right arm in sling s/p ICD 
Progress Note  Physical Medicine and Rehabilitation    Patient: Jhony Hdez  [id number]  Date: 5/26/2025      Chief Complaint: Left Hemiplegia    History of Present Illness/Hospital Course:  Patient still requiring critical care treatment at this time.  Will continue to follow the patient for possible ARU admit in the future.    Physical Examination:  Vitals: Patient Vitals for the past 24 hrs:   BP Temp Temp src Pulse Resp SpO2   05/26/25 1200 (!) 91/53 99.5 °F (37.5 °C) Oral 52 22 93 %   05/26/25 1145 (!) 85/52 -- -- 61 22 91 %   05/26/25 1130 138/74 -- -- 79 19 96 %   05/26/25 1115 (!) 143/87 -- -- 90 21 97 %   05/26/25 1100 (!) 143/88 -- -- 85 11 95 %   05/26/25 1045 (!) 147/93 -- -- 88 20 97 %   05/26/25 1030 132/76 -- -- 70 18 94 %   05/26/25 1015 125/67 -- -- 53 15 94 %   05/26/25 1000 119/68 -- -- 53 15 96 %   05/26/25 0945 120/64 -- -- 52 18 98 %   05/26/25 0930 118/66 -- -- 52 22 98 %   05/26/25 0915 100/71 -- -- 54 20 99 %   05/26/25 0900 109/67 -- -- 53 19 98 %   05/26/25 0845 111/67 -- -- 53 17 100 %   05/26/25 0830 104/67 -- -- 76 23 90 %   05/26/25 0815 105/61 -- -- 54 22 94 %   05/26/25 0800 105/66 98.7 °F (37.1 °C) Axillary 63 19 94 %   05/26/25 0745 104/66 -- -- 69 18 96 %   05/26/25 0730 119/71 -- -- 54 17 93 %   05/26/25 0715 105/67 -- -- 59 21 95 %   05/26/25 0700 98/71 -- -- 69 20 94 %   05/26/25 0400 -- 98.7 °F (37.1 °C) Axillary -- -- --   05/26/25 0000 -- 98.1 °F (36.7 °C) Axillary -- -- --   05/25/25 2000 -- 98.6 °F (37 °C) Axillary -- -- --   05/25/25 1715 -- -- -- 52 28 94 %   05/25/25 1700 (!) 152/82 -- -- 87 22 (!) 83 %   05/25/25 1645 138/79 -- -- 93 20 100 %   05/25/25 1630 (!) 146/88 -- -- 80 20 97 %   05/25/25 1615 134/73 -- -- 60 20 94 %   05/25/25 1600 132/73 98.1 °F (36.7 °C) Axillary 59 20 96 %   05/25/25 1545 113/87 -- -- 54 23 100 %   05/25/25 1530 113/66 -- -- 62 23 97 %   05/25/25 1515 (!) 83/60 -- -- 76 25 99 %   05/25/25 1500 (!) 96/54 -- -- 81 19 98 %   05/25/25 1445 
Progress Note  Physical Medicine and Rehabilitation    Patient: Jhony Hdez  [id number]  Date: 5/19/2025      Chief Complaint: Left Hemiplegia    History of Present Illness/Hospital Course:  NAEO  Patient seen at bedside this AM,  He is with lethargy and able to awaken upon verbal and tactile stimuli.  ROS could not be obtained  ROS  Could not be obtained due to lethargy    Physical Examination:  Vitals: Patient Vitals for the past 24 hrs:   BP Temp Temp src Pulse Resp SpO2 Weight   05/19/25 0915 109/88 -- -- 62 21 97 % --   05/19/25 0900 131/68 -- -- (!) 49 15 96 % --   05/19/25 0845 124/74 -- -- 62 20 97 % --   05/19/25 0830 125/72 -- -- 61 13 96 % --   05/19/25 0815 132/75 -- -- 61 23 -- --   05/19/25 0800 138/65 98.2 °F (36.8 °C) Oral 64 14 97 % --   05/19/25 0745 (!) 133/54 -- -- (!) 47 14 97 % --   05/19/25 0730 (!) 135/52 -- -- 61 16 91 % --   05/19/25 0715 (!) 121/43 -- -- (!) 39 20 98 % --   05/19/25 0700 (!) 98/55 -- -- (!) 47 21 95 % --   05/19/25 0645 (!) 108/44 -- -- (!) 43 23 100 % --   05/19/25 0630 116/78 -- -- 57 20 98 % --   05/19/25 0615 119/77 -- -- 55 15 98 % --   05/19/25 0600 (!) 139/93 -- -- 61 18 99 % --   05/19/25 0545 115/72 -- -- 56 22 97 % --   05/19/25 0530 113/83 -- -- 61 17 98 % --   05/19/25 0515 (!) 138/56 -- -- (!) 41 16 91 % --   05/19/25 0500 (!) 131/55 -- -- (!) 41 16 94 % --   05/19/25 0445 132/69 -- -- (!) 46 15 98 % --   05/19/25 0430 131/78 -- -- 57 23 98 % --   05/19/25 0415 118/74 -- -- 57 19 97 % 81.1 kg (178 lb 12.7 oz)   05/19/25 0400 126/64 -- -- (!) 39 16 97 % --   05/19/25 0345 (!) 130/53 -- -- 51 21 100 % --   05/19/25 0330 110/75 -- -- (!) 39 21 97 % --   05/19/25 0315 122/60 -- -- 53 22 98 % --   05/19/25 0300 116/63 -- -- (!) 45 20 92 % --   05/19/25 0245 (!) 116/59 -- -- 58 25 96 % --   05/19/25 0230 -- -- -- 76 18 95 % --   05/19/25 0215 138/66 -- -- 51 15 99 % --   05/19/25 0200 (!) 140/80 -- -- (!) 43 17 96 % --   05/19/25 0145 133/85 -- -- 60 25 98 % -- 
Progress Note  Physical Medicine and Rehabilitation    Patient: Jhony Hdez  [id number]  Date: 5/30/2025      Chief Complaint: Left Hemiplegia    History of Present Illness/Hospital Course:  -NAEO  -patient with PT this AM  -He continues to have sustained improvements in alertness   -Patient seen at bedside this AM.  -He denies any acute complaints.        Physical Examination:  Vitals: Patient Vitals for the past 24 hrs:   BP Temp Temp src Pulse Resp SpO2 Weight   05/30/25 0911 (!) 96/56 98.4 °F (36.9 °C) Oral 66 18 95 % --   05/30/25 0600 106/63 -- -- 68 -- -- --   05/30/25 0530 (!) 117/54 -- -- 64 -- -- --   05/30/25 0500 108/60 -- -- 67 -- -- --   05/30/25 0430 (!) 98/53 98.4 °F (36.9 °C) Axillary 71 -- 95 % 73.5 kg (162 lb 0.6 oz)   05/30/25 0400 (!) 115/54 -- -- 64 -- -- --   05/30/25 0331 105/61 -- -- 68 -- -- --   05/30/25 0300 116/62 -- -- 65 -- -- --   05/30/25 0230 108/60 -- -- 67 -- -- --   05/30/25 0202 104/60 -- -- 71 -- -- --   05/30/25 0130 101/60 -- -- 65 -- 91 % --   05/30/25 0100 (!) 98/52 -- -- 64 -- -- --   05/30/25 0030 (!) 94/51 -- -- 60 -- 92 % --   05/30/25 0000 (!) 101/53 -- -- 60 -- 91 % --   05/29/25 2329 (!) 100/48 -- -- 60 -- -- --   05/29/25 2325 (!) 90/52 -- -- 55 -- -- --   05/29/25 2316 (!) 90/46 98.4 °F (36.9 °C) Axillary 60 -- 92 % --   05/29/25 2050 (!) 94/56 97.8 °F (36.6 °C) Oral 66 -- 96 % --   05/29/25 1921 (!) 101/56 -- -- -- -- -- --   05/29/25 1700 (!) 100/55 -- -- -- -- -- --   05/29/25 1541 (!) 101/49 98.2 °F (36.8 °C) Oral 82 16 95 % --   05/29/25 1429 -- -- -- -- 18 -- --   05/29/25 1256 102/60 97.8 °F (36.6 °C) Oral 69 18 95 % --   05/29/25 1230 (!) 93/57 -- -- 75 26 97 % --   05/29/25 1215 96/74 -- -- 72 26 96 % --   05/29/25 1200 91/67 98.4 °F (36.9 °C) Oral 79 26 97 % --   05/29/25 1145 91/62 -- -- 87 26 95 % --   05/29/25 1130 91/60 -- -- 80 24 96 % --   05/29/25 1115 (!) 86/57 -- -- 88 19 97 % --   05/29/25 1100 98/60 -- -- 89 18 95 % --   05/29/25 1045 107/69 
Progress Note  Physical Medicine and Rehabilitation    Patient: Jhony Hdez  [id number]  Date: 5/23/2025      Chief Complaint: Left Hemiplegia    History of Present Illness/Hospital Course:  -NAEO  -Patient seen this AM.  -He is with continued improvement of his mental status  -He reports improvement of his abdominal pain.  -ROS limited however patient denies any other acute complaints.  -He completed PT/OT yesterday with 7/9 respectively     Physical Examination:  Vitals: Patient Vitals for the past 24 hrs:   BP Temp Temp src Pulse Resp SpO2 Weight   05/23/25 0930 102/61 -- -- (!) 42 15 99 % --   05/23/25 0915 (!) 100/57 -- -- (!) 41 16 100 % --   05/23/25 0900 139/76 -- -- (!) 46 11 100 % --   05/23/25 0845 (!) 134/59 -- -- 52 21 100 % --   05/23/25 0830 (!) 102/58 -- -- 50 23 99 % --   05/23/25 0815 (!) 101/53 -- -- 52 19 99 % --   05/23/25 0800 (!) 111/59 98.3 °F (36.8 °C) Axillary (!) 48 13 100 % --   05/23/25 0745 (!) 111/59 -- -- 52 12 100 % --   05/23/25 0730 110/64 -- -- 74 23 99 % --   05/23/25 0715 (!) 96/59 -- -- (!) 46 19 100 % --   05/23/25 0700 (!) 98/54 -- -- 51 21 100 % --   05/23/25 0645 (!) 101/54 -- -- (!) 41 22 100 % --   05/23/25 0630 (!) 102/59 -- -- 52 14 100 % --   05/23/25 0615 115/61 -- -- 53 20 99 % --   05/23/25 0600 (!) 87/66 -- -- 53 21 98 % 83 kg (182 lb 15.7 oz)   05/23/25 0545 (!) 99/55 -- -- 69 19 96 % --   05/23/25 0530 (!) 102/58 -- -- 56 21 91 % --   05/23/25 0515 (!) 126/45 -- -- 53 10 96 % --   05/23/25 0500 (!) 106/51 -- -- (!) 40 13 91 % --   05/23/25 0445 (!) 118/46 -- -- (!) 47 14 95 % --   05/23/25 0430 (!) 125/45 -- -- (!) 43 14 98 % --   05/23/25 0415 (!) 113/52 -- -- (!) 49 11 97 % --   05/23/25 0400 (!) 113/51 98.5 °F (36.9 °C) Temporal (!) 46 12 100 % --   05/23/25 0345 95/63 -- -- 50 11 99 % --   05/23/25 0330 (!) 130/47 -- -- (!) 40 12 97 % --   05/23/25 0315 96/61 -- -- (!) 48 21 100 % --   05/23/25 0300 (!) 103/59 -- -- (!) 40 18 98 % --   05/23/25 0245 (!) 
Progress Note  Physical Medicine and Rehabilitation    Patient: Jhony Hdez  [id number]  Date: 5/31/2025      Chief Complaint: Left Hemiplegia    History of Present Illness/Hospital Course:  Ate 3 meals yesterday. Continues on cardiac gtt. Will follow.         Physical Examination:  Vitals: Patient Vitals for the past 24 hrs:   BP Temp Temp src Pulse Resp SpO2 Weight   05/31/25 0900 104/64 98 °F (36.7 °C) Oral 66 18 94 % --   05/31/25 0600 -- -- -- 68 -- -- --   05/31/25 0415 (!) 109/59 98.2 °F (36.8 °C) Oral 63 16 92 % 72.4 kg (159 lb 9.8 oz)   05/31/25 0155 -- -- -- 67 -- -- --   05/30/25 2330 107/61 98.5 °F (36.9 °C) Oral 62 18 94 % --   05/30/25 2200 -- -- -- 68 -- -- --   05/30/25 1950 106/60 99 °F (37.2 °C) Oral 82 18 94 % --   05/30/25 1536 115/68 98.3 °F (36.8 °C) Oral 74 18 94 % --   05/30/25 1146 (!) 101/53 98.1 °F (36.7 °C) Oral 66 18 96 % --     Physical Exam  Constitutional:       General: He is not in acute distress.     Appearance: Normal appearance. He is not ill-appearing or diaphoretic.   HENT:      Head: Normocephalic and atraumatic.   Cardiovascular:      Rate and Rhythm: Normal rate and regular rhythm.   Pulmonary:      Effort: Pulmonary effort is normal.      Breath sounds: Normal breath sounds. No wheezing, rhonchi or rales.   Abdominal:      General: There is no distension.      Palpations: Abdomen is soft.      Tenderness: There is no abdominal tenderness. There is no guarding.   Musculoskeletal:      Right lower leg: No edema.      Left lower leg: No edema.   Skin:     General: Skin is warm and dry.   Neurological:      Mental Status: He is alert.      Motor: Weakness present.      Comments: Limited neuro exam  Right arm in sling s/p ICD placement  Finger  intact on the right  2/5 muscle strength in all other extremities  Follows verbal commands  Unchanged exam          Lab Results   Component Value Date    WBC 8.9 05/31/2025    HGB 9.2 (L) 05/31/2025    HCT 27.0 (L) 05/31/2025    MCV 
Progress Note  Physical Medicine and Rehabilitation    Patient: Jhony Hdez  [id number]  Date: 5/21/2025      Chief Complaint: Left Hemiplegia    History of Present Illness/Hospital Course:  -NAEO  -patient remains on heparin gtt  -Patient seen this AM at bedside,  -He is with continued lethargy however with significant improvement at this time.   -he did notably receive morphine prior to this encounter.   -patient was able to follow commands at this encounter and verbally express his name.    Physical Examination:  Vitals: Patient Vitals for the past 24 hrs:   BP Temp Temp src Pulse Resp SpO2 Weight   05/21/25 0946 -- 98.6 °F (37 °C) Temporal -- -- -- --   05/21/25 0930 108/64 -- -- 56 20 96 % --   05/21/25 0915 98/62 -- -- 61 25 96 % --   05/21/25 0900 99/61 -- -- 62 24 97 % --   05/21/25 0845 120/76 -- -- 64 25 96 % --   05/21/25 0830 (!) 111/57 -- -- 52 22 95 % --   05/21/25 0815 114/64 -- -- 55 19 94 % --   05/21/25 0800 104/72 99.3 °F (37.4 °C) Temporal 77 24 99 % --   05/21/25 0736 -- 99.3 °F (37.4 °C) Temporal -- -- -- --   05/21/25 0730 109/62 -- -- 66 23 99 % --   05/21/25 0715 107/60 -- -- 57 20 -- --   05/21/25 0700 (!) 95/53 -- -- 57 22 95 % --   05/21/25 0645 (!) 102/48 -- -- 57 23 95 % --   05/21/25 0630 (!) 104/50 -- -- (!) 47 23 98 % --   05/21/25 0615 (!) 99/44 -- -- 55 19 97 % --   05/21/25 0600 (!) 100/50 -- -- (!) 49 19 93 % --   05/21/25 0545 101/65 -- -- 54 17 96 % --   05/21/25 0530 124/79 -- -- 54 23 93 % 80 kg (176 lb 5.9 oz)   05/21/25 0515 96/61 -- -- 71 22 92 % --   05/21/25 0500 110/65 -- -- 57 18 100 % --   05/21/25 0445 (!) 112/52 -- -- 59 23 97 % --   05/21/25 0430 111/71 -- -- (!) 46 20 98 % --   05/21/25 0415 118/60 -- -- 72 25 97 % --   05/21/25 0400 117/62 99.6 °F (37.6 °C) Temporal (!) 48 21 97 % --   05/21/25 0345 (!) 89/79 -- -- 50 19 98 % --   05/21/25 0330 100/69 -- -- 83 20 96 % --   05/21/25 0315 (!) 124/101 -- -- 56 23 92 % --   05/21/25 0300 (!) 112/55 -- -- 79 27 96 % 
Progress Note  Physical Medicine and Rehabilitation    Patient: Jhony Hdez  [id number]  Date: 6/4/2025      Chief Complaint: Left Hemiplegia    Interval history:  Continues to be very limited in therapy.  Patient will likely need skilled nursing facility.    Update: Seen in his room with his wife this morning.  Had extensive conversation with his wife about SNF level care versus ARU level care.  After much discussion the wife is interested in going to a skilled nursing facility but does not want the patient to go to a nursing home.  Wife is hopeful that the patient will improve at skilled nursing facility and will be able to admit to rehab unit in a few weeks.          Physical Examination:  Vitals: Patient Vitals for the past 24 hrs:   BP Temp Temp src Pulse Resp SpO2   06/04/25 1142 105/73 98.1 °F (36.7 °C) Oral (!) 107 18 99 %   06/04/25 0827 112/69 97.9 °F (36.6 °C) Oral 67 17 96 %   06/04/25 0530 113/66 97.9 °F (36.6 °C) Oral 60 16 97 %   06/04/25 0000 129/61 97.5 °F (36.4 °C) Oral 61 18 98 %   06/03/25 2130 -- -- -- -- -- 97 %   06/03/25 2000 133/68 98.2 °F (36.8 °C) Oral 60 16 --   06/03/25 1532 (!) 116/51 98.1 °F (36.7 °C) Oral 60 16 96 %     Physical Exam  Constitutional:       General: He is not in acute distress.     Appearance: Normal appearance. He is not ill-appearing or diaphoretic.   HENT:      Head: Normocephalic and atraumatic.   Cardiovascular:      Rate and Rhythm: Normal rate and regular rhythm.   Pulmonary:      Effort: Pulmonary effort is normal.      Breath sounds: Normal breath sounds. No wheezing, rhonchi or rales.   Abdominal:      General: There is no distension.      Palpations: Abdomen is soft.      Tenderness: There is no abdominal tenderness. There is no guarding.   Musculoskeletal:      Right lower leg: No edema.      Left lower leg: No edema.   Skin:     General: Skin is warm and dry.   Neurological:      Mental Status: He is alert.      Motor: Weakness present.      
Progress Note  Physical Medicine and Rehabilitation    Patient: Jhony Hdez  [id number]  Date: 5/20/2025      Chief Complaint: Left Hemiplegia    History of Present Illness/Hospital Course:  -NAEO  -Patient seen this AM at bedside  -He is with some improvement of lethargy.  -He is reported to have been awake prior to this encounter reporting pain.  -ROS could not be obtained due to patient's mental status    Physical Examination:  Vitals: Patient Vitals for the past 24 hrs:   BP Temp Temp src Pulse Resp SpO2 Weight   05/20/25 0900 122/67 -- -- 79 22 97 % --   05/20/25 0800 (!) 118/100 98.2 °F (36.8 °C) Axillary 84 16 97 % --   05/20/25 0743 -- 98 °F (36.7 °C) Axillary -- -- -- --   05/20/25 0601 125/79 -- -- 80 14 100 % --   05/20/25 0600 (!) 79/68 -- -- 89 26 96 % --   05/20/25 0545 -- -- -- 87 22 97 % --   05/20/25 0530 -- -- -- 58 16 100 % --   05/20/25 0515 -- -- -- 50 21 96 % --   05/20/25 0500 (!) 91/59 -- -- 60 22 -- 79.3 kg (174 lb 13.2 oz)   05/20/25 0445 -- -- -- (!) 43 20 -- --   05/20/25 0430 -- -- -- 58 21 -- --   05/20/25 0415 -- -- -- (!) 47 22 -- --   05/20/25 0400 (!) 110/58 99.6 °F (37.6 °C) Temporal 54 21 -- --   05/20/25 0345 -- -- -- 74 18 -- --   05/20/25 0330 -- -- -- 72 18 -- --   05/20/25 0315 -- -- -- 73 26 -- --   05/20/25 0300 (!) 99/51 -- -- 63 21 -- --   05/20/25 0245 -- -- -- 54 20 -- --   05/20/25 0230 -- -- -- 57 23 -- --   05/20/25 0215 -- -- -- 68 21 -- --   05/20/25 0200 (!) 97/58 -- -- 66 15 -- --   05/20/25 0145 -- -- -- 59 18 -- --   05/20/25 0130 -- -- -- 58 23 -- --   05/20/25 0115 -- -- -- 66 16 -- --   05/20/25 0100 116/61 100 °F (37.8 °C) Temporal 72 24 -- --   05/20/25 0045 -- -- -- 71 26 -- --   05/20/25 0030 -- -- -- 61 23 -- --   05/20/25 0015 -- -- -- 68 23 -- --   05/20/25 0000 (!) 107/56 (!) 101.3 °F (38.5 °C) Temporal 66 22 -- --   05/19/25 2345 -- -- -- 62 19 -- --   05/19/25 2330 -- -- -- 60 20 -- --   05/19/25 2315 121/62 -- -- 61 25 -- --   05/19/25 2300 (!) 
Pt had large bout of emesis this morning. Tube feeds paused. Will discuss with primary team.   Mentally speaking he is more alert today.   Abdominal pain is slightly improved from yesterday.  
Pt returned from cath lab post pacemaker/ICD placement. On arrival to room pt is mildly hypotensive with SBP in 80s. CRNA administered ping push at bedside, but otherwise VSS. Currently wearing 6 L via mask but still very drowsy. Dressing C/D/I, sling in place. Family at bedside. Will wean oxygen as tolerated.   
Pt transferred to 4314 at 1230 in his specialty bed. Pt accompanied by spouse and daughter who brought patients belongings. Report given to Yi PARADA and handoff completed at bedside. Dopamine gtt at 5 mcg/kg/hr.   
Pt transported to EEG.  
Received consult for PEG tube placement.  Reviewed chart and stopped by room.  Wife Geetha is not interested in PEG tube feeding at this point and would like for her  to continue to try to eat.  Our service will therefore sign off.  
[facility] Cardiology Progress Note    Primary Cardiologist: Dr Chacko and Dr Merritt (previously Dr Martinez)    CC/HPI: ICD, AF, HFrEF, stroke     S: No events overnight. Resting comfortably.     Tele: Sinus, PVC, PAC, IVCD    O:  Physical Exam:  /62   Pulse 91   Temp 98.7 °F (37.1 °C) (Oral)   Resp 18   Ht 1.676 m (5' 6\")   Wt 72.1 kg (158 lb 15.2 oz)   SpO2 93%   BMI 25.66 kg/m²        I.O's= +4 L   Weight  Admission: Weight - Scale: 86.1 kg (189 lb 13.1 oz)   Today: Weight - Scale: 72.1 kg (158 lb 15.2 oz)    CBC:   Recent Labs     25  0343 25  0353 25  0531   WBC 9.8 8.9 11.6*   HGB 9.4* 9.2* 9.6*   HCT 27.5* 27.0* 28.5*   MCV 84.1 83.9 84.0    352 409     BMP:   Recent Labs     25  0343 25  0353 25  0531   * 135* 137   K 3.7 3.5 3.5    103 104   CO2 23 23 21   PHOS 2.8 2.5 2.9   BUN 17 17 19   CREATININE 1.1 1.0 1.0     Estimated Creatinine Clearance: 49 mL/min (based on SCr of 1 mg/dL).  Mag:   Lab Results   Component Value Date/Time    MG 2.04 2025 11:54 PM     LIVER PROFILE:   Recent Labs     25  0343 25  0353 25  0531   AST 68* 154* 181*   ALT 23 55* 73*   BILIDIR 0.9* 0.8* 0.8*   BILITOT 1.3* 1.2* 1.2*   ALKPHOS 237* 364* 465*     Pro-BNP: No results found for: \"PROBNP\"  High Sensitivity Troponin:   Lab Results   Component Value Date    TROPHS 48 (H) 05/15/2025       Imagin2025 EP  Procedures performed:  Insertion of dual-chamber ICD under fluoroscopic guidance   Transesophageal echo  Sedation provided by anesthesia  Programming and analysis of dual-chamber ICD     EBL less than 10 mL      Indication of the procedure:   Junctional bradycardia  Paroxysmal atrial fibrillation with RVR  Cardiomyopathy      2025 Echo     Left Ventricle: Reduced left ventricular systolic function with a visually estimated EF of 30 - 35%. Left ventricle is dilated. Mildly increased wall 
Specialty bed ordered this am. Pt complaining of lower back/buttocks pain- has blanchable redness.   
Speech Language Pathology  HOLD Note    Per chart review and discussion with RN, pt leaving unit for CT at this time and is very somnolent. SLP stated will hold evaluation at this time. RN in agreement to page SLP if pt is appropriate for evaluation later this date.     Electronically signed by:  Vicki Colin M.A., CCC-SLP  [id number]  Speech-Language Pathologist  Pager #: [phone]  hi5 phone: [phone]  
Spiritual Health History and Assessment/Progress Note  [facility]    Palliative Care, Spiritual/Emotional Needs,  , (P) Life Adjustments, Adjustment to illness, Other (Comment) (Transport to SNF),      Name: Jhony Hdez MRN: [id number]    Age: 84 y.o.     Sex: male   Language: English   Latter-day: Presbyterian   Acute CVA (cerebrovascular accident) (HCC)     Date: 6/6/2025            Total Time Calculated: (P) 44 min              Spiritual Assessment began in [facility] 4 PCU        Referral/Consult From: Palliative Care   Encounter Overview/Reason: Palliative Care, Spiritual/Emotional Needs  Service Provided For: Significant other, Family    Mary Lou, Belief, Meaning:   Patient identifies as spiritual, is connected with a mary lou tradition or spiritual practice, and has beliefs or practices that help with coping during difficult times  Family/Friends identify as spiritual, are connected with a mary lou tradition or spiritual practice, and have beliefs or practices that help with coping during difficult times      Importance and Influence:  Patient has spiritual/personal beliefs that influence decisions regarding their health  Family/Friends have spiritual/personal beliefs that influence decisions regarding the patient's health    Community:  Patient is connected with a spiritual community and feels well-supported. Support system includes: Spouse/Partner, Children, Mary Lou Community, and Extended family  Family/Friends are connected with a spiritual community: and feel well-supported. Support system includes: Spouse/Partner, Children, Mary Lou Community, and Extended family    Assessment and Plan of Care:     Patient Interventions include: Facilitated expression of thoughts and feelings and Affirmed coping skills/support systems    *Prayed, communicated mostly non-verbally in offering empathetic listening and support with Jhony. He appeared relaxed, alert,and present throughout the visit. Jhony’s wife, Aura was 
Spiritual Health History and Assessment/Progress Note  [facility]    (P) Spiritual/Emotional Needs,  ,  ,  Supportive presence offered to patient's family at bedside. They shared about his condition over the past few days and their hopes and fears. Prayer said per wife's request.     Name: Jhony Hdez MRN: [id number]    Age: 84 y.o.     Sex: male   Language: English   Baptist: Presbyterian   Acute CVA (cerebrovascular accident) ([facility])     Date: 5/21/2025            Total Time Calculated: (P) 18 min              Spiritual Assessment continued in [facility] ICU        Referral/Consult From: (P) Rounding   Encounter Overview/Reason: (P) Spiritual/Emotional Needs  Service Provided For: (P) Family, Patient and family together    Mary Lou, Belief, Meaning:   Patient has beliefs or practices that help with coping during difficult times  Family/Friends are connected with a mary lou tradition or spiritual practice and have beliefs or practices that help with coping during difficult times      Importance and Influence:  Patient has no beliefs influential to healthcare decision-making identified during this visit  Family/Friends have no beliefs influential to healthcare decision-making identified during this visit    Community:  Patient Other: Wife and daughter at bedside  Family/Friends feel well-supported. Support system includes: Spouse/Partner and Children    Assessment and Plan of Care:     Patient Interventions include: Provided sacramental/Protestant ritual  Family/Friends Interventions include: Facilitated expression of thoughts and feelings, Affirmed coping skills/support systems, and Provided sacramental/Protestant ritual    Patient Plan of Care: Spiritual Care available upon further referral  Family/Friends Plan of Care: Spiritual Care available upon further referral    Electronically signed by NELA Kelley on 5/21/2025 at 10:42 AM    
Spiritual Health History and Assessment/Progress Note  [facility]    (P) Spiritual/Emotional Needs,  ,  ,  Supportive conversation offered to patient's wife in [facility].    Name: Jhony Hdez MRN: [id number]    Age: 84 y.o.     Sex: male   Language: English   Baptist: Presbyterian   Acute CVA (cerebrovascular accident) (HCC)     Date: 5/23/2025            Total Time Calculated: (P) 12 min              Spiritual Assessment continued in [facility] ICU        Referral/Consult From: (P) Rounding   Encounter Overview/Reason: (P) Spiritual/Emotional Needs  Service Provided For: (P) Family    Mary Lou, Belief, Meaning:   Patient is connected with a mary lou tradition or spiritual practice  Family/Friends are connected with a mary lou tradition or spiritual practice and have beliefs or practices that help with coping during difficult times      Importance and Influence:  Patient unable to assess at this time  Family/Friends have no beliefs influential to healthcare decision-making identified during this visit    Community:  Patient Other: Wife and daughter visiting  Family/Friends feel well-supported. Support system includes: Spouse/Partner and Children    Assessment and Plan of Care:     Patient Interventions include: Other: Supported Family  Family/Friends Interventions include: Facilitated expression of thoughts and feelings, Engaged in theological reflection, and Affirmed coping skills/support systems    Patient Plan of Care: Spiritual Care available upon further referral  Family/Friends Plan of Care: Spiritual Care available upon further referral    Electronically signed by NELA Kelley on 5/23/2025 at 2:38 PM    
Spiritual Health History and Assessment/Progress Note  [facility]    (P) Spiritual/Emotional Needs,  ,  ,  Brief check in with patient's wife, she shared her optimism and encouragement.     Name: Jhony Hdez MRN: [id number]    Age: 84 y.o.     Sex: male   Language: English   Jewish: Presbyterian   Acute CVA (cerebrovascular accident) (HCC)     Date: 6/4/2025            Total Time Calculated: (P) 4 min              Spiritual Assessment continued in [facility] PCU        Referral/Consult From: (P) Rounding   Encounter Overview/Reason: (P) Spiritual/Emotional Needs  Service Provided For: (P) Family    Mary Lou, Belief, Meaning:   Patient is connected with a mary lou tradition or spiritual practice  Family/Friends No family/friends present      Importance and Influence:  Patient has spiritual/personal beliefs that influence decisions regarding their health  Family/Friends have spiritual/personal beliefs that influence decisions regarding the patient's health    Community:  Patient feels well-supported. Support system includes: Spouse/Partner and Children  Family/Friends feel well-supported. Support system includes: Spouse/Partner and Children    Assessment and Plan of Care:     Patient Interventions include: Other: Supported Family  Family/Friends Interventions include: Facilitated expression of thoughts and feelings and Affirmed coping skills/support systems    Patient Plan of Care: No spiritual needs identified for follow-up and Spiritual Care available upon further referral  Family/Friends Plan of Care: No spiritual needs identified for follow-up and Spiritual Care available upon further referral    Electronically signed by NELA Kelley on 6/4/2025 at 3:07 PM\  
Spiritual Health History and Assessment/Progress Note  [facility]    (P) Spiritual/Emotional Needs,  ,  ,  Spiritual care introduced to patient and family. Prayer said prior to surgery per their request.     Name: Jhony Hdez MRN: [id number]    Age: 84 y.o.     Sex: male   Language: English   Samaritan: Presbyterian   Cerebrovascular accident (CVA) due to embolism of right middle cerebral artery (HCC)     Date: 5/16/2025            Total Time Calculated: (P) 13 min              Spiritual Assessment began in [facility] ICU        Referral/Consult From: (P) Rounding   Encounter Overview/Reason: (P) Spiritual/Emotional Needs  Service Provided For: (P) Patient and family together    Mary Lou, Belief, Meaning:   Patient is connected with a mary lou tradition or spiritual practice and has beliefs or practices that help with coping during difficult times  Family/Friends are connected with a mary lou tradition or spiritual practice and have beliefs or practices that help with coping during difficult times      Importance and Influence:  Patient has no beliefs influential to healthcare decision-making identified during this visit  Family/Friends have no beliefs influential to healthcare decision-making identified during this visit    Community:  Patient feels well-supported. Support system includes: Spouse/Partner and Children  Family/Friends feel well-supported. Support system includes: Spouse/Partner and Children    Assessment and Plan of Care:     Patient Interventions include: Facilitated expression of thoughts and feelings, Affirmed coping skills/support systems, and Provided sacramental/Christian ritual  Family/Friends Interventions include: Facilitated expression of thoughts and feelings, Explored spiritual coping/struggle/distress, and Provided sacramental/Christian ritual    Patient Plan of Care: Spiritual Care available upon further referral  Family/Friends Plan of Care: Spiritual Care available upon further 
TODAY'S DATE:  5/15/2025      Current NIHSS 17      Discussed personal risk factors for Stroke/TIA with patient/family, and ways to reduce the risk for a recurrent stroke.     Patient's personal risk factors which were identified are:   []   Alcohol Abuse: check with your physician before any alcohol consumption.   []   Atrial fibrillation: may cause blood clots  []   Drug Abuse: Seek help, talk with your doctor  []   Clotting Disorder  []   Diabetes  []   Family history of stroke or heart disease  [x]   High Blood Pressure/Hypertension: work with your physician  [x]   High cholesterol: monitor cholesterol levels with your physician  []   Overweight/Obesity: work with your physician for your ideal body weight  [x]   Physical Inactivity: get regular exercise as directed by your physician  []   Personal history of previous TIA or stroke  []   Poor Diet: decrease salt (sodium) in your diet, follow diet directed by physician  []   Smoking: stop smoking      Reviewed the Following Education with Patient and/or Family:   - Signs and Symptoms of Stroke  - Personal risk factors   - How to activate EMS (911)   - Importance of Follow Up Appointments at Discharge   - Importance of Compliance with Medications Prescribed at Discharge  - Available community resources and stroke advocacy groups if needed    Patient and/or family member: verbalized understanding.     Stroke Education booklet given to patient/family (or verified, if given already), which reviews above information. yes        Electronically signed by Ksenia Barnett RN on 5/15/2025 at 7:42 AM       
The Anti-X A has not resulted yet. Still processing.   
The Anti-XA has not resulted yet. This RN called the lab for the second time to follow up. ICU charge nurse made aware.     
The [facility] - Acute Rehab Unit   After review, this patient is felt to be:       []  Dr. Lawson states this patient is appropriate for rehab.     []  Not appropriate for Acute Inpatient Rehab    [x]  Referral received and ARU reviewing patient      Updated therapy notes have changed recommendations to SNF at this time. Pt can not tolerate 3 hrs of therapy. Defer to SNF level of care. Dr. Lawson and CL discussed with family.   Will notify CM/SW with further updates. Thank you for the referral.    Sarah HADLEY OTR/L  Clinical Liaison- [facility]   (P): [phone]  (F): [phone]      
The [facility] - Acute Rehab Unit   After review, this patient is felt to be:       []  Appropriate for Acute Inpatient Rehab    []  Appropriate for Acute Inpatient Rehab Pending Insurance Authorization    []  Not appropriate for Acute Inpatient Rehab    [x]  Referral received and ARU reviewing patient; Evaluation ongoing.     Will notify DCP with further updates. Thank you for the referral.    Sadie Restrepo M.A., [facility]-SLP   Clinical Liaison- [facility]   (P): [phone]  (F): [phone]  
This RN received a phone call from radiologist Dr. Brown regarding patient's CT head results. Dr. Sheth (who is at bedside at the moment) made aware about the following critical results : Retained catheter within the right internal carotid artery. New thrombus involving entirety of the lumen for a short segment of the supraclinoid right internal carotid artery along the posterior margin of the upper tip of the catheter fragment.     
[facility]  Cardiology Inpatient Consult Service  Daily Progress Note        Admit Date:  5/15/2025    Referring Physician: Gema Izaguirre MD    Reason for Consultation/Chief Complaint:   PPM/AF/HFrEF/stroke    Subjective:   Interval history:  CARLA  Will dc dopamine    Medications:   midodrine  10 mg Oral TID WC    sodium chloride flush  5-40 mL IntraVENous 2 times per day    sodium chloride flush  5-40 mL IntraVENous 2 times per day    apixaban  5 mg Oral BID    simethicone  80 mg Oral Daily    melatonin  5 mg Oral Nightly    balsum peru-castor oil   Topical BID    atorvastatin  40 mg Oral Nightly    [Held by provider] donepezil  5 mg Oral Nightly    pantoprazole  40 mg Oral QAM AC       IV drips:   DOPamine 2.5 mcg/kg/min (05/31/25 0835)    sodium chloride      sodium chloride         PRN:  morphine, naloxone 0.4 mg in 10 mL sodium chloride syringe, sodium chloride flush, sodium chloride, diphenhydrAMINE, hydrALAZINE, sodium chloride flush, sodium chloride, calcium carbonate, lidocaine, acetaminophen, loperamide, promethazine **OR** ondansetron      Objective:     Vitals:    05/31/25 0155 05/31/25 0415 05/31/25 0600 05/31/25 0900   BP:  (!) 109/59  104/64   Pulse: 67 63 68 66   Resp:  16  18   Temp:  98.2 °F (36.8 °C)  98 °F (36.7 °C)   TempSrc:  Oral  Oral   SpO2:  92%  94%   Weight:  72.4 kg (159 lb 9.8 oz)     Height:           Intake/Output Summary (Last 24 hours) at 5/31/2025 1059  Last data filed at 5/31/2025 0600  Gross per 24 hour   Intake 540 ml   Output 1075 ml   Net -535 ml     I/O last 3 completed shifts:  In: 660 [P.O.:660]  Out: 1700 [Urine:1700]  Wt Readings from Last 3 Encounters:   05/31/25 72.4 kg (159 lb 9.8 oz)   05/17/25 86.2 kg (190 lb 0.6 oz)   05/15/25 89.3 kg (196 lb 13.9 oz)       Admit Wt: Weight - Scale: 86.1 kg (189 lb 13.1 oz)   Todays Wt: Weight - Scale: 72.4 kg (159 lb 9.8 oz)    TELEMETRY: Personally interpreted Sinus     Physical Exam:     General:  
Upon doing 1645 puncture site assessment, this RN noticed increased bleeding. NCC notified and pressure held for 15 minutes. Slight bloody drainage continuously coming from site, pressure held for another 15 minutes and pressure dressing applied with gauze. Bleeding stopped at 1725. No hematoma noted at this time. NCC NP Danna notified.   
CVA  Therapy Prognosis: Good  Requires PT Follow-Up: Yes  Activity Tolerance  Activity Tolerance Comments: BP supine: 81/46 - RN aware, re-check: 104/55, sitting EOB: 110/62. Pt denies dizziness throughout session.    Plan  Physical Therapy Plan  General Plan: 5-7 times per week  Current Treatment Recommendations: Strengthening, Balance training, Functional mobility training, Transfer training, Endurance training, Gait training, Stair training, Safety education & training, Patient/Caregiver education & training, Therapeutic activities, Neuromuscular re-education  Safety Devices  Type of Devices:  (pt left supine in bed with OT in room finishing session)  Restraints  Restraints Initially in Place: No    Restrictions  Position Activity Restriction  Other Position/Activity Restrictions: up with assist; 5/28 - Do not elevate affected arm above shoulder for 30 days (s/p pacemaker 5/28)     Subjective  General  Chart Reviewed: Yes  Additional Pertinent Hx: Jhony Hdez is a 84 y.o. male with PMHx multiple  cancers and AF who p/w ischemic stroke.  CTA confirmed a RM1 occlusion; s/p thrombectomy 5/15; CTA head and neck shows retained catheter within the right ICA, new thrombus involving entirety of the lumen for short segment of the supraclinoid right ICA along the posterior margin of the upper tip of the catheter fragment; pending angiography 5/16 for possible catheter retrieval or stent trapping to prevent migration s/p pacemaker placement on 5/28.  Referring Practitioner: Gomez Sheth MD  Diagnosis: CVA  Subjective  Subjective: Pt found semisupine in bed. Lethargic, variable responses to verbal cues. Alertness improved with mobility. Pt reports R hip pain with gentle movements in bed, denies pain once sitting EOB. Pain not rated - pt positioned for comfort at end of session.         Social/Functional History  Social/Functional History  Lives With: Spouse  Type of Home: House  Home Layout: One level  Home 
UTI  - Started on ceftriaxone  - Culture pending      Urothelial carcinoma  -Follows up with Dr. Duc Mendoza  -Will need nephrouertectomy  as an out patient   - Patient has bilateral ureteral stents in place.  Plan was to remove on 5/20, plan has been deferred at present  -CT showed improvement of right hydronephrosis and stents were in place    Coronary artery disease  -Left heart cath on/2024-shows mild LAD 30% distal LAD 50% normal circumflex and RCA irregularities      I spent > 55  minutes in the care of this patient.  Over 50% of that time was in face-to-face counseling regarding disease process, diagnostic testing, preventative measures, and answering patient and family questions.     Diet ADULT ORAL NUTRITION SUPPLEMENT; Lunch, Dinner; Frozen Oral Supplement  ADULT DIET; Dysphagia - Pureed; Mildly Thick (Nectar)   DVT Prophylaxis [] Lovenox, []  Heparin, [] SCDs, [] Ambulation,  [] Eliquis, [] Xarelto  [] Coumadin   Code Status Full Code   Disposition        Surrogate Decision Maker/ POA         MDM  [x] High (any 2 of A, B, or C)    A. Problems (any 1)  [x] Acute/Chronic Illness/injury posing threat to life or bodily function:    [] Severe exacerbation of chronic illness:    ---------------------------------------------------------------------  B. Risk of Treatment (any 1)   [] IV ABX requiring serial renal monitoring for nephrotoxicity:     [] IV Narcotic analgesia for adverse drug reaction  [] IV diuresis requiring serial monitoring for renal impairment and electrolyte derangements  [] Critical electrolyte abnormalities requiring IV replacement and close serial monitoring  [] Insulin - monitoring serial FSBS for Hypoglycemic adverse drug reaction  [] Anticoagulation requiring serial monitoring of coagulation factors  [] IV/IM Controlled Substances order (any 1)   [] One-time Order including Drug Name/Route, Reason Ordered:   [] Scheduled Order including Drug Name/Route, Reason Ordered or Continued:   [] 
neglect, impaired sitting balance requiring max A x2 for bed mobility and max A to maintain sitting balance. Pt demo's L lateral lean with poor midline awareness, able to partially self correct with cues for midline. Pt also demo's some active movement in LUE and LLE with cues for attention to L side. Today pt significantly limited by lethargy, unsafe to trial OOB transfer. Pt well below his baseline and a great candidate for IPR at [place].  Treatment Diagnosis: balance impairment  Therapy Prognosis: Good  Decision Making: High Complexity  Requires PT Follow-Up: Yes  Activity Tolerance  Activity Tolerance: Patient tolerated evaluation without incident;Patient limited by fatigue    Plan  Physical Therapy Plan  General Plan: 5-7 times per week  Current Treatment Recommendations: Strengthening, Balance training, Functional mobility training, Transfer training, Endurance training, Gait training, Stair training, Safety education & training, Patient/Caregiver education & training, Therapeutic activities  Safety Devices  Type of Devices: Nurse notified, Call light within reach, Left in bed, Bed alarm in place    Restrictions  No restrictions as of 14:00    Subjective  General  Chart Reviewed: Yes  Patient assessed for rehabilitation services?: Yes  Additional Pertinent Hx: Jhony Hdez is a 84 y.o. male with PMHx multiple  cancers and AF who p/w ischemic stroke.  Family/Caregiver Present: Yes (wife and son)  Referring Practitioner: Gomez Sheth MD  Referral Date : 05/14/25  Diagnosis: CVA  General  General Comments: pt cleared to see for therapy by RN.  Subjective  Subjective: pt semisupine in bed on arrival, appears lethargic but more alert once sitting EOB. Pt able to follow most commands at EOB but remains lethargic, family present provides PLOF info.         Social/Functional History  Social/Functional History  Lives With: Spouse  Type of Home: House  Home Layout: One level  Home Access: Stairs to enter without 
      Scheduled Meds:   aspirin  325 mg Oral Daily    Or    aspirin  300 mg Rectal Daily    clopidogrel  75 mg Oral Daily    balsum peru-castor oil   Topical BID    pantoprazole (PROTONIX) 40 mg in sodium chloride (PF) 0.9 % 10 mL injection  40 mg IntraVENous Daily    atorvastatin  40 mg Oral Nightly    midodrine  10 mg Oral TID WC    [Held by provider] donepezil  5 mg Oral Nightly    [Held by provider] furosemide  20 mg Oral Daily    [Held by provider] pantoprazole  40 mg Oral QAM AC     Continuous Infusions:   norepinephrine 6 mcg/min (05/17/25 0823)     PRN Meds:acetaminophen, HYDROcodone 5 mg - acetaminophen **OR** HYDROcodone 5 mg - acetaminophen, promethazine **OR** ondansetron, morphine **OR** morphine     Patient Active Problem List    Diagnosis Date Noted    Acute right MCA stroke (HCC) 05/15/2025    Paroxysmal atrial fibrillation (HCC) 05/15/2025    Anticoagulated 03/26/2025    History of prostate cancer 03/26/2025    Hematuria 03/25/2025    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    Hydronephrosis 03/25/2025    Unequal blood pressure in upper extremities 12/12/2024    Medication management 10/18/2024    Abnormal ECG 04/18/2024    HFrEF (heart failure with reduced ejection fraction) ([facility]) 04/18/2024    Hypotension 03/25/2024    Abnormal echocardiogram 03/20/2024    PAF (paroxysmal atrial fibrillation) (HCC) 03/14/2024    Essential hypertension 03/14/2024    Shortness of breath 02/16/2024    Bilateral lower extremity edema 02/16/2024    Combined systolic and diastolic congestive heart failure (HCC) 02/16/2024    Establishing care with new doctor, encounter for 02/16/2024    Irregular heart rate 02/16/2024    Palpitations 02/16/2024    Mixed hyperlipidemia 02/16/2024    Guillain Barré syndrome     Malignant neoplasm of splenic flexure (HCC) 10/27/2015      Active Hospital Problems    Diagnosis Date Noted    Acute right MCA stroke (HCC) [I63.511] 05/15/2025     Priority: Low    Paroxysmal atrial fibrillation (HCC) [I48.0] 
UROLOGY  IP CONSULT TO PHYSICAL MEDICINE REHAB  IP CONSULT TO CARDIOLOGY  IP CONSULT TO DIETITIAN  PHARMACY TO DOSE WARFARIN  IP CONSULT TO GI  IP CONSULT TO CARDIOLOGY  IP CONSULT TO PHARMACY  IP CONSULT TO GI        --------------------------------------------------      Medications:        Infusion Medications    DOPamine 7.5 mcg/kg/min (05/28/25 1505)     Scheduled Medications    apixaban  5 mg Oral BID    simethicone  80 mg Oral Daily    midodrine  5 mg Oral TID WC    melatonin  5 mg Oral Nightly    balsum peru-castor oil   Topical BID    atorvastatin  40 mg Oral Nightly    [Held by provider] donepezil  5 mg Oral Nightly    [Held by provider] furosemide  20 mg Oral Daily    pantoprazole  40 mg Oral QAM AC     PRN Meds: morphine, calcium carbonate, lidocaine, acetaminophen, loperamide, promethazine **OR** ondansetron      Physical Exam Performed:      General appearance:  No apparent distress  Respiratory:  Normal respiratory effort without tachypnea.   Cardiovascular:  Regular Rate w/ Regular rhythm.  Abdomen:  Soft, non-tender, non-distended. Bowel sounds normal  Musculoskeletal:  No edema  Neurologic:  Neurovascularly intact with focal sensory/motor deficits. Left sided weakness  Psychiatric:  NOT oriented, somnolent, slightly more alert today    BP (!) 112/58   Pulse 87   Temp 98.6 °F (37 °C) (Oral)   Resp 21   Ht 1.676 m (5' 6\")   Wt 95.9 kg (211 lb 6.7 oz)   SpO2 95%   BMI 34.12 kg/m²     Telemetry:      Personally reviewed and interpreted telemetry (Rhythm Strip) on 5/28/2025.  Patient is currently ON tele demonstrating NSR w/ controlled rate.    Diet: Diet NPO Exceptions are: Sips of Water with Meds    DVT Prophylaxis: IPC/SCDs    Code status: Full Code    PT/OT Eval Status: Seen w/ recs for Acute Rehab    Multi-Disciplinary Rounds with Case Management completed on 5/28/2025 with the following recs:     Anticipated Discharge Location: D     Anticipated Discharge Day/Date:  3-4 days    Barriers 
Weight - Scale: 86.1 kg (189 lb 13.1 oz)   Todays Wt: Weight - Scale: 95.9 kg (211 lb 6.7 oz)      Physical Exam:         General Appearance:  Alert, cooperative, no distress, appears stated age Appropriate weight   Head:  Normocephalic, without obvious abnormality, atraumatic   Neck: Supple, symmetrical, trachea midline, no adenopathy, thyroid: not enlarged, symmetric, no tenderness/mass/nodules, no carotid bruit.    Lungs:   Normal respiratory rate, lungs clear to auscultation without any wheezes, rubs or ronchi bilaterally.    Chest Wall:  No tenderness or deformity   Heart:  Regular rhythm, rate is controlled, S1, S2 normal, there is no murmur, there is no rub or gallop, cannot assess jvd, no bilateral lower extremity edema   Abdomen:   Soft, non-tender, bowel sounds active all four quadrants,  no masses, no organomegaly   Extremities: Extremities normal, atraumatic, no cyanosis.    Pulses: 2+ and symmetric   Skin: Skin color, texture, turgor normal, no rashes or lesions   Pysch: Normal mood and affect   Neurologic: Normal gross motor and sensory exam.  Cranial nerves intact       Labs:   Recent Labs     05/26/25  0508 05/27/25  0509 05/28/25  0528   * 133* 132*   K 4.0 4.0 4.0   BUN 11 11 13   CREATININE 0.9 1.0 1.0   CL 98* 97* 96*   CO2 23 25 25   GLUCOSE 111* 116* 105*   CALCIUM 8.8 8.6 8.8   MG 2.03 2.00  --      Recent Labs     05/26/25  0508 05/27/25  0509 05/28/25  0528   WBC 9.1 8.5 10.2   HGB 11.2* 10.7* 11.8*   HCT 33.3* 31.2* 34.6*    374 359   MCV 85.5 84.8 84.9     No results for input(s): \"CHOLTOT\", \"TRIG\", \"HDL\", \"CHOLHDL\", \"LDL\" in the last 72 hours.    Invalid input(s): \"LIPIDCOMM\", \"VLDCHOL\"  Recent Labs     05/26/25  0508 05/27/25  0509 05/28/25  0527   INR 1.21* 1.30* 1.76*     No results for input(s): \"CKTOTAL\", \"CKMB\", \"CKMBINDEX\", \"TROPONINI\" in the last 72 hours.  No results for input(s): \"BNP\" in the last 72 hours.  No results for input(s): \"NTPROBNP\" in the last 72 
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 Pt reports wife assisting w/ meals. Per SLP directions, pt does require 1:1 supervision for safety w/ VCs for safe swallow.  Putting On/Taking Off Footwear: Dependent/Total  Toileting: Dependent/Total  Toileting Skilled Clinical Factors: external catheter; incontinent of BM - dependent hygiene provided at bed level (clean sacral heart, barrier ointment, and brief applied)          Bed mobility  Rolling to Left: Substantial/Maximal assistance (maintained  on bedrail during dependent pericare hygiene)  Rolling to Right: Dependent/Total  Scooting: Dependent/Total  Bed Mobility Comments: pt incontinent of BM - extended time for pericare, application of new sacral heart, barrier cream, and clean brief. Initiated bed to chair transfer via Maxi Move katie - pt returned to bed due to malfunctioning equipment. RN and RN manager informed about equipment failure.     Note: extended time obtaining proper equipment and performing pericare.       Cognition  Cognition Comment: pt very sleepy initially and not following directions; increased alertness and engagement after bed mobility and hygiene - followed some simple directions for exercise engagement, but drifts off to sleep                 Exercise Treatment: initially PROM provided as pt sleeping despite sternal rubs and loud tactile cues; increased alertness after bed 
gaze deviation.     Lives at home with wife. Independent in ADLs.     He was not taking his warfarin for upcoming cystoscopic biopsy. Originally reported to be off warfarin for 5 days but wife says he hasn't been on it for 30 days.     LKW 10 pm on 5/14. Woke at 0200 on 5/15 with stroke symptoms. Presented to ED at [facility] at 0212 where code stroke was called. NIH 23. CTA showed right M1 occlusion at 0237. Not TNK candidate due to out of window. Underwent mechanical thrombectomy. Time of repurfusion 0611. Case c/b broken catheter tip intravascularly which could not be retrieved.\"    Imaging  CT head 5/17/25  1. Late acute to early subacute ischemic infarct in the right basal ganglion, caudate and lentiform nucleus with mild effacement of the right frontal horn.  2. Late acute to early subacute ischemic infarct right frontal lobe  3. Periventricular microangiopathic ischemic changes, chronic small vessel disease  3. No evidence of acute intracranial hemorrhage    CXR- not completed this visit    Date of onset: 5/15/25    Current Diet:  ADULT DIET; Dysphagia - Pureed; Mildly Thick (Nectar)  ADULT ORAL NUTRITION SUPPLEMENT; Lunch, Dinner; Frozen Oral Supplement    Treatment Diagnosis: dysphagia, cognitive impairments, dysarthria     Pain: Pt reporting that his neck is sore. RN aware     Prior Level of Function: Pt reported to live at home with wife. Independent with all IADLs and ADLs at baseline. Consumes regular solids/thin liquids.     Prior Dysphagia History:   None per pt or chart review.     Bedside Swallowing Evaluation Impression (5/15/25)  Pt presents with swallow function which requires further assessment. Pt seated upright in bed. SLP assisted pt with oral care via suction toothbrush. Adequate and clear oral cavity. Noted L facial asymmetry and weakness. Pt required cues to visually attend to SLP standing on L side. Pt able to follow cues to cross midline to locate PO presentations. Pt readily accepted all 
intact       Labs:   Recent Labs     05/17/25  0559 05/18/25  0633 05/19/25  0430    142 141   K 3.7 3.8 3.9   BUN 11 12 12   CREATININE 1.1 1.0 1.1    109 108   CO2 21 22 23   GLUCOSE 114* 108* 107*   CALCIUM 8.4 9.0 8.8     Recent Labs     05/17/25  0559 05/17/25  1805 05/18/25  0633 05/19/25  0430   WBC 10.1  --   --   --    HGB 11.0* 10.4* 11.6* 10.1*   HCT 32.2* 31.3* 35.1* 30.3*     --   --   --    MCV 86.4  --   --   --      No results for input(s): \"CHOLTOT\", \"TRIG\", \"HDL\", \"CHOLHDL\", \"LDL\" in the last 72 hours.    Invalid input(s): \"LIPIDCOMM\", \"VLDCHOL\"  No results for input(s): \"INR\" in the last 72 hours.    Invalid input(s): \"PT\", \"PTT\"  No results for input(s): \"CKTOTAL\", \"CKMB\", \"CKMBINDEX\", \"TROPONINI\" in the last 72 hours.  No results for input(s): \"BNP\" in the last 72 hours.  No results for input(s): \"NTPROBNP\" in the last 72 hours.  No results for input(s): \"TSH\" in the last 72 hours.    Imaging:       Assessment & Plan:     1.  Ischemic stroke due to right MCA thrombus.  Most likely cardioembolic due to lack of anticoagulation in the setting of PAF.  S/p thrombectomy complicated by catheter tip detachment s/p SHAHEED sacrifice.  2.  PAF.  Off anticoagulation due to hematuria.  3.  Chronic HFrEF.  Patient is euvolemic.  4.  Ureteral cancer with hematuria.  5.  Persistent hypotension.  Of unknown etiology, could be neurogenic.  6.  Sinus bradycardia.  Of unknown etiology, could be neurogenic.        - Avoid AV beka blocking agents.  - Agree with IV fluids for BP support and weaning pressors as tolerated.      There are no further recommendations at this time and hence I will now sign off. Patient may follow up in our clinic once discharged from the hospital for further cardiac care. If cardiology needs to be re-consulted during this admission, please contact our Cardiology Navigator at [phone] during normal business hours or the [facility] cardiology office/ at [phone] 
or Continued:   [] PRN Order including Drug Name/Route, Reason Ordered or Continued:  []Other -   [] Decision to De-escalate Care this DOS due to change in treatment goals:  [] Decision to Escalate Care To:   [] Major Surgery/Procedure (any 1):   Elective with patient risk factors including Procedure Type and Risk Factors:   Emergent Procedure Type:    ----------------------------------------------------------------------  C. Data (any 2)  [x] Data Review (3+ points)  [x] Consultant Note reviewed with note date, specialty, and summary (1 point each) cardiology note reviewed from 6/2 no further workup needed  [x] All current labs were reviewed and interpreted for clinical significance   [x] Studies Reviewed (1 point each): CT  [] Collateral history obtained including from who and why needed (Max 1 point):  [x] Independent (Abdi Diaz MD) Interpretation of tests (any 1)  [x] Rhythm Strip (Telemetry) personally reviewed and interpreted as documented above A-fib  [] Imaging personally reviewed and interpreted, includes:    [x] Discussion (any 1)  [x] Discussed the discharge plan in detail with case management including timing/barriers to discharge, need for support services and/or placement decision   [] Discussed management of the case with:     Subjective:     Chief Complaint: CVA    Jhony Hdez is a 84 y.o. male who presents with CVA    Very lethargic.  Opens eyes but falls back asleep  No family      Review of Systems:      Pertinent positives and negatives discussed in HPI    Objective:     Intake/Output Summary (Last 24 hours) at 6/4/2025 1555  Last data filed at 6/4/2025 1415  Gross per 24 hour   Intake 620 ml   Output 1225 ml   Net -605 ml      Vitals:   Vitals:    06/04/25 0000 06/04/25 0530 06/04/25 0827 06/04/25 1142   BP: 129/61 113/66 112/69 105/73   Pulse: 61 60 67 (!) 107   Resp: 18 16 17 18   Temp: 97.5 °F (36.4 °C) 97.9 °F (36.6 °C) 97.9 °F (36.6 °C) 98.1 °F (36.7 °C)   TempSrc: Oral Oral Oral Oral 
reclined, pillows for proper positioning and comfort; wife arrived at end of session)  Balance  Sitting: Impaired (x 8-10 minutes EOB - leans to L; after R forearm propping more midline briefly but leans strongly to L - responds well to VCs and visual cues; maxA initially and progressed to Min/ModA - regressed to maxA w/ fatigue)        ADL  Feeding: Contact guard assistance  Feeding Skilled Clinical Factors: beverage to mouth using R hand  LE Dressing: Dependent/Total;Based on clinical judgement  Putting On/Taking Off Footwear: Dependent/Total  Toileting: Dependent/Total  Toileting Skilled Clinical Factors: external catheter; pt did void in standing - hygiene provided and clean socks donned        Bed mobility  Supine to Sit: 2 Person assistance;Substantial/Maximal assistance  Scooting: Substantial/Maximal assistance  Transfers  Sit to stand:  (ModA of 2 (to Dede Stedy); ModA of 1 (from Dede Tablelist Incdy seat))  Stand to sit: 2 Person assistance;Moderate assistance  Transfer Comments: bed to chair via Dede Gevo  Vision  Vision: Within Functional Limits  Hearing  Hearing: Within functional limits  Cognition  Following Commands: Follows one step commands with increased time  Safety Judgement: Impaired  Insights: Impaired  Initiation: Impaired  Sequencing: Impaired  Orientation  Overall Orientation Status: Within Normal Limits  Orientation Level: Oriented to place;Oriented to person;Oriented to time               Exercise Treatment: AAROM x 5 reps LUE (fingers, wrist - gravity eliminated, elbow flex/ext - gravity eliminated, shoulder flex)  Education Given To: Patient  Education Provided: Role of Therapy;Plan of Care;Mobility Training;ADL Adaptive Strategies  Education Method: Verbal  Education Outcome: Continued education needed;Verbalized understanding                                                                     AM-PAC - ADL  AM-PAC Daily Activity - Inpatient   How much help is needed for putting on and taking off 
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 Pt drowsy during assessment.)  Strength:  (difficult to fully assess 2* drowsiness; LUE appears WFL, RUE : grossly 2/5 (responds to VCs w/ delay and repetition))  ADL  LE Dressing: Dependent/Total;Based on clinical judgement  Putting On/Taking Off Footwear: Dependent/Total;Based on clinical judgement  Toileting: Dependent/Total  Toileting Skilled Clinical Factors: external catheter  Functional Mobility Skilled Clinical Factors: NT - too drowsy to attempt  Additional Comments: ADLs - continue to assess as tolerated     Activity Tolerance  Activity Tolerance: Patient tolerated evaluation without incident;Patient limited by fatigue  Bed mobility  Supine to Sit: 2 Person assistance;Substantial/Maximal assistance  Sit to Supine: Substantial/Maximal assistance;2 Person assistance  Scooting: Dependent/Total;2 Person assistance        Cognition  Cognition Comment: lethargic throughout  Orientation  Orientation Level: Oriented to place;Oriented to person;Disoriented to time;Disoriented to situation  Perception  Overall Perceptual Status: Impaired  Unilateral Attention: Cues to attend left visual field;Cues to attend to left side of body;Cues to maintain midline in sitting      
     PARANASAL SINUSES / MASTOIDS: No acute sinusitis or mastoiditis.      ORBITS: Normal.      EXTRACRANIAL SOFT TISSUES: Normal.      OTHER: None.      IMPRESSION:      1. Retained catheter within the right internal carotid artery. New thrombus involving entirety of the lumen for a short segment of the supraclinoid right internal carotid artery along the posterior margin of the upper tip of the catheter fragment.    Critical results discussed with nurse caring for patient by Dr. Brown 1951.      Electronically signed by Jason Brown MD      CT HEAD WO CONTRAST   Final Result   Impression:       1. Right frontal lobe and right basal ganglia new low attenuation lesions consistent with recent infarct.   2. No evidence of recent intracranial hemorrhage.   3. Parenchymal volume loss and chronic small vessel ischemic changes in the white matter.      Electronically signed by Jason Brown MD      CT HEAD WO CONTRAST   Final Result   Addendum (preliminary) 2 of 2   ADDENDUM #1   Addendum:    There is a catheter fragment demonstrated in the right internal carotid    artery.   This is seen in the parasellar right internal carotid artery and is    tracked   inferiorly to the cervical segment of the right internal carotid artery.    The   inferior extent of the catheter fragment is not identified, however.      Electronically signed by Henok Martin      Electronically Signed by: HENOK MARTIN on u May 15, 2025  2:30:01 PM    EDT      Head CT without contrast on 5/15/2025      CLINICAL HISTORY: Change in neurologic examination.      Comparisons: 5/15/2025.      PROCEDURE: Helical CT imaging evaluation through the head with multiplanar   reformats. Individualized dose optimization technique was used in order to    meet   ALARA standards for radiation dose reduction.  In addition to vendor    specific   dose reduction algorithms, the dose reduction techniques vary based on the   specific scanner utilized but 
discharge  Short Term Goal 1: pt will perf supine>sit with min A  ongoing  Short Term Goal 2: pt will sit EOB for 3min with CGA  ongoing  Short Term Goal 3: pt will perf STS to RW with mod A x2  ongoing  Short Term Goal 4: pt will perf SPT with RW and mod A x2  ongoing  Short Term Goal 5: pt will amb 10ft with RW and mod A  ongoing  Patient Goals   Patient Goals : return home       Education  Patient Education  Education Given To: Patient  Education Provided: Role of Therapy;Mobility Training  Education Method: Verbal;Demonstration  Barriers to Learning: Cognition  Education Outcome: Verbalized understanding;Continued education needed      Therapy Time   Individual Concurrent Group Co-treatment   Time In       1130   Time out       1215   total       45    Timed Code Treatment Minutes:  45    Total Treatment Minutes:   45       Patty Prado, PT         
for Acute Rehab    Multi-Disciplinary Rounds with Case Management completed on 5/26/2025 with the following recs:     Anticipated Discharge Location: [facility]     Anticipated Discharge Day/Date:  3-4 days    Barriers to Discharge: Clinical Course    --------------------------------------------------    MDM (any 2 required for High level billing)    A. Problems (any 1)  [x] Acute/Chronic Illness/injury posing ongoing threat to life and/or bodily function without ongoing treatment    [] Severe exacerbation of chronic illness    --------------------------------------------------  B. Risk of Treatment (any 1)    [x] Drugs/treatments that require intensive monitoring for toxicity    [] IV ABX (Vancomycin, Aminoglycosides, etc)     [] Post-Cath/Contrast study requiring serial monitoring    [] IV Narcotic analgesia    [] Aggressive IV diuresis    [] Hypertonic Saline    [] Critical electrolyte abnormalities requiring IV replacement    [] Insulin - Scheduled/SSI or Insulin gtt    [x] Anticoagulation (Heparin gtt or Coumadin - other anticoagulants in special circumstances)    [] Cardiac Medications (IV Amiodarone/Diltiazem, Tikosyn, etc)    [] Hemodialysis    [x] Other -  pressors  [] Change in code status    [] Decision to escalate care    [] Major surgery/procedure with associated risk factors    --------------------------------------------------  C. Data (any 2)    [x] Data Review (any 3)    [x] Consultant notes from yesterday/today    [x] All available current labs reviewed interpreted for clinical significance    [x] Appropriate follow-up labs were ordered  [] Collateral history obtained     [x] Independent Interpretation of tests (any 1)    [x] Telemetry (Rhythm Strip) personally reviewed and interpreted        [] Imaging personally reviewed and interpreted     [x] Discussion (any 1)  [x] Multi-Disciplinary Rounds with Case Management  [] Discussed management of the case with           Labs:  Personally reviewed on 
residue remaining with puree/regular along L side oral cavity. Required verbal/tactile cues to use lingual sweep to clear residuals, of which pt able to complete well given increased time. Pt appeared to swallow all trials. X2 instances of coughing after completion of swallow with trials thins via straw sips. Pt endorsed globus sensation and wet vocal quality appreciated. S/s may be indicative of aspiration. Based on assessment, known CVA, and increased lethargy, SLP recommends continue NPO with further objective assessment of of oropharyngeal swallow function via MBS to be completed tomorrow, to determine least restrictive diet and further POC. Pt and family present in agreement and demo'd good comprehension. Relayed to RN OK to provide ice chips and tsps water AFTER oral care and with RN only.     Instrumental assessment results MBS 5/17/25  Pt presents with moderate oropharyngeal dysphagia. Pt seated upright, increasing L lateral lean as procedure progressed 2/2 weakness. Pt required cues to readily accept all trials via SLP 1:1 feed assist, noted increased fatigue negatively impacting full alertness/attention. Reduced labial seal resulted in trace anterior escape of liquids from L side oral cavity. Lingual pumping noted with various consistencies with trace residue escaping to FOM and posterior escape of masticated solids progressing down lateral channels into the pyriforms. Disorganized and prolonged mastication of regular solids with unchewed pieces remaining along L lateral sulci - required extraction by SLP. L sided weakness resulted in additional min residuals/pocketing of all consistencies remaining along lingual surface/in lateral sulci. Required cued second swallow to assist with gathering residue and clearing. Delayed swallow with bolus head in the pyriforms, reduced hyolaryngeal elevation/excursion, and reduced/incomplete epiglottic inversion resulted in incomplete laryngeal vestibular closure with 
sips. Pt endorsed globus sensation and wet vocal quality appreciated. S/s may be indicative of aspiration. Based on assessment, known CVA, and increased lethargy, SLP recommends continue NPO with further objective assessment of of oropharyngeal swallow function via MBS to be completed tomorrow, to determine least restrictive diet and further POC. Pt and family present in agreement and demo'd good comprehension. Relayed to RN OK to provide ice chips and tsps water AFTER oral care and with RN only.     Instrumental assessment results  MBS to be completed tomorrow    Speech, Language, Cognitive Evaluation  Recommend full assessment in next session.    Prognosis:  Prognosis for improvement: fair  Barriers to reach goals: age, medical dx    Treatment:  Dysphagia Goals:  The pt will be seen  2-3 x to address the following goals:  Goal 1: Patient will participate in instrumental assessment of swallow function as appropriate.  Goal 2: Patient will tolerate least restrictive diet with adequate oral prep and without overt signs of aspiration or associated decline in respiratory status.  Goal 3: Patient/caregiver will demonstrate understanding of dysphagia recommendations/concerns.    Education  Provided education to patient re: role of SLP, purpose of visit, and recommendations. Patient with some comprehension. Family present demo'd good understanding and agreement.     Pt goal: none stated  Pt discharge goal: home    Total treatment time: 20 minutes swallow evaluation     Plan: Continue per POC  Recommended Diet: NPO   *MBS to be completed tomorrow AM, as appropriate*  Medication administration: via alternative means     Strategies:   Allow ice chips / tsps water ONLY with RN and AFTER oral care via suction toothbrush  Oral care 2-3x/day     Discharge Recommendation: TBD closer to DC  Discussed with Ksenia PARADA  Needs met prior to leaving room, call light within reach    Electronically signed by:  Vicki Colin M.A., 
Calcific plaque without significant    stenosis of the cavernous left internal carotid artery. Absence of the left A1 segment anterior cerebral artery. Left A2 segment supplied via anterior communicating artery. Right anterior cerebral artery normal.      POSTERIOR CIRCULATION: Diminutive but patent stable intracranial right vertebral artery. Calcific plaque of the left vertebral artery without significant stenosis. Calcific plaque basilar artery without significant stenosis.      INTRACRANIAL VENOUS SYSTEM: No evidence for intracranial venous thrombosis.      INTRACRANIAL HEMORRHAGE: None.      VENTRICLES: Normal in size and configuration for age.      BRAIN PARENCHYMA: Gray-white matter differentiation is normal. No intracranial mass effect.      SKULL: No destructive osseous process or fracture.      PARANASAL SINUSES / MASTOIDS: No acute sinusitis or mastoiditis.      ORBITS: Normal.      EXTRACRANIAL SOFT TISSUES: Normal.      OTHER: None.      IMPRESSION:      1. Retained catheter within the right internal carotid artery. New thrombus involving entirety of the lumen for a short segment of the supraclinoid right internal carotid artery along the posterior margin of the upper tip of the catheter fragment.    Critical results discussed with nurse caring for patient by Dr. Brown 1952.      Electronically signed by Jason Brown MD      CT HEAD WO CONTRAST   Final Result   Impression:       1. Right frontal lobe and right basal ganglia new low attenuation lesions consistent with recent infarct.   2. No evidence of recent intracranial hemorrhage.   3. Parenchymal volume loss and chronic small vessel ischemic changes in the white matter.      Electronically signed by Jason Brown MD      CT HEAD WO CONTRAST   Final Result   Addendum (preliminary) 2 of 2   ADDENDUM #1   Addendum:    There is a catheter fragment demonstrated in the right internal carotid    artery.   This is seen in the parasellar right 
Score: 9  AM-PAC Inpatient ADL T-Scale Score : 25.33  ADL Inpatient CMS 0-100% Score: 79.59  ADL Inpatient CMS G-Code Modifier : CL         Goals  Short Term Goals  Time Frame for Short Term Goals: Discharge  Short Term Goal 1: improve bed mobiltiy to ModA of 2- ongoing  Short Term Goal 2: unsupported midline sitting x w/ Surya for 5 minutes- ongoing  Short Term Goal 3: increase strength LUE to 3/5- ongoing  Patient Goals   Patient goals : no goals stated      Therapy Time   Individual Concurrent Group Co-treatment   Time In       1320   Time Out       1433   Minutes       73    Timed Code Treatment Minutes: 73    Total Treatment Minutes:  73     [x]co-treatment indicated; patient seen for co-treatment due to: patient safety, patient endurance, complexity of condition, and need for the assistance of 2 skilled therapists.      OT addressing: [x]ADL's, [] Pericare,  []clothing management, [x] BU E strength/endurance with functional tasks,  [] UE WB [] Other: unsupported sitting/standing, neuro-daya, transfer training      Namrata Dougherty OTR/L #[id number]       
\"ALT\", \"BILIDIR\", \"BILITOT\", \"ALKPHOS\" in the last 72 hours.  Recent Labs     05/20/25  1017   INR 1.16*       Urine Cultures: No results found for: \"LABURIN\"  Blood Cultures: No results found for: \"BC\"  No results found for: \"BLOODCULT2\"  Organism: No results found for: \"ORG\"      Maxi Cruz, DO    
questions.    Garo Chacko MD, FAC, FSA  [facility] [address] [address]  Ph: [phone]      
the upper portion of  the catheter fragment series 2 image 407 new in this location compared to 5/15/2025 CT angiogram head from 0-33. Interval resolution of the occlusive thrombus of the right M1 middle cerebral artery. Intact left MCA. Calcific plaque without significant  stenosis of the cavernous left internal carotid artery. Absence of the left A1 segment anterior cerebral artery. Left A2 segment supplied via anterior communicating artery. Right anterior cerebral artery normal.    POSTERIOR CIRCULATION: Diminutive but patent stable intracranial right vertebral artery. Calcific plaque of the left vertebral artery without significant stenosis. Calcific plaque basilar artery without significant stenosis.    INTRACRANIAL VENOUS SYSTEM: No evidence for intracranial venous thrombosis.    INTRACRANIAL HEMORRHAGE: None.    VENTRICLES: Normal in size and configuration for age.    BRAIN PARENCHYMA: Gray-white matter differentiation is normal. No intracranial mass effect.    SKULL: No destructive osseous process or fracture.    PARANASAL SINUSES / MASTOIDS: No acute sinusitis or mastoiditis.    ORBITS: Normal.    EXTRACRANIAL SOFT TISSUES: Normal.    OTHER: None.    IMPRESSION:    1. Retained catheter within the right internal carotid artery. New thrombus involving entirety of the lumen for a short segment of the supraclinoid right internal carotid artery along the posterior margin of the upper tip of the catheter fragment.  Critical results discussed with nurse caring for patient by Dr. Brown 1952.    Electronically signed by Jason Brown MD      MRI Brain w/o Contrast:   No results found for this or any previous visit from the past 10 days.      MRI Brain w & w/o Contrast:  No results found for this or any previous visit from the past 10 days.    PHYSICAL EXAMINATION     PHYSICAL EXAM: 1015  Vitals:    05/17/25 0915 05/17/25 0930 05/17/25 0945 05/17/25 1000   BP: (!) 136/91 126/79 130/77 134/65   Pulse: 73 (!) 
with bolus head in the pyriforms, reduced hyolaryngeal elevation/excursion, and reduced/incomplete epiglottic inversion resulted in incomplete laryngeal vestibular closure with deep penetration of thin and mildly thick liquids during/after the swallow, and silent aspiration of thin, and suspect mildly thick liquids (difficult to fully visualize if mildly thick liquids spilled below the vocal folds 2/2 shoulder began to obstruct full clear view) after completion of swallow. Pt not sensate to aspirated material, therefore SLP cued pt to produce a decently strong cough. Unable to fully visualize if aspirated material was ejected out of the airway. Reduced tongue base retraction, epiglottic inversion and reduced UES distension/duration resulted in min-mod amounts of residue remaining in the vallecula and pyriforms. Pt required cues second dry swallow to partially clear. Trace-min residue remaining. No significant backflow or retention noted in UES region.  Based on assessment SLP recommends trialing oral diet of FULL MODERATELY THICK LIQUIDS (HONEY) - NO NATURALLY PUREE (I.e. NO pudding, applesauce, mashed potatoes, etc - ONLY LIQUIDS), with strict 1:1 feed assist and use of strategies listed below. Recommend medications via alternative means.     Repeat MBS 5/25/25  Oral Phase  Incomplete labial closure with mild amounts of all liquid trials escaping beyond the chin. Slow and prolonged mastication with complete re-collection of solid bolus. Reduced lingual control and delayed anterior to posterior lingual motion allowed for >50% loss of liquid boluses posteriorly, reaching the pyriform sinus. Premature spillage of thin liquid frequently misdirected into the laryngeal vestibule before and during the swallow, resulting in sensed aspiration (PAS 7). Reduced lingual to palatal seal resulted in trace-mild oral residue.   Pharyngeal Phase  Complete soft palate elevation to the posterior pharyngeal wall. Reduced base of 
tasks,  [] UE WB [] Other:      Timed Code Treatment Minutes:  72    Total Treatment Minutes:  72    If patient is discharged prior to next treatment, this note will serve as the discharge summary.    Irina Quiroz, SPT     Therapist was present, directed the patient's care, made skilled judgement, and was responsible for assessment and treatment of the patient.  Laurence León, PT       
the lumen for a short segment of the supraclinoid right internal carotid artery along the posterior margin of the upper tip of the catheter fragment.    Critical results discussed with nurse caring for patient by Dr. Brown 1952.      Electronically signed by Jason Brown MD      CT HEAD WO CONTRAST   Final Result   Impression:       1. Right frontal lobe and right basal ganglia new low attenuation lesions consistent with recent infarct.   2. No evidence of recent intracranial hemorrhage.   3. Parenchymal volume loss and chronic small vessel ischemic changes in the white matter.      Electronically signed by Jason Brown MD      CT HEAD WO CONTRAST   Final Result   Addendum (preliminary) 2 of 2   ADDENDUM #1   Addendum:    There is a catheter fragment demonstrated in the right internal carotid    artery.   This is seen in the parasellar right internal carotid artery and is    tracked   inferiorly to the cervical segment of the right internal carotid artery.    The   inferior extent of the catheter fragment is not identified, however.      Electronically signed by Henok Martin      Electronically Signed by: HENOK MARTIN on u May 15, 2025  2:30:01 PM    EDT      Head CT without contrast on 5/15/2025      CLINICAL HISTORY: Change in neurologic examination.      Comparisons: 5/15/2025.      PROCEDURE: Helical CT imaging evaluation through the head with multiplanar   reformats. Individualized dose optimization technique was used in order to    meet   ALARA standards for radiation dose reduction.  In addition to vendor    specific   dose reduction algorithms, the dose reduction techniques vary based on the   specific scanner utilized but frequently include automated exposure    control,   adjustment of the mA and/or kV according to patient size, and use of    iterative   reconstruction technique. No intravenous contrast.      FINDINGS:   There is no evidence of depressed skull fracture. Paranasal 
visit from the past 10 days.    PHYSICAL EXAMINATION     PHYSICAL EXAM:  Vitals:    05/16/25 0945 05/16/25 1000 05/16/25 1015 05/16/25 1030   BP: 125/74 117/82 133/79 109/70   Pulse: 74 76 57 72   Resp: (!) 5 21 (!) 8 17   Temp:       TempSrc:       SpO2: 97% 95% 98% 95%   Weight:       Height:             General: sleeping, wakes, easily   Neurologic  Mental status:  Orientation: x4  Attention: Iimited d/t sleepiness    Language: no aphasia noted   Speech: slurred  Comprehension intact; follows simple commands    Cranial nerves:   Pupils equal   Gaze conjugate  L sided facial weakness  Sensation intact  Mild dysarthria      Motor Exam:  RUE - good strength - able to hold off bed for 10 seconds  RLE - able to lift at knee slightly (limited by knee pain), unable to lift lower leg off bed which may be related to pain as well.   LUE - No movement noted - unable to lift off bed  LLE - able to lift at knee more than R, unable to lift lower leg off bed  Able to dorsi / planter flex    Sensory: Decreased on the L, neglectful of L  Cerebellar/coordination: DERRICK  Tone: normal in all 4 extremities        
angiogram    also on   5/15/2025.      Ventricles are normal in size and configuration for patient's age. There    is low   attenuation in the bilateral cerebral hemisphere periventricular white    matter.   This is similar to prior study. There are no extra-axial collections. No   definite abnormal cortical attenuation is detected.      IMPRESSION:       1. Stable CT scan of the brain demonstrating periventricular white matter    small   vessel ischemic disease. No cortical infarct, intracranial hemorrhage or    mass is   detected. Sensitivity for detection of subtle hemorrhage is diminished    given the   presence of some persistent intravascular contrast material.      Electronically signed by Henok Villanueva      Interpreted by:   Henok Villanueva MD      Signed by:   Henok Villanueva MD   5/15/25      Edited Result - FINAL      Final   1. Stable CT scan of the brain demonstrating periventricular white matter small   vessel ischemic disease. No cortical infarct, intracranial hemorrhage or mass is   detected. Sensitivity for detection of subtle hemorrhage is diminished given the   presence of some persistent intravascular contrast material.      Electronically signed by Henok Villanueva      CT head without contrast    (Results Pending)        A/P: Jhony Hdez is a 84 y.o. male who is POD# 1 s/p mechanical thrombectomy R MCA  -complicated by aspiration catheter fracture with retained segment in SHAHEED   -stroke appears encouragingly smaller than expected on CT, MCA remains patent    -Frequent neurochecks   -Pain control   -continue heparin gtt  -plan angiography tomorrow for possible catheter retrieval or stent trapping to prevent migration  -once time confirmed with angio team, will plan to load aspirin and Plavix  -Hold diet, bowel regimen   -PT/OT, Activity as tolerated   -DVT ppx with subq heparin, SCDs     Dispo: ICU     
septum and apex.  Eventually a slightly higher septal position was found with excellent injury current, sensing and threshold. The 9 Fr sheath was peeled away and removed. The lead was anchored in place using the suture sleeve and interrupted 0-silk.  RA Lead Over one of the other wires a 7 Fr peel away sheath and dilator was passed. The wire and dilator was removed. An actively fixated RA pacing lead was introduced and passed to the level of the RA. The stylet was removed, a curved stylet was placed, and the lead retracted to the level of the appendage. The lead was fixated in place. The curved stylet was removed and exchanged for a straight stylet. The lead initially demonstrated poor lead characteristics.  This was found at multiple locations within the right atrium including the appendage, lateral wall, posterior aspect.  Eventually a inferolateral position was found with sensing exceeding 1 mV, excellent current of injury at time of implant. The 7 Fr sheath was peeled away. The lead was anchored in place using the suture sleeve and interrupted 0-silk.  Atrial pacing from the right atrial lead was undertaken to assess for AV beka conduction.  Pacing at a rate of 130 bpm demonstrated one-to-one conduction with prolonged IL interval.  I felt that this rate with one-to-one conduction would be sufficient for patient's expected activity level and thus he would have low expected RV pacing burden.  A left bundle area pacing lead or coronary sinus lead was not sought. Pocket Additional lidocaine was administered. A pocket was created using electrocautery and blunt dissection. The pocket was washed with antibiotic solution. The leads were then connected to the new dual-chamber ICD pulse generator which was then placed into the pocket. The device was anchored to the underlying muscle using 0-silk. A TYRX antibiotic pouch was placed within the pocket encompassing the device. The pocket was then closed in three separate 
None.     Left-sided transvenous pacer/defibrillator is identified in the expected location. No pneumothorax is seen. Electronically signed by Moraima Bravo    Echo (TTE) limited (PRN contrast/bubble/strain/3D)  Result Date: 5/29/2025    Left Ventricle: Not assessed. No EF was obtained with this study.   Pericardium: Trivial pericardial effusion present. No indication of cardiac tamponade.   Image quality is adequate. Procedure performed with the patient in a supine position.     XR ELBOW RIGHT (2 VIEWS)  Result Date: 5/28/2025  2 VIEWS OF THE RIGHT ELBOW HISTORY: Olecranon tenderness and pain on passive flexion COMPARISON STUDY: None FINDINGS: There is no discrete fracture or dislocation. Mild arthritic changes in the elbow. No focal bony erosive process or periosteal new bone. Small joint effusion is not excluded in this exam.     1. No discrete fracture or dislocation. 2. No radiopaque foreign body seen. 3. Question small joint effusion. Electronically signed by Oracio Phillips      CBC:   Recent Labs     05/31/25  0353 06/01/25  0531 06/02/25  0730   WBC 8.9 11.6* 9.9   HGB 9.2* 9.6* 9.5*    409 418     BMP:    Recent Labs     05/31/25  0353 06/01/25  0531 06/02/25  0730   * 137 140   K 3.5 3.5 3.6    104 107   CO2 23 21 21   BUN 17 19 24*   CREATININE 1.0 1.0 1.2   GLUCOSE 98 109* 116*     Hepatic:   Recent Labs     05/31/25  0353 06/01/25  0531 06/02/25  0730   * 181* 219*   ALT 55* 73* 112*   BILITOT 1.2* 1.2* 1.2*   ALKPHOS 364* 465* 462*     Lipids:   Lab Results   Component Value Date/Time    CHOL 113 05/16/2025 04:11 AM    HDL 29 05/16/2025 04:11 AM    TRIG 91 05/16/2025 04:11 AM     Hemoglobin A1C:   Lab Results   Component Value Date/Time    LABA1C 6.0 05/16/2025 04:11 AM     TSH:   Lab Results   Component Value Date/Time    TSH 2.16 12/18/2024 09:04 AM     Troponin: No results found for: \"TROPONINT\"  Lactic Acid: No results for input(s): \"LACTA\" in the last 72 hours.  BNP: 
as well. Noted reduced breath support with cues to state 2-3 words, take a breath and continue. Cont.   5/23: Max verbal/tactile cues to scan to ID SLP / spouse on L side of bed. Significant lethargy negatively impacting. Unable to target in structured tasks. Cont.   5/24: Tactile cues required at times for maintaining head at midline. Pt able to attend to SLP in L visual field >3 times this session. Cont goal   5/25: Not targeted this session. Cont.     Goal 2: Pt will sustain attention to tasks with <3 redirections/repetitions required.   5/20: Pt required >3 redirections across various tasks. Family members present negatively impacting at times, however when prompted pt able to redirect to SLP attention. Family also providing good cues to redirect to attend to tasks. Cont.   5/21: Pt required max verbal/tactile cues to attend to tasks. Pt with increased fatigue suspect still related to morphine received earlier this AM. Cont.  5/22: As noted above, suspect increased fatigue negatively impacting reduced attention, especially as session progressed. Redirections/repetitions required. Cont.   5/23: Constant redirections required to remain alert and attentive to bolus presentations. Cont.   5/24: pt with poor sustained attention throughout session, required redirections back to task and for propulsion throughout tasks. Cont goal   5/25: Pt with poor attention to tasks this date given high level of lethargy. Required verbal and tactile cues to sustain alertness. Cont goal.     Goal 3: Pt will complete executive function tasks with 80% accuracy given min cues.  5/20: As noted above for clock constructions, improved visual attention, planning and self verbal prompting to count numbers spaced around the clock for completing with accuracy. Cont.   5/22: Targeted via verbalization of clock digits listed above. Otherwise not directly targeted. Cont.   5/25: Not targeted this session. Cont.     Goal 4: Pt will complete

## 2025-06-26 ENCOUNTER — ANTI-COAG VISIT (OUTPATIENT)
Dept: PHARMACY | Age: 85
End: 2025-06-26
Payer: MEDICARE

## 2025-06-26 DIAGNOSIS — I48.0 PAF (PAROXYSMAL ATRIAL FIBRILLATION) (HCC): Primary | ICD-10-CM

## 2025-06-26 LAB
INTERNATIONAL NORMALIZATION RATIO, POC: 1.5
PROTHROMBIN TIME, POC: 0

## 2025-06-26 PROCEDURE — 99211 OFF/OP EST MAY X REQ PHY/QHP: CPT | Performed by: PHARMACIST

## 2025-06-26 PROCEDURE — 85610 PROTHROMBIN TIME: CPT | Performed by: PHARMACIST

## 2025-06-26 RX ORDER — FAMOTIDINE 20 MG/1
20 TABLET, FILM COATED ORAL NIGHTLY PRN
COMMUNITY

## 2025-06-26 RX ORDER — SENNOSIDES 8.6 MG
650 CAPSULE ORAL EVERY 8 HOURS PRN
COMMUNITY

## 2025-06-26 NOTE — PROGRESS NOTES
MrEmily Hdez is here for management of anticoagulation for AFib.   PMH also significant for HLD, HTN, Hx prostate, colon CA.   He presents today w/out complaint.  Pt verifies dosing regimen as listed above.   Pt denies s/s bleeding/bruising/swelling/SOB.  No missed doses  No changes in RX/OTCs/Herbal medications.  No dietary concerns  No ETOH or tobacco use.    He had been in hospital several times over the past few months.  Warfarin was held for procedures and due to hematuria for a while.  He did recently have a stroke. Just discharged from Rehab this week.  Received first dose of warfarin last night.  They were planning to switch him to Eliquis but it is not affordable for pt.    Updated medication list. Dose of allopurinol had been increased to 300 mg daily, otherwise no changes that should effect warfarin.    Will have pt return to previous dose of warfarin from March as INR had been mostly in range at that time.    INR 1.5 is below therapeutic range of 2-2.5.  Recommend to continue dose of 5 mg daily except 2.5 mg Q M//  Patient has 5 mg tablets.  Will continue to monitor and check INR in 4 days.  Dosing reminder card given with phone number, appointment date and time.   Return to clinic:  25 @ 1:45 pm    Clifford Mcguire PharmD 2:12 PM EDT 25    For Pharmacy Admin Tracking Only    Intervention Detail: Adherence Monitorin  Total # of Interventions Recommended: 1  Total # of Interventions Accepted: 1  Time Spent (min): 15

## 2025-06-27 ENCOUNTER — TELEPHONE (OUTPATIENT)
Dept: CARDIOLOGY CLINIC | Age: 85
End: 2025-06-27

## 2025-06-27 NOTE — TELEPHONE ENCOUNTER
[facility] per [facility]NK[facility].     Please call pt and ensure med list is accurate prior to sending to  MD review.

## 2025-06-27 NOTE — TELEPHONE ENCOUNTER
Spoke w/ pt wife, pt does not walk and that time of day does not work, spouse states she needs assistance getting pt in and out of car and she will not have anybody available to help that early.     Can another time be provided or time and date?

## 2025-06-27 NOTE — TELEPHONE ENCOUNTER
Pt wife called wants to see if it would be possible to see ALEJANDRO for a hsfu for her .  Pt was admitted on 05/15 after having a stroke and was d/c on 06/06.  Pt has been going to rehab at [facility] in [place].  Next available for ALEJANDRO for pt to f/u would be Sept/Oct and wife would like to see if he can be seen sooner.  She would also like to see if ALEJANDRO could review his medications to ensure he is on the right meds since his d/c from hospital.

## 2025-06-30 ENCOUNTER — ANTI-COAG VISIT (OUTPATIENT)
Dept: PHARMACY | Age: 85
End: 2025-06-30

## 2025-06-30 DIAGNOSIS — I48.0 PAF (PAROXYSMAL ATRIAL FIBRILLATION) (HCC): Primary | ICD-10-CM

## 2025-06-30 LAB
INTERNATIONAL NORMALIZATION RATIO, POC: 1.4
PROTHROMBIN TIME, POC: 0

## 2025-06-30 RX ORDER — WARFARIN SODIUM 5 MG/1
5 TABLET ORAL DAILY
Qty: 90 TABLET | Refills: 3 | Status: ON HOLD | OUTPATIENT
Start: 2025-06-30

## 2025-06-30 NOTE — TELEPHONE ENCOUNTER
Called to offer overbook, however wife states appt needs to be on mon, tues or wed. Will review with ALEJANDRO.

## 2025-06-30 NOTE — PROGRESS NOTES
MrEmily Hdez is here for management of anticoagulation for AFib.   PMH also significant for HLD, HTN, Hx prostate, colon CA.   He presents today w/out complaint.  Pt verifies dosing regimen as listed above.   Pt denies s/s bleeding/bruising/swelling/SOB.  No missed doses  No changes in RX/OTCs/Herbal medications.  No dietary concerns  No ETOH or tobacco use.    He had been in hospital several times over the past few months.  Warfarin was held for procedures and due to hematuria for a while.  He did recently have a stroke. Just discharged from Rehab last week.  were planning to switch him to Eliquis but it is not affordable for pt.  Updated medication list. Dose of allopurinol had been increased to 300 mg daily, otherwise no changes that should effect warfarin.    Patient now has [facility] RN visiting.  RN calls in INR of 1.4 today. Confirms correct dose since visit last week.  Pt requested warfarin refill. Ordered to [facility] pharmacy in [facility].        INR 1.4 is below therapeutic range of 2-2.5.  Recommend to increase dose to 5 mg daily except 2.5 mg Q Fri(18.2% increase)  Patient has 5 mg tablets.  Will continue to monitor and check INR in 7 days.  Dosing reminder card given with phone number, appointment date and time.   Return to clinic:  [facility]     Clifford Mcguire, PharmD 3:47 PM EDT 25    For Pharmacy Admin Tracking Only    Intervention Detail: Adherence Monitorin, Dose Adjustment: 1, reason: Therapy Optimization, and Refill(s) Provided  Total # of Interventions Recommended: 2  Total # of Interventions Accepted: 2  Time Spent (min): 15

## 2025-07-03 ENCOUNTER — CLINICAL SUPPORT (OUTPATIENT)
Dept: CARDIOLOGY CLINIC | Age: 85
End: 2025-07-03

## 2025-07-03 DIAGNOSIS — Z95.810 ICD (IMPLANTABLE CARDIOVERTER-DEFIBRILLATOR), DUAL, IN SITU: ICD-10-CM

## 2025-07-03 DIAGNOSIS — I50.20 HFREF (HEART FAILURE WITH REDUCED EJECTION FRACTION) (HCC): ICD-10-CM

## 2025-07-03 DIAGNOSIS — Z95.810 DUAL ICD (IMPLANTABLE CARDIOVERTER-DEFIBRILLATOR) IN PLACE: Primary | ICD-10-CM

## 2025-07-03 NOTE — PROGRESS NOTES
Incision is closed, clean, and dry with all dressings/steri strips removed. Site left open to the air. Incision well approximated. No s/s of infection.      Patient education was provided about site care, device functionality, in home monitoring, and any other patient questions and/or concerns were addressed. Aftercare and remote monitoring literature was provided. Patient is to call with any signs or symptoms of infection. Patient voices understanding.

## 2025-07-03 NOTE — PATIENT INSTRUCTIONS
New Cardiac Device Implant (Pacemaker and/or Defibrillator) Post Op Instructions  Bathe with water indirectly hitting the incision site. Water and soap may run over the incision site. Do not scrub. Pat dry with a clean towel after bathing.   Leave incision open to the air; do not apply any dressings, ointments, or bandages to the area. Do not apply lotion, perfume/cologne, or powders to the area until it is completely healed.   Any scabbing or skin glue that is noted will fall off within 1-2 weeks after the post op appointment.   If any oozing, bleeding, or pus drainage occurs, please call the office immediately at [phone].       Patient has movement restrictions in place until 4 weeks post op (to the day of implant) unless otherwise instructed by physician.   Patient may not lift the device side arm above shoulder height.   Do not far reach or stretch across body or behind body with effected side.   Do not use this arm for pushing, pulling, or lifting body.   Do not use cane on the effected side.   Patient may not lift anything heavier than a gallon of milk with the associated arm.     Appointments to expect going forward:  Post operatively the patient will have had a 1-week post op check and a 3 month follow up with NP/MD and the device clinic.       Remote Monitoring Instructions    Within 2-3 weeks of your device being implanted, you will receive a call from the  of your device. Please answer this call as it is to set up remote monitoring for your device. Once you receive your in-home monitor, please follow the instructions provided to sync the home monitor to your implanted device. Once you have paired your home monitor to your implanted device, keep your monitor plugged in within 6 feet of where you sleep. Your monitor will routinely check in with your device during sleep hours and transmit any urgent events to the Device Clinic for review.     Please do not send additional routine

## 2025-07-04 ENCOUNTER — APPOINTMENT (OUTPATIENT)
Dept: CT IMAGING | Age: 85
DRG: 660 | End: 2025-07-04
Payer: MEDICARE

## 2025-07-04 ENCOUNTER — HOSPITAL ENCOUNTER (INPATIENT)
Age: 85
LOS: 3 days | Discharge: HOME | DRG: 660 | End: 2025-07-07
Attending: EMERGENCY MEDICINE | Admitting: INTERNAL MEDICINE
Payer: MEDICARE

## 2025-07-04 DIAGNOSIS — N20.0 KIDNEY STONE: ICD-10-CM

## 2025-07-04 DIAGNOSIS — R11.2 NAUSEA AND VOMITING, UNSPECIFIED VOMITING TYPE: Primary | ICD-10-CM

## 2025-07-04 DIAGNOSIS — N13.39 OTHER HYDRONEPHROSIS: ICD-10-CM

## 2025-07-04 LAB
ALBUMIN SERPL-MCNC: 3.5 G/DL (ref 3.4–5)
ALBUMIN/GLOB SERPL: 1.2 {RATIO} (ref 1.1–2.2)
ALP SERPL-CCNC: 160 U/L (ref 40–129)
ALT SERPL-CCNC: 10 U/L (ref 10–40)
ANION GAP SERPL CALCULATED.3IONS-SCNC: 10 MMOL/L (ref 3–16)
AST SERPL-CCNC: 25 U/L (ref 15–37)
BACTERIA URNS QL MICRO: ABNORMAL /HPF
BASOPHILS # BLD: 0.1 K/UL (ref 0–0.2)
BASOPHILS NFR BLD: 2 %
BILIRUB SERPL-MCNC: 0.6 MG/DL (ref 0–1)
BILIRUB UR QL STRIP.AUTO: ABNORMAL
BUN SERPL-MCNC: 12 MG/DL (ref 7–20)
CALCIUM SERPL-MCNC: 9.1 MG/DL (ref 8.3–10.6)
CHLORIDE SERPL-SCNC: 103 MMOL/L (ref 99–110)
CLARITY UR: ABNORMAL
CO2 SERPL-SCNC: 26 MMOL/L (ref 21–32)
COARSE GRAN CASTS #/AREA URNS LPF: ABNORMAL /LPF (ref 0–2)
COLOR UR: ABNORMAL
CREAT SERPL-MCNC: 1.1 MG/DL (ref 0.8–1.3)
DEPRECATED RDW RBC AUTO: 18 % (ref 12.4–15.4)
EKG DIAGNOSIS: NORMAL
EKG Q-T INTERVAL: 452 MS
EKG QRS DURATION: 152 MS
EKG QTC CALCULATION (BAZETT): 528 MS
EKG R AXIS: -69 DEGREES
EKG T AXIS: 36 DEGREES
EKG VENTRICULAR RATE: 82 BPM
EOSINOPHIL # BLD: 0.2 K/UL (ref 0–0.6)
EOSINOPHIL NFR BLD: 3.9 %
EPI CELLS #/AREA URNS HPF: ABNORMAL /HPF (ref 0–5)
GFR SERPLBLD CREATININE-BSD FMLA CKD-EPI: 65 ML/MIN/{1.73_M2}
GLUCOSE SERPL-MCNC: 105 MG/DL (ref 70–99)
GLUCOSE UR STRIP.AUTO-MCNC: NEGATIVE MG/DL
HCT VFR BLD AUTO: 34.5 % (ref 40.5–52.5)
HGB BLD-MCNC: 10.8 G/DL (ref 13.5–17.5)
HGB UR QL STRIP.AUTO: ABNORMAL
INR PPP: 2.68 (ref 0.86–1.14)
KETONES UR STRIP.AUTO-MCNC: NEGATIVE MG/DL
LEUKOCYTE ESTERASE UR QL STRIP.AUTO: ABNORMAL
LIPASE SERPL-CCNC: 19 U/L (ref 13–60)
LYMPHOCYTES # BLD: 1.3 K/UL (ref 1–5.1)
LYMPHOCYTES NFR BLD: 21.1 %
MCH RBC QN AUTO: 27.3 PG (ref 26–34)
MCHC RBC AUTO-ENTMCNC: 31.4 G/DL (ref 31–36)
MCV RBC AUTO: 87 FL (ref 80–100)
MONOCYTES # BLD: 0.5 K/UL (ref 0–1.3)
MONOCYTES NFR BLD: 7.7 %
NEUTROPHILS # BLD: 3.9 K/UL (ref 1.7–7.7)
NEUTROPHILS NFR BLD: 65.3 %
NITRITE UR QL STRIP.AUTO: POSITIVE
PH UR STRIP.AUTO: 6.5 [PH] (ref 5–8)
PLATELET # BLD AUTO: 300 K/UL (ref 135–450)
PMV BLD AUTO: 8.3 FL (ref 5–10.5)
POTASSIUM SERPL-SCNC: 3.9 MMOL/L (ref 3.5–5.1)
PROT SERPL-MCNC: 6.4 G/DL (ref 6.4–8.2)
PROT UR STRIP.AUTO-MCNC: >=300 MG/DL
PROTHROMBIN TIME: 28 SEC (ref 12.1–14.9)
RBC # BLD AUTO: 3.97 M/UL (ref 4.2–5.9)
RBC #/AREA URNS HPF: >100 /HPF (ref 0–4)
SODIUM SERPL-SCNC: 139 MMOL/L (ref 136–145)
SP GR UR STRIP.AUTO: 1.02 (ref 1–1.03)
UA COMPLETE W REFLEX CULTURE PNL UR: ABNORMAL
UA DIPSTICK W REFLEX MICRO PNL UR: YES
URN SPEC COLLECT METH UR: ABNORMAL
UROBILINOGEN UR STRIP-ACNC: 1 E.U./DL
WBC # BLD AUTO: 6 K/UL (ref 4–11)
WBC #/AREA URNS HPF: ABNORMAL /HPF (ref 0–5)

## 2025-07-04 PROCEDURE — 93005 ELECTROCARDIOGRAM TRACING: CPT | Performed by: EMERGENCY MEDICINE

## 2025-07-04 PROCEDURE — 85025 COMPLETE CBC W/AUTO DIFF WBC: CPT

## 2025-07-04 PROCEDURE — 83690 ASSAY OF LIPASE: CPT

## 2025-07-04 PROCEDURE — 80053 COMPREHEN METABOLIC PANEL: CPT

## 2025-07-04 PROCEDURE — 6370000000 HC RX 637 (ALT 250 FOR IP): Performed by: INTERNAL MEDICINE

## 2025-07-04 PROCEDURE — 85610 PROTHROMBIN TIME: CPT

## 2025-07-04 PROCEDURE — 2500000003 HC RX 250 WO HCPCS: Performed by: INTERNAL MEDICINE

## 2025-07-04 PROCEDURE — 36415 COLL VENOUS BLD VENIPUNCTURE: CPT

## 2025-07-04 PROCEDURE — 99285 EMERGENCY DEPT VISIT HI MDM: CPT

## 2025-07-04 PROCEDURE — 81001 URINALYSIS AUTO W/SCOPE: CPT

## 2025-07-04 PROCEDURE — 6360000002 HC RX W HCPCS: Performed by: PHYSICIAN ASSISTANT

## 2025-07-04 PROCEDURE — 6360000004 HC RX CONTRAST MEDICATION: Performed by: PHYSICIAN ASSISTANT

## 2025-07-04 PROCEDURE — 2580000003 HC RX 258: Performed by: INTERNAL MEDICINE

## 2025-07-04 PROCEDURE — 6360000002 HC RX W HCPCS: Performed by: INTERNAL MEDICINE

## 2025-07-04 PROCEDURE — 87086 URINE CULTURE/COLONY COUNT: CPT

## 2025-07-04 PROCEDURE — 74177 CT ABD & PELVIS W/CONTRAST: CPT

## 2025-07-04 PROCEDURE — 2580000003 HC RX 258: Performed by: PHYSICIAN ASSISTANT

## 2025-07-04 PROCEDURE — 96374 THER/PROPH/DIAG INJ IV PUSH: CPT

## 2025-07-04 PROCEDURE — 93010 ELECTROCARDIOGRAM REPORT: CPT | Performed by: INTERNAL MEDICINE

## 2025-07-04 PROCEDURE — 1200000000 HC SEMI PRIVATE

## 2025-07-04 RX ORDER — 0.9 % SODIUM CHLORIDE 0.9 %
1000 INTRAVENOUS SOLUTION INTRAVENOUS ONCE
Status: COMPLETED | OUTPATIENT
Start: 2025-07-04 | End: 2025-07-04

## 2025-07-04 RX ORDER — ACETAMINOPHEN 325 MG/1
650 TABLET ORAL EVERY 6 HOURS PRN
Status: DISCONTINUED | OUTPATIENT
Start: 2025-07-04 | End: 2025-07-07 | Stop reason: HOSPADM

## 2025-07-04 RX ORDER — WARFARIN SODIUM 5 MG/1
5 TABLET ORAL DAILY
Status: DISCONTINUED | OUTPATIENT
Start: 2025-07-05 | End: 2025-07-04

## 2025-07-04 RX ORDER — SODIUM CHLORIDE 0.9 % (FLUSH) 0.9 %
5-40 SYRINGE (ML) INJECTION PRN
Status: DISCONTINUED | OUTPATIENT
Start: 2025-07-04 | End: 2025-07-07 | Stop reason: HOSPADM

## 2025-07-04 RX ORDER — NITROGLYCERIN 0.4 MG/1
0.4 TABLET SUBLINGUAL EVERY 5 MIN PRN
Status: DISCONTINUED | OUTPATIENT
Start: 2025-07-04 | End: 2025-07-07 | Stop reason: HOSPADM

## 2025-07-04 RX ORDER — DIPHENHYDRAMINE HCL 25 MG
25 TABLET ORAL NIGHTLY
Status: DISCONTINUED | OUTPATIENT
Start: 2025-07-04 | End: 2025-07-07 | Stop reason: HOSPADM

## 2025-07-04 RX ORDER — ACETAMINOPHEN 500 MG
500 TABLET ORAL NIGHTLY
Status: DISCONTINUED | OUTPATIENT
Start: 2025-07-04 | End: 2025-07-07 | Stop reason: HOSPADM

## 2025-07-04 RX ORDER — PROCHLORPERAZINE EDISYLATE 5 MG/ML
10 INJECTION INTRAMUSCULAR; INTRAVENOUS EVERY 6 HOURS PRN
Status: DISCONTINUED | OUTPATIENT
Start: 2025-07-04 | End: 2025-07-07 | Stop reason: HOSPADM

## 2025-07-04 RX ORDER — SODIUM CHLORIDE 9 MG/ML
INJECTION, SOLUTION INTRAVENOUS CONTINUOUS
Status: ACTIVE | OUTPATIENT
Start: 2025-07-04 | End: 2025-07-05

## 2025-07-04 RX ORDER — MAGNESIUM SULFATE IN WATER 40 MG/ML
2000 INJECTION, SOLUTION INTRAVENOUS PRN
Status: DISCONTINUED | OUTPATIENT
Start: 2025-07-04 | End: 2025-07-07 | Stop reason: HOSPADM

## 2025-07-04 RX ORDER — MIDODRINE HYDROCHLORIDE 5 MG/1
10 TABLET ORAL
Status: DISCONTINUED | OUTPATIENT
Start: 2025-07-04 | End: 2025-07-07 | Stop reason: HOSPADM

## 2025-07-04 RX ORDER — SODIUM CHLORIDE 0.9 % (FLUSH) 0.9 %
5-40 SYRINGE (ML) INJECTION EVERY 12 HOURS SCHEDULED
Status: DISCONTINUED | OUTPATIENT
Start: 2025-07-04 | End: 2025-07-07 | Stop reason: HOSPADM

## 2025-07-04 RX ORDER — ACETAMINOPHEN 650 MG/1
650 SUPPOSITORY RECTAL EVERY 6 HOURS PRN
Status: DISCONTINUED | OUTPATIENT
Start: 2025-07-04 | End: 2025-07-07 | Stop reason: HOSPADM

## 2025-07-04 RX ORDER — ALLOPURINOL 300 MG/1
300 TABLET ORAL NIGHTLY
Status: DISCONTINUED | OUTPATIENT
Start: 2025-07-04 | End: 2025-07-07 | Stop reason: HOSPADM

## 2025-07-04 RX ORDER — ONDANSETRON 2 MG/ML
4 INJECTION INTRAMUSCULAR; INTRAVENOUS ONCE
Status: COMPLETED | OUTPATIENT
Start: 2025-07-04 | End: 2025-07-04

## 2025-07-04 RX ORDER — FAMOTIDINE 20 MG/1
20 TABLET, FILM COATED ORAL NIGHTLY PRN
Status: DISCONTINUED | OUTPATIENT
Start: 2025-07-04 | End: 2025-07-07 | Stop reason: HOSPADM

## 2025-07-04 RX ORDER — PANTOPRAZOLE SODIUM 40 MG/1
40 TABLET, DELAYED RELEASE ORAL DAILY PRN
Status: DISCONTINUED | OUTPATIENT
Start: 2025-07-04 | End: 2025-07-07 | Stop reason: HOSPADM

## 2025-07-04 RX ORDER — POTASSIUM CHLORIDE 7.45 MG/ML
10 INJECTION INTRAVENOUS PRN
Status: DISCONTINUED | OUTPATIENT
Start: 2025-07-04 | End: 2025-07-07 | Stop reason: HOSPADM

## 2025-07-04 RX ORDER — DONEPEZIL HYDROCHLORIDE 5 MG/1
5 TABLET, FILM COATED ORAL NIGHTLY
Status: DISCONTINUED | OUTPATIENT
Start: 2025-07-04 | End: 2025-07-07 | Stop reason: HOSPADM

## 2025-07-04 RX ORDER — POLYETHYLENE GLYCOL 3350 17 G/17G
17 POWDER, FOR SOLUTION ORAL DAILY PRN
Status: DISCONTINUED | OUTPATIENT
Start: 2025-07-04 | End: 2025-07-07 | Stop reason: HOSPADM

## 2025-07-04 RX ORDER — ONDANSETRON 2 MG/ML
4 INJECTION INTRAMUSCULAR; INTRAVENOUS EVERY 6 HOURS PRN
Status: DISCONTINUED | OUTPATIENT
Start: 2025-07-04 | End: 2025-07-04

## 2025-07-04 RX ORDER — IOPAMIDOL 755 MG/ML
75 INJECTION, SOLUTION INTRAVASCULAR
Status: COMPLETED | OUTPATIENT
Start: 2025-07-04 | End: 2025-07-04

## 2025-07-04 RX ORDER — SODIUM CHLORIDE 9 MG/ML
INJECTION, SOLUTION INTRAVENOUS PRN
Status: DISCONTINUED | OUTPATIENT
Start: 2025-07-04 | End: 2025-07-07 | Stop reason: HOSPADM

## 2025-07-04 RX ORDER — POTASSIUM CHLORIDE 1500 MG/1
40 TABLET, EXTENDED RELEASE ORAL PRN
Status: DISCONTINUED | OUTPATIENT
Start: 2025-07-04 | End: 2025-07-07 | Stop reason: HOSPADM

## 2025-07-04 RX ORDER — ONDANSETRON 4 MG/1
4 TABLET, ORALLY DISINTEGRATING ORAL EVERY 8 HOURS PRN
Status: DISCONTINUED | OUTPATIENT
Start: 2025-07-04 | End: 2025-07-04

## 2025-07-04 RX ADMIN — IOPAMIDOL 75 ML: 755 INJECTION, SOLUTION INTRAVENOUS at 11:49

## 2025-07-04 RX ADMIN — SODIUM CHLORIDE: 0.9 INJECTION, SOLUTION INTRAVENOUS at 17:04

## 2025-07-04 RX ADMIN — WATER 1000 MG: 1 INJECTION INTRAMUSCULAR; INTRAVENOUS; SUBCUTANEOUS at 17:27

## 2025-07-04 RX ADMIN — DONEPEZIL HYDROCHLORIDE 5 MG: 5 TABLET, FILM COATED ORAL at 20:44

## 2025-07-04 RX ADMIN — PROCHLORPERAZINE EDISYLATE 10 MG: 5 INJECTION INTRAMUSCULAR; INTRAVENOUS at 17:25

## 2025-07-04 RX ADMIN — ALLOPURINOL 300 MG: 300 TABLET ORAL at 20:42

## 2025-07-04 RX ADMIN — SODIUM CHLORIDE, PRESERVATIVE FREE 10 ML: 5 INJECTION INTRAVENOUS at 20:44

## 2025-07-04 RX ADMIN — SODIUM CHLORIDE 1000 ML: 0.9 INJECTION, SOLUTION INTRAVENOUS at 11:05

## 2025-07-04 RX ADMIN — ONDANSETRON 4 MG: 2 INJECTION, SOLUTION INTRAMUSCULAR; INTRAVENOUS at 11:05

## 2025-07-04 RX ADMIN — ACETAMINOPHEN 500 MG: 500 TABLET ORAL at 20:44

## 2025-07-04 ASSESSMENT — PAIN SCALES - GENERAL
PAINLEVEL_OUTOF10: 2
PAINLEVEL_OUTOF10: 4

## 2025-07-04 ASSESSMENT — PAIN DESCRIPTION - LOCATION: LOCATION: SHOULDER

## 2025-07-04 ASSESSMENT — PAIN DESCRIPTION - DESCRIPTORS: DESCRIPTORS: ACHING;DISCOMFORT;TENDER

## 2025-07-04 ASSESSMENT — PAIN - FUNCTIONAL ASSESSMENT
PAIN_FUNCTIONAL_ASSESSMENT: NONE - DENIES PAIN
PAIN_FUNCTIONAL_ASSESSMENT: ACTIVITIES ARE NOT PREVENTED

## 2025-07-04 ASSESSMENT — PAIN DESCRIPTION - ORIENTATION: ORIENTATION: LEFT

## 2025-07-04 NOTE — ED PROVIDER NOTES
[facility] EMERGENCY DEPARTMENT     EMERGENCY DEPARTMENT ENCOUNTER     Location: [facility] EMERGENCY DEPARTMENT  7/4/2025  Note Started: 2:03 PM EDT 7/4/25      Patient Identification  Jhony Hdez is a 84 y.o. male      HPI:Jhony Hdez was evaluated in the Emergency Department for nausea.  Patient has known history of recent pacer placement.  He also has history of ureteral stents and follows with Dr. Mendoza.  Patient has had increased nausea and fatigue.  No vomiting or abdominal pain reported.  No diarrhea.. Although initial history and physical exam information was obtained by DEANNA/NPP/MD/ (who also dictated a record of this visit), I personally saw the patient and performed and made/approved the management plan and take responsibility for the patient management.      PHYSICAL EXAM:    General: No acute distress.  HEENT: Dry mucous membranes.  Heart: Regular rhythm.  Normal S1-S2.  Lungs: Clear to auscultation belly.  Wheezes, rales, rhonchi.  Abdomen soft.  Nontender.  Nondistended.  No rebound, guarding.      Patient seen and evaluated.  Relevant records reviewed.  MDM  Patient presents to the emergency department complaining of nausea.  Noted history of  ureteral stents      I independently interpreted the following studies:   CBC with normal white count.  Mild anemia.  Normal electrolytes and renal function.  No evidence of hepatitis or pancreatitis.  Abdomen/pelvis CT with noted worsening of left hydronephrosis.    Urology contacted by PA.  Patient will be admitted for hydration and stent replacement    CLINICAL IMPRESSION  1. Nausea and vomiting, unspecified vomiting type    2. Kidney stone          I, Abdi Huff II, , am the primary clinician of record.   I personally saw the patient and independently provided 0 minutes of non-concurrent critical care out of the total shared critical care time excluding separately billable procedures.    This chart was generated in part by using

## 2025-07-04 NOTE — ED NOTES
Pt asked to be turned on his left side to remove pressure on his butt. Pt turned pillow placed under right buttocks . Pt stated was more comfortable

## 2025-07-04 NOTE — H&P
personally have obtained, updated and/or reviewed the patient’s medication list on 7/4/2025   --------------------------------------------------------------------------------------------------------------------------------------------------------------------    Imaging:     CT ABDOMEN PELVIS W IV CONTRAST Additional Contrast? None  Result Date: 7/4/2025  INDICATION: Abdominal pain with nausea and vomiting. COMPARISON: CT abdomen and pelvis 5/21/2025 and 3/25/2025. TECHNIQUE: Helically-acquired axial images were obtained through the abdomen and pelvis. Multiplanar reformats were reconstructed. Up-to-date CT equipment and radiation dose reduction techniques were employed. IV Contrast: The type and amount of iodine based IV contrast is documented separately within the electronic medical record. Oral Contrast: None. FINDINGS: LUNG BASES: Mild bibasilar atelectasis. Mild cardiomegaly. Coronary artery calcification consistent with coronary artery disease. Pacemaker leads are present. LIVER: Normal. GALLBLADDER AND BILIARY TREE: Gallbladder not distended.  No biliary dilatation. PANCREAS: Normal. SPLEEN: Decrease in wedge-shaped area of hypoattenuation within the spleen consistent with evolution of a splenic infarct. No acute abnormality of the spleen. ADRENAL GLANDS: Right adrenal gland is normal. Surgical clips and streak artifact in the region of the left adrenal gland. KIDNEYS AND URETERS: Chronically atrophic right kidney with chronic severe right hydronephrosis. It right ureteral stent in the expected position. No significant change. Normal size of the left kidney with mildly increased moderate to severe left hydronephrosis. Left ureteral stent well-positioned with proximal pigtail in the left renal pelvis and distal pigtail within the urinary bladder. In the distal left ureter, there are 2 adjacent calculi with the larger measuring 6 mm which are stable since prior study. No other calculi are identified. A No

## 2025-07-04 NOTE — ED NOTES
Pt placed on purewick device, unable to urinate at this time. Bladder scan only revealed 121 ml urine.

## 2025-07-04 NOTE — ED PROVIDER NOTES
[facility] Emergency Department    CHIEF COMPLAINT  Nausea (Pt to er with n/v since yesterday.. pt had pacer placed last month. Was given 4 zofran in route. Pt is wheelchair bound due to stroke with left side deficits)      SHARED SERVICE VISIT  I have seen and evaluated this patient with my supervising physician, Dr. Abdi Huff.    HISTORY OF PRESENT ILLNESS  Jhony Hdez is a 84 y.o. male who presents to the ED complaining of nausea and vomiting.  He has been experiencing nausea and vomiting since last night.  He did have a pacemaker placed last month.  He also has a history of ureteral stents and follows Dr. Mendoza.  Denies any abdominal pain or flank pain. Denies any headache, body ache, fevers or chills.  Denies any coughing or sneezing.  Denies any sore throat or congestion.  Denies any vision changes or dizziness.  Denies any chest pain, shortness of breath, or dyspnea on exertion. Denies any urinary symptoms.  Denies any diarrhea or bloody stools.  Denies any new onset back pain.  Denies any recent travel or sick contacts.    No other complaints, modifying factors or associated symptoms.     Nursing notes reviewed.   Past Medical History:   Diagnosis Date    CHF (congestive heart failure) (HCC)     Chronic kidney disease     kidney stones    Colon cancer (HCC)     Guillain Barré syndrome     Hyperlipidemia     Hypertension     Left ureteral stone     Right Ureteral mass     Stroke (HCC) 05/15/2025     Past Surgical History:   Procedure Laterality Date    ADRENALECTOMY      groin    BACK SURGERY      CARPAL TUNNEL RELEASE      bilateral    COLONOSCOPY      CYSTOSCOPY Bilateral 03/27/2025    CYSTOSCOPY, BILATERAL STENT PLACEMENT,BILATERAL RETROGRADE PYELOGRAM performed by Eduardo Mendoza MD at [facility]    CYSTOSCOPY Bilateral 04/25/2025    CYSTOSCOPY RIGHT DIAGNOSTIC URETEROSCOPY RIGHT URETERAL MASS, EXCISION WITH HOLMIUM LASER, LEFT URETEROSCOPY HOLMIUM LASER STONE

## 2025-07-04 NOTE — ED NOTES
hJony Hdez is a 84 y.o. male admitted for  Principal Problem:    Hydronephrosis  Resolved Problems:    * No resolved hospital problems. *  .   Patient Home via EMS transportation with   Chief Complaint   Patient presents with    Nausea     Pt to er with n/v since yesterday.. pt had pacer placed last month. Was given 4 zofran in route. Pt is wheelchair bound due to stroke with left side deficits   .  Patient is alert and Person, Place, Time, and Situation  Patient's baseline mobility: Baseline Mobility: wheelchair due to left side stroke  Code Status: Prior   Cardiac Rhythm:       Is patient on baseline Oxygen: no how many Liters:   Abnormal Assessment Findings: pt with stones. Already has stents and kidney issues.    Isolation: None      NIH Score:    C-SSRS: Risk of Suicide: No Risk  Bedside swallow:        Active LDA's:   Peripheral IV 07/04/25 Proximal;Right;Dorsal Forearm (Active)     Patient admitted with a palmer: no If the palmer is chronic was it exchanged:  Reason for palmer:   Patient admitted with Central Line:  . PICC line placement confirmed: YES OR NO:[id number]}   Reason for Central line:   Was central line Inserted from an outside facility:        Family/Caregiver Present yes Any Concerns: no   Restraints no  Sitter no         Vitals: MEWS Score: 1    Vitals:    07/04/25 1039 07/04/25 1310   BP: 121/89 124/63   Pulse: 88 86   Resp: 16 16   Temp: 97.7 °F (36.5 °C)    TempSrc: Oral    SpO2: 95% 96%   Weight: 81.6 kg (180 lb)    Height: 1.778 m (5' 10\")        Last documented pain score (0-10 scale)    Pain medication administered No.    Pertinent or High Risk Medications/Drips: No.    Pending Blood Product Administration: no    Abnormal labs:   Abnormal Labs Reviewed   CBC WITH AUTO DIFFERENTIAL - Abnormal; Notable for the following components:       Result Value    RBC 3.97 (*)     Hemoglobin 10.8 (*)     Hematocrit 34.5 (*)     RDW 18.0 (*)     All other components within normal limits   COMPREHENSIVE

## 2025-07-04 NOTE — ED NOTES
Pt asked to be rolled onto right side t this time. Pt rolled and pillow placed under pt buttocks, pt stated felt much better.

## 2025-07-05 ENCOUNTER — ANESTHESIA (OUTPATIENT)
Dept: OPERATING ROOM | Age: 85
End: 2025-07-05
Payer: MEDICARE

## 2025-07-05 ENCOUNTER — APPOINTMENT (OUTPATIENT)
Dept: GENERAL RADIOLOGY | Age: 85
DRG: 660 | End: 2025-07-05
Payer: MEDICARE

## 2025-07-05 ENCOUNTER — ANESTHESIA EVENT (OUTPATIENT)
Dept: OPERATING ROOM | Age: 85
End: 2025-07-05
Payer: MEDICARE

## 2025-07-05 LAB
ANION GAP SERPL CALCULATED.3IONS-SCNC: 10 MMOL/L (ref 3–16)
BACTERIA UR CULT: NORMAL
BASOPHILS # BLD: 0.1 K/UL (ref 0–0.2)
BASOPHILS NFR BLD: 1.1 %
BUN SERPL-MCNC: 11 MG/DL (ref 7–20)
CALCIUM SERPL-MCNC: 9.4 MG/DL (ref 8.3–10.6)
CHLORIDE SERPL-SCNC: 106 MMOL/L (ref 99–110)
CO2 SERPL-SCNC: 25 MMOL/L (ref 21–32)
CREAT SERPL-MCNC: 1.1 MG/DL (ref 0.8–1.3)
DEPRECATED RDW RBC AUTO: 18.2 % (ref 12.4–15.4)
EOSINOPHIL # BLD: 0.3 K/UL (ref 0–0.6)
EOSINOPHIL NFR BLD: 4.2 %
GFR SERPLBLD CREATININE-BSD FMLA CKD-EPI: 65 ML/MIN/{1.73_M2}
GLUCOSE SERPL-MCNC: 99 MG/DL (ref 70–99)
HCT VFR BLD AUTO: 35.9 % (ref 40.5–52.5)
HGB BLD-MCNC: 11.5 G/DL (ref 13.5–17.5)
INR PPP: 3.15 (ref 0.86–1.14)
LYMPHOCYTES # BLD: 1.1 K/UL (ref 1–5.1)
LYMPHOCYTES NFR BLD: 17.3 %
MCH RBC QN AUTO: 27.8 PG (ref 26–34)
MCHC RBC AUTO-ENTMCNC: 32.1 G/DL (ref 31–36)
MCV RBC AUTO: 86.7 FL (ref 80–100)
MONOCYTES # BLD: 0.6 K/UL (ref 0–1.3)
MONOCYTES NFR BLD: 9.6 %
NEUTROPHILS # BLD: 4.1 K/UL (ref 1.7–7.7)
NEUTROPHILS NFR BLD: 67.8 %
PLATELET # BLD AUTO: 276 K/UL (ref 135–450)
PMV BLD AUTO: 7.9 FL (ref 5–10.5)
POTASSIUM SERPL-SCNC: 3.9 MMOL/L (ref 3.5–5.1)
PROTHROMBIN TIME: 31.6 SEC (ref 12.1–14.9)
RBC # BLD AUTO: 4.15 M/UL (ref 4.2–5.9)
SODIUM SERPL-SCNC: 141 MMOL/L (ref 136–145)
WBC # BLD AUTO: 6.1 K/UL (ref 4–11)

## 2025-07-05 PROCEDURE — 2500000003 HC RX 250 WO HCPCS: Performed by: UROLOGY

## 2025-07-05 PROCEDURE — 2580000003 HC RX 258: Performed by: STUDENT IN AN ORGANIZED HEALTH CARE EDUCATION/TRAINING PROGRAM

## 2025-07-05 PROCEDURE — 3600000015 HC SURGERY LEVEL 5 ADDTL 15MIN: Performed by: UROLOGY

## 2025-07-05 PROCEDURE — 36415 COLL VENOUS BLD VENIPUNCTURE: CPT

## 2025-07-05 PROCEDURE — 80048 BASIC METABOLIC PNL TOTAL CA: CPT

## 2025-07-05 PROCEDURE — 2720000010 HC SURG SUPPLY STERILE: Performed by: UROLOGY

## 2025-07-05 PROCEDURE — 3600000005 HC SURGERY LEVEL 5 BASE: Performed by: UROLOGY

## 2025-07-05 PROCEDURE — 2709999900 HC NON-CHARGEABLE SUPPLY: Performed by: UROLOGY

## 2025-07-05 PROCEDURE — BT141ZZ FLUOROSCOPY OF KIDNEYS, URETERS AND BLADDER USING LOW OSMOLAR CONTRAST: ICD-10-PCS | Performed by: UROLOGY

## 2025-07-05 PROCEDURE — 0TC78ZZ EXTIRPATION OF MATTER FROM LEFT URETER, VIA NATURAL OR ARTIFICIAL OPENING ENDOSCOPIC: ICD-10-PCS | Performed by: UROLOGY

## 2025-07-05 PROCEDURE — 7100000000 HC PACU RECOVERY - FIRST 15 MIN: Performed by: UROLOGY

## 2025-07-05 PROCEDURE — 6360000002 HC RX W HCPCS: Performed by: STUDENT IN AN ORGANIZED HEALTH CARE EDUCATION/TRAINING PROGRAM

## 2025-07-05 PROCEDURE — C2617 STENT, NON-COR, TEM W/O DEL: HCPCS | Performed by: UROLOGY

## 2025-07-05 PROCEDURE — C1769 GUIDE WIRE: HCPCS | Performed by: UROLOGY

## 2025-07-05 PROCEDURE — C1758 CATHETER, URETERAL: HCPCS | Performed by: UROLOGY

## 2025-07-05 PROCEDURE — 88300 SURGICAL PATH GROSS: CPT

## 2025-07-05 PROCEDURE — 2500000003 HC RX 250 WO HCPCS: Performed by: STUDENT IN AN ORGANIZED HEALTH CARE EDUCATION/TRAINING PROGRAM

## 2025-07-05 PROCEDURE — 74018 RADEX ABDOMEN 1 VIEW: CPT

## 2025-07-05 PROCEDURE — 2580000003 HC RX 258: Performed by: INTERNAL MEDICINE

## 2025-07-05 PROCEDURE — 85025 COMPLETE CBC W/AUTO DIFF WBC: CPT

## 2025-07-05 PROCEDURE — 0T788DZ DILATION OF BILATERAL URETERS WITH INTRALUMINAL DEVICE, VIA NATURAL OR ARTIFICIAL OPENING ENDOSCOPIC: ICD-10-PCS | Performed by: UROLOGY

## 2025-07-05 PROCEDURE — 3700000001 HC ADD 15 MINUTES (ANESTHESIA): Performed by: UROLOGY

## 2025-07-05 PROCEDURE — 82365 CALCULUS SPECTROSCOPY: CPT

## 2025-07-05 PROCEDURE — 6360000004 HC RX CONTRAST MEDICATION: Performed by: UROLOGY

## 2025-07-05 PROCEDURE — 85610 PROTHROMBIN TIME: CPT

## 2025-07-05 PROCEDURE — 1200000000 HC SEMI PRIVATE

## 2025-07-05 PROCEDURE — 2500000003 HC RX 250 WO HCPCS: Performed by: INTERNAL MEDICINE

## 2025-07-05 PROCEDURE — 3E033XZ INTRODUCTION OF VASOPRESSOR INTO PERIPHERAL VEIN, PERCUTANEOUS APPROACH: ICD-10-PCS | Performed by: UROLOGY

## 2025-07-05 PROCEDURE — 7100000001 HC PACU RECOVERY - ADDTL 15 MIN: Performed by: UROLOGY

## 2025-07-05 PROCEDURE — 3700000000 HC ANESTHESIA ATTENDED CARE: Performed by: UROLOGY

## 2025-07-05 PROCEDURE — 6370000000 HC RX 637 (ALT 250 FOR IP): Performed by: UROLOGY

## 2025-07-05 PROCEDURE — 6360000002 HC RX W HCPCS: Performed by: INTERNAL MEDICINE

## 2025-07-05 DEVICE — URETERAL STENT
Type: IMPLANTABLE DEVICE | Status: FUNCTIONAL
Brand: CONTOUR™

## 2025-07-05 RX ORDER — DROPERIDOL 2.5 MG/ML
0.62 INJECTION, SOLUTION INTRAMUSCULAR; INTRAVENOUS
Status: DISCONTINUED | OUTPATIENT
Start: 2025-07-05 | End: 2025-07-05 | Stop reason: HOSPADM

## 2025-07-05 RX ORDER — IPRATROPIUM BROMIDE AND ALBUTEROL SULFATE 2.5; .5 MG/3ML; MG/3ML
1 SOLUTION RESPIRATORY (INHALATION)
Status: DISCONTINUED | OUTPATIENT
Start: 2025-07-05 | End: 2025-07-05 | Stop reason: HOSPADM

## 2025-07-05 RX ORDER — LABETALOL HYDROCHLORIDE 5 MG/ML
10 INJECTION, SOLUTION INTRAVENOUS
Status: DISCONTINUED | OUTPATIENT
Start: 2025-07-05 | End: 2025-07-05 | Stop reason: HOSPADM

## 2025-07-05 RX ORDER — MAGNESIUM HYDROXIDE 1200 MG/15ML
LIQUID ORAL CONTINUOUS PRN
Status: COMPLETED | OUTPATIENT
Start: 2025-07-05 | End: 2025-07-05

## 2025-07-05 RX ORDER — OXYCODONE HYDROCHLORIDE 5 MG/1
5 TABLET ORAL PRN
Status: DISCONTINUED | OUTPATIENT
Start: 2025-07-05 | End: 2025-07-05 | Stop reason: HOSPADM

## 2025-07-05 RX ORDER — DEXAMETHASONE SODIUM PHOSPHATE 4 MG/ML
INJECTION, SOLUTION INTRA-ARTICULAR; INTRALESIONAL; INTRAMUSCULAR; INTRAVENOUS; SOFT TISSUE
Status: DISCONTINUED | OUTPATIENT
Start: 2025-07-05 | End: 2025-07-05 | Stop reason: SDUPTHER

## 2025-07-05 RX ORDER — IOPAMIDOL 612 MG/ML
INJECTION, SOLUTION INTRAVASCULAR PRN
Status: DISCONTINUED | OUTPATIENT
Start: 2025-07-05 | End: 2025-07-05 | Stop reason: ALTCHOICE

## 2025-07-05 RX ORDER — SODIUM CHLORIDE 0.9 % (FLUSH) 0.9 %
5-40 SYRINGE (ML) INJECTION EVERY 12 HOURS SCHEDULED
Status: DISCONTINUED | OUTPATIENT
Start: 2025-07-05 | End: 2025-07-05 | Stop reason: HOSPADM

## 2025-07-05 RX ORDER — PHENYLEPHRINE HCL IN 0.9% NACL 1 MG/10 ML
SYRINGE (ML) INTRAVENOUS
Status: DISCONTINUED | OUTPATIENT
Start: 2025-07-05 | End: 2025-07-05 | Stop reason: SDUPTHER

## 2025-07-05 RX ORDER — SODIUM CHLORIDE 9 MG/ML
INJECTION, SOLUTION INTRAVENOUS
Status: DISCONTINUED | OUTPATIENT
Start: 2025-07-05 | End: 2025-07-05 | Stop reason: SDUPTHER

## 2025-07-05 RX ORDER — LIDOCAINE HYDROCHLORIDE 20 MG/ML
INJECTION, SOLUTION EPIDURAL; INFILTRATION; INTRACAUDAL; PERINEURAL
Status: DISCONTINUED | OUTPATIENT
Start: 2025-07-05 | End: 2025-07-05 | Stop reason: SDUPTHER

## 2025-07-05 RX ORDER — LIDOCAINE HYDROCHLORIDE 10 MG/ML
1 INJECTION, SOLUTION EPIDURAL; INFILTRATION; INTRACAUDAL; PERINEURAL
Status: DISCONTINUED | OUTPATIENT
Start: 2025-07-05 | End: 2025-07-05 | Stop reason: HOSPADM

## 2025-07-05 RX ORDER — SODIUM CHLORIDE 0.9 % (FLUSH) 0.9 %
5-40 SYRINGE (ML) INJECTION PRN
Status: DISCONTINUED | OUTPATIENT
Start: 2025-07-05 | End: 2025-07-05 | Stop reason: HOSPADM

## 2025-07-05 RX ORDER — MIDAZOLAM HYDROCHLORIDE 1 MG/ML
2 INJECTION, SOLUTION INTRAMUSCULAR; INTRAVENOUS
Status: DISCONTINUED | OUTPATIENT
Start: 2025-07-05 | End: 2025-07-05 | Stop reason: HOSPADM

## 2025-07-05 RX ORDER — PROCHLORPERAZINE EDISYLATE 5 MG/ML
5 INJECTION INTRAMUSCULAR; INTRAVENOUS
Status: DISCONTINUED | OUTPATIENT
Start: 2025-07-05 | End: 2025-07-05 | Stop reason: HOSPADM

## 2025-07-05 RX ORDER — PROPOFOL 10 MG/ML
INJECTION, EMULSION INTRAVENOUS
Status: DISCONTINUED | OUTPATIENT
Start: 2025-07-05 | End: 2025-07-05 | Stop reason: SDUPTHER

## 2025-07-05 RX ORDER — SODIUM CHLORIDE 9 MG/ML
INJECTION, SOLUTION INTRAVENOUS PRN
Status: DISCONTINUED | OUTPATIENT
Start: 2025-07-05 | End: 2025-07-05 | Stop reason: HOSPADM

## 2025-07-05 RX ORDER — OXYCODONE HYDROCHLORIDE 5 MG/1
10 TABLET ORAL PRN
Status: DISCONTINUED | OUTPATIENT
Start: 2025-07-05 | End: 2025-07-05 | Stop reason: HOSPADM

## 2025-07-05 RX ORDER — SODIUM CHLORIDE, SODIUM LACTATE, POTASSIUM CHLORIDE, CALCIUM CHLORIDE 600; 310; 30; 20 MG/100ML; MG/100ML; MG/100ML; MG/100ML
INJECTION, SOLUTION INTRAVENOUS CONTINUOUS
Status: DISCONTINUED | OUTPATIENT
Start: 2025-07-05 | End: 2025-07-05 | Stop reason: HOSPADM

## 2025-07-05 RX ORDER — DIPHENHYDRAMINE HYDROCHLORIDE 50 MG/ML
12.5 INJECTION, SOLUTION INTRAMUSCULAR; INTRAVENOUS
Status: DISCONTINUED | OUTPATIENT
Start: 2025-07-05 | End: 2025-07-05 | Stop reason: HOSPADM

## 2025-07-05 RX ORDER — ONDANSETRON 2 MG/ML
INJECTION INTRAMUSCULAR; INTRAVENOUS
Status: DISCONTINUED | OUTPATIENT
Start: 2025-07-05 | End: 2025-07-05 | Stop reason: SDUPTHER

## 2025-07-05 RX ADMIN — PROPOFOL 50 MG: 10 INJECTION, EMULSION INTRAVENOUS at 20:20

## 2025-07-05 RX ADMIN — Medication 100 MCG: at 20:37

## 2025-07-05 RX ADMIN — LIDOCAINE HYDROCHLORIDE 80 MG: 20 INJECTION, SOLUTION EPIDURAL; INFILTRATION; INTRACAUDAL; PERINEURAL at 20:20

## 2025-07-05 RX ADMIN — SODIUM CHLORIDE: 0.9 INJECTION, SOLUTION INTRAVENOUS at 12:25

## 2025-07-05 RX ADMIN — ONDANSETRON 4 MG: 2 INJECTION INTRAMUSCULAR; INTRAVENOUS at 20:31

## 2025-07-05 RX ADMIN — Medication 200 MCG: at 20:20

## 2025-07-05 RX ADMIN — WATER 1000 MG: 1 INJECTION INTRAMUSCULAR; INTRAVENOUS; SUBCUTANEOUS at 16:26

## 2025-07-05 RX ADMIN — DEXAMETHASONE SODIUM PHOSPHATE 4 MG: 4 INJECTION, SOLUTION INTRAMUSCULAR; INTRAVENOUS at 20:31

## 2025-07-05 RX ADMIN — PROPOFOL 20 MG: 10 INJECTION, EMULSION INTRAVENOUS at 20:22

## 2025-07-05 RX ADMIN — ALLOPURINOL 300 MG: 300 TABLET ORAL at 22:11

## 2025-07-05 RX ADMIN — DONEPEZIL HYDROCHLORIDE 5 MG: 5 TABLET, FILM COATED ORAL at 22:11

## 2025-07-05 RX ADMIN — ACETAMINOPHEN 500 MG: 500 TABLET ORAL at 22:11

## 2025-07-05 RX ADMIN — SODIUM CHLORIDE, PRESERVATIVE FREE 10 ML: 5 INJECTION INTRAVENOUS at 22:11

## 2025-07-05 RX ADMIN — PROCHLORPERAZINE EDISYLATE 10 MG: 5 INJECTION INTRAMUSCULAR; INTRAVENOUS at 04:12

## 2025-07-05 RX ADMIN — SODIUM CHLORIDE: 9 INJECTION, SOLUTION INTRAVENOUS at 20:15

## 2025-07-05 RX ADMIN — SODIUM CHLORIDE, PRESERVATIVE FREE 10 ML: 5 INJECTION INTRAVENOUS at 22:12

## 2025-07-05 ASSESSMENT — PAIN SCALES - GENERAL
PAINLEVEL_OUTOF10: 0
PAINLEVEL_OUTOF10: 3
PAINLEVEL_OUTOF10: 0

## 2025-07-05 ASSESSMENT — PAIN DESCRIPTION - LOCATION: LOCATION: GENERALIZED

## 2025-07-05 ASSESSMENT — PAIN DESCRIPTION - DESCRIPTORS: DESCRIPTORS: ACHING;DISCOMFORT

## 2025-07-05 ASSESSMENT — ENCOUNTER SYMPTOMS: SHORTNESS OF BREATH: 1

## 2025-07-05 NOTE — ANESTHESIA PRE PROCEDURE
blockInferior infarct (cited on or before 25-MAR-2024)Abnormal ECGWhen compared with ECG of 23-MAY-2025 13:30,Heart rate has increasedConfirmed by WILTON FORREST ([id number]) on 7/4/2025 11:04:16 AM     ICD interrogation 7/4/25  [employer]   Battery 10.83 years left  AAIR <-> DDDR  AP 25.74%, RV 33.83%, AT/AF burden 66.09%  0 shocks delivered    ANNIE 5/29/25    Left Ventricle: Reduced left ventricular systolic function with a visually estimated EF of 30 - 35%. Left ventricle size is normal. Normal wall thickness. Linear mobile echodenisty of the ventricular side of the mitral vavle that is most conistent with a ruptured chordea.    Right Ventricle: Not assessed. Right ventricle size is normal. Normal systolic function.    Mitral Valve: Mild regurgitation.    Tricuspid Valve: Mild regurgitation.    Left Atrium: No left atrial appendage thrombus noted. No left atrial appendage mass noted.    Image quality is adequate.     Anesthesia Plan      general     ASA 4     (I discussed with the patient the risks and benefits of PIV, general anesthesia, IV Narcotics, PACU.  All questions were answered the patient agrees with the plan and wishes to proceed.)  Induction: intravenous.    MIPS: Prophylactic antiemetics administered.  Anesthetic plan and risks discussed with patient.                        Clifford Hayes MD   7/5/2025

## 2025-07-06 LAB
ANION GAP SERPL CALCULATED.3IONS-SCNC: 12 MMOL/L (ref 3–16)
BASOPHILS # BLD: 0 K/UL (ref 0–0.2)
BASOPHILS NFR BLD: 0.3 %
BUN SERPL-MCNC: 14 MG/DL (ref 7–20)
CALCIUM SERPL-MCNC: 9.3 MG/DL (ref 8.3–10.6)
CHLORIDE SERPL-SCNC: 105 MMOL/L (ref 99–110)
CO2 SERPL-SCNC: 23 MMOL/L (ref 21–32)
CREAT SERPL-MCNC: 1 MG/DL (ref 0.8–1.3)
DEPRECATED RDW RBC AUTO: 18.6 % (ref 12.4–15.4)
EOSINOPHIL # BLD: 0 K/UL (ref 0–0.6)
EOSINOPHIL NFR BLD: 0 %
GFR SERPLBLD CREATININE-BSD FMLA CKD-EPI: 73 ML/MIN/{1.73_M2}
GLUCOSE SERPL-MCNC: 140 MG/DL (ref 70–99)
HCT VFR BLD AUTO: 38.1 % (ref 40.5–52.5)
HGB BLD-MCNC: 12.4 G/DL (ref 13.5–17.5)
INR PPP: 3.05 (ref 0.86–1.14)
LYMPHOCYTES # BLD: 0.7 K/UL (ref 1–5.1)
LYMPHOCYTES NFR BLD: 9.3 %
MAGNESIUM SERPL-MCNC: 1.91 MG/DL (ref 1.8–2.4)
MCH RBC QN AUTO: 28.1 PG (ref 26–34)
MCHC RBC AUTO-ENTMCNC: 32.5 G/DL (ref 31–36)
MCV RBC AUTO: 86.5 FL (ref 80–100)
MONOCYTES # BLD: 0.2 K/UL (ref 0–1.3)
MONOCYTES NFR BLD: 2 %
NEUTROPHILS # BLD: 6.6 K/UL (ref 1.7–7.7)
NEUTROPHILS NFR BLD: 88.4 %
PLATELET # BLD AUTO: 313 K/UL (ref 135–450)
PMV BLD AUTO: 8 FL (ref 5–10.5)
POTASSIUM SERPL-SCNC: 4.1 MMOL/L (ref 3.5–5.1)
PROTHROMBIN TIME: 30.8 SEC (ref 12.1–14.9)
RBC # BLD AUTO: 4.4 M/UL (ref 4.2–5.9)
SODIUM SERPL-SCNC: 140 MMOL/L (ref 136–145)
TROPONIN, HIGH SENSITIVITY: 50 NG/L (ref 0–22)
WBC # BLD AUTO: 7.5 K/UL (ref 4–11)

## 2025-07-06 PROCEDURE — 36415 COLL VENOUS BLD VENIPUNCTURE: CPT

## 2025-07-06 PROCEDURE — 85610 PROTHROMBIN TIME: CPT

## 2025-07-06 PROCEDURE — 84484 ASSAY OF TROPONIN QUANT: CPT

## 2025-07-06 PROCEDURE — 80048 BASIC METABOLIC PNL TOTAL CA: CPT

## 2025-07-06 PROCEDURE — 2500000003 HC RX 250 WO HCPCS: Performed by: UROLOGY

## 2025-07-06 PROCEDURE — 85025 COMPLETE CBC W/AUTO DIFF WBC: CPT

## 2025-07-06 PROCEDURE — 6370000000 HC RX 637 (ALT 250 FOR IP): Performed by: UROLOGY

## 2025-07-06 PROCEDURE — 2580000003 HC RX 258: Performed by: INTERNAL MEDICINE

## 2025-07-06 PROCEDURE — 6360000002 HC RX W HCPCS: Performed by: UROLOGY

## 2025-07-06 PROCEDURE — 1200000000 HC SEMI PRIVATE

## 2025-07-06 PROCEDURE — 83735 ASSAY OF MAGNESIUM: CPT

## 2025-07-06 RX ORDER — SODIUM CHLORIDE 9 MG/ML
INJECTION, SOLUTION INTRAVENOUS CONTINUOUS
Status: ACTIVE | OUTPATIENT
Start: 2025-07-06 | End: 2025-07-07

## 2025-07-06 RX ADMIN — DIPHENHYDRAMINE HYDROCHLORIDE 25 MG: 25 TABLET ORAL at 20:35

## 2025-07-06 RX ADMIN — PROCHLORPERAZINE EDISYLATE 10 MG: 5 INJECTION INTRAMUSCULAR; INTRAVENOUS at 15:29

## 2025-07-06 RX ADMIN — DONEPEZIL HYDROCHLORIDE 5 MG: 5 TABLET, FILM COATED ORAL at 20:35

## 2025-07-06 RX ADMIN — ACETAMINOPHEN 500 MG: 500 TABLET ORAL at 20:35

## 2025-07-06 RX ADMIN — Medication 10 ML: at 22:46

## 2025-07-06 RX ADMIN — WATER 1000 MG: 1 INJECTION INTRAMUSCULAR; INTRAVENOUS; SUBCUTANEOUS at 18:08

## 2025-07-06 RX ADMIN — SODIUM CHLORIDE, PRESERVATIVE FREE 10 ML: 5 INJECTION INTRAVENOUS at 09:50

## 2025-07-06 RX ADMIN — SODIUM CHLORIDE: 0.9 INJECTION, SOLUTION INTRAVENOUS at 10:26

## 2025-07-06 RX ADMIN — SODIUM CHLORIDE, PRESERVATIVE FREE 10 ML: 5 INJECTION INTRAVENOUS at 20:35

## 2025-07-06 RX ADMIN — PROCHLORPERAZINE EDISYLATE 10 MG: 5 INJECTION INTRAMUSCULAR; INTRAVENOUS at 22:46

## 2025-07-06 RX ADMIN — FAMOTIDINE 20 MG: 20 TABLET, FILM COATED ORAL at 20:35

## 2025-07-06 RX ADMIN — PROCHLORPERAZINE EDISYLATE 10 MG: 5 INJECTION INTRAMUSCULAR; INTRAVENOUS at 00:41

## 2025-07-06 RX ADMIN — ALLOPURINOL 300 MG: 300 TABLET ORAL at 20:35

## 2025-07-06 RX ADMIN — PROCHLORPERAZINE EDISYLATE 10 MG: 5 INJECTION INTRAMUSCULAR; INTRAVENOUS at 08:23

## 2025-07-06 ASSESSMENT — PAIN SCALES - GENERAL: PAINLEVEL_OUTOF10: 0

## 2025-07-06 NOTE — BRIEF OP NOTE
encrusted. Left stone removed   PLAN: WILL CHECK SHAKA IN ONE MONTH AFTER DISCHARGE.    Electronically signed by BHARGAV MARSHALL MD on 7/5/2025 at 9:00 PM

## 2025-07-06 NOTE — CONSULTS
Pharmacy Note  Warfarin Consult  Dx: AFib  Goal INR range 2-2.5  Home Warfarin dose: 5 mg daily except 2.5 mg Q Fri      Date                 INR                  Warfarin  7/4                   2.68                   no warfarin (surgery tomorrow)                Recommend to hold Warfarin tonight x1.  Daily INR ordered.  Rx will continue to manage therapy per consult order.  Raquel ValerioD, BCPS 7/4/2025 5:28 PM  
Consult Placed   Consult Called, left message with answering service  Who: [facility]  Date:7/6/2025  Time:4:56 PM  
Value Date/Time     07/04/2025 10:45 AM    K 3.9 07/04/2025 10:45 AM     07/04/2025 10:45 AM    CO2 26 07/04/2025 10:45 AM    BUN 12 07/04/2025 10:45 AM    CREATININE 1.1 07/04/2025 10:45 AM    CALCIUM 9.1 07/04/2025 10:45 AM    GFRAA >60 10/26/2017 08:30 AM    LABGLOM 65 07/04/2025 10:45 AM    LABGLOM 39 03/25/2024 07:55 AM     PT/INR:    Lab Results   Component Value Date/Time    PROTIME 0.0 06/30/2025 03:41 PM    INR 1.4 06/30/2025 03:41 PM    INR 1.76 05/28/2025 05:27 AM     PTT:    Lab Results   Component Value Date/Time    APTT 39.1 05/22/2025 08:26 PM   [APTT        Imaging: CT scan  IMPRESSION:     1. Moderate to severe hydronephrosis of the left kidney slightly increased since 5/21/2025 with stable position of left ureteral stent. 2 calculi within the distal left ureter measuring up to 6 mm are also stable.  2. Chronically atrophic right kidney with severe right hydronephrosis and stable position of right ureteral stent without significant change.  3. Mild nonspecific lymphadenopathy stable since March 2025.    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  I will also do a left ureteroscopy and remove the stone on the left.  Will make n.p.o. after midnight    BHARGAV MARSHALL MD

## 2025-07-07 ENCOUNTER — TELEPHONE (OUTPATIENT)
Dept: CARDIOLOGY CLINIC | Age: 85
End: 2025-07-07

## 2025-07-07 VITALS
SYSTOLIC BLOOD PRESSURE: 113 MMHG | HEART RATE: 93 BPM | OXYGEN SATURATION: 99 % | WEIGHT: 155.9 LBS | TEMPERATURE: 97.9 F | DIASTOLIC BLOOD PRESSURE: 79 MMHG | RESPIRATION RATE: 18 BRPM | BODY MASS INDEX: 22.32 KG/M2 | HEIGHT: 70 IN

## 2025-07-07 LAB
ALBUMIN SERPL-MCNC: 3.2 G/DL (ref 3.4–5)
ALP SERPL-CCNC: 136 U/L (ref 40–129)
ALT SERPL-CCNC: 8 U/L (ref 10–40)
ANION GAP SERPL CALCULATED.3IONS-SCNC: 8 MMOL/L (ref 3–16)
AST SERPL-CCNC: 24 U/L (ref 15–37)
BASOPHILS # BLD: 0.1 K/UL (ref 0–0.2)
BASOPHILS NFR BLD: 0.9 %
BILIRUB DIRECT SERPL-MCNC: 0.3 MG/DL (ref 0–0.3)
BILIRUB INDIRECT SERPL-MCNC: 0.2 MG/DL (ref 0–1)
BILIRUB SERPL-MCNC: 0.5 MG/DL (ref 0–1)
BUN SERPL-MCNC: 13 MG/DL (ref 7–20)
CALCIUM SERPL-MCNC: 9 MG/DL (ref 8.3–10.6)
CHLORIDE SERPL-SCNC: 104 MMOL/L (ref 99–110)
CO2 SERPL-SCNC: 25 MMOL/L (ref 21–32)
CREAT SERPL-MCNC: 0.9 MG/DL (ref 0.8–1.3)
DEPRECATED RDW RBC AUTO: 18 % (ref 12.4–15.4)
EOSINOPHIL # BLD: 0.1 K/UL (ref 0–0.6)
EOSINOPHIL NFR BLD: 0.9 %
GFR SERPLBLD CREATININE-BSD FMLA CKD-EPI: 83 ML/MIN/{1.73_M2}
GLUCOSE SERPL-MCNC: 101 MG/DL (ref 70–99)
HCT VFR BLD AUTO: 35.6 % (ref 40.5–52.5)
HGB BLD-MCNC: 11.5 G/DL (ref 13.5–17.5)
INR PPP: 2.82 (ref 0.86–1.14)
LYMPHOCYTES # BLD: 1.2 K/UL (ref 1–5.1)
LYMPHOCYTES NFR BLD: 17.6 %
MCH RBC QN AUTO: 27.6 PG (ref 26–34)
MCHC RBC AUTO-ENTMCNC: 32.2 G/DL (ref 31–36)
MCV RBC AUTO: 85.7 FL (ref 80–100)
MONOCYTES # BLD: 0.7 K/UL (ref 0–1.3)
MONOCYTES NFR BLD: 10 %
NEUTROPHILS # BLD: 4.7 K/UL (ref 1.7–7.7)
NEUTROPHILS NFR BLD: 70.6 %
PLATELET # BLD AUTO: 258 K/UL (ref 135–450)
PMV BLD AUTO: 8.1 FL (ref 5–10.5)
POTASSIUM SERPL-SCNC: 3.7 MMOL/L (ref 3.5–5.1)
PROT SERPL-MCNC: 5.8 G/DL (ref 6.4–8.2)
PROTHROMBIN TIME: 29.1 SEC (ref 12.1–14.9)
RBC # BLD AUTO: 4.16 M/UL (ref 4.2–5.9)
SODIUM SERPL-SCNC: 137 MMOL/L (ref 136–145)
TROPONIN, HIGH SENSITIVITY: 59 NG/L (ref 0–22)
WBC # BLD AUTO: 6.6 K/UL (ref 4–11)

## 2025-07-07 PROCEDURE — 80076 HEPATIC FUNCTION PANEL: CPT

## 2025-07-07 PROCEDURE — 97162 PT EVAL MOD COMPLEX 30 MIN: CPT

## 2025-07-07 PROCEDURE — 97530 THERAPEUTIC ACTIVITIES: CPT

## 2025-07-07 PROCEDURE — 85610 PROTHROMBIN TIME: CPT

## 2025-07-07 PROCEDURE — 97166 OT EVAL MOD COMPLEX 45 MIN: CPT

## 2025-07-07 PROCEDURE — 2500000003 HC RX 250 WO HCPCS: Performed by: UROLOGY

## 2025-07-07 PROCEDURE — 6370000000 HC RX 637 (ALT 250 FOR IP): Performed by: UROLOGY

## 2025-07-07 PROCEDURE — 6370000000 HC RX 637 (ALT 250 FOR IP): Performed by: INTERNAL MEDICINE

## 2025-07-07 PROCEDURE — 6360000002 HC RX W HCPCS: Performed by: UROLOGY

## 2025-07-07 PROCEDURE — 36415 COLL VENOUS BLD VENIPUNCTURE: CPT

## 2025-07-07 PROCEDURE — 84484 ASSAY OF TROPONIN QUANT: CPT

## 2025-07-07 PROCEDURE — 85025 COMPLETE CBC W/AUTO DIFF WBC: CPT

## 2025-07-07 PROCEDURE — 80048 BASIC METABOLIC PNL TOTAL CA: CPT

## 2025-07-07 RX ORDER — WARFARIN SODIUM 1 MG/1
1 TABLET ORAL
Status: COMPLETED | OUTPATIENT
Start: 2025-07-07 | End: 2025-07-07

## 2025-07-07 RX ORDER — SACCHAROMYCES BOULARDII 250 MG
250 CAPSULE ORAL 2 TIMES DAILY
Qty: 14 CAPSULE | Refills: 0 | Status: SHIPPED | OUTPATIENT
Start: 2025-07-07 | End: 2025-07-14

## 2025-07-07 RX ORDER — CEFDINIR 300 MG/1
300 CAPSULE ORAL 2 TIMES DAILY
Qty: 12 CAPSULE | Refills: 0 | Status: SHIPPED | OUTPATIENT
Start: 2025-07-07 | End: 2025-07-13

## 2025-07-07 RX ADMIN — MIDODRINE HYDROCHLORIDE 10 MG: 5 TABLET ORAL at 17:20

## 2025-07-07 RX ADMIN — WATER 1000 MG: 1 INJECTION INTRAMUSCULAR; INTRAVENOUS; SUBCUTANEOUS at 17:20

## 2025-07-07 RX ADMIN — SODIUM CHLORIDE, PRESERVATIVE FREE 10 ML: 5 INJECTION INTRAVENOUS at 07:59

## 2025-07-07 RX ADMIN — MIDODRINE HYDROCHLORIDE 10 MG: 5 TABLET ORAL at 11:36

## 2025-07-07 RX ADMIN — WARFARIN SODIUM 1 MG: 1 TABLET ORAL at 17:20

## 2025-07-07 ASSESSMENT — PAIN SCALES - GENERAL: PAINLEVEL_OUTOF10: 0

## 2025-07-07 NOTE — OP NOTE
[address] [address]                            OPERATIVE REPORT      PATIENT NAME: MAHAD OLIVAS                : 1940  MED REC NO: [id number]                      ROOM: 0104  ACCOUNT NO: [id number]                       ADMIT DATE: 2025  PROVIDER: Eduardo Mendoza MD      DATE OF PROCEDURE:  2025    SURGEON:  Eduardo Mendoza MD    FACILITY:  [facility].    PREOPERATIVE DIAGNOSES:  Left ureteral calculus, bilateral hydronephrosis, history of right-sided urothelial carcinoma.    POSTOPERATIVE DIAGNOSES:  Left ureteral calculus, bilateral hydronephrosis, history of right-sided urothelial carcinoma.    PROCEDURES PERFORMED:  Cystoscopy, bilateral retrograde pyelogram, bilateral exchange of 7 x 26 cm double-J stent, left ureteroscopy, laser lithotripsy, stone basket extraction.    INDICATIONS FOR PROCEDURE:  The patient is a very pleasant 84-year-old gentleman who has multiple medical issues.  He has a history of colon cancer and right-sided distal ureteral cancer.  He is not healthy enough for a nephroureterectomy and his health is declining.  He has bilateral stents in and had recent left-sided CVAC procedure.  CT scan shows persistent left 6 mm distal stone, and there is hydronephrosis from stent malfunction.    DESCRIPTION OF PROCEDURE:  After undergoing informed consent, the patient was taken to the operating room.  He was prepped and draped in sterile fashion and given a dose of preoperative antibiotics.  Under general anesthesia, I inserted the rigid scope in the bladder.  The prostate was tiny due to the brachytherapy.  I took the right stent to the urethral meatus and passed a wire retrograde.  There was still contrast up in the kidney, but I did a retrograde pyelogram.  I now placed a 7 x 26 cm double-J stent with a good curl in the renal pelvis as well as in the bladder.  The

## 2025-07-07 NOTE — PLAN OF CARE
Problem: Chronic Conditions and Co-morbidities  Goal: Patient's chronic conditions and co-morbidity symptoms are monitored and maintained or improved  7/7/2025 1033 by Triny Aguilar RN  Outcome: Progressing     Problem: Skin/Tissue Integrity  Goal: Skin integrity remains intact  Description: 1.  Monitor for areas of redness and/or skin breakdown  2.  Assess vascular access sites hourly  3.  Every 4-6 hours minimum:  Change oxygen saturation probe site  4.  Every 4-6 hours:  If on nasal continuous positive airway pressure, respiratory therapy assess nares and determine need for appliance change or resting period  7/7/2025 1033 by Triny Aguilar RN  Outcome: Progressing     Problem: Respiratory - Adult  Goal: Achieves optimal ventilation and oxygenation  Outcome: Progressing     Problem: Cardiovascular - Adult  Goal: Maintains optimal cardiac output and hemodynamic stability  Outcome: Progressing     Problem: Cardiovascular - Adult  Goal: Absence of cardiac dysrhythmias or at baseline  Outcome: Progressing     Problem: Skin/Tissue Integrity - Adult  Goal: Skin integrity remains intact  Description: 1.  Monitor for areas of redness and/or skin breakdown  2.  Assess vascular access sites hourly  3.  Every 4-6 hours minimum:  Change oxygen saturation probe site  4.  Every 4-6 hours:  If on nasal continuous positive airway pressure, respiratory therapy assess nares and determine need for appliance change or resting period  7/7/2025 1033 by Triny Aguilar RN  Outcome: Progressing     Problem: Musculoskeletal - Adult  Goal: Return mobility to safest level of function  Outcome: Progressing     Problem: Musculoskeletal - Adult  Goal: Return ADL status to a safe level of function  Outcome: Progressing     Problem: Genitourinary - Adult  Goal: Absence of urinary retention  Outcome: Progressing     Problem: Infection - Adult  Goal: Absence of infection at discharge  Outcome: Progressing     Problem: Infection - 
  Problem: Skin/Tissue Integrity  Goal: Skin integrity remains intact  Description: 1.  Monitor for areas of redness and/or skin breakdown  2.  Assess vascular access sites hourly  3.  Every 4-6 hours minimum:  Change oxygen saturation probe site  4.  Every 4-6 hours:  If on nasal continuous positive airway pressure, respiratory therapy assess nares and determine need for appliance change or resting period  Outcome: Progressing     Problem: Pain  Goal: Verbalizes/displays adequate comfort level or baseline comfort level  Outcome: Progressing     Problem: ABCDS Injury Assessment  Goal: Absence of physical injury  Outcome: Progressing     Problem: Safety - Adult  Goal: Free from fall injury  Outcome: Progressing     
  Problem: Skin/Tissue Integrity  Goal: Skin integrity remains intact  Description: 1.  Monitor for areas of redness and/or skin breakdown  2.  Assess vascular access sites hourly  3.  Every 4-6 hours minimum:  Change oxygen saturation probe site  4.  Every 4-6 hours:  If on nasal continuous positive airway pressure, respiratory therapy assess nares and determine need for appliance change or resting period  Outcome: Progressing  Flowsheets (Taken 7/6/2025 1129)  Skin Integrity Remains Intact:   Turn and reposition as indicated   Monitor for areas of redness and/or skin breakdown   Check visual cues for pain   Monitor skin under medical devices     Problem: Pain  Goal: Verbalizes/displays adequate comfort level or baseline comfort level  Outcome: Progressing  Flowsheets (Taken 7/5/2025 1917 by Janice Morejon RN)  Verbalizes/displays adequate comfort level or baseline comfort level:   Encourage patient to monitor pain and request assistance   Assess pain using appropriate pain scale   Administer analgesics based on type and severity of pain and evaluate response   Implement non-pharmacological measures as appropriate and evaluate response     Problem: Gastrointestinal - Adult  Goal: Minimal or absence of nausea and vomiting  Outcome: Not Progressing  Flowsheets (Taken 7/6/2025 1130)  Minimal or absence of nausea and vomiting: Administer IV fluids as ordered to ensure adequate hydration     
CHF Care Plan      Patient's EF (Ejection Fraction) is less than 40%    Heart Failure Medications:  Diuretics:: None    (One of the following REQUIRED for EF </= 40%/SYSTOLIC FAILURE but MAY be used in EF% >40%/DIASTOLIC FAILURE)        ACE:: None        ARB:: None         ARNI:: None    (Beta Blockers)  NON- Evidenced Based Beta Blocker (for EF% >40%/DIASTOLIC FAILURE): None    Evidenced Based Beta Blocker::(REQUIRED for EF% <40%/SYSTOLIC FAILURE) None  ...................................................................................................................................................    Failed to redirect to the Timeline version of the "[employer] SmartLink.      Patient's weights and intake/output reviewed    Daily Weight log at bedside, patient/family participation in use of log: \"yes    Patient's current weight today shows a difference of 4 lbs less than last documented weight.      Intake/Output Summary (Last 24 hours) at 7/7/2025 0627  Last data filed at 7/7/2025 0312  Gross per 24 hour   Intake 1260 ml   Output 705 ml   Net 555 ml       Education Booklet Provided: yes    Comorbidities Reviewed Yes    Patient has a past medical history of CHF (congestive heart failure) (HCC), Chronic kidney disease, Colon cancer (HCC), Guillain Barré syndrome, Hyperlipidemia, Hypertension, Left ureteral stone, Right Ureteral mass, and Stroke (HCC).     >>For CHF and Comorbidity documentation on Education Time and Topics, please see Education Tab      CHF Education    Learners:  Patient and Significant Other  Readineess:   Acceptance  Method:   Explanation  Response:   Verbalizes Understanding and Needs Reinforcement  Comments:     Time Spent: 5 min      Pt resting in bed at this time on room air. Pt denies shortness of breath. Pt with nonpitting lower extremity edema.     Patient and/or Family's stated Goal of Care this Admission: increase activity tolerance, better understand heart failure and disease management, be 
Increase function to baseline.    
Increase patients ADLs/functional status to baseline.    
Received Emergency Dept page for admission.  Sent to Dr. Navin Zayas admitting/consulting hospitalist. Current listed PCP is Jhony Valdovinos MD.     
indicated  7/4/2025 1942 by Janice Morejon RN  Outcome: Progressing  Flowsheets (Taken 7/4/2025 1942)  Achieves optimal ventilation and oxygenation:   Assess for changes in respiratory status   Assess for changes in mentation and behavior   Oxygen supplementation based on oxygen saturation or arterial blood gases   Position to facilitate oxygenation and minimize respiratory effort   Encourage broncho-pulmonary hygiene including cough, deep breathe, incentive spirometry   Assess the need for suctioning and aspirate as needed   Assess and instruct to report shortness of breath or any respiratory difficulty   Respiratory therapy support as indicated     Problem: Cardiovascular - Adult  Goal: Maintains optimal cardiac output and hemodynamic stability  7/5/2025 0801 by Tawnya Johnson RN  Outcome: Progressing  Flowsheets (Taken 7/4/2025 1942 by Janice Morejon RN)  Maintains optimal cardiac output and hemodynamic stability:   Monitor blood pressure and heart rate   Monitor urine output and notify Licensed Independent Practitioner for values outside of normal range   Assess for signs of decreased cardiac output   Administer fluid and/or volume expanders as ordered   Administer vasoactive medications as ordered  7/4/2025 1942 by Janice Morejon RN  Outcome: Progressing  Flowsheets (Taken 7/4/2025 1942)  Maintains optimal cardiac output and hemodynamic stability:   Monitor blood pressure and heart rate   Monitor urine output and notify Licensed Independent Practitioner for values outside of normal range   Assess for signs of decreased cardiac output   Administer fluid and/or volume expanders as ordered   Administer vasoactive medications as ordered  Goal: Absence of cardiac dysrhythmias or at baseline  7/5/2025 0801 by Tawnya Johnson, RN  Outcome: Progressing  7/4/2025 1942 by Janice Morejon RN  Outcome: Progressing  Flowsheets (Taken 7/4/2025 1942)  Absence of cardiac dysrhythmias or at baseline:   Monitor 
electrolyte replacements, including repeat lab results as appropriate   Fluid restriction as ordered     Problem: Discharge Planning  Goal: Discharge to home or other facility with appropriate resources  Outcome: Progressing  Flowsheets (Taken 7/4/2025 1942)  Discharge to home or other facility with appropriate resources:   Arrange for needed discharge resources and transportation as appropriate   Identify discharge learning needs (meds, wound care, etc)   Identify barriers to discharge with patient and caregiver     Problem: Pain  Goal: Verbalizes/displays adequate comfort level or baseline comfort level  Outcome: Progressing  Flowsheets (Taken 7/4/2025 1942)  Verbalizes/displays adequate comfort level or baseline comfort level:   Encourage patient to monitor pain and request assistance   Assess pain using appropriate pain scale   Administer analgesics based on type and severity of pain and evaluate response   Implement non-pharmacological measures as appropriate and evaluate response     
changes in neurological status   Initiate measures to prevent increased intracranial pressure   Maintain blood pressure and fluid volume within ordered parameters to optimize cerebral perfusion and minimize risk of hemorrhage     Problem: Gastrointestinal - Adult  Goal: Minimal or absence of nausea and vomiting  7/5/2025 1917 by Janice Morejon, RN  Outcome: Progressing  Flowsheets (Taken 7/5/2025 1917)  Minimal or absence of nausea and vomiting:   Administer IV fluids as ordered to ensure adequate hydration   Maintain NPO status until nausea and vomiting are resolved   Administer ordered antiemetic medications as needed   Provide nonpharmacologic comfort measures as appropriate   Advance diet as tolerated, if ordered   Nutrition consult to assist patient with adequate nutrition and appropriate food choices     Problem: Coping  Goal: Pt/Family able to verbalize concerns and demonstrate effective coping strategies  Description: INTERVENTIONS:  1. Assist patient/family to identify coping skills, available support systems and cultural and spiritual values  2. Provide emotional support, including active listening and acknowledgement of concerns of patient and caregivers  3. Reduce environmental stimuli, as able  4. Instruct patient/family in relaxation techniques, as appropriate  5. Assess for spiritual pain/suffering and initiate Spiritual Care, Psychosocial Clinical Specialist consults as needed  7/5/2025 1917 by Janice Morejon, RN  Outcome: Progressing  Flowsheets (Taken 7/5/2025 1917)  Patient/family able to verbalize anxieties, fears, and concerns, and demonstrate effective coping:   Assist patient/family to identify coping skills, available support systems and cultural and spiritual values   Provide emotional support, including active listening and acknowledgement of concerns of patient and caregivers   Reduce environmental stimuli, as able   Instruct patient/family in relaxation techniques, as appropriate

## 2025-07-07 NOTE — DISCHARGE SUMMARY
medications and discharge plan.    ------------------------------------------------------------------------------------------------------------------------------------------------------    Discharge Medications:   Discharge Medication List as of 7/7/2025  3:35 PM        START taking these medications    Details   cefdinir (OMNICEF) 300 MG capsule Take 1 capsule by mouth 2 times daily for 6 days Take with food, Disp-12 capsule, R-0Normal      saccharomyces boulardii (FLORASTOR) 250 MG capsule Take 1 capsule by mouth 2 times daily for 7 days, Disp-14 capsule, R-0Normal           Discharge Medication List as of 7/7/2025  3:35 PM        Discharge Medication List as of 7/7/2025  3:35 PM        CONTINUE these medications which have NOT CHANGED    Details   warfarin (COUMADIN) 5 MG tablet Take 1 tablet by mouth daily Or as directed by coumadin clinic, Disp-90 tablet, R-3Normal      famotidine (PEPCID) 20 MG tablet Take 1 tablet by mouth nightly as neededHistorical Med      diclofenac sodium (VOLTAREN) 1 % GEL Apply 2 g topically 4 times daily as needed for Pain, Topical, 4 TIMES DAILY PRN, Historical Med      acetaminophen (TYLENOL) 650 MG extended release tablet Take 1 tablet by mouth every 8 hours as needed for PainHistorical Med      atorvastatin (LIPITOR) 40 MG tablet Take 1 tablet by mouth nightly, Disp-30 tablet, R-3Normal      midodrine (PROAMATINE) 10 MG tablet Take 1 tablet by mouth 3 times daily (with meals), Disp-90 tablet, R-3Normal      allopurinol (ZYLOPRIM) 300 MG tablet Take 1 tablet by mouth at bedtimeHistorical Med      omeprazole (PRILOSEC) 40 MG delayed release capsule Take 1 capsule by mouth daily as needed (heartburn)Historical Med      nitroGLYCERIN (NITROSTAT) 0.4 MG SL tablet Place 1 tablet under the tongue every 5 minutes as neededHistorical Med      Diphenhydramine-APAP, sleep, (TYLENOL PM EXTRA STRENGTH PO) Take 1 tablet by mouth nightlyHistorical Med      donepezil (ARICEPT) 5 MG tablet Take 1

## 2025-07-07 NOTE — CARE COORDINATION
Case Management Assessment  Initial Evaluation    Date/Time of Evaluation: 7/7/2025 3:59 PM  Assessment Completed by: Kerry Tay RN    If patient is discharged prior to next notation, then this note serves as note for discharge by case management.    Patient Name: Jhony Hdez                   YOB: 1940  Diagnosis: Hydronephrosis [N13.30]  Kidney stone [N20.0]  Nausea and vomiting, unspecified vomiting type [R11.2]                   Date / Time: 7/4/2025 10:32 AM    Patient Admission Status: Inpatient   Readmission Risk (Low < 19, Mod (19-27), High > 27): Readmission Risk Score: 21.9    Current PCP: Jhony Valdovinos MD  PCP verified by CM? Yes    Chart Reviewed: Yes      History Provided by: Patient, Spouse, Medical Record  Patient Orientation: Alert and Oriented, Person, Place, Situation, Self    Patient Cognition: Alert    Hospitalization in the last 30 days (Readmission):  No    If yes, Readmission Assessment in  Navigator will be completed.    Advance Directives:      Code Status: Full Code   Patient's Primary Decision Maker is: Legal Next of Kin    Primary Decision Maker: Aura Hdez - Spouse - [phone]    Discharge Planning:    Patient lives with: Spouse/Significant Other Type of Home: House  Primary Care Giver: Self  Patient Support Systems include: Spouse/Significant Other, Children   Current Financial resources: Medicare  Current community resources: ECF/Home Care  Current services prior to admission: Home Care            Current DME:              Type of Home Care services:  Nursing Services, OT, PT, Aide Services    ADLS  Prior functional level: Assistance with the following:, Bathing, Dressing, Cooking, Housework, Shopping, Mobility  Current functional level: Assistance with the following:, Bathing, Dressing, Toileting, Cooking, Housework, Shopping, Mobility    PT AM-PAC: 8 /24  OT AM-PAC: 11 /24    Family can provide assistance at DC: Yes  Would you like Case Management to  Opioid Counseling: The patient was advised that opioid pain medications are addictive, can cause nausea, and vomiting, and are rarely needed after Mohs surgery. Proper use of acetaminophen, ibuprofen, and ice is usually sufficient.

## 2025-07-09 ENCOUNTER — ANTI-COAG VISIT (OUTPATIENT)
Dept: PHARMACY | Age: 85
End: 2025-07-09

## 2025-07-09 DIAGNOSIS — I48.0 PAF (PAROXYSMAL ATRIAL FIBRILLATION) (HCC): Primary | ICD-10-CM

## 2025-07-09 LAB
INTERNATIONAL NORMALIZATION RATIO, POC: 2.6
PROTHROMBIN TIME, POC: 0

## 2025-07-09 NOTE — PROGRESS NOTES
Hospital Medicine Progress Note  V 5.17      Date of Admission: 7/4/2025    Hospital Day: 3      Chief Admission Complaint:  n/v    Subjective:  resting in bed,  noted ongoing nausea still , wife at bedside, palmer in place and pt notes burning sensation    Presenting Admission History:         84 y.o. male with hx of nephrolithiasis, chf, colon ca, urothelial ca, htn, recent cva(with left sided paresis) who presented to [facility] with n/v. . Pt was just discharged from rehab on Thurs, a week ago. He had some occas n/v of late and was noted to have some when seeing cardiology this week. Yesterday, his symptoms of n/v worsened and was brought in today given lack of improvement. No f/chills or cp/sob. His last BM was last night.  No back/flank pain noted.    -PCP gave him prn zofran(not helping much)  -of note, he had a renal stent placed in May 2025    Assessment/Plan:        N/v- suspect likely due nephrolithiasis and hydronephrosis and possibly developing uti/pyelonephritis, CTa/p obtained an no other acute finding noted  -uro consulted  -ivfs   -ua ordered and noted +nitrites/LE, could not see wbc due to signif rbc  -on allopurinol  -Clears for diet, npo mn  cysto on 7/5/25(bilat stent change), per uro pt not a candidate for rt nephrouretectomy, f/u in 1 month and needs renal u/s, if stents fail will need to consider perc NT placement  -iv rocephin started 7/4  -ucx done, < 50k mixed flroa   -GI consulted for further thoughts  -checked troponins    Afib- rate controlled, with hx of SSS, had pacer placed recently on 6/2025  -tele  -on coumadin, pharm  assisted dosing  -daily inr     Htn-stable  -on Midodrine with parameters     Urothelial carcinoma- mmgt per outpt     Recent CVA- seen and treated at [facility] recently in 2025  -On statin, held for now  -No asa?  -On aricept     Hld-resume statin on dc     Chronic normocytic anemia-stable, mgmt as outpt     Ckd -per emr  -monitored labs     Hx of 
     Urology Attending Progress Note      Subjective: Awake in bed, family at bedside.     Vitals:  /86   Pulse 96   Temp 97.7 °F (36.5 °C) (Oral)   Resp 16   Ht 1.778 m (5' 10\")   Wt 70.7 kg (155 lb 14.4 oz)   SpO2 98%   BMI 22.37 kg/m²   Temp  Av °F (36.7 °C)  Min: 97.5 °F (36.4 °C)  Max: 98.4 °F (36.9 °C)    Intake/Output Summary (Last 24 hours) at 2025 1047  Last data filed at 2025 0800  Gross per 24 hour   Intake 1080 ml   Output 705 ml   Net 375 ml       Exam: nad  Abd soft  Urine clear    Labs:  WBC:    Lab Results   Component Value Date/Time    WBC 6.6 2025 06:41 AM     Hemoglobin/Hematocrit:    Lab Results   Component Value Date/Time    HGB 11.5 2025 06:41 AM    HCT 35.6 2025 06:41 AM     BMP:    Lab Results   Component Value Date/Time     2025 06:41 AM    K 3.7 2025 06:41 AM     2025 06:41 AM    CO2 25 2025 06:41 AM    BUN 13 2025 06:41 AM    CREATININE 0.9 2025 06:41 AM    CALCIUM 9.0 2025 06:41 AM    GFRAA >60 10/26/2017 08:30 AM    LABGLOM 83 2025 06:41 AM    LABGLOM 39 2024 07:55 AM     PT/INR:    Lab Results   Component Value Date/Time    PROTIME 29.1 2025 06:41 AM    PROTIME 0.0 2025 03:41 PM    INR 2.82 2025 06:41 AM     PTT:    Lab Results   Component Value Date/Time    APTT 39.1 2025 08:26 PM   [APTT      Impression/Plan: pod #2 s/p bilateral stent exchange, left urs/ll/sbe  -7fr stents placed. The previous stents were occluded, causing hydro  -Per Dr. Mendoza, had discussion with wife that he is not a candidate for rt nephroureterectomy for his right sided urothelial ca.   -F/u with Dr. Mendoza in one month with a renal ultrasound.   -if stents failed again, we would need to consider perc NT placement    Urology will sign off, call with questions    GODFREY Cook - CNP  
     Urology Attending Progress Note      Subjective: resting.     Vitals:  /82   Pulse (!) 110   Temp 97.5 °F (36.4 °C)   Resp 14   Ht 1.778 m (5' 10\")   Wt 74.8 kg (165 lb)   SpO2 97%   BMI 23.68 kg/m²   Temp  Av.8 °F (36.6 °C)  Min: 97.3 °F (36.3 °C)  Max: 98.5 °F (36.9 °C)    Intake/Output Summary (Last 24 hours) at 2025 1458  Last data filed at 2025 1414  Gross per 24 hour   Intake 1991.03 ml   Output 870 ml   Net 1121.03 ml       Exam: nad  Abd soft  Urine clear    Labs:  WBC:    Lab Results   Component Value Date/Time    WBC 7.5 2025 05:32 AM     Hemoglobin/Hematocrit:    Lab Results   Component Value Date/Time    HGB 12.4 2025 05:32 AM    HCT 38.1 2025 05:32 AM     BMP:    Lab Results   Component Value Date/Time     2025 05:32 AM    K 4.1 2025 05:32 AM     2025 05:32 AM    CO2 23 2025 05:32 AM    BUN 14 2025 05:32 AM    CREATININE 1.0 2025 05:32 AM    CALCIUM 9.3 2025 05:32 AM    GFRAA >60 10/26/2017 08:30 AM    LABGLOM 73 2025 05:32 AM    LABGLOM 39 2024 07:55 AM     PT/INR:    Lab Results   Component Value Date/Time    PROTIME 30.8 2025 05:32 AM    PROTIME 0.0 2025 03:41 PM    INR 3.05 2025 05:32 AM     PTT:    Lab Results   Component Value Date/Time    APTT 39.1 2025 08:26 PM   [APTT      Impression/Plan: pod #1 s/p bilateral stent exchange, left urs/ll/sbe  -7fr stents placed. The previous stents were occluded, causing hydro  -I told his wife at this time, he is not a candidate for rt nephroureterectomy for his right sided urothelial ca.   -will see me in one month with a renal ultrasound.   -if stents failed again, we would need to consider perc NT placement    BHARGAV MARSHALL MD  
  4 Eyes Skin Assessment and Patient belongings     The patient is being assess for  Low Chuck    I agree that 2 Nurses have performed a thorough Head to Toe Skin Assessment on the patient. ALL assessment sites listed below have been assessed.       Areas assessed by both nurses:   [x]   Head, Face, and Ears   [x]   Shoulders, Back, and Chest  [x]   Arms, Elbows, and Hands   [x]   Coccyx, Sacrum, and IschIum  [x]   Legs, Feet, and Heels        Does the Patient have Skin Breakdown?  No         Chuck Prevention initiated:  Yes   Wound Care Orders initiated:  No      [facility] nurse consulted for Pressure Injury (Stage 3,4, Unstageable, DTI, NWPT, and Complex wounds), New and Established Ostomies:  No      I agree that 2 Nurses have reviewed patient belongings with the patient/family and documented in the flowsheet upon admission or transfer to the unit.     Belongings  Dental Appliances: Uppers, At bedside  Vision - Corrective Lenses: None  Hearing Aid: None  Clothing: At bedside  Jewelry: None  Electronic Devices: None  Weapons (Notify Protective Services/Security): None  Other Valuables: At home  Home Medications: None  Valuables Given To: Family (Comment)  Provide Name(s) of Who Valuable(s) Were Given To: Ms Chavarria  Responsible person(s) in the waiting room: saranya, wife  Patient approves for provider to speak to responsible person post operatively: Yes       Nurse 1 eSignature: Electronically signed by Elmira Moreno RN on 7/6/25 at 1:11 PM EDT    **SHARE this note so that the co-signing nurse is able to place an eSignature**    Nurse 2 eSignature: {Esignature:[id number]}   
  Pharmacy Note  Warfarin Consult  Dx: AFib  Goal INR range 2-2.5  Home Warfarin dose: 5 mg daily except 2.5 mg Q Fri      Date                 INR                  Warfarin  7/4                   2.68                   no warfarin (surgery tomorrow)             7/5                   3.15                    Hold     Recommend to hold Warfarin tonight x1.  Daily INR ordered.  Rx will continue to manage therapy per consult order.  Raquel Butler D.7/5/2025 5:32 AM        
  Pharmacy Note  Warfarin Consult  Dx: AFib  Goal INR range 2-2.5  Home Warfarin dose: 5 mg daily except 2.5 mg Q Fri      Date                 INR                  Warfarin  7/4                   2.68                   no warfarin (surgery tomorrow)             7/5                   3.15                    Hold  7/6                   3.05                    Hold     Recommend to hold Warfarin tonight x1.  Daily INR ordered.  Rx will continue to manage therapy per consult order.  Duc Kimble, Pharm D.7/6/2025 5:52 AM            
  Pharmacy Note  Warfarin Consult  Dx: AFib  Goal INR range 2-2.5  Home Warfarin dose: 5 mg daily except 2.5 mg Q Fri      Date                 INR                  Warfarin  7/4                   2.68                   no warfarin (surgery tomorrow)             7/5                   3.15                    Hold  7/6                   3.05                    Hold  7/7                   2.82                    1 mg     Recommend Warfarin 1 mg tonight x1.  Daily INR ordered.  Rx will continue to manage therapy per consult order.    Jyoti Lino, PharmD 7/7/2025 7:19 AM  
  Physician Progress Note      PATIENT:               MAHAD OLIVAS  CSN #:                  [id number]  :                       1940  ADMIT DATE:       2025 10:32 AM  DISCH DATE:        2025 6:05 PM  RESPONDING  PROVIDER #:        Navin Zayas MD          QUERY TEXT:    Nausea/vomiting is documented in the medical record Discharge Summary by   Navin Zayas MD at 2025.  Please document the most likely cause:    The clinical indicators include:  This is a 84 y.o. male with hx of nephrolithiasis, chf, colon ca, urothelial   ca, htn who presented with n/v.    -\" N/v- suspect likely due nephrolithiasis and hydronephrosis and possibly   developing uti/pyelonephritis, CTa/p obtained an no other acute finding noted.  of note, he had a renal stent placed in May 2025 \" (from H&P by Navin Zayas MD at 2025)    - CT scan shows persistent left 6 mm distal stone, and there is hydronephrosis   from stent malfunction. (from OP note by Eduardo Mendoza MD at   2025)    -\" It is unclear if the nausea and vomiting is due to stent obstruction with   hydronephrosis. plan for cystoscopy possible LEFT URS, LL, SBE, and bilateral   stent exchange today with Dr Mendoza \" (from Urology Progress Notes by   Adamaris Kim PA-C at 2025)    -\" s/p bilateral stent exchange, left urs/ll/sbe-7fr stents placed. The   previous stents were occluded, causing hydro. if stents failed again, we would   need to consider perc NT placement \" (from Urology Progress Notes by   Adamaris Kim PA-C at 2025)    s/p bilateral stent exchange, left ureteroscopy, laser lithotripsy, stone   basket extraction.    Thank you,  Thanknamrata HENRIQUEZ CDS  Options provided:  -- Nausea and Vomiting related to Stent malfunction  -- Nausea and Vomiting related to Nephrolithiasis and Hydronephrosis  -- Other - I will add my own diagnosis  -- Disagree - Not applicable / Not valid  -- Disagree - Clinically unable to 
4 Eyes Skin Assessment     NAME:  Jhony Hdez  YOB: 1940  MEDICAL RECORD NUMBER:  [id number]    The patient is being assessed for  Post-Op Surgical    I agree that at least one RN has performed a thorough Head to Toe Skin Assessment on the patient. ALL assessment sites listed below have been assessed.      Areas assessed by both nurses: Janice Rn/Mike RN    Head, Face, Ears, Shoulders, Back, Chest, Arms, Elbows, Hands, Sacrum. Buttock, Coccyx, Ischium, Legs. Feet and Heels, and Under Medical Devices             Does the Patient have a Wound? No noted wound(s)  Dry excoriated skin on his coccyx/sacral area  Redness (blanchable) bilateral buttocks  Redness (blanchable) L hip area  Ecchymosis - scattered         Chuck Prevention initiated by RN: Yes  Wound Care Orders initiated by RN: No    Pressure Injury (Stage 1,2,3,4, Unstageable, DTI, NWPT, and Complex wounds) if present, place Wound referral order by RN under : No    New Ostomies, if present place, Ostomy referral order under : No     Nurse 1 eSignature: Electronically signed by Janice Morejon RN on 7/5/25 at 10:31 PM EDT    **SHARE this note so that the co-signing nurse can place an eSignature**    Nurse 2 eSignature: Electronically signed by Mike Champion RN on 7/6/25 at 4:10 AM EDT   
4 Eyes Skin Assessment and Patient belongings     The patient is being assess for  Shift Handoff and Low Chuck score    I agree that 2 Nurses have performed a thorough Head to Toe Skin Assessment on the patient. ALL assessment sites listed below have been assessed.       Areas assessed by both nurses: Janice RN/Scott RN  [x]   Head, Face, and Ears   [x]   Shoulders, Back, and Chest  [x]   Arms, Elbows, and Hands   [x]   Coccyx, Sacrum, and IschIum  [x]   Legs, Feet, and Heels        Does the Patient have Skin Breakdown?  No   Dry excoriated skin on his coccyx/sacral area  Redness (blanchable) bilateral buttocks  Redness (blanchable) L hip area  Ecchymosis - scattered        Chuck Prevention initiated:  Yes   Wound Care Orders initiated:  No      [facility] nurse consulted for Pressure Injury (Stage 3,4, Unstageable, DTI, NWPT, and Complex wounds), New and Established Ostomies:  No      I agree that 2 Nurses have reviewed patient belongings with the patient/family and documented in the flowsheet upon admission or transfer to the unit.     Belongings  Dental Appliances: Uppers, At bedside  Vision - Corrective Lenses: Eyeglasses, At home  Hearing Aid: None  Clothing: Shirt, At bedside  Jewelry: None, At home  Electronic Devices: Cell Phone, At bedside  Weapons (Notify Protective Services/Security): None  Other Valuables: At home  Home Medications: None  Valuables Given To: Patient, Family (Comment)  Provide Name(s) of Who Valuable(s) Were Given To: Ms Chavarria       Nurse 1 eSignature: Electronically signed by Janice Morejon RN on 7/4/25 at 9:18 PM EDT    **SHARE this note so that the co-signing nurse is able to place an eSignature**    Nurse 2 eSignature: Electronically signed by Scott West RN on 7/5/25 at 6:07 AM EDT   
Assessment complete and charted. Neuro exam unchanged. VSS with slight asymptomatic HTN. Wife remains at bedside. PM notified of elevated trop (mostly unchanged from May) as well as low UOP. Urine color seems improved overnight and UOP appears to have picked up as morning progressed with PO intake encouraged. Call light within reach, will continue to monitor.   
Assessment completed and documented. VSS. A/ox4. Slurring of speech noted. RA and denies SOB. On Telemetry. Wife at bedside.   Still noted with left sided weakness from his previous CVA. Neuro check done.  Skin assessment done with Scott PARADA. See flowsheets.   Patent PIV line on his R forearm with ongoing NS at 50 cc/hr.   Patient able to take pills whole with apple sauce.   Patient is q2 turn and on pure wick. Bed locked and in lowest position.   Bedside table and call light within reach. Denies further needs at this time.       
CMU called, pt had 8 beats of V-tach. Secure message sent to hospitalist. Currently awaiting response. Electronically signed by GABY REY RN on 7/6/25 at 3:13 PM EDT   
Discharge order obtained. Discharged instructions reviewed with pt and wife with no questions or concerns noted. PIV removed, gauze and tega derm applied, no complications noted. Tele box removed, cmu notified. De La Torre removed, pt passed voiding trail. Medications picked up from outpt pharmacy. Pt assisted to wife's car via wheelchair with all personal belongings. Family assisted pt into car without issues.   
Occupational Therapy  Facility/Department: [place] [address] [facility]  Occupational Therapy Initial Assessment & Treatment    Name: Jhony Hdez  : 1940  MRN: [id number]  Date of Service: 2025    Discharge Recommendations:  24 hour supervision or assist, Home with Home health OT  OT Equipment Recommendations  Equipment Needed: No (family has necessary DME)    AM-PAC score  AM-PAC Inpatient Daily Activity Raw Score: 11 (25 1326)  AM-PAC Inpatient ADL T-Scale Score : 29.04 (25 132)  ADL Inpatient CMS 0-100% Score: 70.42 (25 132)  ADL Inpatient CMS G-Code Modifier : CL (25)    If pt is unable to be seen after this session, please let this note serve as discharge summary.  Please see case management note for discharge disposition.  Thank you.     Patient Diagnosis(es): The primary encounter diagnosis was Nausea and vomiting, unspecified vomiting type. Diagnoses of Kidney stone and Other hydronephrosis were also pertinent to this visit.  Past Medical History:  has a past medical history of CHF (congestive heart failure) (HCC), Chronic kidney disease, Colon cancer (HCC), Guillain Barré syndrome, Hyperlipidemia, Hypertension, Left ureteral stone, Right Ureteral mass, and Stroke (HCC).  Past Surgical History:  has a past surgical history that includes back surgery; Adrenalectomy; Carpal tunnel release; Total elbow arthroplasty (Left); Mandible surgery; other surgical history (10/25/2017); Kidney stone surgery; Intracapsular cataract extraction (Right, 2019); Cystoscopy (Bilateral, 2025); Colonoscopy; Cystoscopy (Bilateral, 2025); thrombectomy (Right, 05/15/2025); neurovascular procedure (N/A, 05/15/2025); neurovascular procedure (N/A, 2025); ep device procedure (N/A, 2025); Urological Surgery (Bilateral, 2025); and Cystoscopy (Left, 2025).    Assessment  Performance deficits / Impairments: Decreased functional mobility ;Decreased ADL 
Patient admitted to PACU bay 5 in preparation for surgery. Pt A&Ox4. VSS. Consents confirmed. PIV assessed, fluids infusing. Surgical site prep completed. Belongings labeled, on cart to PACU. NPO since 0000. Family at bedside, phone number in system for text updates.   
Patient arrived to PACU bay 5, phase one initiated. Placed on bedside monitor, VSS. Report obtained from OR RN and anesthesia. SpO2 stable on room air. Warm blankets applied. Side rails in place.    
Patient back from PACU for S/P BILATERAL URETERAL STENT EXCHANGE  CYSTOSCOPY, LEFT URETEROSCOPY, LEFT RETROGRADE PYELOGRAM, LASER LITHOTRIPSY, AND STONE BASKET EXTRACTION  Patient in bed, alert and oriented/conversant - noted with mild slurring of speech.  Vital signs taken and recorded. On Telemetry. Wife at bedside.   PIV patency assessed and without evidence of infiltration - on saline locked.  No signs of SOB - clear/diminished  breath sounds auscultated bilaterally. On RA and denies any SOB. Abdomen soft  and non distended; no reports of abdominal pain, N/V. Denies any flank pain at the moment. Patient has a palmer catheter inserted from OR - cherry colored urine output. Palmer care done with CHG wipes.   Scheduled medication given - take pills with a apple sauce. Please see eMAR.   Bed wheels locked; Call light within reached; Raised siderails x2; on Fall precaution. Denies any need at the moment.     
Physical Therapy  Facility/Department: [place] [address] [facility]  Physical Therapy Initial Assessment    Name: Jhony Hdez  : 1940  MRN: [id number]  Date of Service: 2025    Discharge Recommendations:  24 hour supervision or assist, Home with Home health PT (24-hr assist from family (which family provides))   PT Equipment Recommendations  Equipment Needed: No  Other: Pt owns all necessary DME      Patient Diagnosis(es): The primary encounter diagnosis was Nausea and vomiting, unspecified vomiting type. Diagnoses of Kidney stone and Other hydronephrosis were also pertinent to this visit.  Past Medical History:  has a past medical history of CHF (congestive heart failure) (HCC), Chronic kidney disease, Colon cancer (HCC), Guillain Barré syndrome, Hyperlipidemia, Hypertension, Left ureteral stone, Right Ureteral mass, and Stroke (HCC).  Past Surgical History:  has a past surgical history that includes back surgery; Adrenalectomy; Carpal tunnel release; Total elbow arthroplasty (Left); Mandible surgery; other surgical history (10/25/2017); Kidney stone surgery; Intracapsular cataract extraction (Right, 2019); Cystoscopy (Bilateral, 2025); Colonoscopy; Cystoscopy (Bilateral, 2025); thrombectomy (Right, 05/15/2025); neurovascular procedure (N/A, 05/15/2025); neurovascular procedure (N/A, 2025); ep device procedure (N/A, 2025); Urological Surgery (Bilateral, 2025); and Cystoscopy (Left, 2025).    Assessment  Body Structures, Functions, Activity Limitations Requiring Skilled Therapeutic Intervention: Decreased functional mobility ;Decreased ROM;Decreased strength;Decreased cognition;Decreased endurance;Decreased balance;Decreased fine motor control;Decreased coordination;Decreased posture    Assessment: Pt referred for PT evaluation during current hospital stay with dx of hydronephrosis, s/p B ureteral stent exchange 25. Pt also with hx recent CVA with L-sided weakness 
Physical Therapy  {PT ALL NOTES:272806}    
Pt assessment completed and charted. VSS, pt on RA. Patient is a/o. IV site patent/flushed, infusing. De La Torre patent and draining- pt reports some burning around insertion site. Medication given per MAR for nausea. IVF ordered- start time noted on MAR.     Safety Measures in place:   Bed in lowest position and wheels locked.   Call light within reach.   Bedside table within reach.   Non-skid socks in place.   Pt denies any other needs at this time.    Pt calls out appropriately.  Patient in stable condition when RN left room.    
Pt had medium sized BM, mucousy in texture and tan in color. Secure message sent to hospitalist to inform. Charge nurse notified. Electronically signed by GABY REY RN on 7/6/25 at 3:56 PM EDT   
Received patient in bed with wife at bedside. A/Ox4 and on RA - denies any SOB.  Initial assessment done and charted. On telemetry.   Transport came to bring down the patient to OR for plan for cystoscopy possible LEFT URS, LL, SBE, and bilateral stent exchange.   
Report given to Maliha, PACU. Electronically signed by GABY REY RN on 7/5/25 at 5:16 PM EDT     
Report given to TAL Camacho. Patient transported to C3 in stable condition.   
Telephone report given to TAL Miller.  
Urology ordered for palmer to be removed and complete voiding trial. Pt made aware of order. Palmer was removed at 1240, without complications. Pt tolerated well. Urinal at bedside, pt and family educated on letting staff know when he urinates and if he needs help. Call light in reach.   
Wife at bedside. Patient tolerating PO intake. Patient stated no pain, no nausea. N\o emesis.   
position of left ureteral stent. 2 calculi within the distal left ureter measuring up to 6 mm are also stable.  2. Chronically atrophic right kidney with severe right hydronephrosis and stable position of right ureteral stent without significant change.  3. Mild nonspecific lymphadenopathy stable since March 2025.      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     
interpreted for clinical significance    [x] Appropriate follow-up labs were ordered  [] Collateral history obtained     [x] Independent Interpretation of tests (any 1)    [x] Telemetry (Rhythm Strip) personally reviewed and interpreted        [] Imaging personally reviewed and interpreted     [x] Discussion (any 1)  [x] Multi-Disciplinary Rounds with Case Management  [] Discussed management of the case with           Labs:  Personally reviewed on 7/5/2025 and interpreted for clinical significance as documented above.     Recent Labs     07/04/25  1045 07/05/25  0506   WBC 6.0 6.1   HGB 10.8* 11.5*   HCT 34.5* 35.9*    276     Recent Labs     07/04/25  1045 07/05/25  0506    141   K 3.9 3.9    106   CO2 26 25   BUN 12 11   CREATININE 1.1 1.1   CALCIUM 9.1 9.4     No results for input(s): \"PROBNP\", \"TROPHS\" in the last 72 hours.  No results for input(s): \"LABA1C\" in the last 72 hours.  Recent Labs     07/04/25  1045   AST 25   ALT 10   BILITOT 0.6   ALKPHOS 160*     Recent Labs     07/04/25  1533 07/05/25  0506   INR 2.68* 3.15*       Urine Cultures:   Lab Results   Component Value Date/Time    LABURIN  06/01/2025 07:40 PM     <10,000 CFU/ml mixed skin/urogenital carlotta. No further workup    LABURIN 25,000 CFU/ml 06/01/2025 07:40 PM     Blood Cultures: No results found for: \"BC\"  No results found for: \"BLOODCULT2\"  Organism:   Lab Results   Component Value Date/Time    ORG Enterococcus faecalis 06/01/2025 07:40 PM         Navin Zayas MD

## 2025-07-09 NOTE — PROGRESS NOTES
MrEmily Hdez is here for management of anticoagulation for AFib.   PMH also significant for HLD, HTN, Hx prostate, colon CA.   He presents today w/out complaint.  Pt verifies dosing regimen as listed above.   Pt denies s/s bleeding/bruising/swelling/SOB.  No missed doses  No changes in RX/OTCs/Herbal medications.  No dietary concerns  No ETOH or tobacco use.    He had been in hospital several times over the past few months.  Warfarin was held for procedures and due to hematuria for a while.  He did recently have a stroke. were planning to switch him to Eliquis but it is not affordable for pt.  Updated medication list. Dose of allopurinol had been increased to 300 mg daily.    He was in hospital again last week, dose was held    Patient now has [facility] RN visiting.  RN calls in INR of 2.6 today. Has resumed previous dose starting yesterday.  Warfarin dose had been held during admission except for 1 mg dose on Mon.  INR was in range at admission but had risen slightly high while admitted so we will make a small dose decrease for now.        INR 2.6 is within therapeutic range of 2-2.5.  Recommend to decrease dose to 5 mg daily except 2.5 mg Q Mon/Fri(7.7% decrease)  Patient has 5 mg tablets.  Will continue to monitor and check INR in 7 days.  Dosing reminder card given with phone number, appointment date and time.   Return to clinic:  [facility] ~    Clifford Mcguire, PharmD 12:10 PM EDT 25    For Pharmacy Admin Tracking Only    Intervention Detail: Adherence Monitorin and Dose Adjustment: 1, reason: Therapy Optimization  Total # of Interventions Recommended: 2  Total # of Interventions Accepted: 2  Time Spent (min): 15

## 2025-07-10 LAB
APPEARANCE STONE: NORMAL
COMPN STONE: NORMAL
SPECIMEN WT: 123 MG

## 2025-07-13 ENCOUNTER — APPOINTMENT (OUTPATIENT)
Dept: CT IMAGING | Age: 85
DRG: 193 | End: 2025-07-13
Payer: MEDICARE

## 2025-07-13 ENCOUNTER — HOSPITAL ENCOUNTER (INPATIENT)
Age: 85
LOS: 10 days | Discharge: NURSING, REHAB OR LONG-TERM CARE | DRG: 193 | End: 2025-07-25
Attending: EMERGENCY MEDICINE | Admitting: INTERNAL MEDICINE
Payer: MEDICARE

## 2025-07-13 DIAGNOSIS — R82.90 ABNORMAL URINALYSIS: ICD-10-CM

## 2025-07-13 DIAGNOSIS — R11.2 NAUSEA AND VOMITING IN ADULT: ICD-10-CM

## 2025-07-13 DIAGNOSIS — J18.9 PNEUMONIA OF BOTH LOWER LOBES DUE TO INFECTIOUS ORGANISM: Primary | ICD-10-CM

## 2025-07-13 DIAGNOSIS — R79.89 ELEVATED TROPONIN: ICD-10-CM

## 2025-07-13 DIAGNOSIS — R11.2 NAUSEA AND VOMITING, UNSPECIFIED VOMITING TYPE: ICD-10-CM

## 2025-07-13 DIAGNOSIS — Z71.89 GOALS OF CARE, COUNSELING/DISCUSSION: ICD-10-CM

## 2025-07-13 PROBLEM — A41.9 SEPSIS (HCC): Status: ACTIVE | Noted: 2025-07-13

## 2025-07-13 LAB
ALBUMIN SERPL-MCNC: 3.8 G/DL (ref 3.4–5)
ALBUMIN/GLOB SERPL: 1.1 {RATIO} (ref 1.1–2.2)
ALP SERPL-CCNC: 168 U/L (ref 40–129)
ALT SERPL-CCNC: 16 U/L (ref 10–40)
ANION GAP SERPL CALCULATED.3IONS-SCNC: 13 MMOL/L (ref 3–16)
AST SERPL-CCNC: 33 U/L (ref 15–37)
BACTERIA URNS QL MICRO: ABNORMAL /HPF
BASOPHILS # BLD: 0.1 K/UL (ref 0–0.2)
BASOPHILS NFR BLD: 1 %
BILIRUB SERPL-MCNC: 0.7 MG/DL (ref 0–1)
BILIRUB UR QL STRIP.AUTO: ABNORMAL
BUN SERPL-MCNC: 13 MG/DL (ref 7–20)
CALCIUM SERPL-MCNC: 9.5 MG/DL (ref 8.3–10.6)
CHLORIDE SERPL-SCNC: 99 MMOL/L (ref 99–110)
CLARITY UR: ABNORMAL
CO2 SERPL-SCNC: 27 MMOL/L (ref 21–32)
COLOR UR: ABNORMAL
CREAT SERPL-MCNC: 1.1 MG/DL (ref 0.8–1.3)
DEPRECATED RDW RBC AUTO: 18.6 % (ref 12.4–15.4)
EOSINOPHIL # BLD: 0.2 K/UL (ref 0–0.6)
EOSINOPHIL NFR BLD: 2.3 %
EPI CELLS #/AREA URNS HPF: ABNORMAL /HPF (ref 0–5)
GFR SERPLBLD CREATININE-BSD FMLA CKD-EPI: 65 ML/MIN/{1.73_M2}
GLUCOSE SERPL-MCNC: 109 MG/DL (ref 70–99)
GLUCOSE UR STRIP.AUTO-MCNC: ABNORMAL MG/DL
HCT VFR BLD AUTO: 43.4 % (ref 40.5–52.5)
HGB BLD-MCNC: 14.1 G/DL (ref 13.5–17.5)
HGB UR QL STRIP.AUTO: ABNORMAL
KETONES UR STRIP.AUTO-MCNC: ABNORMAL MG/DL
LACTATE BLDV-SCNC: 1.8 MMOL/L (ref 0.4–2)
LEUKOCYTE ESTERASE UR QL STRIP.AUTO: ABNORMAL
LIPASE SERPL-CCNC: 25 U/L (ref 13–60)
LYMPHOCYTES # BLD: 1.5 K/UL (ref 1–5.1)
LYMPHOCYTES NFR BLD: 16.9 %
MCH RBC QN AUTO: 27.8 PG (ref 26–34)
MCHC RBC AUTO-ENTMCNC: 32.6 G/DL (ref 31–36)
MCV RBC AUTO: 85.2 FL (ref 80–100)
MONOCYTES # BLD: 0.7 K/UL (ref 0–1.3)
MONOCYTES NFR BLD: 7.5 %
NEUTROPHILS # BLD: 6.5 K/UL (ref 1.7–7.7)
NEUTROPHILS NFR BLD: 72.3 %
NITRITE UR QL STRIP.AUTO: ABNORMAL
PH UR STRIP.AUTO: ABNORMAL [PH] (ref 5–8)
PLATELET # BLD AUTO: 417 K/UL (ref 135–450)
PMV BLD AUTO: 8.1 FL (ref 5–10.5)
POTASSIUM SERPL-SCNC: 4.8 MMOL/L (ref 3.5–5.1)
PROT SERPL-MCNC: 7.4 G/DL (ref 6.4–8.2)
PROT UR STRIP.AUTO-MCNC: ABNORMAL MG/DL
RBC # BLD AUTO: 5.09 M/UL (ref 4.2–5.9)
RBC #/AREA URNS HPF: >100 /HPF (ref 0–4)
SODIUM SERPL-SCNC: 139 MMOL/L (ref 136–145)
SP GR UR STRIP.AUTO: ABNORMAL (ref 1–1.03)
TROPONIN, HIGH SENSITIVITY: 50 NG/L (ref 0–22)
TROPONIN, HIGH SENSITIVITY: 56 NG/L (ref 0–22)
UA COMPLETE W REFLEX CULTURE PNL UR: ABNORMAL
UA DIPSTICK W REFLEX MICRO PNL UR: YES
URN SPEC COLLECT METH UR: ABNORMAL
UROBILINOGEN UR STRIP-ACNC: ABNORMAL E.U./DL
WBC # BLD AUTO: 9 K/UL (ref 4–11)
WBC #/AREA URNS HPF: ABNORMAL /HPF (ref 0–5)

## 2025-07-13 PROCEDURE — 96365 THER/PROPH/DIAG IV INF INIT: CPT

## 2025-07-13 PROCEDURE — 6360000002 HC RX W HCPCS: Performed by: EMERGENCY MEDICINE

## 2025-07-13 PROCEDURE — 96375 TX/PRO/DX INJ NEW DRUG ADDON: CPT

## 2025-07-13 PROCEDURE — 81001 URINALYSIS AUTO W/SCOPE: CPT

## 2025-07-13 PROCEDURE — 83735 ASSAY OF MAGNESIUM: CPT

## 2025-07-13 PROCEDURE — 84145 PROCALCITONIN (PCT): CPT

## 2025-07-13 PROCEDURE — 99285 EMERGENCY DEPT VISIT HI MDM: CPT

## 2025-07-13 PROCEDURE — 85025 COMPLETE CBC W/AUTO DIFF WBC: CPT

## 2025-07-13 PROCEDURE — 96366 THER/PROPH/DIAG IV INF ADDON: CPT

## 2025-07-13 PROCEDURE — 71250 CT THORAX DX C-: CPT

## 2025-07-13 PROCEDURE — 84484 ASSAY OF TROPONIN QUANT: CPT

## 2025-07-13 PROCEDURE — 80053 COMPREHEN METABOLIC PANEL: CPT

## 2025-07-13 PROCEDURE — 74177 CT ABD & PELVIS W/CONTRAST: CPT

## 2025-07-13 PROCEDURE — 93005 ELECTROCARDIOGRAM TRACING: CPT | Performed by: EMERGENCY MEDICINE

## 2025-07-13 PROCEDURE — 83690 ASSAY OF LIPASE: CPT

## 2025-07-13 PROCEDURE — 96361 HYDRATE IV INFUSION ADD-ON: CPT

## 2025-07-13 PROCEDURE — 2580000003 HC RX 258: Performed by: EMERGENCY MEDICINE

## 2025-07-13 PROCEDURE — 96367 TX/PROPH/DG ADDL SEQ IV INF: CPT

## 2025-07-13 PROCEDURE — 83605 ASSAY OF LACTIC ACID: CPT

## 2025-07-13 PROCEDURE — 6360000004 HC RX CONTRAST MEDICATION: Performed by: EMERGENCY MEDICINE

## 2025-07-13 RX ORDER — ONDANSETRON 2 MG/ML
8 INJECTION INTRAMUSCULAR; INTRAVENOUS ONCE
Status: COMPLETED | OUTPATIENT
Start: 2025-07-13 | End: 2025-07-13

## 2025-07-13 RX ORDER — IOPAMIDOL 755 MG/ML
75 INJECTION, SOLUTION INTRAVASCULAR
Status: COMPLETED | OUTPATIENT
Start: 2025-07-13 | End: 2025-07-13

## 2025-07-13 RX ORDER — 0.9 % SODIUM CHLORIDE 0.9 %
1000 INTRAVENOUS SOLUTION INTRAVENOUS ONCE
Status: COMPLETED | OUTPATIENT
Start: 2025-07-13 | End: 2025-07-13

## 2025-07-13 RX ADMIN — SODIUM CHLORIDE 1000 ML: 0.9 INJECTION, SOLUTION INTRAVENOUS at 15:58

## 2025-07-13 RX ADMIN — ONDANSETRON 8 MG: 2 INJECTION, SOLUTION INTRAMUSCULAR; INTRAVENOUS at 15:56

## 2025-07-13 RX ADMIN — IOPAMIDOL 75 ML: 755 INJECTION, SOLUTION INTRAVENOUS at 17:27

## 2025-07-13 RX ADMIN — CEFEPIME 2000 MG: 2 INJECTION, POWDER, FOR SOLUTION INTRAVENOUS at 21:54

## 2025-07-13 RX ADMIN — VANCOMYCIN HYDROCHLORIDE 1750 MG: 10 INJECTION, POWDER, LYOPHILIZED, FOR SOLUTION INTRAVENOUS at 22:27

## 2025-07-13 ASSESSMENT — LIFESTYLE VARIABLES
HOW OFTEN DO YOU HAVE A DRINK CONTAINING ALCOHOL: NEVER
HOW MANY STANDARD DRINKS CONTAINING ALCOHOL DO YOU HAVE ON A TYPICAL DAY: PATIENT DOES NOT DRINK

## 2025-07-13 NOTE — ED PROVIDER NOTES
[facility] EMERGENCY DEPARTMENT  EMERGENCY DEPARTMENT ENCOUNTER        Pt Name: Jhony Hdez  MRN: [id number]  Birthdate 1940  Date of evaluation: 7/13/2025  Provider: Gabby Gallegos MD  PCP: Jhony Valdovinos MD  Note Started: 2:33 PM EDT 7/13/25    CHIEF COMPLAINT      Sick   HISTORY OF PRESENT ILLNESS: 1 or more Elements     Chief Complaint   Patient presents with    Nausea    Vomiting     Patient has been having problems with nausea /vomiting since Friday. He has residual left sided weakness from a previous stroke and some facial droop from that. No abd pain, dizziness. Patient recently had kidney stents placed.      History from : Patient and Family wife at bedside  Limitations to history : The patient is a very poor historian, the wife provides most of the history and collateral    Jhony Hdez is a 84 y.o. male who presents to the emergency department secondary to concern for feeling like he wants to throw up all the time. Started last week wife says (brought him here at that time) they could not find anything but the urology team did replace two of his stents in his kidneys (one on each side) has kidney stones on left side, right side she says is not working real good either. She states after coming home he was good for about a day and now he's back to the same thing (puking up yellow). No fevers or chills. No pain. Last BM was was while he was here in the hospital other than some smears. He reports he is urinating ok (he wears depends 2/2 incontinence). His urologist who did the procedure was Dr. Naik in [facility]. No leg pain, leg swelling, chest pain or chest pressure, no trouble breathing.     Past medical history noted below. Denies smoking. Aside from what is stated above denies any other symptoms or modifying factors.    Nursing Notes were all reviewed and agreed with or any disagreements addressed in HPI/MDM.  REVIEW OF SYSTEMS :    Review of Systems Pertinent positive and

## 2025-07-13 NOTE — ED NOTES
Kourtney PARADA made multiple attempts at IV placement. Unsuccessful.     Alma Rosa PARADA is at the bedside to look for IV access site at this time.

## 2025-07-14 PROBLEM — R11.0 NAUSEA: Status: ACTIVE | Noted: 2025-07-14

## 2025-07-14 LAB
APTT BLD: 44.9 SEC (ref 22.8–35.8)
BASOPHILS # BLD: 0.1 K/UL (ref 0–0.2)
BASOPHILS NFR BLD: 1.2 %
DEPRECATED RDW RBC AUTO: 18.5 % (ref 12.4–15.4)
EKG ATRIAL RATE: 109 BPM
EKG DIAGNOSIS: NORMAL
EKG DIAGNOSIS: NORMAL
EKG P-R INTERVAL: 160 MS
EKG Q-T INTERVAL: 390 MS
EKG Q-T INTERVAL: 420 MS
EKG QRS DURATION: 148 MS
EKG QRS DURATION: 154 MS
EKG QTC CALCULATION (BAZETT): 527 MS
EKG QTC CALCULATION (BAZETT): 565 MS
EKG R AXIS: -74 DEGREES
EKG R AXIS: -81 DEGREES
EKG T AXIS: 51 DEGREES
EKG T AXIS: 83 DEGREES
EKG VENTRICULAR RATE: 109 BPM
EKG VENTRICULAR RATE: 110 BPM
EOSINOPHIL # BLD: 0.2 K/UL (ref 0–0.6)
EOSINOPHIL NFR BLD: 2.6 %
HCT VFR BLD AUTO: 38.2 % (ref 40.5–52.5)
HGB BLD-MCNC: 12.4 G/DL (ref 13.5–17.5)
INR PPP: 5.43 (ref 0.86–1.14)
LYMPHOCYTES # BLD: 1.2 K/UL (ref 1–5.1)
LYMPHOCYTES NFR BLD: 16.2 %
MAGNESIUM SERPL-MCNC: 1.69 MG/DL (ref 1.8–2.4)
MCH RBC QN AUTO: 27.4 PG (ref 26–34)
MCHC RBC AUTO-ENTMCNC: 32.4 G/DL (ref 31–36)
MCV RBC AUTO: 84.6 FL (ref 80–100)
MONOCYTES # BLD: 0.7 K/UL (ref 0–1.3)
MONOCYTES NFR BLD: 8.9 %
NEUTROPHILS # BLD: 5.2 K/UL (ref 1.7–7.7)
NEUTROPHILS NFR BLD: 71.1 %
PLATELET # BLD AUTO: 343 K/UL (ref 135–450)
PMV BLD AUTO: 7.9 FL (ref 5–10.5)
PROCALCITONIN SERPL IA-MCNC: 0.05 NG/ML (ref 0–0.15)
PROTHROMBIN TIME: 47.3 SEC (ref 12.1–14.9)
RBC # BLD AUTO: 4.51 M/UL (ref 4.2–5.9)
WBC # BLD AUTO: 7.4 K/UL (ref 4–11)

## 2025-07-14 PROCEDURE — 97162 PT EVAL MOD COMPLEX 30 MIN: CPT

## 2025-07-14 PROCEDURE — 6370000000 HC RX 637 (ALT 250 FOR IP): Performed by: STUDENT IN AN ORGANIZED HEALTH CARE EDUCATION/TRAINING PROGRAM

## 2025-07-14 PROCEDURE — 96366 THER/PROPH/DIAG IV INF ADDON: CPT

## 2025-07-14 PROCEDURE — 2500000003 HC RX 250 WO HCPCS: Performed by: SURGERY

## 2025-07-14 PROCEDURE — 92526 ORAL FUNCTION THERAPY: CPT

## 2025-07-14 PROCEDURE — 96361 HYDRATE IV INFUSION ADD-ON: CPT

## 2025-07-14 PROCEDURE — 97530 THERAPEUTIC ACTIVITIES: CPT

## 2025-07-14 PROCEDURE — G0378 HOSPITAL OBSERVATION PER HR: HCPCS

## 2025-07-14 PROCEDURE — 36415 COLL VENOUS BLD VENIPUNCTURE: CPT

## 2025-07-14 PROCEDURE — 6360000002 HC RX W HCPCS: Performed by: SURGERY

## 2025-07-14 PROCEDURE — 93005 ELECTROCARDIOGRAM TRACING: CPT | Performed by: STUDENT IN AN ORGANIZED HEALTH CARE EDUCATION/TRAINING PROGRAM

## 2025-07-14 PROCEDURE — 85610 PROTHROMBIN TIME: CPT

## 2025-07-14 PROCEDURE — 85025 COMPLETE CBC W/AUTO DIFF WBC: CPT

## 2025-07-14 PROCEDURE — 97535 SELF CARE MNGMENT TRAINING: CPT

## 2025-07-14 PROCEDURE — 96375 TX/PRO/DX INJ NEW DRUG ADDON: CPT

## 2025-07-14 PROCEDURE — 87081 CULTURE SCREEN ONLY: CPT

## 2025-07-14 PROCEDURE — 97166 OT EVAL MOD COMPLEX 45 MIN: CPT

## 2025-07-14 PROCEDURE — 96367 TX/PROPH/DG ADDL SEQ IV INF: CPT

## 2025-07-14 PROCEDURE — 92610 EVALUATE SWALLOWING FUNCTION: CPT

## 2025-07-14 PROCEDURE — 6370000000 HC RX 637 (ALT 250 FOR IP): Performed by: SURGERY

## 2025-07-14 PROCEDURE — 2580000003 HC RX 258: Performed by: STUDENT IN AN ORGANIZED HEALTH CARE EDUCATION/TRAINING PROGRAM

## 2025-07-14 PROCEDURE — 96376 TX/PRO/DX INJ SAME DRUG ADON: CPT

## 2025-07-14 PROCEDURE — 87205 SMEAR GRAM STAIN: CPT

## 2025-07-14 PROCEDURE — 87070 CULTURE OTHR SPECIMN AEROBIC: CPT

## 2025-07-14 PROCEDURE — 87633 RESP VIRUS 12-25 TARGETS: CPT

## 2025-07-14 PROCEDURE — 2580000003 HC RX 258: Performed by: SURGERY

## 2025-07-14 PROCEDURE — 87040 BLOOD CULTURE FOR BACTERIA: CPT

## 2025-07-14 PROCEDURE — 85730 THROMBOPLASTIN TIME PARTIAL: CPT

## 2025-07-14 PROCEDURE — 87449 NOS EACH ORGANISM AG IA: CPT

## 2025-07-14 PROCEDURE — 6360000002 HC RX W HCPCS: Performed by: STUDENT IN AN ORGANIZED HEALTH CARE EDUCATION/TRAINING PROGRAM

## 2025-07-14 RX ORDER — ATORVASTATIN CALCIUM 40 MG/1
40 TABLET, FILM COATED ORAL NIGHTLY
Status: DISCONTINUED | OUTPATIENT
Start: 2025-07-14 | End: 2025-07-25 | Stop reason: HOSPADM

## 2025-07-14 RX ORDER — MIDODRINE HYDROCHLORIDE 5 MG/1
10 TABLET ORAL 3 TIMES DAILY PRN
Status: DISCONTINUED | OUTPATIENT
Start: 2025-07-14 | End: 2025-07-14

## 2025-07-14 RX ORDER — TRAZODONE HYDROCHLORIDE 50 MG/1
50 TABLET ORAL NIGHTLY PRN
Status: DISCONTINUED | OUTPATIENT
Start: 2025-07-14 | End: 2025-07-25 | Stop reason: HOSPADM

## 2025-07-14 RX ORDER — ACETAMINOPHEN 650 MG/1
650 SUPPOSITORY RECTAL EVERY 6 HOURS PRN
Status: DISCONTINUED | OUTPATIENT
Start: 2025-07-14 | End: 2025-07-25 | Stop reason: HOSPADM

## 2025-07-14 RX ORDER — ONDANSETRON 2 MG/ML
4 INJECTION INTRAMUSCULAR; INTRAVENOUS EVERY 6 HOURS PRN
Status: DISCONTINUED | OUTPATIENT
Start: 2025-07-14 | End: 2025-07-14

## 2025-07-14 RX ORDER — SODIUM CHLORIDE 0.9 % (FLUSH) 0.9 %
5-40 SYRINGE (ML) INJECTION PRN
Status: DISCONTINUED | OUTPATIENT
Start: 2025-07-14 | End: 2025-07-25 | Stop reason: HOSPADM

## 2025-07-14 RX ORDER — SODIUM CHLORIDE 9 MG/ML
INJECTION, SOLUTION INTRAVENOUS CONTINUOUS
Status: ACTIVE | OUTPATIENT
Start: 2025-07-14 | End: 2025-07-15

## 2025-07-14 RX ORDER — ONDANSETRON 2 MG/ML
4 INJECTION INTRAMUSCULAR; INTRAVENOUS EVERY 4 HOURS PRN
Status: DISCONTINUED | OUTPATIENT
Start: 2025-07-14 | End: 2025-07-21

## 2025-07-14 RX ORDER — WARFARIN SODIUM 2.5 MG/1
2.5 TABLET ORAL
Status: DISCONTINUED | OUTPATIENT
Start: 2025-07-14 | End: 2025-07-14

## 2025-07-14 RX ORDER — SODIUM CHLORIDE 0.9 % (FLUSH) 0.9 %
5-40 SYRINGE (ML) INJECTION EVERY 12 HOURS SCHEDULED
Status: DISCONTINUED | OUTPATIENT
Start: 2025-07-14 | End: 2025-07-25 | Stop reason: HOSPADM

## 2025-07-14 RX ORDER — ALLOPURINOL 300 MG/1
300 TABLET ORAL NIGHTLY
Status: DISCONTINUED | OUTPATIENT
Start: 2025-07-14 | End: 2025-07-25 | Stop reason: HOSPADM

## 2025-07-14 RX ORDER — MIDODRINE HYDROCHLORIDE 5 MG/1
10 TABLET ORAL EVERY 8 HOURS PRN
Status: DISCONTINUED | OUTPATIENT
Start: 2025-07-14 | End: 2025-07-25 | Stop reason: HOSPADM

## 2025-07-14 RX ORDER — 0.9 % SODIUM CHLORIDE 0.9 %
1000 INTRAVENOUS SOLUTION INTRAVENOUS ONCE
Status: COMPLETED | OUTPATIENT
Start: 2025-07-14 | End: 2025-07-14

## 2025-07-14 RX ORDER — ACETAMINOPHEN 325 MG/1
650 TABLET ORAL EVERY 6 HOURS PRN
Status: DISCONTINUED | OUTPATIENT
Start: 2025-07-14 | End: 2025-07-25 | Stop reason: HOSPADM

## 2025-07-14 RX ORDER — SODIUM CHLORIDE 9 MG/ML
INJECTION, SOLUTION INTRAVENOUS PRN
Status: DISCONTINUED | OUTPATIENT
Start: 2025-07-14 | End: 2025-07-25 | Stop reason: HOSPADM

## 2025-07-14 RX ADMIN — AZITHROMYCIN MONOHYDRATE 500 MG: 500 INJECTION, POWDER, LYOPHILIZED, FOR SOLUTION INTRAVENOUS at 12:15

## 2025-07-14 RX ADMIN — CEFEPIME 2000 MG: 2 INJECTION, POWDER, FOR SOLUTION INTRAVENOUS at 10:28

## 2025-07-14 RX ADMIN — SODIUM CHLORIDE: 0.9 INJECTION, SOLUTION INTRAVENOUS at 07:52

## 2025-07-14 RX ADMIN — ATORVASTATIN CALCIUM 40 MG: 40 TABLET, FILM COATED ORAL at 20:49

## 2025-07-14 RX ADMIN — ONDANSETRON 4 MG: 2 INJECTION, SOLUTION INTRAMUSCULAR; INTRAVENOUS at 04:27

## 2025-07-14 RX ADMIN — WATER 1000 MG: 1 INJECTION INTRAMUSCULAR; INTRAVENOUS; SUBCUTANEOUS at 15:57

## 2025-07-14 RX ADMIN — ONDANSETRON 4 MG: 2 INJECTION, SOLUTION INTRAMUSCULAR; INTRAVENOUS at 10:25

## 2025-07-14 RX ADMIN — MIDODRINE HYDROCHLORIDE 10 MG: 5 TABLET ORAL at 13:56

## 2025-07-14 RX ADMIN — ALLOPURINOL 300 MG: 300 TABLET ORAL at 20:49

## 2025-07-14 RX ADMIN — SODIUM CHLORIDE 1000 ML: 0.9 INJECTION, SOLUTION INTRAVENOUS at 04:26

## 2025-07-14 RX ADMIN — TRAZODONE HYDROCHLORIDE 50 MG: 50 TABLET ORAL at 11:29

## 2025-07-14 NOTE — PROGRESS NOTES
Speech Language Pathology  Clinical Bedside Swallow Assessment  Facility/Department: [place] [address] [facility] TELEMET[facility]        Recommendations:  Diet recommendation: IDDSI 6 Soft and Bite Sized Solids; IDDSI 0 Thin Liquids - NO straws ; Meds whole in puree  *Recommend downgrade to minced and moist solids if pt unable to orally clear soft and bite sized   *Recommend STRICT use of lingual sweep, small bites/sips, and alternating bites/sips to promote oral clearance   Instrumentation: Pt preference to monitor. SLP reiterated that aspiration cannot definitively be ruled out without completion- pt/pt's wife verbalized understanding.   Risk management: upright for all intake, small bites/sips, assist feed, 1:1 supervision with intake, alternate bites/sips, slow rate of intake, STRICT aspiration precautions, assess oral cavity for pocketing, Control risk factors for aspiration PNA by completing oral care 3-4x/day and increasing physical mobility as is medically feasible, and hold PO and contact SLP if s/s of aspiration or worsening respiratory status develop.      NAME:Jhony Hdez  : 1940 (84 y.o.)   MRN: [id number]  ROOM: [facility]-  ADMISSION DATE: 2025  PATIENT DIAGNOSIS(ES): Nausea [R11.0]  Abnormal urinalysis [R82.90]  Elevated troponin [R79.89]  Goals of care, counseling/discussion [Z71.89]  Sepsis (HCC) [A41.9]  Pneumonia of both lower lobes due to infectious organism [J18.9]  Nausea and vomiting, unspecified vomiting type [R11.2]  Chief Complaint   Patient presents with    Nausea    Vomiting     Patient has been having problems with nausea /vomiting since Friday. He has residual left sided weakness from a previous stroke and some facial droop from that. No abd pain, dizziness. Patient recently had kidney stents placed.      Patient Active Problem List    Diagnosis Date Noted    Nausea 2025    Sepsis (HCC) 2025    Severe malnutrition 2025    Encounter for palliative care 2025

## 2025-07-14 NOTE — ED NOTES
Pt family is upset that he is an admission hold in the ED. Family provided with recliner to stay the night and updated on plan of care. Pt turned and checked. Antibiotics completed.

## 2025-07-14 NOTE — PROGRESS NOTES
Occupational Therapy  Facility/Department: [facility] EMERGENCY DEPARTMENT  Occupational Therapy Initial Assessment and Treatment Note    Name: Jhony Hdez  : 1940  MRN: [id number]  Date of Service: 2025    Discharge Recommendations:  Subacute/Skilled Nursing Facility   Therapy discharge recommendations are subject to collaboration from the patient’s interdisciplinary healthcare team, including MD and case management recommendations.    Barriers to Home Discharge:   [x] Steps to access home entry or bed/bath:   [x] Unable to transfer, ambulate, or propel wheelchair household distances without assist   [] Limited available assist at home upon discharge    [x] Patient or family requests d/c to post-acute facility    [] Poor cognition/safety awareness for d/c to home alone    [] Unable to maintain ordered weight bearing status    [] Patient with salient signs of long-standing immobility   [x] Decreased independence with ADLs   [x] Increased risk for falls   [] Other:  If pt is unable to be seen after this session, please let this note serve as discharge summary.  Please see case management note for discharge disposition.  Thank you.     Patient Diagnosis(es): The primary encounter diagnosis was Pneumonia of both lower lobes due to infectious organism. Diagnoses of Nausea and vomiting, unspecified vomiting type, Elevated troponin, Abnormal urinalysis, and Goals of care, counseling/discussion were also pertinent to this visit.  Past Medical History:  has a past medical history of CHF (congestive heart failure) (HCC), Chronic kidney disease, Colon cancer (HCC), Guillain Barré syndrome, Hyperlipidemia, Hypertension, Left ureteral stone, Right Ureteral mass, and Stroke (HCC).  Past Surgical History:  has a past surgical history that includes back surgery; Adrenalectomy; Carpal tunnel release; Total elbow arthroplasty (Left); Mandible surgery; other surgical history (10/25/2017); Kidney stone surgery;  them)  Hearing  Hearing: Impaired  Hearing Exceptions: Hard of hearing/hearing concerns;No hearing aid  Cognition  Overall Cognitive Status: Exceptions  Arousal/Alertness: Appears intact  Following Commands: Follows one step commands with increased time  Attention Span: Attends with cues to redirect  Memory: Decreased recall of recent events  Safety Judgement: Decreased awareness of need for safety  Problem Solving: Decreased awareness of errors  Insights: Decreased awareness of deficits  Initiation: Requires cues for some  Sequencing: Requires cues for some  Orientation  Overall Orientation Status: Impaired  Orientation Level: Oriented to place;Oriented to situation;Oriented to person;Disoriented to time  Perception  Overall Perceptual Status: Impaired  Unilateral Attention: Cues to maintain midline in sitting;Cues to maintain midline in standing (Leans posteriorly or to L side)        LUE AAROM Exercises: Pt performed x10 reps of the following BUE exercises:  [x]Grasp/release  []Wrist flexion/extension  []Forearm pronation/supination   [x]Elbow flexion/extension  [x]Shoulder flexion/extension  []Chest press  []Shoulder horizontal Abduction/Adduction  []Scapular elevation/retraction     Education Given To: Patient  Education Provided: Role of Therapy;Transfer Training;Plan of Care;Equipment;Orientation;Mobility Training;ADL Adaptive Strategies;Fall Prevention Strategies  Education Provided Comments: Pt educated on importance of OOB mobility, prevention of complications of bedrest, and general safety during hospitalization  Education Method: Demonstration;Verbal  Barriers to Learning: Cognition  Education Outcome: Verbalized understanding;Continued education needed             AM-PAC - ADL  AM-PAC Daily Activity - Inpatient   How much help is needed for putting on and taking off regular lower body clothing?: Total  How much help is needed for bathing (which includes washing, rinsing, drying)?: Total  How much help

## 2025-07-14 NOTE — PROGRESS NOTES
4 Eyes Skin Assessment and Patient belongings     The patient is being assess for  Admission    I agree that 2 Nurses have performed a thorough Head to Toe Skin Assessment on the patient. ALL assessment sites listed below have been assessed.       Areas assessed by both nurses:   [x]   Head, Face, and Ears   [x]   Shoulders, Back, and Chest  [x]   Arms, Elbows, and Hands   [x]   Coccyx, Sacrum, and IschIum  [x]   Legs, Feet, and Heels        Does the Patient have Skin Breakdown?  No         Chuck Prevention initiated:  Yes   Wound Care Orders initiated:  No      [facility] nurse consulted for Pressure Injury (Stage 3,4, Unstageable, DTI, NWPT, and Complex wounds), New and Established Ostomies:  No      I agree that 2 Nurses have reviewed patient belongings with the patient/family and documented in the flowsheet upon admission or transfer to the unit.     Belongings  Dental Appliances: Uppers  Vision - Corrective Lenses: None  Hearing Aid: None  Clothing: Shirt, Pants  Jewelry: None  Electronic Devices: None  Weapons (Notify Protective Services/Security): None  Home Medications: None  Valuables Given To: Family (Comment)  Provide Name(s) of Who Valuable(s) Were Given To: wife       Nurse 1 eSignature: Electronically signed by Maryam Elmore RN on 7/14/25 at 4:30 PM EDT    **SHARE this note so that the co-signing nurse is able to place an eSignature**    Nurse 2 eSignature: Electronically signed by Melania Rodriguez RN on 7/14/25 at 5:50 PM EDT

## 2025-07-14 NOTE — ED NOTES
Patient turned to right side, wedge placed under left side. Room cleaned and IV infusing. Call light replaced.

## 2025-07-14 NOTE — CONSULTS
Pharmacy Note  Vancomycin Consult for ED       Consult received from Dr. Gallegos to dose Vancomycin x1 for treatment of pna.     Allergies:  Patient has no known allergies.          Recent Labs     07/13/25  1339   CREATININE 1.1     Estimated Creatinine Clearance: 49 mL/min (based on SCr of 1.1 mg/dL).    Recent Labs     07/13/25  1339   WBC 9.0       Wt Readings from Last 1 Encounters:   07/13/25 70.3 kg (155 lb)         Vancomycin 1750 mg IV once x1 ordered.    Please re-consult if admitted & would like Vancomycin to continue.      Thank you for the consult.   Seth Dent, PharmD 7/13/2025 9:29 PM

## 2025-07-14 NOTE — H&P
V2.0  History and Physical      Name:  Jhony Hdez /Age/Sex: 1940  (84 y.o. male)   MRN & CSN:  [id number] & [id number] Encounter Date/Time: 2025 11:23 AM EDT   Location:  10/10 PCP: Jhony Valdovinos MD       Hospital Day: 2    Assessment and Plan:       Hospital Problems           Last Modified POA    * (Principal) Sepsis (HCC) 2025 Yes    Nausea 2025 Yes       Assessment/Plan: Admit patient to inpatient unit.    Possible CAP  - CT with groundglass opacities with consolidation present in bilateral lower lobes   - patient's family member stating that patient has had productive cough  - I did not observe a productive cough on my evaluation  - procal negative  - not requiring oxygen  - patient family member demanding antibiotics  - will order ceftriaxone and azithromycin with low threshold to d/c    Supratherapeutic INR  Nephroureteral catheters in place  Gross hematuria  Distal ureteral stone, left  - Urology consulted  - pharmacy consulted for Warfarin  - monitor h/h    Chronic:  Hx of CVA  Combine CHF  ICD in place  Hx of GBS  Hx of colon cancer  Afib - holding Coumadin        Dispo: med/surg tele  Ppx:  indications for ulcer and DVT ppx reviewed and medications ordered as needed     Personally reviewed patient's home medications  Personally reviewed lab studies including but not limited to CBC, BMP/CMP with reflex to Mg, troponin, and/or BNP  Discussed management of the case with ED provider and agree with his/her recommended to admit patient for management of suspected pneumonia and gross hematuria  EKG interpreted personally and results indicating sinus tach  Imaging modalities that were personally interpreted include plain radiograph and/or CT with results indicating ground glass on CT lungs  Drugs that require monitoring for toxicity include vancomycin and the method of monitoring was serial blood vancomycin concentration measurements      History from:     Patient, direct  dose optimization technique was used in order to meet ALARA standards for radiation dose reduction.  In addition to vendor specific dose reduction algorithms, the dose reduction techniques vary based on the specific scanner utilized but frequently include automated exposure control, adjustment of the mA and/or kV according to patient size, and use of iterative reconstruction technique. FINDINGS: There are groundglass opacities with consolidation present in bilateral lower lobes. There is no pneumothorax. The central airways are patent. There are small mediastinal lymph nodes. Cardiac conduction device is noted. The heart is mildly enlarged. Aortic and coronary atherosclerosis is present. Bilateral hydronephrosis is seen as noted on the most recent CT. No acute chest wall abnormality is noted. No destructive bone lesion seen.     1. Groundglass opacity with consolidation lower lobes, atelectasis or pneumonia. Follow-up to resolution. 2. Cardiomegaly. 3. Aortic and coronary atherosclerosis. Electronically signed by Farrukh Martinez, DO    CT ABDOMEN PELVIS W IV CONTRAST Additional Contrast? None  Result Date: 7/13/2025  EXAM: CT ABDOMEN AND PELVIS WITH CONTRAST INDICATION: hx of stents replaced recently and has new nausea vomiting, previously with same symptoms had stent blockages of kidneys, hx of stones, no BM for about a week r/o obstruction, Pain COMPARISON: July 4, 2025 TECHNIQUE: Axial CT imaging obtained from lung bases through pelvis. Axial images and multiplanar reformatted images are provided for review.   Up-to-date CT equipment and radiation dose reduction techniques were employed. IV Contrast: 100 cc Omnipaque 350 Oral Contrast: Yes. FINDINGS: LUNG BASES: Clear. LIVER: Normal. GALLBLADDER AND BILIARY TREE: No calcified gallstones. No gallbladder distention.  No intra- or extrahepatic biliary dilatation. PANCREAS: Normal. SPLEEN: Normal. ADRENAL GLANDS: Normal. KIDNEYS AND URETERS: Severe bilateral

## 2025-07-14 NOTE — ED NOTES
Called Urology @[facility].  Per; Jefry Diaz MD.  RE; please review CT, bilateral hydro.  Routine consult, no callback required. Provided Urology with ED nurse Medhat valenzuela.

## 2025-07-14 NOTE — CARE COORDINATION
Case Management Assessment  Initial Evaluation    Date/Time of Evaluation: 7/14/2025 3:43 PM  Assessment Completed by: Anny Jaimes RN    If patient is discharged prior to next notation, then this note serves as note for discharge by case management.    Patient Name: Jhony Hdez                   YOB: 1940  Diagnosis: Nausea [R11.0]  Abnormal urinalysis [R82.90]  Elevated troponin [R79.89]  Goals of care, counseling/discussion [Z71.89]  Sepsis (HCC) [A41.9]  Pneumonia of both lower lobes due to infectious organism [J18.9]  Nausea and vomiting, unspecified vomiting type [R11.2]                   Date / Time: 7/13/2025  1:20 PM    Patient Admission Status: Observation   Readmission Risk (Low < 19, Mod (19-27), High > 27): Readmission Risk Score: 19.3    Current PCP: Jhony Valdovinos MD  PCP verified by CM? Yes    Chart Reviewed: Yes      History Provided by: Patient  Patient Orientation: Alert and Oriented, Person, Place, Situation, Self    Patient Cognition: Alert    Hospitalization in the last 30 days (Readmission):  Yes    If yes, Readmission Assessment in  Navigator will be completed.    Advance Directives:      Code Status: Full Code   Patient's Primary Decision Maker is: Legal Next of Kin    Primary Decision Maker: Aura Hdez - Spouse - [phone]    Discharge Planning:    Patient lives with: Spouse/Significant Other, Family Members Type of Home: House  Primary Care Giver: Self  Patient Support Systems include: Spouse/Significant Other, Family Members   Current Financial resources: Medicare  Current community resources: ECF/Home Care  Current services prior to admission: Durable Medical Equipment, Home Care            Current DME:              Type of Home Care services:  OT, PT, Nursing Services    ADLS  Prior functional level: Assistance with the following:, Bathing, Dressing, Toileting, Feeding, Housework, Cooking, Shopping, Mobility  Current functional level: Mobility, Shopping,  Housework, Cooking, Feeding, Toileting, Dressing, Bathing, Assistance with the following:    PT AM-PAC: 6 /24  OT AM-PAC: 10 /24    Family can provide assistance at DC: Yes  Would you like Case Management to discuss the discharge plan with any other family members/significant others, and if so, who? Yes (wife, Geetha)  Plans to Return to Present Housing: Unknown at present  Other Identified Issues/Barriers to RETURNING to current housing: may need more assistance  Potential Assistance needed at discharge: Home Care            Potential DME:    Patient expects to discharge to: House  Plan for transportation at discharge: Family    Financial    Payor: MEDICARE / Plan: MEDICARE PART A AND B / Product Type: *No Product type* /     Does insurance require precert for SNF: No    Potential assistance Purchasing Medications: No  Meds-to-Beds request: Yes      [id number] - [place], [place] - [address] - P [phone] - F [phone]  [address]   [place] [place] [address]  Phone: [phone] Fax: [phone]      Notes:    Factors facilitating achievement of predicted outcomes: Family support, Caregiver support, Cooperative, and Pleasant    Barriers to discharge: Decreased endurance, Medical complications, and Medication managment    Additional Case Management Notes: Cm met with pt and wife at bedside. From home w/wife. Grandsons also live in home to help. Pt is active with [facility] for PT, OT, SLP and SN. They would like to resume at [place]. Pt has had recent stay at [facility]. Wife does not want SNF. She feels they were managing well at home.     The Plan for Transition of Care is related to the following treatment goals of Nausea [R11.0]  Abnormal urinalysis [R82.90]  Elevated troponin [R79.89]  Goals of care, counseling/discussion [Z71.89]  Sepsis (HCC) [A41.9]  Pneumonia of both lower lobes due to infectious organism [J18.9]  Nausea and vomiting, unspecified vomiting type

## 2025-07-14 NOTE — PLAN OF CARE
Problem: Safety - Adult  Goal: Free from fall injury  Flowsheets (Taken 7/14/2025 1620)  Free From Fall Injury: Instruct family/caregiver on patient safety     Problem: ABCDS Injury Assessment  Goal: Absence of physical injury  Flowsheets  Taken 7/14/2025 1623  Absence of Physical Injury: Implement safety measures based on patient assessment  Taken 7/14/2025 4297  Absence of Physical Injury: Implement safety measures based on patient assessment

## 2025-07-14 NOTE — CONSULTS
Pharmacy Note  Warfarin Consult    Dx: atrial fibrillation  Goal INR range: 2 - 2.5 per anticoagulation clinic note  Prior to admission warfarin regimen: 2.5 mg on M&F and 5 mg on all other days    Lab Results   Component Value Date/Time    INR 5.43 07/14/2025 10:28 AM    INR 2.6 07/09/2025 11:54 AM    INR 2.82 07/07/2025 06:41 AM    INR 3.05 07/06/2025 05:32 AM     Date INR Warfarin Dose Notes   7/14 5.43 HOLD            Plan:  INR today is supratherapeutic at 5.43.  Drug interactions: antibiotics started today  Will hold warfarin tonight.  Daily INR ordered.  Rx will continue to manage therapy per consult order.    Thank you for the consult!  Adamaris Thornton, PharmD, BCCCP

## 2025-07-14 NOTE — ED NOTES
Jhony Hdez is a 84 y.o. male admitted for  Principal Problem:    Sepsis (HCC)  Resolved Problems:    * No resolved hospital problems. *  .   Patient Home via EMS transportation with   Chief Complaint   Patient presents with    Nausea    Vomiting     Patient has been having problems with nausea /vomiting since Friday. He has residual left sided weakness from a previous stroke and some facial droop from that. No abd pain, dizziness. Patient recently had kidney stents placed.    .  Patient is alert and Person, Place  Patient's baseline mobility: Baseline Mobility: Walker  Code Status: Prior   Cardiac Rhythm:       Is patient on baseline Oxygen: yes,  how many Liters:   Abnormal Assessment Findings: pt being admitted for possible UTI and PNA. Pt also has elevated trops but they are on trend for what they are at his baseline.     Isolation: None      NIH Score:    C-SSRS: Risk of Suicide: No Risk  Bedside swallow:        Active LDA's:   Peripheral IV 07/13/25 Left Forearm (Active)   Site Assessment Clean, dry & intact 07/13/25 1344     Patient admitted with a palmer: no If the palmer is chronic was it exchanged:  Reason for palmer:   Patient admitted with Central Line:  . PICC line placement confirmed: YES OR NO:[id number]}   Reason for Central line:   Was central line Inserted from an outside facility:        Family/Caregiver Present yes Any Concerns: no   Restraints no  Sitter no         Vitals:      Vitals:    07/13/25 1800 07/13/25 2212 07/13/25 2315 07/13/25 2344   BP:   113/81 (!) 125/91   Pulse: 90 (!) 105 (!) 109 (!) 109   Resp: 15 17 18 17   Temp:       TempSrc:       SpO2:       Weight:       Height:           Last documented pain score (0-10 scale)    Pain medication administered Yes- see MAR.    Pertinent or High Risk Medications/Drips: No.    Pending Blood Product Administration: no    Abnormal labs:   Abnormal Labs Reviewed   CBC WITH AUTO DIFFERENTIAL - Abnormal; Notable for the following components:

## 2025-07-14 NOTE — ED NOTES
Patient turned to left side. Purple wedge to left upper side and pillows to support left arm with air mattress. Patient reports feeling comfortable. Heel boots to bilateral lower feet to support heels. Wife at bedside. Updated wife that have had request for over an hour for hospital bed.

## 2025-07-14 NOTE — CONSULTS
Pharmacy Note  Vancomycin Consult    Jhony Hdez is a 84 y.o. male started on Vancomycin for CAP; consult received from Dr. Diaz to manage therapy.   Recent Labs     07/13/25  1339   CREATININE 1.1       Recent Labs     07/13/25  1339   WBC 9.0       Estimated Creatinine Clearance: 49 mL/min (based on SCr of 1.1 mg/dL).      Intake/Output Summary (Last 24 hours) at 7/14/2025 0109  Last data filed at 7/13/2025 1745  Gross per 24 hour   Intake 1000 ml   Output --   Net 1000 ml       Wt Readings from Last 1 Encounters:   07/13/25 70.3 kg (155 lb)         Body mass index is 22.24 kg/m².    Loading dose (critically ill or in ICU, require dialysis or renal replacement therapy): Vancomycin 25 mg/kg IVPB x 1 (maximum 2500 mg).  Maintenance dose: 10-20 mg/kg (maximum: 2000 mg/dose and 4500 mg/day) starting at the next dosing interval determined by renal function  Pulse dose: fluctuating renal function, SUREKHA, ESRD  CRRT: 7.5-10 mg/kg q12h   Goal Vancomycin trough: 15-20 mcg/mL   Goal Vancomycin AUC: 400-600     Assessment/Plan:  Will initiate Vancomycin with a one time loading dose of 1750 mg x1, followed by 1000 mg IV every 24 hours. Calculated Vancomycin AUC = 425 mg/L*h with an estimated steady-state vancomycin trough = 13.1 mcg/mL. Vancomycin level ordered for 7/15 0600. Timing of trough level will be determined based on culture results, renal function, and clinical response.     Thank you for the consult.  Arben Butts PharmD 7/14/2025  1:10 AM

## 2025-07-14 NOTE — PROGRESS NOTES
Pt admitted from ED to R 111. A&Ox4, delayed responses. VSS. Admission completed. Wife at bedside. Denies pain at this time. Pt is on fluids and antibiotics. Call light within reach, bed in lowest position, wheels locked.

## 2025-07-14 NOTE — PROGRESS NOTES
Physical Therapy  Facility/Department: [facility] EMERGENCY DEPARTMENT  Physical Therapy Initial Assessment/Treatment     Name: Jhony Hdez  : 1940  MRN: [id number]  Date of Service: 2025    Discharge Recommendations:  Subacute/Skilled Nursing Facility   PT Equipment Recommendations  Equipment Needed: No      Patient Diagnosis(es): The primary encounter diagnosis was Pneumonia of both lower lobes due to infectious organism. Diagnoses of Nausea and vomiting, unspecified vomiting type, Elevated troponin, Abnormal urinalysis, and Goals of care, counseling/discussion were also pertinent to this visit.  Past Medical History:  has a past medical history of CHF (congestive heart failure) ([facility]), Chronic kidney disease, Colon cancer ([facility]), Guillain Barré syndrome, Hyperlipidemia, Hypertension, Left ureteral stone, Right Ureteral mass, and Stroke ([facility]).  Past Surgical History:  has a past surgical history that includes back surgery; Adrenalectomy; Carpal tunnel release; Total elbow arthroplasty (Left); Mandible surgery; other surgical history (10/25/2017); Kidney stone surgery; Intracapsular cataract extraction (Right, 2019); Cystoscopy (Bilateral, 2025); Colonoscopy; Cystoscopy (Bilateral, 2025); thrombectomy (Right, 05/15/2025); neurovascular procedure (N/A, 05/15/2025); neurovascular procedure (N/A, 2025); ep device procedure (N/A, 2025); Urological Surgery (Bilateral, 2025); and Cystoscopy (Left, 2025).    Therapy discharge recommendations are subject to collaboration from the patient’s interdisciplinary healthcare team, including MD and case management recommendations.    Barriers to Home Discharge:   [] Steps to access home entry or bed/bath:   [x] Unable to transfer, ambulate, or propel wheelchair household distances without assist   [x] Limited available assist at home upon discharge    [] Patient or family requests d/c to post-acute facility    [] Poor

## 2025-07-15 PROBLEM — R31.0 GROSS HEMATURIA: Status: ACTIVE | Noted: 2025-07-15

## 2025-07-15 LAB
INR PPP: 5.77 (ref 0.86–1.14)
PROTHROMBIN TIME: 49.4 SEC (ref 12.1–14.9)
REPORT: NORMAL
RESP PATH DNA+RNA PNL L RESP NAA+NON-PRB: NORMAL

## 2025-07-15 PROCEDURE — 96366 THER/PROPH/DIAG IV INF ADDON: CPT

## 2025-07-15 PROCEDURE — 1200000000 HC SEMI PRIVATE

## 2025-07-15 PROCEDURE — 2500000003 HC RX 250 WO HCPCS: Performed by: STUDENT IN AN ORGANIZED HEALTH CARE EDUCATION/TRAINING PROGRAM

## 2025-07-15 PROCEDURE — 6360000002 HC RX W HCPCS: Performed by: SURGERY

## 2025-07-15 PROCEDURE — 6370000000 HC RX 637 (ALT 250 FOR IP): Performed by: INTERNAL MEDICINE

## 2025-07-15 PROCEDURE — 96376 TX/PRO/DX INJ SAME DRUG ADON: CPT

## 2025-07-15 PROCEDURE — 85610 PROTHROMBIN TIME: CPT

## 2025-07-15 PROCEDURE — 2500000003 HC RX 250 WO HCPCS: Performed by: SURGERY

## 2025-07-15 PROCEDURE — 96361 HYDRATE IV INFUSION ADD-ON: CPT

## 2025-07-15 PROCEDURE — 6370000000 HC RX 637 (ALT 250 FOR IP): Performed by: SURGERY

## 2025-07-15 PROCEDURE — 87086 URINE CULTURE/COLONY COUNT: CPT

## 2025-07-15 PROCEDURE — 6370000000 HC RX 637 (ALT 250 FOR IP): Performed by: STUDENT IN AN ORGANIZED HEALTH CARE EDUCATION/TRAINING PROGRAM

## 2025-07-15 PROCEDURE — 2580000003 HC RX 258: Performed by: SURGERY

## 2025-07-15 RX ORDER — POLYVINYL ALCOHOL 14 MG/ML
1 SOLUTION/ DROPS OPHTHALMIC PRN
Status: DISCONTINUED | OUTPATIENT
Start: 2025-07-15 | End: 2025-07-25 | Stop reason: HOSPADM

## 2025-07-15 RX ORDER — SENNA AND DOCUSATE SODIUM 50; 8.6 MG/1; MG/1
2 TABLET, FILM COATED ORAL 2 TIMES DAILY
Status: DISCONTINUED | OUTPATIENT
Start: 2025-07-15 | End: 2025-07-25 | Stop reason: HOSPADM

## 2025-07-15 RX ORDER — POLYETHYLENE GLYCOL 3350 17 G/17G
17 POWDER, FOR SOLUTION ORAL DAILY
Status: DISCONTINUED | OUTPATIENT
Start: 2025-07-15 | End: 2025-07-17

## 2025-07-15 RX ADMIN — TRAZODONE HYDROCHLORIDE 50 MG: 50 TABLET ORAL at 20:44

## 2025-07-15 RX ADMIN — AZITHROMYCIN MONOHYDRATE 500 MG: 500 INJECTION, POWDER, LYOPHILIZED, FOR SOLUTION INTRAVENOUS at 09:36

## 2025-07-15 RX ADMIN — SODIUM CHLORIDE, PRESERVATIVE FREE 10 ML: 5 INJECTION INTRAVENOUS at 09:41

## 2025-07-15 RX ADMIN — POLYETHYLENE GLYCOL 3350 17 G: 17 POWDER, FOR SOLUTION ORAL at 15:14

## 2025-07-15 RX ADMIN — SENNOSIDES AND DOCUSATE SODIUM 2 TABLET: 50; 8.6 TABLET ORAL at 20:44

## 2025-07-15 RX ADMIN — TRAZODONE HYDROCHLORIDE 50 MG: 50 TABLET ORAL at 00:19

## 2025-07-15 RX ADMIN — WATER 1000 MG: 1 INJECTION INTRAMUSCULAR; INTRAVENOUS; SUBCUTANEOUS at 09:32

## 2025-07-15 RX ADMIN — ONDANSETRON 4 MG: 2 INJECTION, SOLUTION INTRAMUSCULAR; INTRAVENOUS at 00:19

## 2025-07-15 RX ADMIN — ALLOPURINOL 300 MG: 300 TABLET ORAL at 20:44

## 2025-07-15 RX ADMIN — ATORVASTATIN CALCIUM 40 MG: 40 TABLET, FILM COATED ORAL at 20:45

## 2025-07-15 RX ADMIN — MINERAL OIL 1 ENEMA: 100 ENEMA RECTAL at 15:14

## 2025-07-15 RX ADMIN — SENNOSIDES AND DOCUSATE SODIUM 2 TABLET: 50; 8.6 TABLET ORAL at 15:14

## 2025-07-15 NOTE — CONSULTS
Reason for Consult: Bilateral hydro, look at CT.    History of Present Illness: Jhony Hdez is a 84 y.o. 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 He has a stent in place. Additionally he has had extensive left-sided stones and has had undergone a C VAC procedure. He came to the ED on 7/4/25 with nausea/vomiting. CT with bilateral hydronephrosis and a 6mm distal left ureteral stone so underwent left ureteroscopy and bilateral ureteral stent exchange in hopes this would help with his symptoms. They have been informed he is not a candidate for a right nephroureterectomy due to his decline and recent stroke. Original plan was to see Dr. Mendoza next month with SHAKA prior.   He apparently only felt ok the day after discharge from the hospital. He has become weaker and not eating so family brought him to the hospital for further workup. He denies pain anywhere.   CT abdomen/pelvis with unchanged hydronephrosis and a small stone in the distal left ureter, he is very impacted with stool which I wonder if is contributing to his nausea/vomiting.   Urologically, the next step would be to place bilateral nephrostomy tubes. His creatinine is normal and his INR is elevated. This would need reversal prior to IR placement of bilateral nephrostomy tubes.      ROS:  General: no fever or chills  CV: No chest pain  Pulm: No SOB  GI: No nausea, vomiting, diarrhea or constipation  Skin: No rash  Neuro: No headaches, dizziness or neurologic symptoms  : as above  Hematologic: no complaints  Endocrine: no complaints  Psych: no complaints    Past Medical History:   Past Medical History:   Diagnosis Date    CHF (congestive heart failure) (HCC)     Chronic kidney disease     kidney stones    Colon cancer (HCC)     Guillain Barré syndrome     Hyperlipidemia     Hypertension     Left ureteral stone     Right Ureteral mass     Stroke (HCC) 05/15/2025  Highest education level: Not on file   Occupational History    Not on file   Tobacco Use    Smoking status: Never    Smokeless tobacco: Never   Vaping Use    Vaping status: Never Used   Substance and Sexual Activity    Alcohol use: No    Drug use: No    Sexual activity: Not on file   Other Topics Concern    Not on file   Social History Narrative    Not on file     Social Drivers of Health     Financial Resource Strain: Not on file   Food Insecurity: No Food Insecurity (7/14/2025)    Hunger Vital Sign     Worried About Running Out of Food in the Last Year: Never true     Ran Out of Food in the Last Year: Never true   Transportation Needs: No Transportation Needs (7/14/2025)    PRAPARE - Transportation     Lack of Transportation (Medical): No     Lack of Transportation (Non-Medical): No   Physical Activity: Not on file   Stress: Not on file   Social Connections: Not on file   Intimate Partner Violence: Not on file   Housing Stability: Low Risk  (7/14/2025)    Housing Stability Vital Sign     Unable to Pay for Housing in the Last Year: No     Number of Times Moved in the Last Year: 0     Homeless in the Last Year: No       Meds:   Current Facility-Administered Medications: sodium chloride flush 0.9 % injection 5-40 mL, 5-40 mL, IntraVENous, 2 times per day  sodium chloride flush 0.9 % injection 5-40 mL, 5-40 mL, IntraVENous, PRN  0.9 % sodium chloride infusion, , IntraVENous, PRN  acetaminophen (TYLENOL) tablet 650 mg, 650 mg, Oral, Q6H PRN **OR** acetaminophen (TYLENOL) suppository 650 mg, 650 mg, Rectal, Q6H PRN  warfarin placeholder: dosing by pharmacy, , Oral, RX Placeholder  atorvastatin (LIPITOR) tablet 40 mg, 40 mg, Oral, Nightly  allopurinol (ZYLOPRIM) tablet 300 mg, 300 mg, Oral, Nightly  midodrine (PROAMATINE) tablet 10 mg, 10 mg, Oral, Q8H PRN  ondansetron (ZOFRAN) injection 4 mg, 4 mg, IntraVENous, Q4H PRN  traZODone (DESYREL) tablet 50 mg, 50 mg, Oral, Nightly PRN  cefTRIAXone (ROCEPHIN) 1,000 mg in

## 2025-07-15 NOTE — PROGRESS NOTES
Pharmacy Note  Warfarin Consult  Dx: AFib  Goal INR range 2-2.5  Home Warfarin dose: 2.5 mg Mon/Fri, 5 mg all other days     Date  INR  Warfarin  7/14                5.43                   Hold  7/15                5.77                   Hold    Recommend to hold Warfarin tonight x1.  Daily INR ordered.  Rx will continue to manage therapy per consult order.  Raquel ValerioD, BCPS 7/15/2025 8:14 AM

## 2025-07-15 NOTE — PROGRESS NOTES
[facility] Medicine Progress Note  V 5.17      Date of Admission: 7/13/2025    Hospital Day: 3      Chief Admission Complaint:  Nausea and cough    Subjective:  Drowsy. Limited historian. Discussed with wife. She notes minimal stool output over the past week but attributed it to poor PO intake.     Presenting Admission History:       84 y.o. male who presented for nausea and cough. 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 Due to persistent bradycardia, a dual chamber ICD was also placed that admission. Discharged to Acute Rehab until 7/3/25. He was re-admitted at [facility] from 7/4-7/7/25 for N/V thought to be related to Nephrolithiasis and Hydronephrosis. Urology was consulted and underwent Cysto with bilateral stent exchange on 7/5/25. As he was deemed not a candidate for right Nephroureterectomy, plan was to consider percutaneous Nephrostomy tubes if stents were to fail. He was discharged home on 7/7/25 with plan for Urology follow-up to determine further management of right sided Urothelial Carcinoma. 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     He now presented back to [facility] ED on 7/13/25 with recurrent N/V along with productive cough for several days. CT scan picking up on some respiratory pathology that may indicated pneumonia. Gross hematuria noted; pt with hx of severe h/l hydronephrosis s/p R ureterostomy tube. INR supratherapeutic as well. Admited  threat to life and/or bodily function without ongoing treatment    [] Severe exacerbation of chronic illness    --------------------------------------------------  B. Risk of Treatment (any 1)    [] Drugs/treatments that require intensive monitoring for toxicity    [] IV ABX (Vancomycin, Aminoglycosides, etc)     [] Post-Cath/Contrast study requiring serial monitoring    [] IV Narcotic analgesia    [] Aggressive IV diuresis    [] Hypertonic Saline    [] Critical electrolyte abnormalities requiring IV replacement    [] Insulin - Scheduled/SSI or Insulin gtt    [] Anticoagulation (Heparin gtt or Coumadin - other anticoagulants in special circumstances)    [] Cardiac Medications (IV Amiodarone/Diltiazem, Tikosyn, etc)    [] Hemodialysis    [] Other -    [] Change in code status    [] Decision to escalate care    [] Major surgery/procedure with associated risk factors    --------------------------------------------------  C. Data (any 2)    [x] Data Review (any 3)    [x] Consultant/subspecialist notes from yesterday/today    [x] All available current labs reviewed interpreted for clinical significance    [x] Appropriate follow-up labs were ordered  [] Collateral history obtained     [] Independent Interpretation of tests (any 1)    [] Telemetry (Rhythm Strip) personally reviewed and interpreted        [] Imaging personally reviewed and interpreted     [x] Discussion (any 1)  [x] Multi-Disciplinary Rounds with Case Management  [] Discussed management of the case with           Labs:  Personally reviewed on 7/15/2025 and interpreted for clinical significance as documented above.     Recent Labs     07/13/25 1339 07/14/25  0750   WBC 9.0 7.4   HGB 14.1 12.4*   HCT 43.4 38.2*    343     Recent Labs     07/13/25  1339 07/13/25  1638     --    K 4.8  --    CL 99  --    CO2 27  --    BUN 13  --    CREATININE 1.1  --    CALCIUM 9.5  --    MG  --  1.69*     Recent Labs     07/13/25 1339 07/13/25  1638   TROPHS 56*

## 2025-07-15 NOTE — PROGRESS NOTES
Pt assessment completed and charted. VSS, pt on RA. Patient is a/o. IV site patent/flushed, saline locked. Medication given per MAR. Pt has hx of stroke and has L sided defects. Urine is bloody.     Safety Measures in place:   Bed in lowest position and wheels locked.   Call light within reach.   Bedside table within reach.   Non-skid socks in place.   Pt denies any other needs at this time.    Pt calls out appropriately.  Patient in stable condition when RN left room.

## 2025-07-15 NOTE — PROGRESS NOTES
Comprehensive Nutrition Assessment    Type and Reason for Visit:  Initial, Positive nutrition screen    Nutrition Recommendations/Plan:   Continue soft and bite sized diet  Diet texture/liquid level per SLP recommendations  Encourage po intakes  Add Magic Cup vanilla TID  Monitor po intakes, nutrition adequacy, weights, pertinent labs, BMs     Malnutrition Assessment:  Malnutrition Status:  Severe malnutrition (07/15/25 1551)    Context:  Acute Illness     Findings of the 6 clinical characteristics of malnutrition:  Energy Intake:  50% or less of estimated energy requirements for 5 or more days  Weight Loss:  Greater than 7.5% over 3 months     Body Fat Loss:  Mild body fat loss Orbital   Muscle Mass Loss:  Mild muscle mass loss Temples (temporalis), Clavicles (pectoralis & deltoids)  Fluid Accumulation:  No fluid accumulation     Strength:  Not Performed    Nutrition Assessment:    Positive nutrition screen for weight loss and decreased appetite. Pt with PMHx of CHF, CKD, HTN, HLD, admitted with c/o nausea and cough 2/2 possible CAP. Pt currently on a soft and bite sized diet per SLP recommendations. Pt reports that his appetite is poor and was diminished PTA for weeks as well. Pt states that he feels hungry at times, but has had nausea and phlegm that inhibits his appetite. Per urology note, pt has large stool burden that may be contributing to nausea. Glycolax and Senokot starting today. Pt family states that he has likely lost weight, unsure of exact amount. Pt noted to have lost about 40 lb since 5/15/25 per EMR. Pt states that he does not like Ensure, favorable to Magic Cup ONS. Will order. Encouraged po intakes. Will monitor.    Nutrition Related Findings:    No edema. Labs reviewed. Mg 1.69. BM x2 on 7/14. Wound Type: None       Current Nutrition Intake & Therapies:    Average Meal Intake: Unable to assess  Average Supplements Intake: None Ordered  ADULT DIET; Dysphagia - Soft and Bite Sized  ADULT ORAL

## 2025-07-15 NOTE — PLAN OF CARE
Problem: Chronic Conditions and Co-morbidities  Goal: Patient's chronic conditions and co-morbidity symptoms are monitored and maintained or improved  Outcome: Progressing  Flowsheets (Taken 7/15/2025 1055)  Care Plan - Patient's Chronic Conditions and Co-Morbidity Symptoms are Monitored and Maintained or Improved:   Monitor and assess patient's chronic conditions and comorbid symptoms for stability, deterioration, or improvement   Collaborate with multidisciplinary team to address chronic and comorbid conditions and prevent exacerbation or deterioration   Update acute care plan with appropriate goals if chronic or comorbid symptoms are exacerbated and prevent overall improvement and discharge     Problem: Discharge Planning  Goal: Discharge to home or other facility with appropriate resources  Outcome: Progressing  Flowsheets (Taken 7/15/2025 1055)  Discharge to home or other facility with appropriate resources:   Identify barriers to discharge with patient and caregiver   Arrange for needed discharge resources and transportation as appropriate   Identify discharge learning needs (meds, wound care, etc)     Problem: Safety - Adult  Goal: Free from fall injury  Outcome: Progressing  Flowsheets (Taken 7/14/2025 1629 by Maryam Elmore, RN)  Free From Fall Injury: Instruct family/caregiver on patient safety     Problem: ABCDS Injury Assessment  Goal: Absence of physical injury  Outcome: Progressing  Flowsheets (Taken 7/14/2025 1629 by Maryam Elmore, RN)  Absence of Physical Injury: Implement safety measures based on patient assessment     Problem: Skin/Tissue Integrity  Goal: Skin integrity remains intact  Description: 1.  Monitor for areas of redness and/or skin breakdown  2.  Assess vascular access sites hourly  3.  Every 4-6 hours minimum:  Change oxygen saturation probe site  4.  Every 4-6 hours:  If on nasal continuous positive airway pressure, respiratory therapy assess nares and determine need for

## 2025-07-16 LAB
ANION GAP SERPL CALCULATED.3IONS-SCNC: 8 MMOL/L (ref 3–16)
BACTERIA UR CULT: ABNORMAL
BACTERIA UR CULT: ABNORMAL
BUN SERPL-MCNC: 9 MG/DL (ref 7–20)
CALCIUM SERPL-MCNC: 8.6 MG/DL (ref 8.3–10.6)
CHLORIDE SERPL-SCNC: 104 MMOL/L (ref 99–110)
CO2 SERPL-SCNC: 23 MMOL/L (ref 21–32)
CREAT SERPL-MCNC: 0.8 MG/DL (ref 0.8–1.3)
DEPRECATED RDW RBC AUTO: 18.2 % (ref 12.4–15.4)
GFR SERPLBLD CREATININE-BSD FMLA CKD-EPI: 86 ML/MIN/{1.73_M2}
GLUCOSE SERPL-MCNC: 77 MG/DL (ref 70–99)
HCT VFR BLD AUTO: 35.8 % (ref 40.5–52.5)
HGB BLD-MCNC: 11.6 G/DL (ref 13.5–17.5)
INR PPP: 4.72 (ref 0.86–1.14)
LEGIONELLA AG UR QL: ABNORMAL
MCH RBC QN AUTO: 27.6 PG (ref 26–34)
MCHC RBC AUTO-ENTMCNC: 32.4 G/DL (ref 31–36)
MCV RBC AUTO: 85.2 FL (ref 80–100)
MRSA SPEC QL CULT: ABNORMAL
ORGANISM: ABNORMAL
PLATELET # BLD AUTO: 278 K/UL (ref 135–450)
PMV BLD AUTO: 8.1 FL (ref 5–10.5)
POTASSIUM SERPL-SCNC: 3.9 MMOL/L (ref 3.5–5.1)
PROTHROMBIN TIME: 42.6 SEC (ref 12.1–14.9)
RBC # BLD AUTO: 4.2 M/UL (ref 4.2–5.9)
S PNEUM AG UR QL: NORMAL
SODIUM SERPL-SCNC: 135 MMOL/L (ref 136–145)
WBC # BLD AUTO: 6.3 K/UL (ref 4–11)

## 2025-07-16 PROCEDURE — 6370000000 HC RX 637 (ALT 250 FOR IP): Performed by: STUDENT IN AN ORGANIZED HEALTH CARE EDUCATION/TRAINING PROGRAM

## 2025-07-16 PROCEDURE — 6360000002 HC RX W HCPCS: Performed by: SURGERY

## 2025-07-16 PROCEDURE — 85610 PROTHROMBIN TIME: CPT

## 2025-07-16 PROCEDURE — 2500000003 HC RX 250 WO HCPCS: Performed by: SURGERY

## 2025-07-16 PROCEDURE — 36415 COLL VENOUS BLD VENIPUNCTURE: CPT

## 2025-07-16 PROCEDURE — 6370000000 HC RX 637 (ALT 250 FOR IP): Performed by: SURGERY

## 2025-07-16 PROCEDURE — 1200000000 HC SEMI PRIVATE

## 2025-07-16 PROCEDURE — 6370000000 HC RX 637 (ALT 250 FOR IP): Performed by: INTERNAL MEDICINE

## 2025-07-16 PROCEDURE — 2580000003 HC RX 258: Performed by: SURGERY

## 2025-07-16 PROCEDURE — 2500000003 HC RX 250 WO HCPCS: Performed by: STUDENT IN AN ORGANIZED HEALTH CARE EDUCATION/TRAINING PROGRAM

## 2025-07-16 PROCEDURE — 80048 BASIC METABOLIC PNL TOTAL CA: CPT

## 2025-07-16 PROCEDURE — 85027 COMPLETE CBC AUTOMATED: CPT

## 2025-07-16 RX ADMIN — MIDODRINE HYDROCHLORIDE 10 MG: 5 TABLET ORAL at 12:21

## 2025-07-16 RX ADMIN — AZITHROMYCIN MONOHYDRATE 500 MG: 500 INJECTION, POWDER, LYOPHILIZED, FOR SOLUTION INTRAVENOUS at 12:17

## 2025-07-16 RX ADMIN — ALLOPURINOL 300 MG: 300 TABLET ORAL at 20:57

## 2025-07-16 RX ADMIN — TRAZODONE HYDROCHLORIDE 50 MG: 50 TABLET ORAL at 20:57

## 2025-07-16 RX ADMIN — WATER 1000 MG: 1 INJECTION INTRAMUSCULAR; INTRAVENOUS; SUBCUTANEOUS at 11:18

## 2025-07-16 RX ADMIN — POLYETHYLENE GLYCOL 3350 17 G: 17 POWDER, FOR SOLUTION ORAL at 12:11

## 2025-07-16 RX ADMIN — SENNOSIDES AND DOCUSATE SODIUM 2 TABLET: 50; 8.6 TABLET ORAL at 20:57

## 2025-07-16 RX ADMIN — ATORVASTATIN CALCIUM 40 MG: 40 TABLET, FILM COATED ORAL at 20:57

## 2025-07-16 RX ADMIN — SENNOSIDES AND DOCUSATE SODIUM 2 TABLET: 50; 8.6 TABLET ORAL at 12:11

## 2025-07-16 RX ADMIN — SODIUM CHLORIDE, PRESERVATIVE FREE 10 ML: 5 INJECTION INTRAVENOUS at 20:57

## 2025-07-16 NOTE — PROGRESS NOTES
PT/OT attempted to see pt at this time. INR 4.72 - will hold. Will follow up as appropriate and as schedule allows. Thank you.  Gayle Bernabe, PT, DPT  nAa Pathak, OTR/L

## 2025-07-16 NOTE — CARE COORDINATION
Cm update; Unable to work with therapies today due to high INR. Urology following. Will follow for any discharge needs.Tessa Salas RN

## 2025-07-16 NOTE — PROGRESS NOTES
Lab called with critical INR 4.72 Dr Blood notified.    Patients urine is also positive for legionella. Infection control RN Sandra notified per protocol.

## 2025-07-16 NOTE — PROGRESS NOTES
[facility] Medicine Progress Note  V 5.17      Date of Admission: 7/13/2025    Hospital Day: 4      Chief Admission Complaint:  Nausea and cough    Subjective:   Limited historian. Discussed with wife. He had a BM with the enema but none since. His nausea and PO intake have improved some since admission.    Presenting Admission History:       84 y.o. male who presented for nausea and cough. 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 Due to persistent bradycardia, a dual chamber ICD was also placed that admission. Discharged to Acute Rehab until 7/3/25. He was re-admitted at [facility] from 7/4-7/7/25 for N/V thought to be related to Nephrolithiasis and Hydronephrosis. Urology was consulted and underwent Cysto with bilateral stent exchange on 7/5/25. As he was deemed not a candidate for right Nephroureterectomy, plan was to consider percutaneous Nephrostomy tubes if stents were to fail. He was discharged home on 7/7/25 with plan for Urology follow-up to determine further management of right sided Urothelial Carcinoma. 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     He now presented back to [facility] ED on 7/13/25 with recurrent N/V along with productive cough for several days. CT scan picking up on some respiratory pathology that may indicated pneumonia. Gross hematuria noted; pt with hx of severe h/l hydronephrosis s/p R ureterostomy tube. INR supratherapeutic as well.

## 2025-07-16 NOTE — PLAN OF CARE
Problem: Chronic Conditions and Co-morbidities  Goal: Patient's chronic conditions and co-morbidity symptoms are monitored and maintained or improved  7/16/2025 1611 by Idalmis Kolb RN  Outcome: Progressing  Flowsheets (Taken 7/16/2025 1611)  Care Plan - Patient's Chronic Conditions and Co-Morbidity Symptoms are Monitored and Maintained or Improved:   Monitor and assess patient's chronic conditions and comorbid symptoms for stability, deterioration, or improvement   Collaborate with multidisciplinary team to address chronic and comorbid conditions and prevent exacerbation or deterioration   Update acute care plan with appropriate goals if chronic or comorbid symptoms are exacerbated and prevent overall improvement and discharge     Problem: Discharge Planning  Goal: Discharge to home or other facility with appropriate resources  7/16/2025 1611 by Idalmis Kolb, RN  Outcome: Progressing  Flowsheets (Taken 7/16/2025 1611)  Discharge to home or other facility with appropriate resources:   Identify barriers to discharge with patient and caregiver   Arrange for needed discharge resources and transportation as appropriate   Identify discharge learning needs (meds, wound care, etc)   Refer to discharge planning if patient needs post-hospital services based on physician order or complex needs related to functional status, cognitive ability or social support system     Problem: Safety - Adult  Goal: Free from fall injury  7/16/2025 1611 by Idalmis Kolb, RN  Outcome: Progressing  Note: Pt will remain free from falls throughout hospital stay. Fall precautions in place, bed alarm on, bed in lowest position with wheels locked and side rails 2/4 up. Room door open and hourly rounding completed. Will continue to monitor throughout shift.       Problem: ABCDS Injury Assessment  Goal: Absence of physical injury  7/16/2025 1611 by Idalmis Kolb, RN  Outcome: Progressing     Problem:

## 2025-07-16 NOTE — PROGRESS NOTES
Pt Name: Jhony Hdez  Medical Record Number: [id number]  Date of Birth 1940   Today's Date: 7/16/2025      Subjective:  looks much better today, wife at bedside feels the same. Wife says she doesn't notice him coughing anymore and patient nausea has improved some.     ROS: Constitutional: No fever    Vitals:  Vitals:    07/15/25 1751 07/15/25 1935 07/16/25 0500 07/16/25 0900   BP: 117/83 116/75 103/77 105/73   Pulse: 85 98 80 92   Resp: 15 16 16 16   Temp:  97.6 °F (36.4 °C) 97.7 °F (36.5 °C) 97.9 °F (36.6 °C)   TempSrc:  Oral Oral Axillary   SpO2: 92% 97% 100% 99%   Weight:   67.8 kg (149 lb 7.6 oz)    Height:         I/O last 3 completed shifts:  In: 120 [P.O.:120]  Out: 1300 [Urine:1300]    Exam:  General: Awake, oriented, no acute distress  Respiratory: Nonlabored breathing  Abdomen: Soft, non-tender, non-distended, no masses  : spontaneously voiding per purewick - hematuria noted  Skin: Skin color, texture, turgor normal, no rashes or lesions  Neurologic: no gross deficits    CURRENT MEDICATIONS   Scheduled Meds:   sennosides-docusate sodium  2 tablet Oral BID    polyethylene glycol  17 g Oral Daily    sodium chloride flush  5-40 mL IntraVENous 2 times per day    warfarin placeholder: dosing by pharmacy   Oral RX Placeholder    atorvastatin  40 mg Oral Nightly    allopurinol  300 mg Oral Nightly    cefTRIAXone (ROCEPHIN) IV  1,000 mg IntraVENous Daily    azithromycin  500 mg IntraVENous Daily     Continuous Infusions:   sodium chloride       PRN Meds:.polyvinyl alcohol, sodium chloride flush, sodium chloride, acetaminophen **OR** acetaminophen, midodrine, ondansetron, traZODone    LABS     Recent Labs     07/13/25  1339 07/13/25  1638 07/14/25  0750 07/14/25  1028 07/15/25  0704 07/16/25  0638   WBC 9.0  --  7.4  --   --  6.3   HGB 14.1  --  12.4*  --   --  11.6*   HCT 43.4  --  38.2*  --   --  35.8*     --  343  --   --  278     --   --   --   --  135*   K 4.8  --   --   --   --  3.9

## 2025-07-16 NOTE — PROGRESS NOTES
Pharmacy Note  Warfarin Consult  Dx: AFib  Goal INR range 2-2.5  Home Warfarin dose: 2.5 mg Mon/Fri, 5 mg all other days     Date  INR  Warfarin  7/14                5.43                   Hold  7/15                5.77                   Hold  7/16                4.72                   Hold    Recommend to hold Warfarin tonight x1.  Daily INR ordered.  Rx will continue to manage therapy per consult order.    Maureen Sheridan, PharmD 7/16/2025  7:53 AM

## 2025-07-17 LAB
ANION GAP SERPL CALCULATED.3IONS-SCNC: 10 MMOL/L (ref 3–16)
BACTERIA SPEC RESP CULT: NORMAL
BUN SERPL-MCNC: 9 MG/DL (ref 7–20)
CALCIUM SERPL-MCNC: 8.7 MG/DL (ref 8.3–10.6)
CHLORIDE SERPL-SCNC: 103 MMOL/L (ref 99–110)
CO2 SERPL-SCNC: 21 MMOL/L (ref 21–32)
CREAT SERPL-MCNC: 0.8 MG/DL (ref 0.8–1.3)
DEPRECATED RDW RBC AUTO: 18.5 % (ref 12.4–15.4)
GFR SERPLBLD CREATININE-BSD FMLA CKD-EPI: 86 ML/MIN/{1.73_M2}
GLUCOSE SERPL-MCNC: 93 MG/DL (ref 70–99)
GRAM STN SPEC: NORMAL
HCT VFR BLD AUTO: 39.2 % (ref 40.5–52.5)
HGB BLD-MCNC: 12.2 G/DL (ref 13.5–17.5)
INR PPP: 4.08 (ref 0.86–1.14)
MCH RBC QN AUTO: 27.6 PG (ref 26–34)
MCHC RBC AUTO-ENTMCNC: 31 G/DL (ref 31–36)
MCV RBC AUTO: 88.9 FL (ref 80–100)
PLATELET # BLD AUTO: 289 K/UL (ref 135–450)
PMV BLD AUTO: 7.8 FL (ref 5–10.5)
POTASSIUM SERPL-SCNC: 3.9 MMOL/L (ref 3.5–5.1)
PROTHROMBIN TIME: 38.3 SEC (ref 12.1–14.9)
RBC # BLD AUTO: 4.41 M/UL (ref 4.2–5.9)
SODIUM SERPL-SCNC: 134 MMOL/L (ref 136–145)
WBC # BLD AUTO: 9.9 K/UL (ref 4–11)

## 2025-07-17 PROCEDURE — 2500000003 HC RX 250 WO HCPCS: Performed by: STUDENT IN AN ORGANIZED HEALTH CARE EDUCATION/TRAINING PROGRAM

## 2025-07-17 PROCEDURE — 85027 COMPLETE CBC AUTOMATED: CPT

## 2025-07-17 PROCEDURE — 85610 PROTHROMBIN TIME: CPT

## 2025-07-17 PROCEDURE — 1200000000 HC SEMI PRIVATE

## 2025-07-17 PROCEDURE — 6370000000 HC RX 637 (ALT 250 FOR IP): Performed by: INTERNAL MEDICINE

## 2025-07-17 PROCEDURE — 2500000003 HC RX 250 WO HCPCS: Performed by: SURGERY

## 2025-07-17 PROCEDURE — 6370000000 HC RX 637 (ALT 250 FOR IP): Performed by: STUDENT IN AN ORGANIZED HEALTH CARE EDUCATION/TRAINING PROGRAM

## 2025-07-17 PROCEDURE — 97530 THERAPEUTIC ACTIVITIES: CPT

## 2025-07-17 PROCEDURE — 36415 COLL VENOUS BLD VENIPUNCTURE: CPT

## 2025-07-17 PROCEDURE — 6360000002 HC RX W HCPCS: Performed by: SURGERY

## 2025-07-17 PROCEDURE — 80048 BASIC METABOLIC PNL TOTAL CA: CPT

## 2025-07-17 PROCEDURE — 6370000000 HC RX 637 (ALT 250 FOR IP): Performed by: SURGERY

## 2025-07-17 PROCEDURE — 2580000003 HC RX 258: Performed by: SURGERY

## 2025-07-17 RX ORDER — POLYETHYLENE GLYCOL 3350 17 G/17G
17 POWDER, FOR SOLUTION ORAL 2 TIMES DAILY
Status: DISCONTINUED | OUTPATIENT
Start: 2025-07-17 | End: 2025-07-25 | Stop reason: HOSPADM

## 2025-07-17 RX ADMIN — SENNOSIDES AND DOCUSATE SODIUM 2 TABLET: 50; 8.6 TABLET ORAL at 21:02

## 2025-07-17 RX ADMIN — AZITHROMYCIN MONOHYDRATE 500 MG: 500 INJECTION, POWDER, LYOPHILIZED, FOR SOLUTION INTRAVENOUS at 08:38

## 2025-07-17 RX ADMIN — TRAZODONE HYDROCHLORIDE 50 MG: 50 TABLET ORAL at 21:07

## 2025-07-17 RX ADMIN — ALLOPURINOL 300 MG: 300 TABLET ORAL at 21:03

## 2025-07-17 RX ADMIN — WATER 1000 MG: 1 INJECTION INTRAMUSCULAR; INTRAVENOUS; SUBCUTANEOUS at 08:37

## 2025-07-17 RX ADMIN — ONDANSETRON 4 MG: 2 INJECTION, SOLUTION INTRAMUSCULAR; INTRAVENOUS at 08:52

## 2025-07-17 RX ADMIN — POLYETHYLENE GLYCOL 3350 17 G: 17 POWDER, FOR SOLUTION ORAL at 08:36

## 2025-07-17 RX ADMIN — ACETAMINOPHEN 650 MG: 325 TABLET ORAL at 08:36

## 2025-07-17 RX ADMIN — SENNOSIDES AND DOCUSATE SODIUM 2 TABLET: 50; 8.6 TABLET ORAL at 08:36

## 2025-07-17 RX ADMIN — SODIUM CHLORIDE, PRESERVATIVE FREE 10 ML: 5 INJECTION INTRAVENOUS at 21:03

## 2025-07-17 RX ADMIN — ATORVASTATIN CALCIUM 40 MG: 40 TABLET, FILM COATED ORAL at 21:02

## 2025-07-17 NOTE — PROGRESS NOTES
Occupational Therapy  Facility/Department: [place] [address] [facility]  Daily Treatment Note  NAME: Jhony Hdez  : 1940  MRN: [id number]    Date of Service: 2025    Discharge Recommendations:  Subacute/Skilled Nursing Facility  OT Equipment Recommendations  Equipment Needed: No  Other: defer    Therapy discharge recommendations are subject to collaboration from the patient’s interdisciplinary healthcare team, including MD and case management recommendations.    Barriers to Home Discharge:   [] Steps to access home entry or bed/bath:   [x] Unable to transfer, ambulate, or propel wheelchair household distances without assist   [] Limited available assist at home upon discharge    [] Patient or family requests d/c to post-acute facility    [x] Poor cognition/safety awareness for d/c to home alone    [] Unable to maintain ordered weight bearing status    [x] Patient with salient signs of long-standing immobility   [x] Decreased independence with ADLs   [x] Increased risk for falls   [] Other:    If pt is unable to be seen after this session, please let this note serve as discharge summary.  Please see case management note for discharge disposition.  Thank you.    Patient Diagnosis(es): The primary encounter diagnosis was Pneumonia of both lower lobes due to infectious organism. Diagnoses of Nausea and vomiting, unspecified vomiting type, Elevated troponin, Abnormal urinalysis, and Goals of care, counseling/discussion were also pertinent to this visit.     Assessment   Assessment: Pt supine in bed at start of session and tolerates OT session poorly. Pt completes bed mobility with maxAx2 and demos significant posterior lean with static sitting requiring modA to correct. Trialed sit to stand from EOB with maxAx2 but unable to achieve full upright position. Pt is limited by generalized weakness, decreased endurance, limited activity tolerance, and impaired balance - OT recommends SNF to increase pt's

## 2025-07-17 NOTE — PROGRESS NOTES
Pt Name: Jhony Hdez  Medical Record Number: [id number]  Date of Birth 1940   Today's Date: 7/17/2025      Subjective:  back to not looking as good today, wife reports phlegm worsening again, no appetite. Had small bm this morning.     ROS: Constitutional: No fever    Vitals:  Vitals:    07/16/25 2030 07/17/25 0446 07/17/25 0516 07/17/25 0814   BP: 105/74 107/83  114/79   Pulse: (!) 109 85  (!) 105   Resp: 16 19 18   Temp: 98.2 °F (36.8 °C) 97.7 °F (36.5 °C)  98.1 °F (36.7 °C)   TempSrc: Oral Oral     SpO2: 94% 96%  99%   Weight:   68.8 kg (151 lb 10.8 oz)    Height:         I/O last 3 completed shifts:  In: 993.8 [P.O.:240; IV Piggyback:753.8]  Out: 950 [Urine:950]    Exam:  General: Awake, oriented, no acute distress  Respiratory: Nonlabored breathing  Abdomen: Soft, non-tender, non-distended, no masses  : spontaneously voiding per purewick - hematuria noted  Skin: Skin color, texture, turgor normal, no rashes or lesions  Neurologic: no gross deficits    CURRENT MEDICATIONS   Scheduled Meds:   sennosides-docusate sodium  2 tablet Oral BID    polyethylene glycol  17 g Oral Daily    sodium chloride flush  5-40 mL IntraVENous 2 times per day    warfarin placeholder: dosing by pharmacy   Oral RX Placeholder    atorvastatin  40 mg Oral Nightly    allopurinol  300 mg Oral Nightly    cefTRIAXone (ROCEPHIN) IV  1,000 mg IntraVENous Daily    azithromycin  500 mg IntraVENous Daily     Continuous Infusions:   sodium chloride       PRN Meds:.polyvinyl alcohol, sodium chloride flush, sodium chloride, acetaminophen **OR** acetaminophen, midodrine, ondansetron, traZODone    LABS     Recent Labs     07/15/25  0704 07/16/25  0638 07/17/25  0606   WBC  --  6.3 9.9   HGB  --  11.6* 12.2*   HCT  --  35.8* 39.2*   PLT  --  278 289   NA  --  135* 134*   K  --  3.9 3.9   CL  --  104 103   CO2  --  23 21   BUN  --  9 9   CREATININE  --  0.8 0.8   CALCIUM  --  8.6 8.7   INR 5.77* 4.72* 4.08*       ASSESSMENT   Hospital day #

## 2025-07-17 NOTE — ACP (ADVANCE CARE PLANNING)
Advance Care Planning     General Advance Care Planning (ACP) Conversation    Date of Conversation: 7/17/2025  Conducted with: Patient with Decision Making Capacity  Other persons present: Spouse      Healthcare Decision Maker:   Primary Decision Maker: Aura Hdze - Spouse - [phone]     Today we documented Decision Maker(s) consistent with Legal Next of Kin hierarchy.  Content/Action Overview:  Has NO ACP documents-Information provided          Length of Voluntary ACP Conversation in minutes:  <16 minutes (Non-Billable)    ORTIZ Adhikari

## 2025-07-17 NOTE — CARE COORDINATION
Spoke to wife again.  Discussed Rehab vs. SNF.  Wife agreeable.  Discussed [facility] where patient has been.  Also discussed [facility], wife doesn't want a \"nursing home.\" Discussed [facility] is short term stay.  Wife agreeable.  Referral made.  Await response.  Electronically signed by ORTIZ Adhikari  on 7/17/2025 at 3:44 PM

## 2025-07-17 NOTE — CARE COORDINATION
Spoke to patient and wife.  Continue to decline SNF.  Plans to return home with [facility].  May need ambulance transport.  Electronically signed by ORTIZ Adhikari on 7/17/2025 at 12:37 PM

## 2025-07-17 NOTE — PROGRESS NOTES
Pharmacy Note  Warfarin Consult  Dx: AFib  Goal INR range 2-2.5  Home Warfarin dose: 2.5 mg Mon/Fri, 5 mg all other days     Date  INR  Warfarin  7/14                5.43                   Hold  7/15                5.77                   Hold  7/16                4.72                   Hold  7/17                4.08                   Hold   Recommend to hold Warfarin tonight x1.  Daily INR ordered.  Rx will continue to manage therapy per consult order.  Shima Oquendo/Gabriella. 7/17/25 7:01 AM EDT

## 2025-07-17 NOTE — PROGRESS NOTES
Physical Therapy  Facility/Department: [place] [address] [facility]  Daily Treatment Note  NAME: Jhony Hdez  : 1940  MRN: [id number]    Date of Service: 2025    Discharge Recommendations:  Subacute/Skilled Nursing Facility   PT Equipment Recommendations  Equipment Needed: No    Therapy discharge recommendations take into account each patient's current medical complexities and are subject to input/oversight from the patient's healthcare team.   Barriers to Home Discharge:   [] Steps to access home entry or bed/bath:   [x] Unable to transfer, ambulate, or propel wheelchair household distances without assist   [] Limited available assist at home upon discharge    [] Patient or family requests d/c to post-acute facility    [x] Poor cognition/safety awareness for d/c to home alone    []Unable to maintain ordered weight bearing status    [] Patient with salient signs of long-standing immobility   [x] Patient is at risk for falls   [x] Other: requires 2 person assist for bed mobility    If pt is unable to be seen after this session, please let this note serve as discharge summary.  Please see case management note for discharge disposition.  Thank you.    Patient Diagnosis(es): The primary encounter diagnosis was Pneumonia of both lower lobes due to infectious organism. Diagnoses of Nausea and vomiting, unspecified vomiting type, Elevated troponin, Abnormal urinalysis, and Goals of care, counseling/discussion were also pertinent to this visit.    Assessment  Assessment: Pt seen as cotx with OT to maximize safety and functional mobility. Pt required max A x2 for bed mobility and mod A for sitting balance at EOB. Pt able to complete sit-stand with max A x2. Will continue to progress mobility as pt tolerates. Recommend SNF for continued therapy as pt is unable to safely transfer to chair.  Activity Tolerance: Patient limited by fatigue;Patient limited by endurance  Equipment Needed: No    Plan  Physical Therapy

## 2025-07-17 NOTE — PROGRESS NOTES
[facility] Medicine Progress Note  V 5.17      Date of Admission: 7/13/2025    Hospital Day: 5      Chief Admission Complaint:  Nausea and cough    Subjective:   More interactive but defers to his wife to answer questions. Small BMs today. Ongoing nausea.     Presenting Admission History:       84 y.o. male who presented for nausea and cough. 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 Due to persistent bradycardia, a dual chamber ICD was also placed that admission. Discharged to Acute Rehab until 7/3/25. He was re-admitted at [facility] from 7/4-7/7/25 for N/V thought to be related to Nephrolithiasis and Hydronephrosis. Urology was consulted and underwent Cysto with bilateral stent exchange on 7/5/25. As he was deemed not a candidate for right Nephroureterectomy, plan was to consider percutaneous Nephrostomy tubes if stents were to fail. He was discharged home on 7/7/25 with plan for Urology follow-up to determine further management of right sided Urothelial Carcinoma. 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     He now presented back to [facility] ED on 7/13/25 with recurrent N/V along with productive cough for several days. CT scan picking up on some respiratory pathology that may indicated pneumonia. Gross hematuria noted; pt with hx of severe h/l hydronephrosis s/p R ureterostomy tube. INR supratherapeutic as well. Admited patient for treatment of suspected  07/17/25  0606   * 134*   K 3.9 3.9    103   CO2 23 21   BUN 9 9   CREATININE 0.8 0.8   CALCIUM 8.6 8.7     No results for input(s): \"PROBNP\", \"TROPHS\" in the last 72 hours.    No results for input(s): \"LABA1C\" in the last 72 hours.  No results for input(s): \"AST\", \"ALT\", \"BILIDIR\", \"BILITOT\", \"ALKPHOS\" in the last 72 hours.    Recent Labs     07/15/25  0704 07/16/25  0638 07/17/25  0606   INR 5.77* 4.72* 4.08*       Urine Cultures:   Lab Results   Component Value Date/Time    LABURIN  07/15/2025 09:32 AM     <10,000 CFU/ml mixed skin/urogenital carlotta. No further workup    LABURIN 50,000 CFU/ml  No further workup   07/15/2025 09:32 AM     Blood Cultures:   Lab Results   Component Value Date/Time    BC  07/14/2025 06:53 AM     No Growth to date.  Any change in status will be called.     Lab Results   Component Value Date/Time    BLOODCULT2  07/14/2025 06:51 AM     No Growth to date.  Any change in status will be called.     Organism:   Lab Results   Component Value Date/Time    ORG Wilma albicans 07/15/2025 09:32 AM         Abdi Blood MD

## 2025-07-18 LAB
ANION GAP SERPL CALCULATED.3IONS-SCNC: 9 MMOL/L (ref 3–16)
BACTERIA BLD CULT ORG #2: NORMAL
BACTERIA BLD CULT: NORMAL
BUN SERPL-MCNC: 8 MG/DL (ref 7–20)
CALCIUM SERPL-MCNC: 8.7 MG/DL (ref 8.3–10.6)
CHLORIDE SERPL-SCNC: 103 MMOL/L (ref 99–110)
CO2 SERPL-SCNC: 24 MMOL/L (ref 21–32)
CREAT SERPL-MCNC: 0.8 MG/DL (ref 0.8–1.3)
DEPRECATED RDW RBC AUTO: 18.5 % (ref 12.4–15.4)
GFR SERPLBLD CREATININE-BSD FMLA CKD-EPI: 86 ML/MIN/{1.73_M2}
GLUCOSE SERPL-MCNC: 84 MG/DL (ref 70–99)
HCT VFR BLD AUTO: 40.4 % (ref 40.5–52.5)
HGB BLD-MCNC: 12.8 G/DL (ref 13.5–17.5)
INR PPP: 2.88 (ref 0.86–1.14)
MCH RBC QN AUTO: 27.3 PG (ref 26–34)
MCHC RBC AUTO-ENTMCNC: 31.7 G/DL (ref 31–36)
MCV RBC AUTO: 86.3 FL (ref 80–100)
PLATELET # BLD AUTO: 295 K/UL (ref 135–450)
PMV BLD AUTO: 8.8 FL (ref 5–10.5)
POTASSIUM SERPL-SCNC: 4.2 MMOL/L (ref 3.5–5.1)
PROTHROMBIN TIME: 29.5 SEC (ref 12.1–14.9)
RBC # BLD AUTO: 4.68 M/UL (ref 4.2–5.9)
SODIUM SERPL-SCNC: 136 MMOL/L (ref 136–145)
WBC # BLD AUTO: 6.9 K/UL (ref 4–11)

## 2025-07-18 PROCEDURE — 6360000002 HC RX W HCPCS: Performed by: SURGERY

## 2025-07-18 PROCEDURE — 85027 COMPLETE CBC AUTOMATED: CPT

## 2025-07-18 PROCEDURE — 80048 BASIC METABOLIC PNL TOTAL CA: CPT

## 2025-07-18 PROCEDURE — 2580000003 HC RX 258: Performed by: SURGERY

## 2025-07-18 PROCEDURE — 1200000000 HC SEMI PRIVATE

## 2025-07-18 PROCEDURE — 6370000000 HC RX 637 (ALT 250 FOR IP): Performed by: INTERNAL MEDICINE

## 2025-07-18 PROCEDURE — 2500000003 HC RX 250 WO HCPCS: Performed by: SURGERY

## 2025-07-18 PROCEDURE — 2500000003 HC RX 250 WO HCPCS: Performed by: STUDENT IN AN ORGANIZED HEALTH CARE EDUCATION/TRAINING PROGRAM

## 2025-07-18 PROCEDURE — 6370000000 HC RX 637 (ALT 250 FOR IP): Performed by: STUDENT IN AN ORGANIZED HEALTH CARE EDUCATION/TRAINING PROGRAM

## 2025-07-18 PROCEDURE — 6360000002 HC RX W HCPCS: Performed by: NURSE PRACTITIONER

## 2025-07-18 PROCEDURE — 85610 PROTHROMBIN TIME: CPT

## 2025-07-18 PROCEDURE — 36415 COLL VENOUS BLD VENIPUNCTURE: CPT

## 2025-07-18 PROCEDURE — 6370000000 HC RX 637 (ALT 250 FOR IP): Performed by: SURGERY

## 2025-07-18 RX ORDER — PROCHLORPERAZINE EDISYLATE 5 MG/ML
10 INJECTION INTRAMUSCULAR; INTRAVENOUS EVERY 6 HOURS PRN
Status: DISCONTINUED | OUTPATIENT
Start: 2025-07-18 | End: 2025-07-25 | Stop reason: HOSPADM

## 2025-07-18 RX ORDER — WARFARIN SODIUM 2.5 MG/1
2.5 TABLET ORAL
Status: COMPLETED | OUTPATIENT
Start: 2025-07-18 | End: 2025-07-18

## 2025-07-18 RX ADMIN — WARFARIN SODIUM 2.5 MG: 2.5 TABLET ORAL at 17:11

## 2025-07-18 RX ADMIN — ATORVASTATIN CALCIUM 40 MG: 40 TABLET, FILM COATED ORAL at 20:38

## 2025-07-18 RX ADMIN — SODIUM CHLORIDE, PRESERVATIVE FREE 10 ML: 5 INJECTION INTRAVENOUS at 07:49

## 2025-07-18 RX ADMIN — WATER 1000 MG: 1 INJECTION INTRAMUSCULAR; INTRAVENOUS; SUBCUTANEOUS at 07:48

## 2025-07-18 RX ADMIN — MIDODRINE HYDROCHLORIDE 10 MG: 5 TABLET ORAL at 17:11

## 2025-07-18 RX ADMIN — ONDANSETRON 4 MG: 2 INJECTION, SOLUTION INTRAMUSCULAR; INTRAVENOUS at 20:38

## 2025-07-18 RX ADMIN — SENNOSIDES AND DOCUSATE SODIUM 2 TABLET: 50; 8.6 TABLET ORAL at 20:38

## 2025-07-18 RX ADMIN — ALLOPURINOL 300 MG: 300 TABLET ORAL at 20:38

## 2025-07-18 RX ADMIN — SENNOSIDES AND DOCUSATE SODIUM 2 TABLET: 50; 8.6 TABLET ORAL at 07:49

## 2025-07-18 RX ADMIN — AZITHROMYCIN MONOHYDRATE 500 MG: 500 INJECTION, POWDER, LYOPHILIZED, FOR SOLUTION INTRAVENOUS at 07:52

## 2025-07-18 RX ADMIN — POLYETHYLENE GLYCOL 3350 17 G: 17 POWDER, FOR SOLUTION ORAL at 07:49

## 2025-07-18 RX ADMIN — TRAZODONE HYDROCHLORIDE 50 MG: 50 TABLET ORAL at 20:38

## 2025-07-18 RX ADMIN — SODIUM CHLORIDE, PRESERVATIVE FREE 10 ML: 5 INJECTION INTRAVENOUS at 20:38

## 2025-07-18 RX ADMIN — ACETAMINOPHEN 650 MG: 325 TABLET ORAL at 07:55

## 2025-07-18 RX ADMIN — PROCHLORPERAZINE EDISYLATE 10 MG: 5 INJECTION INTRAMUSCULAR; INTRAVENOUS at 23:31

## 2025-07-18 RX ADMIN — MIDODRINE HYDROCHLORIDE 10 MG: 5 TABLET ORAL at 07:55

## 2025-07-18 ASSESSMENT — PAIN SCALES - GENERAL
PAINLEVEL_OUTOF10: 0
PAINLEVEL_OUTOF10: 0

## 2025-07-18 ASSESSMENT — PAIN SCALES - WONG BAKER: WONGBAKER_NUMERICALRESPONSE: NO HURT

## 2025-07-18 NOTE — PLAN OF CARE
Problem: Chronic Conditions and Co-morbidities  Goal: Patient's chronic conditions and co-morbidity symptoms are monitored and maintained or improved  Outcome: Progressing     Problem: Discharge Planning  Goal: Discharge to home or other facility with appropriate resources  Outcome: Progressing     Problem: Safety - Adult  Goal: Free from fall injury  Outcome: Progressing     Problem: Skin/Tissue Integrity  Goal: Skin integrity remains intact  Description: 1.  Monitor for areas of redness and/or skin breakdown  2.  Assess vascular access sites hourly  3.  Every 4-6 hours minimum:  Change oxygen saturation probe site  4.  Every 4-6 hours:  If on nasal continuous positive airway pressure, respiratory therapy assess nares and determine need for appliance change or resting period  Outcome: Progressing     Problem: Nutrition Deficit:  Goal: Optimize nutritional status  Outcome: Progressing     Problem: Respiratory - Adult  Goal: Achieves optimal ventilation and oxygenation  Outcome: Progressing     Problem: Cardiovascular - Adult  Goal: Maintains optimal cardiac output and hemodynamic stability  Outcome: Progressing

## 2025-07-18 NOTE — PROGRESS NOTES
Comprehensive Nutrition Assessment    Type and Reason for Visit:  Reassess    Nutrition Recommendations/Plan:   Continue soft and bite sized diet  Diet texture/liquid level per SLP recommendations  Encourage po intakes  Magic Cup vanilla TID  Monitor po intakes, nutrition adequacy, weights, pertinent labs, BMs     Malnutrition Assessment:  Malnutrition Status:  Severe malnutrition (07/15/25 1551)    Context:  Acute Illness     Findings of the 6 clinical characteristics of malnutrition:  Energy Intake:  50% or less of estimated energy requirements for 5 or more days  Weight Loss:  Greater than 7.5% over 3 months     Body Fat Loss:  Mild body fat loss Orbital   Muscle Mass Loss:  Mild muscle mass loss Temples (temporalis), Clavicles (pectoralis & deltoids)  Fluid Accumulation:  No fluid accumulation     Strength:  Not Performed    Nutrition Assessment:    Follow up: Pt continues on a soft and bite sized diet. PO intakes 0-50% per EMR. Pt reports that his nausea is improving some. Pt wife at  states that pt has been eating a little better. Pt has been eating some of his Magic Cups. Encouraged po intakes. Will monitor.    Nutrition Related Findings:    Labs reviewed. BM x2 on 7/17. Wound Type: None       Current Nutrition Intake & Therapies:    Average Meal Intake: 0%, 1-25%, 26-50%  Average Supplements Intake: 0%, 1-25%, 26-50%  ADULT DIET; Dysphagia - Soft and Bite Sized  ADULT ORAL NUTRITION SUPPLEMENT; Breakfast, Lunch, Dinner; Frozen Oral Supplement    Anthropometric Measures:  Height: 177.8 cm (5' 10\")  Ideal Body Weight (IBW): 166 lbs (75 kg)       Current Body Weight: 67.7 kg (149 lb 4 oz),   IBW. Weight Source: Bed scale  Current BMI (kg/m2): 21.4                             BMI Categories: Underweight (BMI less than 22) age over 65    Estimated Daily Nutrient Needs:  Energy Requirements Based On: Kcal/kg  Weight Used for Energy Requirements: Current  Energy (kcal/day): 75-90 (28-32 kcal/kg)  Weight Used

## 2025-07-18 NOTE — PROGRESS NOTES
[facility] Medicine Progress Note  V 5.17      Date of Admission: 7/13/2025    Hospital Day: 6      Chief Admission Complaint:  Nausea and cough    Subjective:   Minimally interactive. Still only small BM today. Enema planned. Denies any nausea however.     Presenting Admission History:       84 y.o. male who presented for nausea and cough. 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 Due to persistent bradycardia, a dual chamber ICD was also placed that admission. Discharged to Acute Rehab until 7/3/25. He was re-admitted at [facility] from 7/4-7/7/25 for N/V thought to be related to Nephrolithiasis and Hydronephrosis. Urology was consulted and underwent Cysto with bilateral stent exchange on 7/5/25. As he was deemed not a candidate for right Nephroureterectomy, plan was to consider percutaneous Nephrostomy tubes if stents were to fail. He was discharged home on 7/7/25 with plan for Urology follow-up to determine further management of right sided Urothelial Carcinoma. 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     He now presented back to [facility] ED on 7/13/25 with recurrent N/V along with productive cough for several days. CT scan picking up on some respiratory pathology that may indicated pneumonia. Gross hematuria noted; pt with hx of severe h/l hydronephrosis s/p R ureterostomy tube. INR supratherapeutic as well. Admited patient for treatment of suspected

## 2025-07-18 NOTE — PROGRESS NOTES
Pharmacy Note  Warfarin Consult  Dx: AFib  Goal INR range 2-2.5  Home Warfarin dose: 2.5 mg Mon/Fri, 5 mg all other days      Date                 INR                  Warfarin  7/14                5.43                   Hold  7/15                5.77                   Hold  7/16                4.72                   Hold  7/17                4.08                   Hold   7/18                2.88                   2.5 mg     Recommend Warfarin 2.5 mg tonight x1.  Daily INR ordered.  Rx will continue to manage therapy per consult order.  Joe Sibley PharmD, [facility] 7/18/2025 7:58 AM

## 2025-07-18 NOTE — PLAN OF CARE
Problem: Chronic Conditions and Co-morbidities  Goal: Patient's chronic conditions and co-morbidity symptoms are monitored and maintained or improved  7/18/2025 1120 by Idalmis Cote RN  Outcome: Progressing  7/17/2025 2341 by Trang Barker RN  Outcome: Progressing     Problem: Discharge Planning  Goal: Discharge to home or other facility with appropriate resources  7/18/2025 1120 by Idalmis Cote RN  Outcome: Progressing  7/17/2025 2341 by Trang Barker RN  Outcome: Progressing     Problem: Safety - Adult  Goal: Free from fall injury  7/18/2025 1120 by Idalmis Cote RN  Outcome: Progressing  7/17/2025 2341 by Trang Barker RN  Outcome: Progressing     Problem: ABCDS Injury Assessment  Goal: Absence of physical injury  7/18/2025 1120 by Idalmis Cote RN  Outcome: Progressing  7/17/2025 2341 by Trang Barker RN  Outcome: Progressing     Problem: Skin/Tissue Integrity  Goal: Skin integrity remains intact  Description: 1.  Monitor for areas of redness and/or skin breakdown  2.  Assess vascular access sites hourly  3.  Every 4-6 hours minimum:  Change oxygen saturation probe site  4.  Every 4-6 hours:  If on nasal continuous positive airway pressure, respiratory therapy assess nares and determine need for appliance change or resting period  7/18/2025 1120 by Idalmis Cote RN  Outcome: Progressing  7/17/2025 2341 by Trang Barker RN  Outcome: Progressing     Problem: Nutrition Deficit:  Goal: Optimize nutritional status  7/18/2025 1120 by Idalmis Cote RN  Outcome: Progressing  7/17/2025 2341 by Trang Barker RN  Outcome: Progressing     Problem: Respiratory - Adult  Goal: Achieves optimal ventilation and oxygenation  7/18/2025 1120 by Idalmis Cote RN  Outcome: Progressing  7/17/2025 2341 by Trang Barker RN  Outcome: Progressing     Problem: Cardiovascular - Adult  Goal: Maintains optimal cardiac output and hemodynamic stability  7/18/2025 1120 by Idalmis Cote RN  Outcome: Progressing  7/17/2025 2341 by Trang Barker

## 2025-07-18 NOTE — CARE COORDINATION
Continue to plan for [facility] on d/c.  Patient sleeping.  Wife not in room.  Will continue to follow.  Electronically signed by ORTIZ Adhikari on 7/18/2025 at 1:41 PM

## 2025-07-19 ENCOUNTER — APPOINTMENT (OUTPATIENT)
Dept: GENERAL RADIOLOGY | Age: 85
DRG: 193 | End: 2025-07-19
Payer: MEDICARE

## 2025-07-19 LAB
EKG ATRIAL RATE: 76 BPM
EKG DIAGNOSIS: NORMAL
EKG P AXIS: 77 DEGREES
EKG P-R INTERVAL: 200 MS
EKG Q-T INTERVAL: 470 MS
EKG QRS DURATION: 160 MS
EKG QTC CALCULATION (BAZETT): 528 MS
EKG R AXIS: -72 DEGREES
EKG T AXIS: 54 DEGREES
EKG VENTRICULAR RATE: 76 BPM
INR PPP: 2.66 (ref 0.86–1.14)
PROTHROMBIN TIME: 27.8 SEC (ref 12.1–14.9)

## 2025-07-19 PROCEDURE — 6370000000 HC RX 637 (ALT 250 FOR IP)

## 2025-07-19 PROCEDURE — 2500000003 HC RX 250 WO HCPCS: Performed by: STUDENT IN AN ORGANIZED HEALTH CARE EDUCATION/TRAINING PROGRAM

## 2025-07-19 PROCEDURE — 93005 ELECTROCARDIOGRAM TRACING: CPT | Performed by: STUDENT IN AN ORGANIZED HEALTH CARE EDUCATION/TRAINING PROGRAM

## 2025-07-19 PROCEDURE — 6370000000 HC RX 637 (ALT 250 FOR IP): Performed by: SURGERY

## 2025-07-19 PROCEDURE — 93010 ELECTROCARDIOGRAM REPORT: CPT | Performed by: INTERNAL MEDICINE

## 2025-07-19 PROCEDURE — 6370000000 HC RX 637 (ALT 250 FOR IP): Performed by: INTERNAL MEDICINE

## 2025-07-19 PROCEDURE — 74018 RADEX ABDOMEN 1 VIEW: CPT

## 2025-07-19 PROCEDURE — 6360000002 HC RX W HCPCS: Performed by: NURSE PRACTITIONER

## 2025-07-19 PROCEDURE — 36415 COLL VENOUS BLD VENIPUNCTURE: CPT

## 2025-07-19 PROCEDURE — 1200000000 HC SEMI PRIVATE

## 2025-07-19 PROCEDURE — 85610 PROTHROMBIN TIME: CPT

## 2025-07-19 PROCEDURE — 6360000002 HC RX W HCPCS: Performed by: SURGERY

## 2025-07-19 PROCEDURE — 6370000000 HC RX 637 (ALT 250 FOR IP): Performed by: STUDENT IN AN ORGANIZED HEALTH CARE EDUCATION/TRAINING PROGRAM

## 2025-07-19 RX ORDER — WARFARIN SODIUM 2 MG/1
2 TABLET ORAL
Status: COMPLETED | OUTPATIENT
Start: 2025-07-19 | End: 2025-07-19

## 2025-07-19 RX ADMIN — MIDODRINE HYDROCHLORIDE 10 MG: 5 TABLET ORAL at 04:31

## 2025-07-19 RX ADMIN — TRAZODONE HYDROCHLORIDE 50 MG: 50 TABLET ORAL at 19:44

## 2025-07-19 RX ADMIN — POLYETHYLENE GLYCOL 3350 17 G: 17 POWDER, FOR SOLUTION ORAL at 08:09

## 2025-07-19 RX ADMIN — SENNOSIDES AND DOCUSATE SODIUM 2 TABLET: 50; 8.6 TABLET ORAL at 08:09

## 2025-07-19 RX ADMIN — ONDANSETRON 4 MG: 2 INJECTION, SOLUTION INTRAMUSCULAR; INTRAVENOUS at 13:19

## 2025-07-19 RX ADMIN — WARFARIN SODIUM 2 MG: 2 TABLET ORAL at 17:37

## 2025-07-19 RX ADMIN — SODIUM CHLORIDE, PRESERVATIVE FREE 10 ML: 5 INJECTION INTRAVENOUS at 19:44

## 2025-07-19 RX ADMIN — PROCHLORPERAZINE EDISYLATE 10 MG: 5 INJECTION INTRAMUSCULAR; INTRAVENOUS at 09:10

## 2025-07-19 RX ADMIN — PROCHLORPERAZINE EDISYLATE 10 MG: 5 INJECTION INTRAMUSCULAR; INTRAVENOUS at 17:37

## 2025-07-19 RX ADMIN — MIDODRINE HYDROCHLORIDE 10 MG: 5 TABLET ORAL at 19:44

## 2025-07-19 RX ADMIN — ALLOPURINOL 300 MG: 300 TABLET ORAL at 19:44

## 2025-07-19 RX ADMIN — POLYETHYLENE GLYCOL 3350 17 G: 17 POWDER, FOR SOLUTION ORAL at 19:44

## 2025-07-19 RX ADMIN — ATORVASTATIN CALCIUM 40 MG: 40 TABLET, FILM COATED ORAL at 19:44

## 2025-07-19 RX ADMIN — SENNOSIDES AND DOCUSATE SODIUM 2 TABLET: 50; 8.6 TABLET ORAL at 19:44

## 2025-07-19 RX ADMIN — MIDODRINE HYDROCHLORIDE 10 MG: 5 TABLET ORAL at 13:19

## 2025-07-19 ASSESSMENT — PAIN SCALES - GENERAL: PAINLEVEL_OUTOF10: 0

## 2025-07-19 NOTE — PROGRESS NOTES
Speech Language Pathology  Attempt Note     Name: Jhony Hdez  : 1940  Medical Diagnosis: Nausea [R11.0]  Abnormal urinalysis [R82.90]  Elevated troponin [R79.89]  Goals of care, counseling/discussion [Z71.89]  Sepsis (HCC) [A41.9]  Pneumonia of both lower lobes due to infectious organism [J18.9]  Nausea and vomiting, unspecified vomiting type [R11.2]  Gross hematuria [R31.0]      SLP attempted to see pt for dysphagia tx. Treatment unable to be completed due to pt feeling nauseous and refusing all PO. Wife present and reported no s/s of aspiration with prior meals. SLP to re-attempt as pt's condition and schedule allows. SLP notified RN of nausea. No charges.    Thank you,    Maegan Ferraro M.A., CCC-SLP  Speech-Language Pathologist  [phone]

## 2025-07-19 NOTE — PROGRESS NOTES
Pharmacy Note  Warfarin Consult  Dx: AFib  Goal INR range 2-2.5  Home Warfarin dose: 2.5 mg Mon/Fri, 5 mg all other days      Date                 INR                  Warfarin  7/14                5.43                   Hold  7/15                5.77                   Hold  7/16                4.72                   Hold  7/17                4.08                   Hold   7/18                2.88                   2.5 mg   7/19                2.66                      2 mg    Recommend Warfarin 2 mg tonight x1.  Daily INR ordered.  Rx will continue to manage therapy per consult order.    Maureen Sheridan, PharmD 7/19/2025  8:01 AM

## 2025-07-19 NOTE — PROGRESS NOTES
[facility] Medicine Progress Note  V 5.17      Date of Admission: 7/13/2025    Hospital Day: 7      Chief Admission Complaint: Nausea and coughing    Subjective: He is sitting up in bed, he looks quite weak and frail.  States he feels nauseated not eating or drinking very well.      Presenting Admission History:       84 y.o. male who presented for nausea and cough. 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 Due to persistent bradycardia, a dual chamber ICD was also placed that admission. Discharged to Acute Rehab until 7/3/25. He was re-admitted at [facility] from 7/4-7/7/25 for N/V thought to be related to Nephrolithiasis and Hydronephrosis. Urology was consulted and underwent Cysto with bilateral stent exchange on 7/5/25. As he was deemed not a candidate for right Nephroureterectomy, plan was to consider percutaneous Nephrostomy tubes if stents were to fail. He was discharged home on 7/7/25 with plan for Urology follow-up to determine further management of right sided Urothelial Carcinoma. 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      He now presented back to [facility] ED on 7/13/25 with recurrent N/V along with productive cough for several days. CT scan picking up on some respiratory pathology that may indicated pneumonia. Gross hematuria noted; pt with hx of severe h/l hydronephrosis s/p R ureterostomy tube. INR supratherapeutic as well. Admited patient for

## 2025-07-19 NOTE — PLAN OF CARE
Problem: Chronic Conditions and Co-morbidities  Goal: Patient's chronic conditions and co-morbidity symptoms are monitored and maintained or improved  Outcome: Progressing     Problem: Discharge Planning  Goal: Discharge to home or other facility with appropriate resources  Outcome: Progressing     Problem: Safety - Adult  Goal: Free from fall injury  Outcome: Progressing     Problem: ABCDS Injury Assessment  Goal: Absence of physical injury  Outcome: Progressing     Problem: Skin/Tissue Integrity  Goal: Skin integrity remains intact  Description: 1.  Monitor for areas of redness and/or skin breakdown  2.  Assess vascular access sites hourly  3.  Every 4-6 hours minimum:  Change oxygen saturation probe site  4.  Every 4-6 hours:  If on nasal continuous positive airway pressure, respiratory therapy assess nares and determine need for appliance change or resting period  Outcome: Progressing     Problem: Nutrition Deficit:  Goal: Optimize nutritional status  Outcome: Progressing     Problem: Respiratory - Adult  Goal: Achieves optimal ventilation and oxygenation  Outcome: Progressing     Problem: Cardiovascular - Adult  Goal: Maintains optimal cardiac output and hemodynamic stability  Outcome: Progressing     Problem: Skin/Tissue Integrity - Adult  Goal: Skin integrity remains intact  Description: 1.  Monitor for areas of redness and/or skin breakdown  2.  Assess vascular access sites hourly  3.  Every 4-6 hours minimum:  Change oxygen saturation probe site  4.  Every 4-6 hours:  If on nasal continuous positive airway pressure, respiratory therapy assess nares and determine need for appliance change or resting period  Outcome: Progressing     Problem: Musculoskeletal - Adult  Goal: Maintain proper alignment of affected body part  Outcome: Progressing     Problem: Genitourinary - Adult  Goal: Absence of urinary retention  Outcome: Progressing     Problem: Anxiety  Goal: Will report anxiety at manageable  levels  Description: INTERVENTIONS:  1. Administer medication as ordered  2. Teach and rehearse alternative coping skills  3. Provide emotional support with 1:1 interaction with staff  Outcome: Progressing     Problem: Coping  Goal: Pt/Family able to verbalize concerns and demonstrate effective coping strategies  Description: INTERVENTIONS:  1. Assist patient/family to identify coping skills, available support systems and cultural and spiritual values  2. Provide emotional support, including active listening and acknowledgement of concerns of patient and caregivers  3. Reduce environmental stimuli, as able  4. Instruct patient/family in relaxation techniques, as appropriate  5. Assess for spiritual pain/suffering and initiate Spiritual Care, Psychosocial Clinical Specialist consults as needed  Outcome: Progressing

## 2025-07-19 NOTE — PROGRESS NOTES
Soap suds enema given at this time, pt had a medium amount of formed stool come out and a large amount of liquid come out.   Electronically signed by Denny Montes RN on 7/18/2025 at 9:03 PM      Pt continued to have N/V over night. PRN zofran was given at the start of my shift- pt started to vomit before I could give him another dose of zofran so the NP was messaged at that time and an order was added for compazine 10mg IV q6 PRN was ordered and givne. Pt was able to get some relief at that point.   No other acute events over night. VSS. Wife at bedside over night.   Electronically signed by Denny Montes RN on 7/19/2025 at 6:23 AM     no

## 2025-07-20 ENCOUNTER — APPOINTMENT (OUTPATIENT)
Dept: GENERAL RADIOLOGY | Age: 85
DRG: 193 | End: 2025-07-20
Payer: MEDICARE

## 2025-07-20 LAB
ANION GAP SERPL CALCULATED.3IONS-SCNC: 9 MMOL/L (ref 3–16)
BUN SERPL-MCNC: 9 MG/DL (ref 7–20)
CALCIUM SERPL-MCNC: 8.8 MG/DL (ref 8.3–10.6)
CHLORIDE SERPL-SCNC: 99 MMOL/L (ref 99–110)
CO2 SERPL-SCNC: 26 MMOL/L (ref 21–32)
CREAT SERPL-MCNC: 0.8 MG/DL (ref 0.8–1.3)
DEPRECATED RDW RBC AUTO: 18.8 % (ref 12.4–15.4)
GFR SERPLBLD CREATININE-BSD FMLA CKD-EPI: 86 ML/MIN/{1.73_M2}
GLUCOSE SERPL-MCNC: 97 MG/DL (ref 70–99)
HCT VFR BLD AUTO: 40.2 % (ref 40.5–52.5)
HGB BLD-MCNC: 12.9 G/DL (ref 13.5–17.5)
INR PPP: 2.47 (ref 0.86–1.14)
MAGNESIUM SERPL-MCNC: 1.94 MG/DL (ref 1.8–2.4)
MCH RBC QN AUTO: 27 PG (ref 26–34)
MCHC RBC AUTO-ENTMCNC: 32 G/DL (ref 31–36)
MCV RBC AUTO: 84.1 FL (ref 80–100)
PLATELET # BLD AUTO: 338 K/UL (ref 135–450)
PMV BLD AUTO: 8.1 FL (ref 5–10.5)
POTASSIUM SERPL-SCNC: 4.1 MMOL/L (ref 3.5–5.1)
PROTHROMBIN TIME: 26.4 SEC (ref 12.1–14.9)
RBC # BLD AUTO: 4.78 M/UL (ref 4.2–5.9)
SODIUM SERPL-SCNC: 134 MMOL/L (ref 136–145)
WBC # BLD AUTO: 11.1 K/UL (ref 4–11)

## 2025-07-20 PROCEDURE — 83735 ASSAY OF MAGNESIUM: CPT

## 2025-07-20 PROCEDURE — 2500000003 HC RX 250 WO HCPCS: Performed by: STUDENT IN AN ORGANIZED HEALTH CARE EDUCATION/TRAINING PROGRAM

## 2025-07-20 PROCEDURE — 6370000000 HC RX 637 (ALT 250 FOR IP): Performed by: INTERNAL MEDICINE

## 2025-07-20 PROCEDURE — 85027 COMPLETE CBC AUTOMATED: CPT

## 2025-07-20 PROCEDURE — 2700000000 HC OXYGEN THERAPY PER DAY

## 2025-07-20 PROCEDURE — 1200000000 HC SEMI PRIVATE

## 2025-07-20 PROCEDURE — 71045 X-RAY EXAM CHEST 1 VIEW: CPT

## 2025-07-20 PROCEDURE — 36415 COLL VENOUS BLD VENIPUNCTURE: CPT

## 2025-07-20 PROCEDURE — 6370000000 HC RX 637 (ALT 250 FOR IP): Performed by: STUDENT IN AN ORGANIZED HEALTH CARE EDUCATION/TRAINING PROGRAM

## 2025-07-20 PROCEDURE — 6360000002 HC RX W HCPCS: Performed by: INTERNAL MEDICINE

## 2025-07-20 PROCEDURE — 85610 PROTHROMBIN TIME: CPT

## 2025-07-20 PROCEDURE — 80048 BASIC METABOLIC PNL TOTAL CA: CPT

## 2025-07-20 PROCEDURE — 94761 N-INVAS EAR/PLS OXIMETRY MLT: CPT

## 2025-07-20 PROCEDURE — 6370000000 HC RX 637 (ALT 250 FOR IP): Performed by: SURGERY

## 2025-07-20 RX ORDER — FUROSEMIDE 10 MG/ML
20 INJECTION INTRAMUSCULAR; INTRAVENOUS ONCE
Status: COMPLETED | OUTPATIENT
Start: 2025-07-20 | End: 2025-07-20

## 2025-07-20 RX ORDER — WARFARIN SODIUM 2 MG/1
2 TABLET ORAL
Status: COMPLETED | OUTPATIENT
Start: 2025-07-20 | End: 2025-07-20

## 2025-07-20 RX ORDER — DONEPEZIL HYDROCHLORIDE 5 MG/1
5 TABLET, FILM COATED ORAL NIGHTLY
Status: DISCONTINUED | OUTPATIENT
Start: 2025-07-20 | End: 2025-07-25 | Stop reason: HOSPADM

## 2025-07-20 RX ADMIN — SODIUM CHLORIDE, PRESERVATIVE FREE 10 ML: 5 INJECTION INTRAVENOUS at 19:14

## 2025-07-20 RX ADMIN — SENNOSIDES AND DOCUSATE SODIUM 2 TABLET: 50; 8.6 TABLET ORAL at 08:17

## 2025-07-20 RX ADMIN — ATORVASTATIN CALCIUM 40 MG: 40 TABLET, FILM COATED ORAL at 19:13

## 2025-07-20 RX ADMIN — POLYETHYLENE GLYCOL 3350 17 G: 17 POWDER, FOR SOLUTION ORAL at 08:17

## 2025-07-20 RX ADMIN — MIDODRINE HYDROCHLORIDE 10 MG: 5 TABLET ORAL at 05:25

## 2025-07-20 RX ADMIN — MIDODRINE HYDROCHLORIDE 10 MG: 5 TABLET ORAL at 12:17

## 2025-07-20 RX ADMIN — FUROSEMIDE 20 MG: 10 INJECTION, SOLUTION INTRAMUSCULAR; INTRAVENOUS at 14:14

## 2025-07-20 RX ADMIN — MIDODRINE HYDROCHLORIDE 10 MG: 5 TABLET ORAL at 19:13

## 2025-07-20 RX ADMIN — ALLOPURINOL 300 MG: 300 TABLET ORAL at 19:13

## 2025-07-20 RX ADMIN — DONEPEZIL HYDROCHLORIDE 5 MG: 5 TABLET, FILM COATED ORAL at 19:14

## 2025-07-20 RX ADMIN — TRAZODONE HYDROCHLORIDE 50 MG: 50 TABLET ORAL at 20:44

## 2025-07-20 RX ADMIN — WARFARIN SODIUM 2 MG: 2 TABLET ORAL at 17:55

## 2025-07-20 ASSESSMENT — PAIN SCALES - GENERAL
PAINLEVEL_OUTOF10: 0

## 2025-07-20 ASSESSMENT — PAIN SCALES - WONG BAKER
WONGBAKER_NUMERICALRESPONSE: NO HURT

## 2025-07-20 NOTE — PROGRESS NOTES
supratherapeutic as well. Admited patient for treatment of suspected pneumonia, supra-therapeutic INR, and urology evaluation.        Assessment/Plan:      Possible Pneumonia  - CT with groundglass opacities with consolidation present in bilateral lower lobes, which could represent atelectasis, although the productive cough and recent vomiting in setting of prior CVA poses risk for aspiration pneumonitis/pneumonia.   - procal negative  - Urine Legionella Ag was positive; received Azithromycin just in case  - not requiring oxygen  - Empiric Abx (Azithromycin and Ceftriaxone) were given.  Antibiotics discontinued and completed a course of antibiotic therapy.     Supratherapeutic INR  :     This was present at time of admission.  Now improved, INR= 2.47 today  He is on Coumadin for atrial fibrillation  Pharmacy is managing dosing    Nephroureteral catheters in place  Gross hematuria  Urothelial Carcinoma  Distal ureteral stone, left  - Urology consulted; recommend addressing other potential causes of nausea prior to pursuing bilateral nephrostomy tube placement.   Nausea has improved, constipation  improving.  Hematuria has improved.  He will need to follow with Urology/ Dr Mendoza as outpatient.  Urology has signed off.     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 Difficult to exclude UTI at this time due to ongoing hematuria, however, already restarted on IV Abx as above for possible Pneumonia.   Has completed antibiotics which are now discontinued    Nonobstructive CAD : History noted.  Not on beta-blocker suspect secondary to his low blood pressure.  Not on aspirin suspect secondary to hematuria.  Continue on daily warfarin     Nausea/Vomiting, poor PO intake possibly due to Constipation: Has previously been attributed to his recent obstructive uropathy, but could be related to severe

## 2025-07-20 NOTE — PLAN OF CARE
Problem: Chronic Conditions and Co-morbidities  Goal: Patient's chronic conditions and co-morbidity symptoms are monitored and maintained or improved  7/20/2025 1012 by Sarah Cabrera RN  Outcome: Progressing  7/19/2025 2016 by Idalmis Cote RN  Outcome: Progressing     Problem: Discharge Planning  Goal: Discharge to home or other facility with appropriate resources  7/20/2025 1012 by Sarah Cabrera RN  Outcome: Progressing  7/19/2025 2016 by Idalmis Cote RN  Outcome: Progressing     Problem: Safety - Adult  Goal: Free from fall injury  7/20/2025 1012 by Sarah Cabrera RN  Outcome: Progressing  7/19/2025 2016 by Idalmis Cote RN  Outcome: Progressing     Problem: ABCDS Injury Assessment  Goal: Absence of physical injury  7/20/2025 1012 by Sarah Cabrera RN  Outcome: Progressing  7/19/2025 2016 by Idalmis Cote RN  Outcome: Progressing     Problem: Skin/Tissue Integrity  Goal: Skin integrity remains intact  Description: 1.  Monitor for areas of redness and/or skin breakdown  2.  Assess vascular access sites hourly  3.  Every 4-6 hours minimum:  Change oxygen saturation probe site  4.  Every 4-6 hours:  If on nasal continuous positive airway pressure, respiratory therapy assess nares and determine need for appliance change or resting period  7/20/2025 1012 by Sarah Cabrera RN  Outcome: Progressing  7/19/2025 2016 by Idalmis Cote RN  Outcome: Progressing

## 2025-07-20 NOTE — PROGRESS NOTES
Pharmacy Note  Warfarin Consult  Dx: AFib  Goal INR range 2-2.5  Home Warfarin dose: 2.5 mg Mon/Fri, 5 mg all other days      Date                 INR                  Warfarin  7/14                5.43                   Hold  7/15                5.77                   Hold  7/16                4.72                   Hold  7/17                4.08                   Hold   7/18                2.88                   2.5 mg   7/19                2.66                      2 mg  7/20                2.47                      2 mg    Recommend Warfarin 2 mg tonight x1.  Daily INR ordered.  Rx will continue to manage therapy per consult order.    Maureen Sheridan, PharmD 7/20/2025  7:25 AM

## 2025-07-20 NOTE — PROGRESS NOTES
Perfect serve:  BP 90/50  [phone] Hospital or Facility: [facility] From: Sarah Caberra RE: MAHAD YUSUFMANUEL 1940 RM: [id number] BP 90/50.. orders for PRN midrodine Q8. dose last given at 0500 for bp 95/60. Need Callback: NO CALLBACK REQ B3 TELEMETRY FYI  Read 8:37 AM

## 2025-07-20 NOTE — PLAN OF CARE
Problem: Chronic Conditions and Co-morbidities  Goal: Patient's chronic conditions and co-morbidity symptoms are monitored and maintained or improved  7/19/2025 2016 by Idalmis Cote RN  Outcome: Progressing  7/19/2025 1204 by Amish Narayanan RN  Outcome: Progressing     Problem: Discharge Planning  Goal: Discharge to home or other facility with appropriate resources  7/19/2025 2016 by Idalmis Cote RN  Outcome: Progressing  7/19/2025 1204 by Amish Narayanan RN  Outcome: Progressing     Problem: Safety - Adult  Goal: Free from fall injury  7/19/2025 2016 by Idalmis Cote RN  Outcome: Progressing  7/19/2025 1204 by Amish Narayanan RN  Outcome: Progressing     Problem: ABCDS Injury Assessment  Goal: Absence of physical injury  7/19/2025 2016 by Idalmis Cote RN  Outcome: Progressing  7/19/2025 1204 by Amish Narayanan RN  Outcome: Progressing     Problem: Skin/Tissue Integrity  Goal: Skin integrity remains intact  Description: 1.  Monitor for areas of redness and/or skin breakdown  2.  Assess vascular access sites hourly  3.  Every 4-6 hours minimum:  Change oxygen saturation probe site  4.  Every 4-6 hours:  If on nasal continuous positive airway pressure, respiratory therapy assess nares and determine need for appliance change or resting period  7/19/2025 2016 by Idalmis Cote RN  Outcome: Progressing  7/19/2025 1204 by Amish Narayanan RN  Outcome: Progressing     Problem: Nutrition Deficit:  Goal: Optimize nutritional status  7/19/2025 2016 by Idalmis Cote RN  Outcome: Progressing  7/19/2025 1204 by Amish Narayanan RN  Outcome: Progressing     Problem: Respiratory - Adult  Goal: Achieves optimal ventilation and oxygenation  7/19/2025 2016 by Idalmis Cote RN  Outcome: Progressing  7/19/2025 1204 by Amish Narayanan RN  Outcome: Progressing     Problem: Cardiovascular - Adult  Goal: Maintains optimal cardiac output and hemodynamic stability  7/19/2025 2016 by Idalmis Cote RN  Outcome:

## 2025-07-20 NOTE — FLOWSHEET NOTE
Patient alert. Bp soft. MD notified via perfect serve, awaiting orders . Wife remains at bedside.   AM assessment completed at this time.   Patient turned in bed. Brief is clean.   Call light in reach. Bed alarm is on.     07/20/25 0800   Vital Signs   Temp 97.7 °F (36.5 °C)   Temp Source Oral   Pulse 70   Heart Rate Source Monitor   Respirations 16   BP (!) 90/50   MAP (Calculated) 63   BP Location Right upper arm   BP Method Automatic   Patient Position Left side   Pain Assessment   Pain Assessment None - Denies Pain   Pain Level 0   Rubin-Baker Pain Rating 0   Patient's Stated Pain Goal 0 - No pain   Response to Pain Intervention Patient satisfied   Side Effects No side effects reported or observed   RASS   Osorio Agitation Sedation Scale (RASS) -1   Oxygen Therapy   SpO2 100 %   O2 Device None (Room air)   Rhythm Interpretation   Cardiac Rhythm Atrial fib   RN Validation Agree with rhythm interpretation and measurements

## 2025-07-21 LAB
ANION GAP SERPL CALCULATED.3IONS-SCNC: 10 MMOL/L (ref 3–16)
BUN SERPL-MCNC: 10 MG/DL (ref 7–20)
CALCIUM SERPL-MCNC: 8.4 MG/DL (ref 8.3–10.6)
CHLORIDE SERPL-SCNC: 99 MMOL/L (ref 99–110)
CO2 SERPL-SCNC: 25 MMOL/L (ref 21–32)
CREAT SERPL-MCNC: 0.7 MG/DL (ref 0.8–1.3)
DEPRECATED RDW RBC AUTO: 18.6 % (ref 12.4–15.4)
GFR SERPLBLD CREATININE-BSD FMLA CKD-EPI: 90 ML/MIN/{1.73_M2}
GLUCOSE SERPL-MCNC: 93 MG/DL (ref 70–99)
HCT VFR BLD AUTO: 38.3 % (ref 40.5–52.5)
HGB BLD-MCNC: 12.4 G/DL (ref 13.5–17.5)
INR PPP: 2.24 (ref 0.86–1.14)
MAGNESIUM SERPL-MCNC: 1.95 MG/DL (ref 1.8–2.4)
MCH RBC QN AUTO: 27.4 PG (ref 26–34)
MCHC RBC AUTO-ENTMCNC: 32.5 G/DL (ref 31–36)
MCV RBC AUTO: 84.4 FL (ref 80–100)
PLATELET # BLD AUTO: 321 K/UL (ref 135–450)
PMV BLD AUTO: 7.9 FL (ref 5–10.5)
POTASSIUM SERPL-SCNC: 4 MMOL/L (ref 3.5–5.1)
PROTHROMBIN TIME: 24.5 SEC (ref 12.1–14.9)
RBC # BLD AUTO: 4.54 M/UL (ref 4.2–5.9)
SODIUM SERPL-SCNC: 134 MMOL/L (ref 136–145)
WBC # BLD AUTO: 10 K/UL (ref 4–11)

## 2025-07-21 PROCEDURE — 97110 THERAPEUTIC EXERCISES: CPT

## 2025-07-21 PROCEDURE — 97530 THERAPEUTIC ACTIVITIES: CPT

## 2025-07-21 PROCEDURE — 92526 ORAL FUNCTION THERAPY: CPT

## 2025-07-21 PROCEDURE — 80048 BASIC METABOLIC PNL TOTAL CA: CPT

## 2025-07-21 PROCEDURE — 6370000000 HC RX 637 (ALT 250 FOR IP): Performed by: INTERNAL MEDICINE

## 2025-07-21 PROCEDURE — 85610 PROTHROMBIN TIME: CPT

## 2025-07-21 PROCEDURE — 85027 COMPLETE CBC AUTOMATED: CPT

## 2025-07-21 PROCEDURE — 83735 ASSAY OF MAGNESIUM: CPT

## 2025-07-21 PROCEDURE — 2500000003 HC RX 250 WO HCPCS: Performed by: STUDENT IN AN ORGANIZED HEALTH CARE EDUCATION/TRAINING PROGRAM

## 2025-07-21 PROCEDURE — 6370000000 HC RX 637 (ALT 250 FOR IP): Performed by: STUDENT IN AN ORGANIZED HEALTH CARE EDUCATION/TRAINING PROGRAM

## 2025-07-21 PROCEDURE — 36415 COLL VENOUS BLD VENIPUNCTURE: CPT

## 2025-07-21 PROCEDURE — 6370000000 HC RX 637 (ALT 250 FOR IP): Performed by: SURGERY

## 2025-07-21 PROCEDURE — 1200000000 HC SEMI PRIVATE

## 2025-07-21 RX ORDER — POLYVINYL ALCOHOL 14 MG/ML
1 SOLUTION/ DROPS OPHTHALMIC PRN
DISCHARGE
Start: 2025-07-21 | End: 2025-08-20

## 2025-07-21 RX ORDER — FUROSEMIDE 20 MG/1
20 TABLET ORAL EVERY OTHER DAY
DISCHARGE
Start: 2025-07-23

## 2025-07-21 RX ORDER — MIDODRINE HYDROCHLORIDE 10 MG/1
10 TABLET ORAL EVERY 8 HOURS PRN
DISCHARGE
Start: 2025-07-21

## 2025-07-21 RX ORDER — PROCHLORPERAZINE MALEATE 5 MG/1
5 TABLET ORAL EVERY 6 HOURS PRN
Status: DISCONTINUED | OUTPATIENT
Start: 2025-07-21 | End: 2025-07-25 | Stop reason: HOSPADM

## 2025-07-21 RX ORDER — WARFARIN SODIUM 2.5 MG/1
TABLET ORAL
DISCHARGE
Start: 2025-07-21 | End: 2025-07-25 | Stop reason: HOSPADM

## 2025-07-21 RX ORDER — SACCHAROMYCES BOULARDII 250 MG
250 CAPSULE ORAL 2 TIMES DAILY
DISCHARGE
Start: 2025-07-21 | End: 2025-07-28

## 2025-07-21 RX ORDER — WARFARIN SODIUM 2.5 MG/1
2.5 TABLET ORAL
Status: COMPLETED | OUTPATIENT
Start: 2025-07-21 | End: 2025-07-21

## 2025-07-21 RX ORDER — FUROSEMIDE 20 MG/1
20 TABLET ORAL EVERY OTHER DAY
Status: DISCONTINUED | OUTPATIENT
Start: 2025-07-21 | End: 2025-07-25 | Stop reason: HOSPADM

## 2025-07-21 RX ORDER — TRAZODONE HYDROCHLORIDE 50 MG/1
50 TABLET ORAL NIGHTLY PRN
DISCHARGE
Start: 2025-07-21

## 2025-07-21 RX ADMIN — ALLOPURINOL 300 MG: 300 TABLET ORAL at 20:26

## 2025-07-21 RX ADMIN — FUROSEMIDE 20 MG: 20 TABLET ORAL at 09:19

## 2025-07-21 RX ADMIN — PROCHLORPERAZINE MALEATE 5 MG: 5 TABLET ORAL at 13:57

## 2025-07-21 RX ADMIN — MIDODRINE HYDROCHLORIDE 10 MG: 5 TABLET ORAL at 17:19

## 2025-07-21 RX ADMIN — SODIUM CHLORIDE, PRESERVATIVE FREE 10 ML: 5 INJECTION INTRAVENOUS at 08:31

## 2025-07-21 RX ADMIN — WARFARIN SODIUM 2.5 MG: 2.5 TABLET ORAL at 16:47

## 2025-07-21 RX ADMIN — MIDODRINE HYDROCHLORIDE 10 MG: 5 TABLET ORAL at 08:31

## 2025-07-21 RX ADMIN — ACETAMINOPHEN 650 MG: 325 TABLET ORAL at 03:29

## 2025-07-21 RX ADMIN — TRAZODONE HYDROCHLORIDE 50 MG: 50 TABLET ORAL at 20:26

## 2025-07-21 RX ADMIN — DONEPEZIL HYDROCHLORIDE 5 MG: 5 TABLET, FILM COATED ORAL at 20:26

## 2025-07-21 RX ADMIN — ATORVASTATIN CALCIUM 40 MG: 40 TABLET, FILM COATED ORAL at 20:26

## 2025-07-21 ASSESSMENT — PAIN DESCRIPTION - ORIENTATION: ORIENTATION: LEFT

## 2025-07-21 ASSESSMENT — PAIN DESCRIPTION - LOCATION: LOCATION: SHOULDER

## 2025-07-21 ASSESSMENT — PAIN SCALES - GENERAL
PAINLEVEL_OUTOF10: 3
PAINLEVEL_OUTOF10: 0

## 2025-07-21 ASSESSMENT — PAIN DESCRIPTION - DESCRIPTORS: DESCRIPTORS: ACHING

## 2025-07-21 NOTE — PROGRESS NOTES
Occupational Therapy  Facility/Department: [place] [address] [facility]  Daily Treatment Note  NAME: Jhony Hdez  : 1940  MRN: [id number]    Date of Service: 2025    Discharge Recommendations:  Subacute/Skilled Nursing Facility  OT Equipment Recommendations  Equipment Needed: No  Other: defer    Therapy discharge recommendations are subject to collaboration from the patient’s interdisciplinary healthcare team, including MD and case management recommendations.    Barriers to Home Discharge:   [] Steps to access home entry or bed/bath:   [x] Unable to transfer, ambulate, or propel wheelchair household distances without assist   [] Limited available assist at home upon discharge    [] Patient or family requests d/c to post-acute facility    [x] Poor cognition/safety awareness for d/c to home alone    [] Unable to maintain ordered weight bearing status    [x] Patient with salient signs of long-standing immobility   [x] Decreased independence with ADLs   [x] Increased risk for falls   [] Other:    If pt is unable to be seen after this session, please let this note serve as discharge summary.  Please see case management note for discharge disposition.  Thank you.    Patient Diagnosis(es): The primary encounter diagnosis was Pneumonia of both lower lobes due to infectious organism. Diagnoses of Nausea and vomiting, unspecified vomiting type, Elevated troponin, Abnormal urinalysis, and Goals of care, counseling/discussion were also pertinent to this visit.     Assessment   Assessment: Co-tx collaboration this date to safely meet goals and will have better occupational performance outcomes with in a co-treatment than 1:1 session.  Pt tolerated session fair, initially difficult to rouse, however more alert once repositioned in bed, agreeable to therapy session. He performed bed mobility w/ max Ax2 and tolerated ~10 mins sitting EOB w/ mod-max A for balance 2/2 L lateral and posterior lean. He completed x10 reps

## 2025-07-21 NOTE — CARE COORDINATION
Following for care plan. Pt has bed available at [facility] [place]; no precert required with traditional medicare; pt has had qualifying 3 night stay.      Per MD, possible dc tomorrow.     Cathleen Beck MSW LSW

## 2025-07-21 NOTE — DISCHARGE INSTR - COC
Continuity of Care Form    Patient Name: Jhony Hdez   :  1940  MRN:  [id number]    Admit date:  2025  Discharge date:  25    Code Status Order: Full Code   Advance Directives:     Admitting Physician:  Abdi Blood MD  PCP: Jhony Valdovinos MD    Discharging Nurse: Maryam ORELLANA  Discharging Hospital Unit/Room#: 0111/0111-01  Discharging Unit Phone Number:     Emergency Contact:   Extended Emergency Contact Information  Primary Emergency Contact: Aura Hdez  Address: [id number] [place] [address]  Home Phone: [phone]  Mobile Phone: [phone]  Relation: Spouse   needed? No  Secondary Emergency Contact: Angelica Hdez  Mobile Phone: [phone]  Relation: Child  Preferred language: English   needed? No    Past Surgical History:  Past Surgical History:   Procedure Laterality Date    ADRENALECTOMY      groin    BACK SURGERY      CARPAL TUNNEL RELEASE      bilateral    COLONOSCOPY      CYSTOSCOPY Bilateral 2025    CYSTOSCOPY, BILATERAL STENT PLACEMENT,BILATERAL RETROGRADE PYELOGRAM performed by Eduardo Mendoza MD at [facility] OR    CYSTOSCOPY Bilateral 2025    CYSTOSCOPY RIGHT DIAGNOSTIC URETEROSCOPY RIGHT URETERAL MASS, EXCISION WITH HOLMIUM LASER, LEFT URETEROSCOPY HOLMIUM LASER STONE MANIPULATION WITH CVAC, BILATERAL STENT EXCHANGE performed by Eduardo Mendoza MD at [facility] OR    CYSTOSCOPY Left 2025    CYSTOSCOPY, LEFT URETEROSCOPY, LEFT RETROGRADE PYELOGRAM, LASER LITHOTRIPSY, AND STONE BASKET EXTRACTION performed by Eduardo Mendoza MD at [facility] OR    EP DEVICE PROCEDURE N/A 2025    Insert ICD dual performed by Christoph Martinez MD at [facility] CARDIAC CATH LAB    INTRACAPSULAR CATARACT EXTRACTION Right 2019    PHACOEMULSIFICATION OF CATARACT RIGHT EYE WITH INTRAOCULARLENS IMPLANT performed by Thierno Jensen MD at [facility] OR    KIDNEY STONE SURGERY      several

## 2025-07-21 NOTE — PROGRESS NOTES
Speech Language Pathology  Dysphagia Treatment/Follow-Up Note  Facility/Department: [place] [address] [facility]    Recommendations:  Solid Consistency: IDDSI 6 Soft and Bite Sized Solids  Liquid Consistency: IDDSI 0 Thin Liquids - NO straws   Medication: Meds whole in puree    Risk Management: upright for all intake, stay upright for at least 30 mins after intake, small bites/sips, assist feed, oral care 2-3x/day to reduce adverse affects in the event of aspiration, alternate bites/sips, slow rate of intake, hold PO and contact SLP if s/s of aspiration or worsening respiratory status develop., and assess oral cavity for pocketing.     ** recommend MBSS when pt appropriate/agreeable due to increased s/s of aspiration with intake per family.    NAME:Jhony Hdez  : 1940 (84 y.o.)   MRN: [id number]  ROOM: 011/0111-01  ADMISSION DATE: 2025  PATIENT DIAGNOSIS(ES): Nausea [R11.0]  Abnormal urinalysis [R82.90]  Elevated troponin [R79.89]  Goals of care, counseling/discussion [Z71.89]  Sepsis (HCC) [A41.9]  Pneumonia of both lower lobes due to infectious organism [J18.9]  Nausea and vomiting, unspecified vomiting type [R11.2]  Gross hematuria [R31.0]  No Known Allergies    DATE ONSET: 2025    Pain: The patient does not complain of pain. However, pt reports nausea, RN aware.        Current Diet: ADULT DIET; Dysphagia - Soft and Bite Sized  ADULT ORAL NUTRITION SUPPLEMENT; Breakfast, Lunch, Dinner; Frozen Oral Supplement      Diet Tolerance:  Patient tolerating current diet level without signs/symptoms of aspiration.    Dysphagia Treatment and Impressions:  Subjective: Pt seen in room at bedside with RN permission.   Behavior / Cognition: Lethargic, agreeable to minimal tx.   RN Report/Chart Review: RN reported no acute concerns.   Patient tolerance: Pt tolerated minimal trials    Baseline Respiratory Status Measures: Pt with SPO2% of 98 on RA  with RR of 16    Liquid PO Trials:    IDDSI 0 Thin:  Assessed

## 2025-07-21 NOTE — PLAN OF CARE
Problem: Chronic Conditions and Co-morbidities  Goal: Patient's chronic conditions and co-morbidity symptoms are monitored and maintained or improved  Outcome: Progressing     Problem: Discharge Planning  Goal: Discharge to home or other facility with appropriate resources  Outcome: Progressing     Problem: Safety - Adult  Goal: Free from fall injury  Outcome: Progressing     Problem: ABCDS Injury Assessment  Goal: Absence of physical injury  Outcome: Progressing     Problem: Skin/Tissue Integrity  Goal: Skin integrity remains intact  Description: 1.  Monitor for areas of redness and/or skin breakdown  2.  Assess vascular access sites hourly  3.  Every 4-6 hours minimum:  Change oxygen saturation probe site  4.  Every 4-6 hours:  If on nasal continuous positive airway pressure, respiratory therapy assess nares and determine need for appliance change or resting period  Outcome: Progressing     Problem: Skin/Tissue Integrity  Goal: Skin integrity remains intact  Description: 1.  Monitor for areas of redness and/or skin breakdown  2.  Assess vascular access sites hourly  3.  Every 4-6 hours minimum:  Change oxygen saturation probe site  4.  Every 4-6 hours:  If on nasal continuous positive airway pressure, respiratory therapy assess nares and determine need for appliance change or resting period  Outcome: Progressing     Problem: Nutrition Deficit:  Goal: Optimize nutritional status  Outcome: Progressing     Problem: Respiratory - Adult  Goal: Achieves optimal ventilation and oxygenation  Outcome: Progressing     Problem: Cardiovascular - Adult  Goal: Maintains optimal cardiac output and hemodynamic stability  Outcome: Progressing     Problem: Skin/Tissue Integrity - Adult  Goal: Skin integrity remains intact  Description: 1.  Monitor for areas of redness and/or skin breakdown  2.  Assess vascular access sites hourly  3.  Every 4-6 hours minimum:  Change oxygen saturation probe site  4.  Every 4-6 hours:  If on nasal

## 2025-07-21 NOTE — PLAN OF CARE
Perfect serve : BP 94/77       [phone] Hospital or Facility: [facility] From: Idalmis Cote RE: MAHAD OLIVAS 1940 RM: 0111-01 patient BP 94/77 . PRN midodrine is Q8 and recently given at 1913. can we give him another dose or try something else? Need Callback: NO CALLBACK REQ A2 HEART FAILURE ROUTINE

## 2025-07-21 NOTE — PLAN OF CARE
Problem: Chronic Conditions and Co-morbidities  Goal: Patient's chronic conditions and co-morbidity symptoms are monitored and maintained or improved  Outcome: Progressing     Problem: Discharge Planning  Goal: Discharge to home or other facility with appropriate resources  Outcome: Progressing     Problem: Safety - Adult  Goal: Free from fall injury  Outcome: Progressing     Problem: ABCDS Injury Assessment  Goal: Absence of physical injury  Outcome: Progressing     Problem: Skin/Tissue Integrity  Goal: Skin integrity remains intact  Outcome: Progressing     Problem: Skin/Tissue Integrity - Adult  Goal: Skin integrity remains intact  Outcome: Progressing

## 2025-07-21 NOTE — PROGRESS NOTES
Physical Therapy  Facility/Department: [place] [address] [facility]  Daily Treatment Note  NAME: Jhony Hdez  : 1940  MRN: [id number]    Date of Service: 2025    Discharge Recommendations:  Subacute/Skilled Nursing Facility   PT Equipment Recommendations  Equipment Needed: No    Patient Diagnosis(es): The primary encounter diagnosis was Pneumonia of both lower lobes due to infectious organism. Diagnoses of Nausea and vomiting, unspecified vomiting type, Elevated troponin, Abnormal urinalysis, and Goals of care, counseling/discussion were also pertinent to this visit.    Assessment  Assessment: Pt seen as cotx with OT to maximize safety and functional mobility. Pt required mod/max A x2 for bed mobility and max to mod A for sitting balance at EOB. Pt session limited by pt fatigue. Will continue to progress mobility as pt tolerates. Recommend SNF for continued therapy as pt is unable to safely transfer to chair.  Activity Tolerance: Patient limited by fatigue;Patient limited by endurance  Equipment Needed: No    Plan  Physical Therapy Plan  General Plan:  (3-5x/wk in acute care)  Current Treatment Recommendations: Strengthening;ROM;Balance training;Functional mobility training;Transfer training;Endurance training;Pain management;Home exercise program;Safety education & training;Patient/Caregiver education & training;Therapeutic activities;Co-Treatment  Additional Comments: 3-4x/week in acute care    Restrictions  Restrictions/Precautions  Restrictions/Precautions: Fall Risk  Activity Level: Up with Assist  Position Activity Restriction  Other Position/Activity Restrictions: purewick     Subjective   Subjective  Subjective: Pt sleepy at approach but not resistant to therapy  Pain: no c/o pain    Objective  Vitals  Vitals:    25 1451   BP: 99/65   Pulse:    Resp:    Temp:    SpO2: 100%        Bed Mobility Training  Bed Mobility Training: Yes  Interventions: Verbal cues;Tactile cues;Manual cues  Rolling:

## 2025-07-21 NOTE — PROGRESS NOTES
Pharmacy Note  Warfarin Consult  Dx: AFib  Goal INR range 2-2.5  Home Warfarin dose: 2.5 mg Mon/Fri, 5 mg all other days      Date                 INR                  Warfarin  7/14                5.43                   Hold  7/15                5.77                   Hold  7/16                4.72                   Hold  7/17                4.08                   Hold   7/18                2.88                   2.5 mg   7/19                2.66                     2 mg  7/20                2.47                     2 mg  7/21                2.24                   2.5 mg       Recommend Warfarin 2.5 mg tonight x1.  Daily INR ordered.  Rx will continue to manage therapy per consult order.  Joe Sibley, PharmD, BCPS 7/21/2025 7:52 AM

## 2025-07-22 ENCOUNTER — APPOINTMENT (OUTPATIENT)
Dept: CT IMAGING | Age: 85
DRG: 193 | End: 2025-07-22
Payer: MEDICARE

## 2025-07-22 ENCOUNTER — ANESTHESIA EVENT (OUTPATIENT)
Dept: ENDOSCOPY | Age: 85
End: 2025-07-22
Payer: MEDICARE

## 2025-07-22 LAB
EKG ATRIAL RATE: 73 BPM
EKG DIAGNOSIS: NORMAL
EKG Q-T INTERVAL: 540 MS
EKG QRS DURATION: 166 MS
EKG QTC CALCULATION (BAZETT): 586 MS
EKG R AXIS: -58 DEGREES
EKG T AXIS: 127 DEGREES
EKG VENTRICULAR RATE: 71 BPM
INR PPP: 2.39 (ref 0.86–1.14)
PROTHROMBIN TIME: 25.7 SEC (ref 12.1–14.9)

## 2025-07-22 PROCEDURE — 93005 ELECTROCARDIOGRAM TRACING: CPT | Performed by: INTERNAL MEDICINE

## 2025-07-22 PROCEDURE — 92526 ORAL FUNCTION THERAPY: CPT

## 2025-07-22 PROCEDURE — 6370000000 HC RX 637 (ALT 250 FOR IP): Performed by: INTERNAL MEDICINE

## 2025-07-22 PROCEDURE — 6370000000 HC RX 637 (ALT 250 FOR IP): Performed by: STUDENT IN AN ORGANIZED HEALTH CARE EDUCATION/TRAINING PROGRAM

## 2025-07-22 PROCEDURE — 6370000000 HC RX 637 (ALT 250 FOR IP): Performed by: SURGERY

## 2025-07-22 PROCEDURE — 36415 COLL VENOUS BLD VENIPUNCTURE: CPT

## 2025-07-22 PROCEDURE — 85610 PROTHROMBIN TIME: CPT

## 2025-07-22 PROCEDURE — 74176 CT ABD & PELVIS W/O CONTRAST: CPT

## 2025-07-22 PROCEDURE — 70450 CT HEAD/BRAIN W/O DYE: CPT

## 2025-07-22 PROCEDURE — 93010 ELECTROCARDIOGRAM REPORT: CPT | Performed by: INTERNAL MEDICINE

## 2025-07-22 PROCEDURE — 1200000000 HC SEMI PRIVATE

## 2025-07-22 RX ORDER — WARFARIN SODIUM 2 MG/1
2 TABLET ORAL
Status: DISCONTINUED | OUTPATIENT
Start: 2025-07-22 | End: 2025-07-22

## 2025-07-22 RX ORDER — PANTOPRAZOLE SODIUM 40 MG/10ML
40 INJECTION, POWDER, LYOPHILIZED, FOR SOLUTION INTRAVENOUS DAILY
Status: DISCONTINUED | OUTPATIENT
Start: 2025-07-22 | End: 2025-07-23

## 2025-07-22 RX ORDER — PHYTONADIONE 5 MG/1
10 TABLET ORAL ONCE
Status: COMPLETED | OUTPATIENT
Start: 2025-07-22 | End: 2025-07-22

## 2025-07-22 RX ADMIN — ATORVASTATIN CALCIUM 40 MG: 40 TABLET, FILM COATED ORAL at 21:54

## 2025-07-22 RX ADMIN — PROCHLORPERAZINE MALEATE 5 MG: 5 TABLET ORAL at 06:00

## 2025-07-22 RX ADMIN — MIDODRINE HYDROCHLORIDE 10 MG: 5 TABLET ORAL at 08:50

## 2025-07-22 RX ADMIN — TRAZODONE HYDROCHLORIDE 50 MG: 50 TABLET ORAL at 21:54

## 2025-07-22 RX ADMIN — SENNOSIDES AND DOCUSATE SODIUM 2 TABLET: 50; 8.6 TABLET ORAL at 07:58

## 2025-07-22 RX ADMIN — PHYTONADIONE 10 MG: 5 TABLET ORAL at 13:22

## 2025-07-22 RX ADMIN — DONEPEZIL HYDROCHLORIDE 5 MG: 5 TABLET, FILM COATED ORAL at 21:54

## 2025-07-22 RX ADMIN — ALLOPURINOL 300 MG: 300 TABLET ORAL at 21:54

## 2025-07-22 RX ADMIN — SENNOSIDES AND DOCUSATE SODIUM 2 TABLET: 50; 8.6 TABLET ORAL at 21:54

## 2025-07-22 NOTE — PROGRESS NOTES
rhythm.  Abdomen:  Soft, non-tender, non-distended. Bowel sounds normal  Musculoskeletal:  No edema  Neurologic:  Neurovascularly intact with residual left-sided weakness and generalized weakness  Psychiatric:  Alert and oriented    BP (!) 88/69   Pulse 91   Temp 97.6 °F (36.4 °C) (Oral)   Resp 16   Ht 1.778 m (5' 10\")   Wt 67.1 kg (147 lb 14.9 oz)   SpO2 90%   BMI 21.23 kg/m²     Telemetry:      Personally reviewed and interpreted telemetry (Rhythm Strip) on 7/21/2025.  Patient is currently ON tele demonstrating Paced Rhythm.    Diet: ADULT DIET; Dysphagia - Soft and Bite Sized  ADULT ORAL NUTRITION SUPPLEMENT; Breakfast, Lunch, Dinner; Frozen Oral Supplement    DVT Prophylaxis: Coumadin    Code status: Full Code    PT/OT Eval Status: Seen w/ recs for SNF    Multi-Disciplinary Rounds with Case Management completed on 7/21/2025 with the following recs:     Anticipated Discharge Location: [facility]     Anticipated Discharge Day/Date: Possible discharge tomorrow    Barriers to Discharge: Clinical Course and Placement decision    Likely rate limiting factor: INR    --------------------------------------------------    MDM (any 2 required for High level billing)    A. Problems (any 1)  [x] Acute/Chronic Illness/injury posing ongoing threat to life and/or bodily function without ongoing treatment    [x] Severe exacerbation of chronic illness    --------------------------------------------------  B. Risk of Treatment (any 1)    [x] Drugs/treatments that require intensive monitoring for toxicity    [x] IV ABX (Vancomycin, Aminoglycosides, etc)     [] Post-Cath/Contrast study requiring serial monitoring    [] IV Narcotic analgesia    [] Aggressive IV diuresis    [] Hypertonic Saline    [] Critical electrolyte abnormalities requiring IV replacement    [] Insulin - Scheduled/SSI or Insulin gtt    [x] Anticoagulation (Heparin gtt or Coumadin - other anticoagulants in special circumstances)    [] Cardiac Medications  incubation. 07/14/2025 06:51 AM     Organism:   Lab Results   Component Value Date/Time    ORG Wilma albicans 07/15/2025 09:32 AM         CONG PHIPPS MD

## 2025-07-22 NOTE — CARE COORDINATION
LOS 7.  Care managed by [facility] GI. Here w PNA, S/P CVA. [facility]- accepted-no cert needed. From home w spouse. CM following. Natacha Badillo RN

## 2025-07-22 NOTE — PROGRESS NOTES
Speech Language Pathology  Dysphagia Treatment/Follow-Up Note  Facility/Department: [place] [address] [facility]    Recommendations:  Solid Consistency: IDDSI 6 Soft and Bite Sized Solids  Liquid Consistency: IDDSI 0 Thin Liquids - NO straws , SMALL SIPS ONLY   Medication: Meds whole in puree    Risk Management: upright for all intake, stay upright for at least 30 mins after intake, small bites/sips, assist feed, oral care 2-3x/day to reduce adverse affects in the event of aspiration, alternate bites/sips, slow rate of intake, hold PO and contact SLP if s/s of aspiration or worsening respiratory status develop., and assess oral cavity for pocketing.     ** recommend MBSS when pt appropriate/agreeable due to increased s/s of aspiration with intake per family. Pt still feeling nauseous and unable to accept enough PO for MBSS.     NAME:Jhony Hdez  : 1940 (84 y.o.)   MRN: [id number]  ROOM: [address] [place] [address]  ADMISSION DATE: 2025  PATIENT DIAGNOSIS(ES): Nausea [R11.0]  Abnormal urinalysis [R82.90]  Elevated troponin [R79.89]  Goals of care, counseling/discussion [Z71.89]  Sepsis (HCC) [A41.9]  Pneumonia of both lower lobes due to infectious organism [J18.9]  Nausea and vomiting, unspecified vomiting type [R11.2]  Gross hematuria [R31.0]  No Known Allergies    DATE ONSET: 2025    Pain: The patient does not complain of pain. However, pt reports nausea, RN aware.        Current Diet: ADULT DIET; Dysphagia - Soft and Bite Sized  ADULT ORAL NUTRITION SUPPLEMENT; Breakfast, Lunch, Dinner; Frozen Oral Supplement      Diet Tolerance:  Patient demonstrating some coughing with PO. However, pt is coughing without PO. Unable to determine if related to PO w/o instrumental.     Dysphagia Treatment and Impressions:  Subjective: Pt seen in room at bedside with RN permission.   Behavior / Cognition: Lethargic, nauseous and agreeable to minimal tx.   RN Report/Chart Review: RN reported no acute concerns.   Patient tolerance:

## 2025-07-22 NOTE — PROGRESS NOTES
Comprehensive Nutrition Assessment    Type and Reason for Visit:  Reassess    Nutrition Recommendations/Plan:   Continue soft and bite sized diet  Diet texture/liquid level per SLP recommendations  Encourage po intakes  Magic Cup vanilla TID  Monitor po intakes, nutrition adequacy, weights, pertinent labs, BMs     Malnutrition Assessment:  Malnutrition Status:  Severe malnutrition (07/15/25 1551)    Context:  Acute Illness     Findings of the 6 clinical characteristics of malnutrition:  Energy Intake:  50% or less of estimated energy requirements for 5 or more days  Weight Loss:  Greater than 7.5% over 3 months     Body Fat Loss:  Mild body fat loss Orbital   Muscle Mass Loss:  Mild muscle mass loss Temples (temporalis), Clavicles (pectoralis & deltoids)  Fluid Accumulation:  No fluid accumulation     Strength:  Not Performed    Nutrition Assessment:    Follow up: Pt continues to be nutritionally compromised AEB variable po intakes. Pt currently on a soft and bite sized diet with Magic Cup ONS. PO intakes 1-100% per EMR. Plan for EGD tomorrow to rule out PUD, gastritis, erosive esophagitis per GI note. Pt lethargic this afternoon. Will continue current diet and ONS and monitor nutrition needs.    Nutrition Related Findings:    BM x3 on 7/20. Labs reviewed. Wound Type: None       Current Nutrition Intake & Therapies:    Average Meal Intake: 1-25%, 26-50%, 51-75%, %  Average Supplements Intake: 1-25%, 26-50%, 51-75%, %  ADULT DIET; Dysphagia - Soft and Bite Sized  ADULT ORAL NUTRITION SUPPLEMENT; Breakfast, Lunch, Dinner; Frozen Oral Supplement  Diet NPO Exceptions are: Sips of Water with Meds    Anthropometric Measures:  Height: 177.8 cm (5' 10\")  Ideal Body Weight (IBW): 166 lbs (75 kg)       Current Body Weight: 67.4 kg (148 lb 9.4 oz),   IBW. Weight Source: Bed scale  Current BMI (kg/m2): 21.3                             BMI Categories: Underweight (BMI less than 22) age over 65    Estimated Daily

## 2025-07-22 NOTE — PROGRESS NOTES
Educated patient on having iv access while in the hospital.  Aware of risks. Patient continued to refuse iv placement knowing risk involved  MD notified

## 2025-07-22 NOTE — PROGRESS NOTES
[facility] Medicine Progress Note  V 5.17      Date of Admission: 7/13/2025    Hospital Day: 10      Chief Admission Complaint: Generalized weakness    Subjective: Mental status change.    Presenting Admission History:       84 y.o. male who presented for nausea and cough. 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 Due to persistent bradycardia, a dual chamber ICD was also placed that admission. Discharged to Acute Rehab until 7/3/25. He was re-admitted at [facility] from 7/4-7/7/25 for N/V thought to be related to Nephrolithiasis and Hydronephrosis. Urology was consulted and underwent Cysto with bilateral stent exchange on 7/5/25. As he was deemed not a candidate for right Nephroureterectomy, plan was to consider percutaneous Nephrostomy tubes if stents were to fail. He was discharged home on 7/7/25 with plan for Urology follow-up to determine further management of right sided Urothelial Carcinoma. 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      He now presented back to [facility] ED on 7/13/25 with recurrent N/V along with productive cough for several days. CT scan picking up on some respiratory pathology that may indicated pneumonia. Gross hematuria noted; pt with hx of severe h/l hydronephrosis s/p R ureterostomy tube. INR supratherapeutic as well. Admited patient for treatment of suspected pneumonia, supra-therapeutic INR, and urology evaluation.

## 2025-07-22 NOTE — PROGRESS NOTES
Pharmacy Note  Warfarin Consult  Dx: AFib  Goal INR range 2-2.5  Home Warfarin dose: 2.5 mg Mon/Fri, 5 mg all other days      Date                 INR                  Warfarin  7/14                5.43                   Hold  7/15                5.77                   Hold  7/16                4.72                   Hold  7/17                4.08                   Hold   7/18                2.88                   2.5 mg   7/19                2.66                     2 mg  7/20                2.47                     2 mg  7/21                2.24                   2.5 mg    7/22                2.39                   2 mg     Recommend Warfarin 2 mg tonight x1.  Daily INR ordered.  Rx will continue to manage therapy per consult order.    Seth Dent, PharmD 7/22/2025 8:34 AM

## 2025-07-22 NOTE — CONSULTS
GASTROENTEROLOGY INPATIENT CONSULTATION        IDENTIFYING DATA/REASON FOR CONSULTATION   PATIENT:  Jhony Hdez  MRN:  [id number]  ADMIT DATE: 7/13/2025  TIME OF EVALUATION: 7/22/2025 8:39 AM  HOSPITAL STAY:   LOS: 7 days     REASON FOR CONSULTATION:  Persistent nausea, vomiting    HISTORY OF PRESENT ILLNESS   Jhony Hdez is a 84 y.o. male with a PMH of Afib on Coumadin, CVA, colon cancer s/p XRT and resection, prostate cancer, [place] Barré syndrome, HTN, HLD, recent diagnosis of right Urothelial Carcinoma who presented on 7/13/2025 with nausea, cough. Admitted now with possible pneumonia and UTI. Wife provided HPI/ROS. He has been having pretty persistent nausea/vomiting since his last admission several weeks ago at which time he underwent bilateral stent exchanges for obstructive uropathy with hydronephrosis. Symptoms initially improved but returned shortly after returning home. He has having poor PO intake and ongoing symptoms so wife brought the patient back to the hospital. Upon readmission patient was seen by urology due to persistent hydronephrosis but was thought symptoms were due to constipation and stool burden noted on imaging. He had enemas due to stool ball in rectum but unfortunately continues to have symptoms despite having more regular bowel movements. No overt signs of GI bleeding reports. No history of NSAID use. No prior history of nausea/vomiting prior to the patient's recent admissions. No dysphagia or odynophagia. No abdominal pain reported.    CT A/P with IV contrast on 7/13/2025: Unchanged bilateral hydronephrosis, and the presence of nephroureteral catheters. There is a small in the left distal ureter. Retroperitoneal lymphadenopathy is seen previously.    Repeat CT A/P without contrast was ordered this morning by hospitalist team.       Prior Endoscopic Evaluations:  EGD 1/2018 with Dr. Gomez:  Lower esophageal ring. Balloon dilation was performed.     EGD 12/2017 with Dr. Gomez:  1.   Brain and Spine    TOTAL ELBOW ARTHROPLASTY Left     UROLOGICAL SURGERY Bilateral 7/5/2025    BILATERAL URETERAL STENT EXCHANGE performed by Eduardo Mendoza MD at [facility] OR     Family History   Problem Relation Age of Onset    Colon Cancer Father     Diabetes Mother     Hypertension Mother     Colon Cancer Sister         x 2     Social History     Socioeconomic History    Marital status:    Tobacco Use    Smoking status: Never    Smokeless tobacco: Never   Vaping Use    Vaping status: Never Used   Substance and Sexual Activity    Alcohol use: No    Drug use: No     Social Drivers of Health     Food Insecurity: No Food Insecurity (7/14/2025)    Hunger Vital Sign     Worried About Running Out of Food in the Last Year: Never true     Ran Out of Food in the Last Year: Never true   Transportation Needs: No Transportation Needs (7/14/2025)    PRAPARE - Transportation     Lack of Transportation (Medical): No     Lack of Transportation (Non-Medical): No   Housing Stability: Low Risk  (7/14/2025)    Housing Stability Vital Sign     Unable to Pay for Housing in the Last Year: No     Number of Times Moved in the Last Year: 0     Homeless in the Last Year: No       MEDICATIONS   HOME MEDICATIONS:   Prior to Admission medications    Medication Sig Start Date End Date Taking? Authorizing Provider   traZODone (DESYREL) 50 MG tablet Take 1 tablet by mouth nightly as needed for Sleep (sleep) 7/21/25  Yes Arnel Yan MD   warfarin (COUMADIN) 2.5 MG tablet Goal INR 2-2.5 for Atrial fib 7/21/25  Yes Arnel Yan MD   saccharomyces boulardii (FLORASTOR) 250 MG capsule Take 1 capsule by mouth 2 times daily for 7 days 7/21/25 7/28/25 Yes Arnel Yan MD   furosemide (LASIX) 20 MG tablet Take 1 tablet by mouth every other day 7/23/25  Yes Arnel Yan MD   polyvinyl alcohol (LIQUIFILM TEARS) 1.4 % ophthalmic solution Place 1 drop into both eyes as needed for Dry Eyes 7/21/25 8/20/25 Yes

## 2025-07-22 NOTE — PROGRESS NOTES
Physician Progress Note      PATIENT:               MAHAD OLIVAS  CSN #:                  [id number]  :                       1940  ADMIT DATE:       2025 1:20 PM  DISCH DATE:  RESPONDING  PROVIDER #:        Arnel Yan MD          QUERY TEXT:    Please clarify the patient?s nutritional status:    The clinical indicators include:  -Hx: prior CVA, possible PNA/UTI, nausea, constipation  -\"Severe malnutrition (07/15/25 1551)  Context:  Acute Illness  Findings of the 6 clinical characteristics of malnutrition:  Energy Intake:  50% or less of estimated energy requirements for 5 or more   days  Weight Loss:  Greater than 7.5% over 3 months  Body Fat Loss:  Mild body fat loss Orbital  Muscle Mass Loss:  Mild muscle mass loss Temples (temporalis), Clavicles   (pectoralis & deltoids)\" -(RD note 7/15)  -Tx: weight, I/O, Magic Cups ONS, RD consult,  Options provided:  -- Protein calorie malnutrition severe  -- Other - I will add my own diagnosis  -- Disagree - Not applicable / Not valid  -- Disagree - Clinically unable to determine / Unknown  -- Refer to Clinical Documentation Reviewer    PROVIDER RESPONSE TEXT:    This patient has severe protein calorie malnutrition.    Query created by: Triny Soares on 2025 12:29 PM      QUERY TEXT:    Sepsis was documented in the medical record on the H/P.  The diagnosis was not   noted in subsequent documentation.  Please clarify the status of this   condition.    The clinical indicators include:  -Hx- Possible PNA, possible UTI  -\"Sepsis 2025, POA -YES\" -(H/P)  - Temp 97.8, WBC 9.0, procalcitonin 0.05, lactic acid 1.8 (labs and VS on   2025)  -Tx: labs, imaging, SLP eval, IV Rocephin/Azithromycin, IVF, urology consult  Options provided:  -- Sepsis ruled out after study  -- Sepsis as evidenced by, please include supporting clinical evidence  -- Other - I will add my own diagnosis  -- Disagree - Not applicable / Not valid  -- Disagree - Clinically  64 y/o M PMH COPD, PAD, HTN p/w chills/LLE pain, bullae adm for severe sepsis 2/2 GAS necrotizing fasciitis s/p debridement 4/6, w/ course c/b JUNAID (now resolved) now s/p integra/VAC placement 4/13.

## 2025-07-23 ENCOUNTER — ANESTHESIA (OUTPATIENT)
Dept: ENDOSCOPY | Age: 85
End: 2025-07-23
Payer: MEDICARE

## 2025-07-23 LAB
CORTIS AM PEAK SERPL-MCNC: 13.3 UG/DL (ref 4.3–22.4)
INR PPP: 1.35 (ref 0.86–1.14)
PROTHROMBIN TIME: 16.9 SEC (ref 12.1–14.9)

## 2025-07-23 PROCEDURE — 2580000003 HC RX 258: Performed by: NURSE ANESTHETIST, CERTIFIED REGISTERED

## 2025-07-23 PROCEDURE — 88305 TISSUE EXAM BY PATHOLOGIST: CPT

## 2025-07-23 PROCEDURE — 3609012400 HC EGD TRANSORAL BIOPSY SINGLE/MULTIPLE: Performed by: INTERNAL MEDICINE

## 2025-07-23 PROCEDURE — 7100000010 HC PHASE II RECOVERY - FIRST 15 MIN: Performed by: INTERNAL MEDICINE

## 2025-07-23 PROCEDURE — 36415 COLL VENOUS BLD VENIPUNCTURE: CPT

## 2025-07-23 PROCEDURE — 6360000002 HC RX W HCPCS: Performed by: NURSE PRACTITIONER

## 2025-07-23 PROCEDURE — 82533 TOTAL CORTISOL: CPT

## 2025-07-23 PROCEDURE — 6370000000 HC RX 637 (ALT 250 FOR IP): Performed by: STUDENT IN AN ORGANIZED HEALTH CARE EDUCATION/TRAINING PROGRAM

## 2025-07-23 PROCEDURE — 6370000000 HC RX 637 (ALT 250 FOR IP): Performed by: SURGERY

## 2025-07-23 PROCEDURE — 85610 PROTHROMBIN TIME: CPT

## 2025-07-23 PROCEDURE — 6370000000 HC RX 637 (ALT 250 FOR IP): Performed by: PHYSICIAN ASSISTANT

## 2025-07-23 PROCEDURE — 3609017100 HC EGD: Performed by: INTERNAL MEDICINE

## 2025-07-23 PROCEDURE — 7100000011 HC PHASE II RECOVERY - ADDTL 15 MIN: Performed by: INTERNAL MEDICINE

## 2025-07-23 PROCEDURE — 6360000002 HC RX W HCPCS: Performed by: NURSE ANESTHETIST, CERTIFIED REGISTERED

## 2025-07-23 PROCEDURE — 1200000000 HC SEMI PRIVATE

## 2025-07-23 PROCEDURE — 2500000003 HC RX 250 WO HCPCS: Performed by: STUDENT IN AN ORGANIZED HEALTH CARE EDUCATION/TRAINING PROGRAM

## 2025-07-23 PROCEDURE — 3700000001 HC ADD 15 MINUTES (ANESTHESIA): Performed by: INTERNAL MEDICINE

## 2025-07-23 PROCEDURE — 0DB28ZX EXCISION OF MIDDLE ESOPHAGUS, VIA NATURAL OR ARTIFICIAL OPENING ENDOSCOPIC, DIAGNOSTIC: ICD-10-PCS | Performed by: INTERNAL MEDICINE

## 2025-07-23 PROCEDURE — 3700000000 HC ANESTHESIA ATTENDED CARE: Performed by: INTERNAL MEDICINE

## 2025-07-23 PROCEDURE — 6370000000 HC RX 637 (ALT 250 FOR IP): Performed by: INTERNAL MEDICINE

## 2025-07-23 PROCEDURE — 0DB38ZX EXCISION OF LOWER ESOPHAGUS, VIA NATURAL OR ARTIFICIAL OPENING ENDOSCOPIC, DIAGNOSTIC: ICD-10-PCS | Performed by: INTERNAL MEDICINE

## 2025-07-23 PROCEDURE — 2709999900 HC NON-CHARGEABLE SUPPLY: Performed by: INTERNAL MEDICINE

## 2025-07-23 RX ORDER — LABETALOL HYDROCHLORIDE 5 MG/ML
10 INJECTION, SOLUTION INTRAVENOUS
Status: DISCONTINUED | OUTPATIENT
Start: 2025-07-23 | End: 2025-07-23 | Stop reason: HOSPADM

## 2025-07-23 RX ORDER — SODIUM CHLORIDE 0.9 % (FLUSH) 0.9 %
5-40 SYRINGE (ML) INJECTION EVERY 12 HOURS SCHEDULED
Status: DISCONTINUED | OUTPATIENT
Start: 2025-07-23 | End: 2025-07-23 | Stop reason: HOSPADM

## 2025-07-23 RX ORDER — SODIUM CHLORIDE 9 MG/ML
INJECTION, SOLUTION INTRAVENOUS PRN
Status: DISCONTINUED | OUTPATIENT
Start: 2025-07-23 | End: 2025-07-23 | Stop reason: HOSPADM

## 2025-07-23 RX ORDER — SODIUM CHLORIDE, SODIUM LACTATE, POTASSIUM CHLORIDE, CALCIUM CHLORIDE 600; 310; 30; 20 MG/100ML; MG/100ML; MG/100ML; MG/100ML
INJECTION, SOLUTION INTRAVENOUS
Status: DISCONTINUED | OUTPATIENT
Start: 2025-07-23 | End: 2025-07-23 | Stop reason: SDUPTHER

## 2025-07-23 RX ORDER — PROPOFOL 10 MG/ML
INJECTION, EMULSION INTRAVENOUS
Status: DISCONTINUED | OUTPATIENT
Start: 2025-07-23 | End: 2025-07-23 | Stop reason: SDUPTHER

## 2025-07-23 RX ORDER — PANTOPRAZOLE SODIUM 40 MG/10ML
40 INJECTION, POWDER, LYOPHILIZED, FOR SOLUTION INTRAVENOUS 2 TIMES DAILY
Status: DISCONTINUED | OUTPATIENT
Start: 2025-07-23 | End: 2025-07-25

## 2025-07-23 RX ORDER — LIDOCAINE HYDROCHLORIDE 20 MG/ML
INJECTION, SOLUTION EPIDURAL; INFILTRATION; INTRACAUDAL; PERINEURAL
Status: DISCONTINUED | OUTPATIENT
Start: 2025-07-23 | End: 2025-07-23 | Stop reason: SDUPTHER

## 2025-07-23 RX ORDER — ONDANSETRON 2 MG/ML
4 INJECTION INTRAMUSCULAR; INTRAVENOUS EVERY 30 MIN PRN
Status: DISCONTINUED | OUTPATIENT
Start: 2025-07-23 | End: 2025-07-23 | Stop reason: HOSPADM

## 2025-07-23 RX ORDER — OXYCODONE HYDROCHLORIDE 5 MG/1
5 TABLET ORAL PRN
Status: DISCONTINUED | OUTPATIENT
Start: 2025-07-23 | End: 2025-07-23 | Stop reason: HOSPADM

## 2025-07-23 RX ORDER — OXYCODONE HYDROCHLORIDE 5 MG/1
10 TABLET ORAL PRN
Status: DISCONTINUED | OUTPATIENT
Start: 2025-07-23 | End: 2025-07-23 | Stop reason: HOSPADM

## 2025-07-23 RX ORDER — WARFARIN SODIUM 2.5 MG/1
2.5 TABLET ORAL
Status: COMPLETED | OUTPATIENT
Start: 2025-07-23 | End: 2025-07-23

## 2025-07-23 RX ORDER — SODIUM CHLORIDE 0.9 % (FLUSH) 0.9 %
5-40 SYRINGE (ML) INJECTION PRN
Status: DISCONTINUED | OUTPATIENT
Start: 2025-07-23 | End: 2025-07-23 | Stop reason: HOSPADM

## 2025-07-23 RX ORDER — PHENYLEPHRINE HCL IN 0.9% NACL 1 MG/10 ML
SYRINGE (ML) INTRAVENOUS
Status: DISCONTINUED | OUTPATIENT
Start: 2025-07-23 | End: 2025-07-23 | Stop reason: SDUPTHER

## 2025-07-23 RX ADMIN — ACETAMINOPHEN 650 MG: 325 TABLET ORAL at 04:40

## 2025-07-23 RX ADMIN — LIDOCAINE HYDROCHLORIDE 100 MG: 20 INJECTION, SOLUTION EPIDURAL; INFILTRATION; INTRACAUDAL at 14:12

## 2025-07-23 RX ADMIN — DONEPEZIL HYDROCHLORIDE 5 MG: 5 TABLET, FILM COATED ORAL at 21:20

## 2025-07-23 RX ADMIN — SODIUM CHLORIDE, PRESERVATIVE FREE 10 ML: 5 INJECTION INTRAVENOUS at 21:21

## 2025-07-23 RX ADMIN — ALLOPURINOL 300 MG: 300 TABLET ORAL at 21:21

## 2025-07-23 RX ADMIN — TRAZODONE HYDROCHLORIDE 50 MG: 50 TABLET ORAL at 21:21

## 2025-07-23 RX ADMIN — SODIUM CHLORIDE, SODIUM LACTATE, POTASSIUM CHLORIDE, AND CALCIUM CHLORIDE: .6; .31; .03; .02 INJECTION, SOLUTION INTRAVENOUS at 14:07

## 2025-07-23 RX ADMIN — ACETAMINOPHEN 650 MG: 325 TABLET ORAL at 21:19

## 2025-07-23 RX ADMIN — PROPOFOL 10 MG: 10 INJECTION, EMULSION INTRAVENOUS at 14:16

## 2025-07-23 RX ADMIN — Medication 100 MCG: at 14:27

## 2025-07-23 RX ADMIN — WARFARIN SODIUM 2.5 MG: 2.5 TABLET ORAL at 17:15

## 2025-07-23 RX ADMIN — PROPOFOL 50 MG: 10 INJECTION, EMULSION INTRAVENOUS at 14:12

## 2025-07-23 RX ADMIN — PROCHLORPERAZINE EDISYLATE 10 MG: 5 INJECTION INTRAMUSCULAR; INTRAVENOUS at 20:29

## 2025-07-23 RX ADMIN — SENNOSIDES AND DOCUSATE SODIUM 2 TABLET: 50; 8.6 TABLET ORAL at 21:20

## 2025-07-23 RX ADMIN — ATORVASTATIN CALCIUM 40 MG: 40 TABLET, FILM COATED ORAL at 21:20

## 2025-07-23 ASSESSMENT — PAIN SCALES - WONG BAKER: WONGBAKER_NUMERICALRESPONSE: HURTS LITTLE MORE

## 2025-07-23 ASSESSMENT — PAIN DESCRIPTION - LOCATION
LOCATION: BUTTOCKS
LOCATION: BACK

## 2025-07-23 ASSESSMENT — PAIN DESCRIPTION - DESCRIPTORS
DESCRIPTORS: ACHING
DESCRIPTORS: ACHING

## 2025-07-23 ASSESSMENT — PAIN SCALES - GENERAL: PAINLEVEL_OUTOF10: 0

## 2025-07-23 ASSESSMENT — PAIN - FUNCTIONAL ASSESSMENT
PAIN_FUNCTIONAL_ASSESSMENT: 0-10
PAIN_FUNCTIONAL_ASSESSMENT: PREVENTS OR INTERFERES SOME ACTIVE ACTIVITIES AND ADLS
PAIN_FUNCTIONAL_ASSESSMENT: NONE - DENIES PAIN

## 2025-07-23 ASSESSMENT — PAIN DESCRIPTION - ORIENTATION: ORIENTATION: OTHER (COMMENT)

## 2025-07-23 NOTE — H&P
[facility] ENDO   Procedure H&P    Patient: Jhony Hdez MRN: [id number]     YOB: 1940  Age: 84 y.o.  Sex: male    Unit: [facility] ENDO Room/Bed: ASC Endo Pool/NONE Location: [facility]     Procedure: Procedure(s):  ESOPHAGOGASTRODUODENOSCOPY    Indication:nausea vomiting  Referring  Physician:        Brief history: He is fairly debilitated and difficult to get a history from. Does have some acid reflux. He has a history of having an upper endoscopy last done in 12/20/2017 where there was noted to be lower esophageal ring. Patient is on Pepcid at home. Patient has been dealing with hydronephrosis had bilateral stents placed. He has also been experiencing a problem with chronic constipation and received enemas to disimpact him. 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     Nurses past medical history notes reviewed and agreed.   Medications reviewed.    Allergies: Patient has no known allergies.     Allergies noted: Yes     Past Medical History:   Past Medical History:   Diagnosis Date    CHF (congestive heart failure) (HCC)     Chronic kidney disease     kidney stones    Colon cancer (HCC)     Guillain Barré syndrome     Hyperlipidemia     Hypertension     Left ureteral stone     Right Ureteral mass     Stroke ([facility]) 05/15/2025       Past Surgical History:   Past Surgical History:   Procedure Laterality Date    ADRENALECTOMY      groin    BACK SURGERY      CARPAL TUNNEL RELEASE      bilateral    COLONOSCOPY      CYSTOSCOPY Bilateral 03/27/2025    CYSTOSCOPY, BILATERAL STENT PLACEMENT,BILATERAL RETROGRADE PYELOGRAM performed by Eduardo Mendoza MD [place] [facility] [place]    CYSTOSCOPY Bilateral 04/25/2025    CYSTOSCOPY RIGHT DIAGNOSTIC URETEROSCOPY RIGHT URETERAL MASS, EXCISION WITH HOLMIUM LASER, LEFT URETEROSCOPY HOLMIUM LASER STONE MANIPULATION WITH CVAC, BILATERAL STENT EXCHANGE  14.9 sec Final    INR 07/06/2025 3.05 (H)  0.86 - 1.14 Final    Magnesium 07/06/2025 1.91  1.80 - 2.40 mg/dL Final    Troponin, High Sensitivity 07/06/2025 50 (H)  0 - 22 ng/L Final    Sodium 07/07/2025 137  136 - 145 mmol/L Final    Potassium reflex Magnesium 07/07/2025 3.7  3.5 - 5.1 mmol/L Final    Chloride 07/07/2025 104  99 - 110 mmol/L Final    CO2 07/07/2025 25  21 - 32 mmol/L Final    Anion Gap 07/07/2025 8  3 - 16 Final    Glucose 07/07/2025 101 (H)  70 - 99 mg/dL Final    BUN 07/07/2025 13  7 - 20 mg/dL Final    Creatinine 07/07/2025 0.9  0.8 - 1.3 mg/dL Final    Est, Glom Filt Rate 07/07/2025 83  >60 Final    Calcium 07/07/2025 9.0  8.3 - 10.6 mg/dL Final    WBC 07/07/2025 6.6  4.0 - 11.0 K/uL Final    RBC 07/07/2025 4.16 (L)  4.20 - 5.90 M/uL Final    Hemoglobin 07/07/2025 11.5 (L)  13.5 - 17.5 g/dL Final    Hematocrit 07/07/2025 35.6 (L)  40.5 - 52.5 % Final    MCV 07/07/2025 85.7  80.0 - 100.0 fL Final    MCH 07/07/2025 27.6  26.0 - 34.0 pg Final    MCHC 07/07/2025 32.2  31.0 - 36.0 g/dL Final    RDW 07/07/2025 18.0 (H)  12.4 - 15.4 % Final    Platelets 07/07/2025 258  135 - 450 K/uL Final    MPV 07/07/2025 8.1  5.0 - 10.5 fL Final    Neutrophils % 07/07/2025 70.6  % Final    Lymphocytes % 07/07/2025 17.6  % Final    Monocytes % 07/07/2025 10.0  % Final    Eosinophils % 07/07/2025 0.9  % Final    Basophils % 07/07/2025 0.9  % Final    Neutrophils Absolute 07/07/2025 4.7  1.7 - 7.7 K/uL Final    Lymphocytes Absolute 07/07/2025 1.2  1.0 - 5.1 K/uL Final    Monocytes Absolute 07/07/2025 0.7  0.0 - 1.3 K/uL Final    Eosinophils Absolute 07/07/2025 0.1  0.0 - 0.6 K/uL Final    Basophils Absolute 07/07/2025 0.1  0.0 - 0.2 K/uL Final    Protime 07/07/2025 29.1 (H)  12.1 - 14.9 sec Final    INR 07/07/2025 2.82 (H)  0.86 - 1.14 Final    Troponin, High Sensitivity 07/07/2025 59 (H)  0 - 22 ng/L Final    Total Protein 07/07/2025 5.8 (L)  6.4 - 8.2 g/dL Final    Albumin 07/07/2025 3.2 (L)  3.4 - 5.0 g/dL Final

## 2025-07-23 NOTE — CARE COORDINATION
Plan for EGD today.  Spoke to patient and wife.  Wife reports patient continues to not be able to eat or drink.  Continues to plan for [facility] on d/c.  Facility updated.  Per facility, patient can NOT be 1:1 feed on d/c.  CM will continue to follow.  Electronically signed by ORTIZ Adhikari on 7/23/2025 at 12:02 PM

## 2025-07-23 NOTE — PROGRESS NOTES
[facility] Medicine Progress Note  V 5.17      Date of Admission: 7/13/2025    Hospital Day: 11      Chief Admission Complaint: Generalized weakness    Subjective: Mental status change.    Presenting Admission History:       84 y.o. male who presented for nausea and cough. 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 Due to persistent bradycardia, a dual chamber ICD was also placed that admission. Discharged to Acute Rehab until 7/3/25. He was re-admitted at [facility] from 7/4-7/7/25 for N/V thought to be related to Nephrolithiasis and Hydronephrosis. Urology was consulted and underwent Cysto with bilateral stent exchange on 7/5/25. As he was deemed not a candidate for right Nephroureterectomy, plan was to consider percutaneous Nephrostomy tubes if stents were to fail. He was discharged home on 7/7/25 with plan for Urology follow-up to determine further management of right sided Urothelial Carcinoma. 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      He now presented back to [facility] ED on 7/13/25 with recurrent N/V along with productive cough for several days. CT scan picking up on some respiratory pathology that may indicated pneumonia. Gross hematuria noted; pt with hx of severe h/l hydronephrosis s/p R ureterostomy tube. INR supratherapeutic as well. Admited patient for treatment of suspected pneumonia, supra-therapeutic INR, and urology evaluation.

## 2025-07-23 NOTE — PLAN OF CARE
Problem: Chronic Conditions and Co-morbidities  Goal: Patient's chronic conditions and co-morbidity symptoms are monitored and maintained or improved  7/23/2025 0903 by Elmira Forrester RN  Outcome: Progressing  7/22/2025 2011 by Diogenes Pineda RN  Outcome: Progressing     Problem: Discharge Planning  Goal: Discharge to home or other facility with appropriate resources  7/23/2025 0903 by Elmira Forrester RN  Outcome: Progressing  7/22/2025 2011 by Diogenes Pineda RN  Outcome: Progressing     Problem: Safety - Adult  Goal: Free from fall injury  7/23/2025 0903 by Elmira Forrester RN  Outcome: Progressing  7/22/2025 2011 by Diogenes Pineda RN  Outcome: Progressing     Problem: ABCDS Injury Assessment  Goal: Absence of physical injury  7/23/2025 0903 by Elmira Forrester RN  Outcome: Progressing  7/22/2025 2011 by Diogenes Pineda RN  Outcome: Progressing     Problem: Skin/Tissue Integrity  Goal: Skin integrity remains intact  Description: 1.  Monitor for areas of redness and/or skin breakdown  2.  Assess vascular access sites hourly  3.  Every 4-6 hours minimum:  Change oxygen saturation probe site  4.  Every 4-6 hours:  If on nasal continuous positive airway pressure, respiratory therapy assess nares and determine need for appliance change or resting period  7/23/2025 0903 by Elmira Forrester RN  Outcome: Progressing  Flowsheets (Taken 7/23/2025 0902)  Skin Integrity Remains Intact:   Monitor for areas of redness and/or skin breakdown   Turn and reposition as indicated  7/22/2025 2011 by Diogenes Pineda RN  Outcome: Progressing     Problem: Nutrition Deficit:  Goal: Optimize nutritional status  7/23/2025 0903 by Elmira Forrester RN  Outcome: Progressing  7/22/2025 2011 by Diogenes Pineda RN  Outcome: Progressing     Problem: Respiratory - Adult  Goal: Achieves optimal ventilation and oxygenation  7/23/2025 0903 by Elmira Forrester RN  Outcome: Progressing  7/22/2025 2011 by Miriam

## 2025-07-23 NOTE — ANESTHESIA PRE PROCEDURE
Department of Anesthesiology  Preprocedure Note       Name:  Jhony Hdez   Age:  84 y.o.  :  1940                                          MRN:  [id number]         Date:  2025      Surgeon: Surgeon(s):  Girish Beauchamp DO    Procedure: Procedure(s):  ESOPHAGOGASTRODUODENOSCOPY    Medications prior to admission:   Prior to Admission medications    Medication Sig Start Date End Date Taking? Authorizing Provider   traZODone (DESYREL) 50 MG tablet Take 1 tablet by mouth nightly as needed for Sleep (sleep) 25  Yes Arnel Yan MD   warfarin (COUMADIN) 2.5 MG tablet Goal INR 2-2.5 for Atrial fib 25  Yes Arnel Yan MD   saccharomyces boulardii (FLORASTOR) 250 MG capsule Take 1 capsule by mouth 2 times daily for 7 days 25 Yes Arnel Yan MD   furosemide (LASIX) 20 MG tablet Take 1 tablet by mouth every other day 25  Yes Arnel Yan MD   polyvinyl alcohol (LIQUIFILM TEARS) 1.4 % ophthalmic solution Place 1 drop into both eyes as needed for Dry Eyes 25 Yes Arnel Yan MD   midodrine (PROAMATINE) 10 MG tablet Take 1 tablet by mouth every 8 hours as needed (SBP <120) 25  Yes Arnel Yan MD   acetaminophen (TYLENOL) 650 MG extended release tablet Take 1 tablet by mouth every 8 hours as needed for Pain   Yes Alhaji Alonzo MD   atorvastatin (LIPITOR) 40 MG tablet Take 1 tablet by mouth nightly 6/3/25  Yes Abdi Diaz MD   allopurinol (ZYLOPRIM) 300 MG tablet Take 1 tablet by mouth at bedtime   Yes Alhaji Alonzo MD   donepezil (ARICEPT) 5 MG tablet Take 1 tablet by mouth nightly   Yes Alhaji Alonzo MD   famotidine (PEPCID) 20 MG tablet Take 1 tablet by mouth nightly as needed    Alhaji Alonzo MD       Current medications:    Current Facility-Administered Medications   Medication Dose Route Frequency Provider Last Rate Last Admin   • warfarin (COUMADIN) tablet 2.5 mg  2.5 mg

## 2025-07-23 NOTE — PROGRESS NOTES
Transported patient to inpatient room with hospital transport staff  Per sherri with side rails up x2  VSS  Resp unchanged from previous assessment  GI assessment unchanged   Physician out to discuss exam with family   CMU called   Report called to floor by Tamar ricci RN

## 2025-07-23 NOTE — PROGRESS NOTES
Report to Kelly Fulton wife remains at bedside  and patient to go to room waiting to be updated per Md per wife's request.

## 2025-07-23 NOTE — PLAN OF CARE
Problem: Chronic Conditions and Co-morbidities  Goal: Patient's chronic conditions and co-morbidity symptoms are monitored and maintained or improved  Outcome: Progressing     Problem: Discharge Planning  Goal: Discharge to home or other facility with appropriate resources  Outcome: Progressing     Problem: Safety - Adult  Goal: Free from fall injury  7/22/2025 2011 by Diogenes Pineda, RN  Outcome: Progressing  7/22/2025 1754 by Shey Jovel RN  Outcome: Progressing     Problem: ABCDS Injury Assessment  Goal: Absence of physical injury  Outcome: Progressing     Problem: Skin/Tissue Integrity  Goal: Skin integrity remains intact  Description: 1.  Monitor for areas of redness and/or skin breakdown  2.  Assess vascular access sites hourly  3.  Every 4-6 hours minimum:  Change oxygen saturation probe site  4.  Every 4-6 hours:  If on nasal continuous positive airway pressure, respiratory therapy assess nares and determine need for appliance change or resting period  Outcome: Progressing     Problem: Nutrition Deficit:  Goal: Optimize nutritional status  Outcome: Progressing     Problem: Respiratory - Adult  Goal: Achieves optimal ventilation and oxygenation  Outcome: Progressing     Problem: Cardiovascular - Adult  Goal: Maintains optimal cardiac output and hemodynamic stability  Outcome: Progressing     Problem: Skin/Tissue Integrity - Adult  Goal: Skin integrity remains intact  Description: 1.  Monitor for areas of redness and/or skin breakdown  2.  Assess vascular access sites hourly  3.  Every 4-6 hours minimum:  Change oxygen saturation probe site  4.  Every 4-6 hours:  If on nasal continuous positive airway pressure, respiratory therapy assess nares and determine need for appliance change or resting period  Outcome: Progressing     Problem: Musculoskeletal - Adult  Goal: Maintain proper alignment of affected body part  Outcome: Progressing     Problem: Genitourinary - Adult  Goal: Absence of urinary

## 2025-07-23 NOTE — ANESTHESIA POSTPROCEDURE EVALUATION
Department of Anesthesiology  Postprocedure Note    Patient: Jhony Hdez  MRN: [id number]  YOB: 1940  Date of evaluation: 7/23/2025    Procedure Summary       Date: 07/23/25 Room / Location: [place] [address] / [facility]    Anesthesia Start: 1407 Anesthesia Stop: 1423    Procedures:       ESOPHAGOGASTRODUODENOSCOPY      ESOPHAGOGASTRODUODENOSCOPY BIOPSY Diagnosis:       Nausea and vomiting in adult      (Nausea and vomiting in adult [R11.2])    Surgeons: Girish Beauchamp DO Responsible Provider: Warren Burns MD    Anesthesia Type: MAC ASA Status: 3            Anesthesia Type: No value filed.    Aly Phase I: Aly Score: 10    Aly Phase II: Aly Score: 9    Anesthesia Post Evaluation    Comments: Anes Post-op Note    Name:    Jhony Hdez  MRN:      [id number]    Patient Vitals in the past 12 hrs:  07/23/25 1544, BP:108/79, Temp:97.5 °F (36.4 °C), Temp src:Oral, Pulse:94, Resp:18, SpO2:98 %  07/23/25 1500, BP:98/70, Pulse:65, Resp:18, SpO2:99 %  07/23/25 1444, BP:107/77, Pulse:57, Resp:16, SpO2:97 %  07/23/25 1434, BP:103/70, Pulse:80, Resp:15, SpO2:96 %  07/23/25 1430, BP:109/79, Pulse:75, Resp:12, SpO2:98 %  07/23/25 1424, BP:(!) 85/65, Pulse:56, Resp:15, SpO2:97 %  07/23/25 1324, BP:93/61, Temp:97.6 °F (36.4 °C), Temp src:Oral, Pulse:52, Resp:14, SpO2:97 %, Height:1.778 m (5' 10\"), Weight:65.8 kg (145 lb)  07/23/25 1145, BP:(!) 121/93, Temp:97.8 °F (36.6 °C), Temp src:Oral, Pulse:87, Resp:16, SpO2:100 %  07/23/25 0830, BP:120/78, Temp:97.9 °F (36.6 °C), Temp src:Oral, Pulse:87, Resp:16, SpO2:97 %     LABS:    CBC  Lab Results       Component                Value               Date/Time                  WBC                      10.0                07/21/2025 07:10 AM        HGB                      12.4 (L)            07/21/2025 07:10 AM        HCT                      38.3 (L)            07/21/2025 07:10 AM        PLT                      321                 07/21/2025

## 2025-07-23 NOTE — PROGRESS NOTES
Pharmacy Note  Warfarin Consult  Dx: AFib  Goal INR range 2-2.5  Home Warfarin dose: 2.5 mg Mon/Fri, 5 mg all other days      Date                 INR                  Warfarin  7/14                5.43                   Hold  7/15                5.77                   Hold  7/16                4.72                   Hold  7/17                4.08                   Hold   7/18                2.88                   2.5 mg   7/19                2.66                     2 mg  7/20                2.47                     2 mg  7/21                2.24                   2.5 mg    7/22                2.39                   2 mg   7/23                1.35                  2.5 mg      Recommend Warfarin 2.5 mg tonight x1.  Daily INR ordered.  Rx will continue to manage therapy per consult order.  Joe Sibley, PharmD, BCPS 7/23/2025 8:14 AM

## 2025-07-23 NOTE — PROGRESS NOTES
Occupational/Physical Therapy    Attempted to see pt for follow up OT/PT session. Pt off floor at this time. Will re-attempt as pt condition and therapy schedule permit.       Leonor Pradhan, OTR/KATHARINA Betancourt, PT, DPT

## 2025-07-24 LAB
ALBUMIN SERPL-MCNC: 3 G/DL (ref 3.4–5)
ALBUMIN/GLOB SERPL: 1 {RATIO} (ref 1.1–2.2)
ALP SERPL-CCNC: 128 U/L (ref 40–129)
ALT SERPL-CCNC: 8 U/L (ref 10–40)
ANION GAP SERPL CALCULATED.3IONS-SCNC: 9 MMOL/L (ref 3–16)
AST SERPL-CCNC: 22 U/L (ref 15–37)
BASOPHILS # BLD: 0.1 K/UL (ref 0–0.2)
BASOPHILS NFR BLD: 1 %
BILIRUB SERPL-MCNC: 0.7 MG/DL (ref 0–1)
BUN SERPL-MCNC: 11 MG/DL (ref 7–20)
CALCIUM SERPL-MCNC: 8.8 MG/DL (ref 8.3–10.6)
CHLORIDE SERPL-SCNC: 101 MMOL/L (ref 99–110)
CO2 SERPL-SCNC: 27 MMOL/L (ref 21–32)
CREAT SERPL-MCNC: 0.8 MG/DL (ref 0.8–1.3)
DEPRECATED RDW RBC AUTO: 18.2 % (ref 12.4–15.4)
EOSINOPHIL # BLD: 0.2 K/UL (ref 0–0.6)
EOSINOPHIL NFR BLD: 2.3 %
GFR SERPLBLD CREATININE-BSD FMLA CKD-EPI: 86 ML/MIN/{1.73_M2}
GLUCOSE SERPL-MCNC: 87 MG/DL (ref 70–99)
HCT VFR BLD AUTO: 36.4 % (ref 40.5–52.5)
HGB BLD-MCNC: 11.9 G/DL (ref 13.5–17.5)
INR PPP: 1.07 (ref 0.86–1.14)
LYMPHOCYTES # BLD: 1.6 K/UL (ref 1–5.1)
LYMPHOCYTES NFR BLD: 24.7 %
MCH RBC QN AUTO: 27.1 PG (ref 26–34)
MCHC RBC AUTO-ENTMCNC: 32.6 G/DL (ref 31–36)
MCV RBC AUTO: 83.1 FL (ref 80–100)
MONOCYTES # BLD: 0.6 K/UL (ref 0–1.3)
MONOCYTES NFR BLD: 9.1 %
NEUTROPHILS # BLD: 4.1 K/UL (ref 1.7–7.7)
NEUTROPHILS NFR BLD: 62.9 %
PLATELET # BLD AUTO: 322 K/UL (ref 135–450)
PMV BLD AUTO: 8 FL (ref 5–10.5)
POTASSIUM SERPL-SCNC: 4.1 MMOL/L (ref 3.5–5.1)
PROT SERPL-MCNC: 5.9 G/DL (ref 6.4–8.2)
PROTHROMBIN TIME: 14.2 SEC (ref 12.1–14.9)
RBC # BLD AUTO: 4.38 M/UL (ref 4.2–5.9)
SODIUM SERPL-SCNC: 137 MMOL/L (ref 136–145)
WBC # BLD AUTO: 6.6 K/UL (ref 4–11)

## 2025-07-24 PROCEDURE — 6360000002 HC RX W HCPCS: Performed by: INTERNAL MEDICINE

## 2025-07-24 PROCEDURE — 85610 PROTHROMBIN TIME: CPT

## 2025-07-24 PROCEDURE — 1200000000 HC SEMI PRIVATE

## 2025-07-24 PROCEDURE — 85025 COMPLETE CBC W/AUTO DIFF WBC: CPT

## 2025-07-24 PROCEDURE — 2500000003 HC RX 250 WO HCPCS: Performed by: STUDENT IN AN ORGANIZED HEALTH CARE EDUCATION/TRAINING PROGRAM

## 2025-07-24 PROCEDURE — 6370000000 HC RX 637 (ALT 250 FOR IP): Performed by: INTERNAL MEDICINE

## 2025-07-24 PROCEDURE — 97110 THERAPEUTIC EXERCISES: CPT

## 2025-07-24 PROCEDURE — 97530 THERAPEUTIC ACTIVITIES: CPT

## 2025-07-24 PROCEDURE — 36415 COLL VENOUS BLD VENIPUNCTURE: CPT

## 2025-07-24 PROCEDURE — 6370000000 HC RX 637 (ALT 250 FOR IP): Performed by: SURGERY

## 2025-07-24 PROCEDURE — 6370000000 HC RX 637 (ALT 250 FOR IP): Performed by: STUDENT IN AN ORGANIZED HEALTH CARE EDUCATION/TRAINING PROGRAM

## 2025-07-24 PROCEDURE — 80053 COMPREHEN METABOLIC PANEL: CPT

## 2025-07-24 RX ORDER — SUCRALFATE 1 G/1
1 TABLET ORAL
Status: DISCONTINUED | OUTPATIENT
Start: 2025-07-24 | End: 2025-07-25 | Stop reason: HOSPADM

## 2025-07-24 RX ORDER — ENOXAPARIN SODIUM 100 MG/ML
1 INJECTION SUBCUTANEOUS 2 TIMES DAILY
Status: DISCONTINUED | OUTPATIENT
Start: 2025-07-24 | End: 2025-07-25 | Stop reason: HOSPADM

## 2025-07-24 RX ORDER — WARFARIN SODIUM 2.5 MG/1
2.5 TABLET ORAL
Status: DISCONTINUED | OUTPATIENT
Start: 2025-07-24 | End: 2025-07-24

## 2025-07-24 RX ORDER — WARFARIN SODIUM 5 MG/1
5 TABLET ORAL
Status: COMPLETED | OUTPATIENT
Start: 2025-07-24 | End: 2025-07-24

## 2025-07-24 RX ADMIN — SODIUM CHLORIDE, PRESERVATIVE FREE 10 ML: 5 INJECTION INTRAVENOUS at 09:48

## 2025-07-24 RX ADMIN — ALLOPURINOL 300 MG: 300 TABLET ORAL at 20:27

## 2025-07-24 RX ADMIN — SENNOSIDES AND DOCUSATE SODIUM 2 TABLET: 50; 8.6 TABLET ORAL at 09:44

## 2025-07-24 RX ADMIN — ATORVASTATIN CALCIUM 40 MG: 40 TABLET, FILM COATED ORAL at 20:27

## 2025-07-24 RX ADMIN — ENOXAPARIN SODIUM 70 MG: 100 INJECTION SUBCUTANEOUS at 11:30

## 2025-07-24 RX ADMIN — SODIUM CHLORIDE, PRESERVATIVE FREE 10 ML: 5 INJECTION INTRAVENOUS at 20:28

## 2025-07-24 RX ADMIN — ENOXAPARIN SODIUM 70 MG: 100 INJECTION SUBCUTANEOUS at 20:27

## 2025-07-24 RX ADMIN — PANTOPRAZOLE SODIUM 40 MG: 40 INJECTION, POWDER, FOR SOLUTION INTRAVENOUS at 20:27

## 2025-07-24 RX ADMIN — SUCRALFATE 1 G: 1 TABLET ORAL at 17:42

## 2025-07-24 RX ADMIN — SUCRALFATE 1 G: 1 TABLET ORAL at 09:48

## 2025-07-24 RX ADMIN — WARFARIN SODIUM 5 MG: 5 TABLET ORAL at 17:42

## 2025-07-24 RX ADMIN — DONEPEZIL HYDROCHLORIDE 5 MG: 5 TABLET, FILM COATED ORAL at 20:27

## 2025-07-24 RX ADMIN — PANTOPRAZOLE SODIUM 40 MG: 40 INJECTION, POWDER, FOR SOLUTION INTRAVENOUS at 09:44

## 2025-07-24 RX ADMIN — TRAZODONE HYDROCHLORIDE 50 MG: 50 TABLET ORAL at 20:30

## 2025-07-24 RX ADMIN — MIDODRINE HYDROCHLORIDE 10 MG: 5 TABLET ORAL at 09:43

## 2025-07-24 NOTE — PROGRESS NOTES
Occupational Therapy  Facility/Department: [place] [address] [facility]  Daily Treatment Note  NAME: Jhony Hdez  : 1940  MRN: [id number]    Date of Service: 2025    Discharge Recommendations:  Subacute/Skilled Nursing Facility  OT Equipment Recommendations  Equipment Needed: No  Other: defer    Therapy discharge recommendations are subject to collaboration from the patient’s interdisciplinary healthcare team, including MD and case management recommendations.     Barriers to Home Discharge:   [] Steps to access home entry or bed/bath:   [x] Unable to transfer, ambulate, or propel wheelchair household distances without assist   [] Limited available assist at home upon discharge    [] Patient or family requests d/c to post-acute facility    [x] Poor cognition/safety awareness for d/c to home alone    [] Unable to maintain ordered weight bearing status    [x] Patient with salient signs of long-standing immobility   [x] Decreased independence with ADLs   [x] Increased risk for falls   [] Other:     If pt is unable to be seen after this session, please let this note serve as discharge summary.  Please see case management note for discharge disposition.  Thank you.    Patient Diagnosis(es): The primary encounter diagnosis was Pneumonia of both lower lobes due to infectious organism. Diagnoses of Nausea and vomiting, unspecified vomiting type, Elevated troponin, Abnormal urinalysis, Goals of care, counseling/discussion, and Nausea and vomiting in adult were also pertinent to this visit.     Assessment   Assessment: Pt supine in bed at start of session and tolerates OT session poorly. Pt completes bed mobility with maxAx2 and tolerates sitting EOB ~8 minutes with min-maxA for sitting balance - pt demos L sided and posterior lean and requires max verbal cues to lean forward. Pt reports pain in buttocks with activity and is assisted back to supine d/t limited endurance. Pt is limited by generalized weakness,

## 2025-07-24 NOTE — PROGRESS NOTES
SLP Therapy  Attempted Bedside Swallow Follow-Up      SLP completed chart review and arrived to unit to complete swallow follow-up with this pt. Pt is ccurently working with PT in his room.    SLP will re-attempt follow-up as schedule allows.      Caro Bedolla MA CCC-SLP SP#[id number]  Speech-Language Pathologist

## 2025-07-24 NOTE — PROGRESS NOTES
[facility]und[facility]      LABS AND IMAGING   Laboratory   Recent Labs     07/24/25  0649   WBC 6.6   HGB 11.9*   HCT 36.4*   MCV 83.1        Recent Labs     07/24/25  0649      K 4.1      CO2 27   BUN 11   CREATININE 0.8     Recent Labs     07/24/25  0649   AST 22   ALT 8*   BILITOT 0.7   ALKPHOS 128     No results for input(s): \"LIPASE\", \"AMYLASE\" in the last 72 hours.  Recent Labs     07/22/25  0756 07/23/25  0642 07/24/25  0649   PROTIME 25.7* 16.9* 14.2   INR 2.39* 1.35* 1.07         Imaging  CT HEAD WO CONTRAST   Final Result      Motion degraded study with increased size of infarcts in the right basal ganglia and right frontal lobe compared to the prior study. These findings are likely related to expected evolution of the remote infarcts that are visualized on prior imaging. No    definite new or acute findings. No intracranial hemorrhage or mass effect.      Electronically signed by Richie Villanueva MD      CT ABDOMEN PELVIS WO CONTRAST Additional Contrast? None   Final Result      No significant change in bilateral hydronephrosis with bilateral ureteral stents in appropriate position.      2 5 mm stones within the distal third of the left ureter similar to prior studies.      New hyperdense material within the left intrarenal collecting system, again felt to be most likely excreted contrast material.      Bibasilar atelectasis.      Stable mild mesenteric and retroperitoneal lymphadenopathy.              Electronically signed by Richie Sullivan MD      XR CHEST PORTABLE   Final Result      Hazy bilateral airspace disease may be atelectasis, infectious or inflammatory or mild edema.      Electronically signed by Albino Lagos MD      XR ABDOMEN (KUB) (SINGLE AP VIEW)   Final Result      Nonobstructive bowel gas pattern.      Electronically signed by Alfredo Levine MD      CT CHEST WO CONTRAST   Final Result   1. Groundglass opacity with consolidation lower lobes, atelectasis or pneumonia. Follow-up to

## 2025-07-24 NOTE — PROGRESS NOTES
[facility] Medicine Progress Note  V 5.17      Date of Admission: 7/13/2025    Hospital Day: 12      Chief Admission Complaint: Generalized weakness    Subjective: Patient appears improved.  Patient alert and oriented.  Sitting up in bed.  Patient eating a little bit.  Spouse is present.    Presenting Admission History:       84 y.o. male who presented for nausea and cough. 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 Due to persistent bradycardia, a dual chamber ICD was also placed that admission. Discharged to Acute Rehab until 7/3/25. He was re-admitted at [facility] from 7/4-7/7/25 for N/V thought to be related to Nephrolithiasis and Hydronephrosis. Urology was consulted and underwent Cysto with bilateral stent exchange on 7/5/25. As he was deemed not a candidate for right Nephroureterectomy, plan was to consider percutaneous Nephrostomy tubes if stents were to fail. He was discharged home on 7/7/25 with plan for Urology follow-up to determine further management of right sided Urothelial Carcinoma. 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      He now presented back to [facility] ED on 7/13/25 with recurrent N/V along with productive cough for several days. CT scan picking up on some respiratory pathology that may indicated pneumonia. Gross hematuria noted; pt with hx of severe h/l hydronephrosis s/p R ureterostomy tube. INR supratherapeutic as well. Admited patient  noted.    Ongoing threat to life and/or bodily function without ongoing treatment due to: Pneumonia    Consults:      PHARMACY TO DOSE VANCOMYCIN  PHARMACY TO DOSE VANCOMYCIN  IP CONSULT TO PHARMACY  IP CONSULT TO UROLOGY  IP CONSULT TO PHARMACY  IP CONSULT TO GI        --------------------------------------------------      Medications:        Infusion Medications    sodium chloride       Scheduled Medications    warfarin  5 mg Oral Once    sucralfate  1 g Oral TID AC    pantoprazole  40 mg IntraVENous BID    furosemide  20 mg Oral Every Other Day    donepezil  5 mg Oral Nightly    polyethylene glycol  17 g Oral BID    sennosides-docusate sodium  2 tablet Oral BID    sodium chloride flush  5-40 mL IntraVENous 2 times per day    warfarin placeholder: dosing by pharmacy   Oral RX Placeholder    atorvastatin  40 mg Oral Nightly    allopurinol  300 mg Oral Nightly     PRN Meds: prochlorperazine, prochlorperazine, polyvinyl alcohol, sodium chloride flush, sodium chloride, acetaminophen **OR** acetaminophen, midodrine, traZODone      Physical Exam Performed:      General appearance:  No apparent distress.  Patient alert.  Sitting up in bed  Respiratory:  Normal respiratory effort without tachypnea.   Cardiovascular:  Regular Rate w/ Regular rhythm.  Abdomen:  Soft, non-tender, non-distended. Bowel sounds normal  Musculoskeletal:  No edema  Neurologic: Residual left-sided weakness and generalized weakness.  Psychiatric:  Alert and oriented    /83   Pulse 98   Temp 97.5 °F (36.4 °C) (Oral)   Resp 18   Ht 1.778 m (5' 10\")   Wt 66.5 kg (146 lb 9.7 oz)   SpO2 97%   BMI 21.04 kg/m²     Telemetry:      Personally reviewed and interpreted telemetry (Rhythm Strip) on 7/24/2025.  Patient is currently ON tele demonstrating Paced Rhythm.    Diet: ADULT DIET; Dysphagia - Soft and Bite Sized  ADULT ORAL NUTRITION SUPPLEMENT; Breakfast; Standard High Calorie/High Protein Oral Supplement    DVT Prophylaxis: Coumadin    Code

## 2025-07-24 NOTE — PROGRESS NOTES
Physical Therapy  Facility/Department: [place] [address] [facility]  Daily Treatment Note  NAME: Jhony Hdez  : 1940  MRN: [id number]    Date of Service: 2025    Discharge Recommendations:  Subacute/Skilled Nursing Facility   PT Equipment Recommendations  Equipment Needed: No    Patient Diagnosis(es): The primary encounter diagnosis was Pneumonia of both lower lobes due to infectious organism. Diagnoses of Nausea and vomiting, unspecified vomiting type, Elevated troponin, Abnormal urinalysis, Goals of care, counseling/discussion, and Nausea and vomiting in adult were also pertinent to this visit.    Assessment  Assessment: Pt seen as cotx with OT to maximize safety and functional mobility. Pt required max A x2 for bed mobility and sit<>stand from raise bed with rw. Pt was mod to min A for sitting balance at EOB. Pt session limited by pt fatigue and low BP ( see vitals). Will continue to progress mobility as pt tolerates. Recommend SNF for continued therapy as pt is unable to safely transfer to chair.  Activity Tolerance: Patient limited by fatigue;Patient limited by endurance    Plan  Physical Therapy Plan  General Plan:  (3-5x/wk in acute care)  Current Treatment Recommendations: Strengthening;ROM;Balance training;Functional mobility training;Transfer training;Endurance training;Pain management;Home exercise program;Safety education & training;Patient/Caregiver education & training;Therapeutic activities;Co-Treatment  Additional Comments: 3-4x/week in acute care    Restrictions  Restrictions/Precautions  Restrictions/Precautions: Fall Risk  Activity Level: Up with Assist  Position Activity Restriction  Other Position/Activity Restrictions: purewick     Subjective   Subjective  Subjective: Pt agrees to PT session  Pain: soreness in his bottom, unratedMonique RN made aware    Objective  Vitals  Pulse: 79  BP: 99/71  BP Location: Left upper arm  MAP (Calculated): 80  SpO2: 95 %  O2 Device: None (Room

## 2025-07-24 NOTE — PROGRESS NOTES
07/24/25 1524   Encounter Summary   Encounter Overview/Reason Spiritual/Emotional Needs   Service Provided For Patient   [facility]   Support System Spouse;Children   Last Encounter  07/24/25  ( visited and prayed with patient. DS)   Complexity of Encounter Low   Begin Time 1515   End Time  1525   Total Time Calculated 10 min   Spiritual/Emotional needs   Type Spiritual Support   Assessment/Intervention/Outcome   Assessment Calm;Coping   Intervention Active listening;Discussed belief system/Jehovah's witness practices/hayde;Discussed illness injury and it’s impact;Discussed relationship with God;Prayer (assurance of)/[place]   Outcome Comfort;Connection/Belonging;Engaged in conversation

## 2025-07-24 NOTE — PLAN OF CARE
Problem: Chronic Conditions and Co-morbidities  Goal: Patient's chronic conditions and co-morbidity symptoms are monitored and maintained or improved  7/23/2025 2223 by Francisca Choi, RN  Outcome: Progressing  Flowsheets (Taken 7/23/2025 1949)  Care Plan - Patient's Chronic Conditions and Co-Morbidity Symptoms are Monitored and Maintained or Improved: Monitor and assess patient's chronic conditions and comorbid symptoms for stability, deterioration, or improvement     Problem: Safety - Adult  Goal: Free from fall injury  7/24/2025 1121 by Maryam Elmore, RN  Outcome: Progressing  7/23/2025 2223 by Francisca Choi, RN  Outcome: Progressing

## 2025-07-24 NOTE — PROGRESS NOTES
Pharmacy Note  Warfarin Consult  Dx: AFib  Goal INR range 2-2.5  Home Warfarin dose: 2.5 mg Mon/Fri, 5 mg all other days      Date                 INR                  Warfarin  7/14                5.43                   Hold  7/15                5.77                   Hold  7/16                4.72                   Hold  7/17                4.08                   Hold   7/18                2.88                 2.5 mg   7/19                2.66                    2 mg  7/20                2.47                    2 mg  7/21                2.24                 2.5 mg    7/22                2.39                    2 mg   7/23                1.35                 2.5 mg   7/24                1.07                    5 mg     Recommend Warfarin 5 mg tonight x1.  Daily INR ordered.  Rx will continue to manage therapy per consult order.    Maureen Sheridan, PharmD 7/24/2025  7:34 AM

## 2025-07-24 NOTE — PROGRESS NOTES
Physical Therapy  Facility/Department: [place] [address] [facility]  Daily Treatment Note  NAME: Jhony Hdez  : 1940  MRN: [id number]    Date of Service: 2025    Discharge Recommendations:  Subacute/Skilled Nursing Facility   PT Equipment Recommendations  Equipment Needed: No    Patient Diagnosis(es): The primary encounter diagnosis was Pneumonia of both lower lobes due to infectious organism. Diagnoses of Nausea and vomiting, unspecified vomiting type, Elevated troponin, Abnormal urinalysis, Goals of care, counseling/discussion, and Nausea and vomiting in adult were also pertinent to this visit.    Assessment  Assessment: Pt seen as cotx with OT to maximize safety and functional mobility. Pt required max A x2 for bed mobility and sit<>stand from raise bed with rw. Pt was mod to min A for sitting balance at EOB and mod Ax2 to stand usinf rw. Pt session limited by pt fatigue and low BP ( see vitals). Will continue to progress mobility as pt tolerates. Recommend SNF for continued therapy as pt is unable to safely transfer to chair.  Activity Tolerance: Patient limited by fatigue;Patient limited by endurance    Plan  Physical Therapy Plan  General Plan:  (3-5x/wk in acute care)  Current Treatment Recommendations: Strengthening;ROM;Balance training;Functional mobility training;Transfer training;Endurance training;Pain management;Home exercise program;Safety education & training;Patient/Caregiver education & training;Therapeutic activities;Co-Treatment  Additional Comments: 3-4x/week in acute care    Restrictions  Restrictions/Precautions  Restrictions/Precautions: Fall Risk  Activity Level: Up with Assist  Position Activity Restriction  Other Position/Activity Restrictions: purewick     Subjective   Subjective  Subjective: Pt agrees to PT session  Pain: soreness in his bottom, unratedMonique RN made aware    Objective  Vitals  Pulse: 79  BP: 99/71  BP Location: Left upper arm  MAP (Calculated): 80  SpO2: 95

## 2025-07-24 NOTE — PLAN OF CARE
Problem: Chronic Conditions and Co-morbidities  Goal: Patient's chronic conditions and co-morbidity symptoms are monitored and maintained or improved  7/23/2025 2223 by Francisca Choi RN  Outcome: Progressing  Flowsheets (Taken 7/23/2025 1949)  Care Plan - Patient's Chronic Conditions and Co-Morbidity Symptoms are Monitored and Maintained or Improved: Monitor and assess patient's chronic conditions and comorbid symptoms for stability, deterioration, or improvement  7/23/2025 0903 by Elmira Forrester RN  Outcome: Progressing     Problem: Discharge Planning  Goal: Discharge to home or other facility with appropriate resources  7/23/2025 2223 by Francisca Choi RN  Outcome: Progressing  Flowsheets (Taken 7/23/2025 1949)  Discharge to home or other facility with appropriate resources: Identify barriers to discharge with patient and caregiver  7/23/2025 0903 by Elmira Forrester RN  Outcome: Progressing     Problem: Safety - Adult  Goal: Free from fall injury  7/23/2025 2223 by Francisca Choi RN  Outcome: Progressing  7/23/2025 0903 by Elmira Forrester RN  Outcome: Progressing     Problem: ABCDS Injury Assessment  Goal: Absence of physical injury  7/23/2025 2223 by Francisca Choi RN  Outcome: Progressing  7/23/2025 0903 by Elmira Forrester RN  Outcome: Progressing     Problem: Skin/Tissue Integrity  Goal: Skin integrity remains intact  7/23/2025 2223 by Francisca Choi RN  Outcome: Progressing  Flowsheets (Taken 7/23/2025 1949)  Skin Integrity Remains Intact: Monitor for areas of redness and/or skin breakdown  7/23/2025 0903 by Elmira Forrester RN  Outcome: Progressing  Flowsheets (Taken 7/23/2025 0902)  Skin Integrity Remains Intact:   Monitor for areas of redness and/or skin breakdown   Turn and reposition as indicated     Problem: Nutrition Deficit:  Goal: Optimize nutritional status  7/23/2025 0903 by Elmira Forrester RN  Outcome: Progressing     Problem: Respiratory -

## 2025-07-25 VITALS
RESPIRATION RATE: 18 BRPM | TEMPERATURE: 97.5 F | DIASTOLIC BLOOD PRESSURE: 72 MMHG | WEIGHT: 145.28 LBS | SYSTOLIC BLOOD PRESSURE: 101 MMHG | HEART RATE: 98 BPM | BODY MASS INDEX: 20.8 KG/M2 | OXYGEN SATURATION: 98 % | HEIGHT: 70 IN

## 2025-07-25 LAB
INR PPP: 1.15 (ref 0.86–1.14)
PROTHROMBIN TIME: 15 SEC (ref 12.1–14.9)

## 2025-07-25 PROCEDURE — 6370000000 HC RX 637 (ALT 250 FOR IP): Performed by: INTERNAL MEDICINE

## 2025-07-25 PROCEDURE — 6370000000 HC RX 637 (ALT 250 FOR IP): Performed by: STUDENT IN AN ORGANIZED HEALTH CARE EDUCATION/TRAINING PROGRAM

## 2025-07-25 PROCEDURE — 36415 COLL VENOUS BLD VENIPUNCTURE: CPT

## 2025-07-25 PROCEDURE — 6360000002 HC RX W HCPCS: Performed by: INTERNAL MEDICINE

## 2025-07-25 PROCEDURE — 85610 PROTHROMBIN TIME: CPT

## 2025-07-25 PROCEDURE — 2500000003 HC RX 250 WO HCPCS: Performed by: STUDENT IN AN ORGANIZED HEALTH CARE EDUCATION/TRAINING PROGRAM

## 2025-07-25 RX ORDER — WARFARIN SODIUM 5 MG/1
5 TABLET ORAL
Status: DISCONTINUED | OUTPATIENT
Start: 2025-07-25 | End: 2025-07-25 | Stop reason: HOSPADM

## 2025-07-25 RX ORDER — WARFARIN SODIUM 5 MG/1
TABLET ORAL
DISCHARGE
Start: 2025-07-25

## 2025-07-25 RX ORDER — SUCRALFATE 1 G/1
1 TABLET ORAL
DISCHARGE
Start: 2025-07-25

## 2025-07-25 RX ORDER — PANTOPRAZOLE SODIUM 40 MG/1
40 TABLET, DELAYED RELEASE ORAL
DISCHARGE
Start: 2025-07-25

## 2025-07-25 RX ORDER — PANTOPRAZOLE SODIUM 40 MG/1
40 TABLET, DELAYED RELEASE ORAL
Status: DISCONTINUED | OUTPATIENT
Start: 2025-07-25 | End: 2025-07-25 | Stop reason: HOSPADM

## 2025-07-25 RX ORDER — ENOXAPARIN SODIUM 100 MG/ML
1 INJECTION SUBCUTANEOUS 2 TIMES DAILY
DISCHARGE
Start: 2025-07-25

## 2025-07-25 RX ADMIN — SODIUM CHLORIDE, PRESERVATIVE FREE 10 ML: 5 INJECTION INTRAVENOUS at 08:46

## 2025-07-25 RX ADMIN — SUCRALFATE 1 G: 1 TABLET ORAL at 11:39

## 2025-07-25 RX ADMIN — SUCRALFATE 1 G: 1 TABLET ORAL at 06:05

## 2025-07-25 RX ADMIN — ACETAMINOPHEN 650 MG: 325 TABLET ORAL at 02:27

## 2025-07-25 RX ADMIN — MIDODRINE HYDROCHLORIDE 10 MG: 5 TABLET ORAL at 08:44

## 2025-07-25 RX ADMIN — SENNOSIDES AND DOCUSATE SODIUM 2 TABLET: 50; 8.6 TABLET ORAL at 08:46

## 2025-07-25 RX ADMIN — ENOXAPARIN SODIUM 70 MG: 100 INJECTION SUBCUTANEOUS at 08:47

## 2025-07-25 RX ADMIN — PANTOPRAZOLE SODIUM 40 MG: 40 INJECTION, POWDER, FOR SOLUTION INTRAVENOUS at 08:46

## 2025-07-25 ASSESSMENT — PAIN SCALES - WONG BAKER: WONGBAKER_NUMERICALRESPONSE: NO HURT

## 2025-07-25 ASSESSMENT — PAIN SCALES - GENERAL
PAINLEVEL_OUTOF10: 3
PAINLEVEL_OUTOF10: 2

## 2025-07-25 ASSESSMENT — PAIN DESCRIPTION - LOCATION: LOCATION: LEG

## 2025-07-25 ASSESSMENT — PAIN DESCRIPTION - DESCRIPTORS: DESCRIPTORS: DISCOMFORT

## 2025-07-25 ASSESSMENT — PAIN - FUNCTIONAL ASSESSMENT: PAIN_FUNCTIONAL_ASSESSMENT: ACTIVITIES ARE NOT PREVENTED

## 2025-07-25 NOTE — CARE COORDINATION
Case Management Discharge Summary                                 DISCHARGE Disposition: Skilled Nursing Facility (SNF)        CMS Letters:  2IMM Yes 07/25  Follow-Up copy of Important Message from Medicare (IMM2) has been explained to patient and/or designated healthcare decision maker (HCDM). Pt and/or HCDM aware that patient is permitted to stay an additional 4 hours prior to discharge to consider an appeal if they feel as though they are being discharged too soon. Patient may discharge as planned if chooses to do so.  Patient/HCDM voice no other concerns or questions regarding this process.       Transportation:  Transportation plan for discharge: Ambulance Stretcher - S  Name of Transport Company: [facility]  Phone: [phone]  Date and Time of Transport: 1500  Transport form completed: Yes    Confirmed discharge plan with:  Patient: Yes  Family/Name: wife     RN/name: Maryam    Additional CM Notes: Referred to patient for d/c to [facility].  Patient and wife notified and agreeable.  Facility notified and agreeable.  Paperwork completed.  Hens completed: Document ID : [id number]   RN to call report.  Transport arranged via [employer].  No other needs at this time.   The Patient and/or patient representative Jhony and his family were provided with a choice of provider and agrees with the discharge plan Yes  Freedom of choice list was provided with basic dialogue that supports the patient's individualized plan of care/goals and shares the quality data associated with the providers. Yes    ORTIZ Adhikari, LUZ   [facility]   Case Management Department  Ph: [phone]  Fax: [phone]

## 2025-07-25 NOTE — FLOWSHEET NOTE
07/24/25 2041   Assessment   Charting Type Shift assessment   Psychosocial   Psychosocial (WDL) X  (quiet, calm responsive, follows commands)   Patient Behaviors Flat affect   Family Behaviors Calm;Cooperative   Neurological   Level of Consciousness 1   Orientation Level Oriented to person;Oriented to situation;Oriented to place;Disoriented to time   Cognition Follows commands   Speech   (very soft)   R Pupil Size (mm) 2   R Pupil Shape Round   R Pupil Reaction Brisk   L Pupil Size (mm) 2   L Pupil Shape Round   L Pupil Reaction Brisk   R Hand  Moderate   L Hand  Weak   R Foot Dorsiflexion Moderate   L Foot Dorsiflexion Weak   R Foot Plantar Flexion Moderate   L Foot Plantar Flexion Weak   RUE Motor Response Responds to command   RUE Sensation  Full sensation   LUE Motor Response Responds to command   LUE Sensation  Full sensation   RLE Motor Response Responds to command   RLE Sensation  Full sensation   LLE Motor Response Responds to command   LLE Sensation  Full sensation   Gag Present   Cough Reflex Present   Neuro (WDL) X   [place] Coma Scale   Eye Opening 4   Best Verbal Response 5   Best Motor Response 6   [place] Coma Scale Score 15   HEENT (Head, Ears, Eyes, Nose, & Throat)   HEENT (WDL) X   Right Eye Impaired vision;Glasses   Left Eye Impaired vision;Glasses   Right Ear Impaired hearing   Left Ear Impaired hearing   Teeth Dentures upper   Respiratory   Respiratory Pattern Regular   Respiratory Depth Normal   Respiratory Quality/Effort Unlabored   Chest Assessment Chest expansion symmetrical;Trachea midline   L Breath Sounds Clear;Diminished   R Breath Sounds Clear;Diminished   Subcutaneous Air/Crepitus None   Respiratory (WDL) X   Breath Sounds   Right Upper Lobe Diminished   Right Middle Lobe Diminished   Right Lower Lobe Diminished   Left Upper Lobe Diminished   Left Lower Lobe Diminished   Cough/Sputum   Sputum How Obtained Spontaneous cough   Cough Productive   Sputum Amount Moderate

## 2025-07-25 NOTE — PROGRESS NOTES
Called [facility] to give report- left phone number for a call back, not able to take report. Ta completed and final paperwork placed in discharge packet. Pt's wife took all belongings, pt is leaving with his dentures on. Transportation is set for 3:00 PM.

## 2025-07-25 NOTE — DISCHARGE SUMMARY
Hospital Medicine Discharge Summary    Patient: Jhony Hdez   : 1940     Hospital:  [facility]  Admit Date: 2025   Discharge Date:   2025    Disposition:  SNF - [facility]   Code status:  Full  Condition at Discharge: Stable  Primary Care Provider: Jhony Valdovinos MD    Admitting Provider: Abdi Blood MD  Discharge Provider: CONG PHIPPS MD     Discharge Diagnoses:      Active Hospital Problems    Diagnosis     Gross hematuria [R31.0]     Nausea [R11.0]     Sepsis (HCC) [A41.9]     Severe malnutrition [E43]        Presenting Admission History:      84 y.o. male who presented for nausea and cough. 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 Due to persistent bradycardia, a dual chamber ICD was also placed that admission. Discharged to Acute Rehab until 7/3/25. He was re-admitted at [facility] from -25 for N/V thought to be related to Nephrolithiasis and Hydronephrosis. Urology was consulted and underwent Cysto with bilateral stent exchange on 25. As he was deemed not a candidate for right Nephroureterectomy, plan was to consider percutaneous Nephrostomy tubes if stents were to fail. He was discharged home on 25 with plan for Urology follow-up to determine further management of right sided Urothelial Carcinoma. 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      He now presented back to [facility] ED on

## 2025-07-25 NOTE — PROGRESS NOTES
Pharmacy Note  Warfarin Consult  Dx: AFib  Goal INR range 2-2.5  Home Warfarin dose: 2.5 mg Mon/Fri, 5 mg all other days      Date                 INR                  Warfarin  7/14                5.43                   Hold  7/15                5.77                   Hold  7/16                4.72                   Hold  7/17                4.08                   Hold   7/18                2.88                 2.5 mg   7/19                2.66                    2 mg  7/20                2.47                    2 mg  7/21                2.24                 2.5 mg    7/22                2.39                    2 mg   7/23                1.35                 2.5 mg   7/24                1.07                    5 mg  7/25                1.15                    5 mg     Recommend Warfarin 5 mg tonight x1.  Daily INR ordered.  Rx will continue to manage therapy per consult order.  Shima Oquendo/Gabriella. 7/25/25 7:19 AM EDT

## 2025-07-25 NOTE — PROGRESS NOTES
INPATIENT PROGRESS NOTE        IDENTIFYING DATA/REASON FOR CONSULTATION   PATIENT:  Jhony Hdez  MRN:  [id number]  ADMIT DATE: 2025  TIME OF EVALUATION: 2025 8:45 AM  HOSPITAL STAY:   LOS: 10 days   CONSULTING PHYSICIAN: Arnel Yan MD   REASON FOR CONSULTATION: Nausea, vomiting    Subjective:    Patient seen in follow-up.  Wife at bedside states patient is tolerating PO intake much better today.  No GI complaints. Patient is sleeping comfortably at the time of my visit.    MEDICATIONS   SCHEDULED:  warfarin, 5 mg, Once  sucralfate, 1 g, TID AC  enoxaparin, 1 mg/kg, BID  pantoprazole, 40 mg, BID  furosemide, 20 mg, Every Other Day  donepezil, 5 mg, Nightly  polyethylene glycol, 17 g, BID  sennosides-docusate sodium, 2 tablet, BID  sodium chloride flush, 5-40 mL, 2 times per day  warfarin placeholder: dosing by pharmacy, , RX Placeholder  atorvastatin, 40 mg, Nightly  allopurinol, 300 mg, Nightly      FLUIDS/DRIPS:     sodium chloride       PRNs: prochlorperazine, 5 mg, Q6H PRN  prochlorperazine, 10 mg, Q6H PRN  polyvinyl alcohol, 1 drop, PRN  sodium chloride flush, 5-40 mL, PRN  sodium chloride, , PRN  acetaminophen, 650 mg, Q6H PRN   Or  acetaminophen, 650 mg, Q6H PRN  midodrine, 10 mg, Q8H PRN  traZODone, 50 mg, Nightly PRN      ALLERGIES:  No Known Allergies      PHYSICAL EXAM   VITALS:  BP 96/64   Pulse 93   Temp 97.4 °F (36.3 °C) (Oral)   Resp 18   Ht 1.778 m (5' 10\")   Wt 65.9 kg (145 lb 4.5 oz)   SpO2 98%   BMI 20.85 kg/m²   TEMPERATURE:  Current - Temp: 97.4 °F (36.3 °C); Max - Temp  Av.6 °F (36.4 °C)  Min: 97.4 °F (36.3 °C)  Max: 97.9 °F (36.6 °C)    Physical Exam:  General appearance: chronically ill appearing male  Eyes: Anicteric  Head: Normocephalic, without obvious abnormality  Lungs: clear to auscultation bilaterally, Normal Effort  Heart: regular rate and rhythm, normal S1 and S2, no murmurs or rubs  Abdomen: soft, non-distended, non-tender. Bowel sounds normal. No

## 2025-07-29 ENCOUNTER — TRANSCRIBE ORDERS (OUTPATIENT)
Dept: ADMINISTRATIVE | Age: 85
End: 2025-07-29

## 2025-07-29 DIAGNOSIS — N20.1 CALCULUS OF URETER: Primary | ICD-10-CM

## 2025-08-20 ENCOUNTER — ANTI-COAG VISIT (OUTPATIENT)
Dept: PHARMACY | Age: 85
End: 2025-08-20
Payer: MEDICARE

## 2025-08-20 DIAGNOSIS — I48.0 PAF (PAROXYSMAL ATRIAL FIBRILLATION) (HCC): Primary | ICD-10-CM

## 2025-08-20 LAB
INTERNATIONAL NORMALIZATION RATIO, POC: 5
PROTHROMBIN TIME, POC: NORMAL

## 2025-08-20 PROCEDURE — 99211 OFF/OP EST MAY X REQ PHY/QHP: CPT | Performed by: PHARMACIST

## 2025-08-20 PROCEDURE — 85610 PROTHROMBIN TIME: CPT | Performed by: PHARMACIST

## 2025-08-25 ENCOUNTER — ANTI-COAG VISIT (OUTPATIENT)
Dept: PHARMACY | Age: 85
End: 2025-08-25
Payer: MEDICARE

## 2025-08-25 DIAGNOSIS — I48.0 PAF (PAROXYSMAL ATRIAL FIBRILLATION) (HCC): Primary | ICD-10-CM

## 2025-08-25 LAB
INTERNATIONAL NORMALIZATION RATIO, POC: 3.7
PROTHROMBIN TIME, POC: NORMAL

## 2025-08-25 PROCEDURE — 85610 PROTHROMBIN TIME: CPT | Performed by: PHARMACIST

## 2025-08-25 PROCEDURE — 99211 OFF/OP EST MAY X REQ PHY/QHP: CPT | Performed by: PHARMACIST

## 2025-09-03 ENCOUNTER — ANTI-COAG VISIT (OUTPATIENT)
Dept: PHARMACY | Age: 85
End: 2025-09-03
Payer: MEDICARE

## 2025-09-03 ENCOUNTER — HOSPITAL ENCOUNTER (OUTPATIENT)
Dept: ULTRASOUND IMAGING | Age: 85
Discharge: HOME | End: 2025-09-03
Attending: UROLOGY
Payer: MEDICARE

## 2025-09-03 DIAGNOSIS — I48.0 PAF (PAROXYSMAL ATRIAL FIBRILLATION) (HCC): Primary | ICD-10-CM

## 2025-09-03 DIAGNOSIS — N20.1 CALCULUS OF URETER: ICD-10-CM

## 2025-09-03 LAB
INTERNATIONAL NORMALIZATION RATIO, POC: 2.9
PROTHROMBIN TIME, POC: NORMAL

## 2025-09-03 PROCEDURE — 76770 US EXAM ABDO BACK WALL COMP: CPT

## 2025-09-03 PROCEDURE — 99211 OFF/OP EST MAY X REQ PHY/QHP: CPT | Performed by: PHARMACIST

## 2025-09-03 PROCEDURE — 85610 PROTHROMBIN TIME: CPT | Performed by: PHARMACIST

## (undated) DEVICE — PATIENT CARE KIT EYE POST OP

## (undated) DEVICE — GLOVE ORANGE PI 7   MSG9070

## (undated) DEVICE — CANISTER SUCT 1200ML W/ INTERMED TBNG RIPTIDE

## (undated) DEVICE — Device

## (undated) DEVICE — GLOVE SURG SZ 65 L12IN FNGR THK94MIL STD WHT LTX FREE

## (undated) DEVICE — GUIDEWIRE ENDOSCP L150CM DIA0.035IN TIP 3CM PTFE NIT

## (undated) DEVICE — INTRODUCER SPLIT SHEATH PRELUDE SNAP 9FR X 13CM

## (undated) DEVICE — AIRLIFE™ NASAL OXYGEN CANNULA CURVED, FLARED TIP, WITH 7 FEET (2.1 M) CRUSH RESISTANT TUBING, OVER-THE-EAR STYLE: Brand: AIRLIFE™

## (undated) DEVICE — URETEROSCOPE RIGID ASPIR SYS STRL DISP CVAC  MIN ORDER 2BX

## (undated) DEVICE — NITINOL STONE RETRIEVAL BASKET: Brand: ZERO TIP

## (undated) DEVICE — GUIDEWIRE URO L145CM DIA0.035IN TIP L3.5CM PTFE FLX AMPLATZ

## (undated) DEVICE — SOLUTION ST WATER 1500ML W/H

## (undated) DEVICE — INTRODUCER SHTH L13CM OD7FR SH ORNG HUB SEAMLESS SAFSHTH

## (undated) DEVICE — OPEN-END FLEXI-TIP URETERAL CATHETER: Brand: FLEXI-TIP

## (undated) DEVICE — CATHETER GUID STR 038 8 FRX90 CM 6 FRX125 CM SIM BMX 96 SEL

## (undated) DEVICE — MHCZ CYSTO: Brand: MEDLINE INDUSTRIES, INC.

## (undated) DEVICE — SET GRAV VENT NVENT CK VLV 3 NDL FREE PRT 10 GTT

## (undated) DEVICE — ZOOM 035 158 CM RC: Brand: ZOOM™ REPERFUSION CATHETER

## (undated) DEVICE — 3M™ TEGADERM™ TRANSPARENT FILM DRESSING FRAME STYLE, 1624W, 2-3/8 IN X 2-3/4 IN (6 CM X 7 CM), 100/CT 4CT/CASE: Brand: 3M™ TEGADERM™

## (undated) DEVICE — MICROCATHETER: Brand: HEADWAY

## (undated) DEVICE — CONMED SCOPE SAVER BITE BLOCK, 20X27 MM: Brand: SCOPE SAVER

## (undated) DEVICE — PROTECTION STATION PLUS - HYBRID PG: Brand: NAMIC

## (undated) DEVICE — CANNULA NSL 13FT TUBE AD ETCO2 DIV SAMP M

## (undated) DEVICE — ELECTRODE ECG MONITR FOAM TEAR DROP ADLT RED

## (undated) DEVICE — DRAINBAG,ANTI-REFLUX TOWER,L/F,2000ML,LL: Brand: MEDLINE

## (undated) DEVICE — SURGICAL PROCEDURE PACK EYE ANDRSN

## (undated) DEVICE — GLOVE,SURG,SENSICARE,ALOE,LF,PF,7: Brand: MEDLINE

## (undated) DEVICE — 3M(TM) TRANSPORE SURGICAL TAPE 1527-1: Brand: 3M™ TRANSPORE™

## (undated) DEVICE — GARMENT,MEDLINE,DVT,INT,CALF,MED, GEN2: Brand: MEDLINE

## (undated) DEVICE — STENT: Brand: LVIS

## (undated) DEVICE — FORCEPS BX L240CM JAW DIA2.8MM L CAP W/ NDL MIC MESH TOOTH

## (undated) DEVICE — GOWN,SIRUS,POLYRNF,BRTHSLV,XL,30/CS: Brand: MEDLINE

## (undated) DEVICE — CYSTO: Brand: MEDLINE INDUSTRIES, INC.

## (undated) DEVICE — CLEARCUT® SLIT KNIFE INTREPID MICRO-COAXIAL SYSTEM 2.2 SB: Brand: CLEARCUT®; INTREPID

## (undated) DEVICE — SOLUTION IRRIGATION STRL H2O 1000 ML UROMATIC CONTAINER

## (undated) DEVICE — NEEDLE HYPO 18GA L1IN PNK POLYPR HUB S STL REG BVL STR W/O

## (undated) DEVICE — TOWEL,STOP FLAG GOLD N-W: Brand: MEDLINE

## (undated) DEVICE — PENCIL SMK EVAC L10FT TBNG NONSTICK ESU BLDE PLUMEPEN ELITE

## (undated) DEVICE — MASK CAPNOGRAPHY AD W35IN DIA58IN SAMP LN L10FT O2 LN

## (undated) DEVICE — ZOOM 071 137 CM RC: Brand: ZOOM™ REPERFUSION CATHETER

## (undated) DEVICE — CATHETER URETH 18FR BLLN 5CC SIL HYDRGEL 2 W F SPEC CARS M

## (undated) DEVICE — CATHETER URET 5FR L70CM OPN END SGL LUMN INJ HUB FLEXIMA

## (undated) DEVICE — SOL IRR SOD CHL 0.9% TITAN XL CNTNR 3000ML

## (undated) DEVICE — BAG URIN STRL FOR URO CTCH SYS

## (undated) DEVICE — SET ADMIN PRIMING 7ML L30IN 7.35LB 20 GTT 2ND RLER CLMP

## (undated) DEVICE — URETERAL ACCESS SHEATH SET: Brand: NAVIGATOR HD

## (undated) DEVICE — CATHETER URETH 20FR BLLN 5CC F 2 W SPEC M OLV COUDE TIP

## (undated) DEVICE — PAD, DEFIB, ADULT, RADIOTRANS, PHYSIO: Brand: MEDLINE

## (undated) DEVICE — GLOVE SURG SZ 8 L12IN FNGR THK94MIL STD WHT LTX FREE

## (undated) DEVICE — KIT MIC INTRO OD5FR NDL L7CM OD21GA GWIRE L40CM OD0.018IN

## (undated) DEVICE — SOLUTION IV 1000ML LAC RINGERS PH 6.5 INJ USP VIAFLX PLAS

## (undated) DEVICE — TOWEL,OR,DSP,ST,BLUE,STD,4/PK,20PK/CS: Brand: MEDLINE

## (undated) DEVICE — SYRINGE TB 1ML NDL 27GA L0.5IN PLAS SLIP TIP CONVENTIONAL

## (undated) DEVICE — CATHETER F BLLN 5CC 18FR 2 W HYDRGEL COAT LESS TRAUM LUB

## (undated) DEVICE — ELECTRODE,ECG,STRESS,FOAM,3PK: Brand: MEDLINE

## (undated) DEVICE — PACEMAKER PACK: Brand: MEDLINE INDUSTRIES, INC.

## (undated) DEVICE — SILICONE I/A TIP STRAIGHT: Brand: ALCON

## (undated) DEVICE — SET,IRRIGATION,CYSTO,Y-TYPE,90": Brand: MEDLINE

## (undated) DEVICE — ENDOSCOPIC KIT 2 12 FT OP4 DE2 GWN SYR

## (undated) DEVICE — CATHETER IV 20GA L1.25IN PNK FEP SFTY STR HUB RADPQ DISP

## (undated) DEVICE — GUIDEWIRES: Brand: TRAXCESS EX GUIDEWIRE

## (undated) DEVICE — RADIFOCUS GLIDEWIRE: Brand: GLIDEWIRE

## (undated) DEVICE — TUBING, SUCTION, 3/16" X 10', STRAIGHT: Brand: MEDLINE

## (undated) DEVICE — CATH LAB PACK: Brand: MEDLINE INDUSTRIES, INC.